# Patient Record
Sex: FEMALE | Race: WHITE | Employment: FULL TIME | URBAN - METROPOLITAN AREA
[De-identification: names, ages, dates, MRNs, and addresses within clinical notes are randomized per-mention and may not be internally consistent; named-entity substitution may affect disease eponyms.]

---

## 2017-02-06 ENCOUNTER — APPOINTMENT (OUTPATIENT)
Dept: GENERAL RADIOLOGY | Age: 57
End: 2017-02-06
Payer: COMMERCIAL

## 2017-02-06 ENCOUNTER — HOSPITAL ENCOUNTER (OUTPATIENT)
Age: 57
Setting detail: OBSERVATION
Discharge: HOME | End: 2017-02-07
Attending: EMERGENCY MEDICINE | Admitting: EMERGENCY MEDICINE
Payer: COMMERCIAL

## 2017-02-06 DIAGNOSIS — R79.1 SUPRATHERAPEUTIC INR: ICD-10-CM

## 2017-02-06 DIAGNOSIS — R06.09 OTHER FORM OF DYSPNEA: Primary | ICD-10-CM

## 2017-02-06 LAB
ABSOLUTE EOS #: 0.1 K/UL (ref 0–0.4)
ABSOLUTE LYMPH #: 1.5 K/UL (ref 1–4.8)
ABSOLUTE MONO #: 0.6 K/UL (ref 0.1–1.2)
ANION GAP SERPL CALCULATED.3IONS-SCNC: 13 MMOL/L (ref 9–17)
BASOPHILS # BLD: 0 % (ref 0–2)
BASOPHILS ABSOLUTE: 0 K/UL (ref 0–0.2)
BNP INTERPRETATION: ABNORMAL
BUN BLDV-MCNC: 10 MG/DL (ref 6–20)
BUN/CREAT BLD: ABNORMAL (ref 9–20)
CALCIUM SERPL-MCNC: 8.9 MG/DL (ref 8.6–10.4)
CHLORIDE BLD-SCNC: 98 MMOL/L (ref 98–107)
CO2: 27 MMOL/L (ref 20–31)
CREAT SERPL-MCNC: 0.57 MG/DL (ref 0.5–0.9)
DIFFERENTIAL TYPE: ABNORMAL
EOSINOPHILS RELATIVE PERCENT: 2 % (ref 1–4)
GFR AFRICAN AMERICAN: >60 ML/MIN
GFR NON-AFRICAN AMERICAN: >60 ML/MIN
GFR SERPL CREATININE-BSD FRML MDRD: ABNORMAL ML/MIN/{1.73_M2}
GFR SERPL CREATININE-BSD FRML MDRD: ABNORMAL ML/MIN/{1.73_M2}
GLUCOSE BLD-MCNC: 119 MG/DL (ref 70–99)
HCT VFR BLD CALC: 45 % (ref 36–46)
HEMOGLOBIN: 15.1 G/DL (ref 12–16)
INR BLD: 3.3
LYMPHOCYTES # BLD: 18 % (ref 24–44)
MAGNESIUM: 1.9 MG/DL (ref 1.6–2.6)
MCH RBC QN AUTO: 31.1 PG (ref 26–34)
MCHC RBC AUTO-ENTMCNC: 33.5 G/DL (ref 31–37)
MCV RBC AUTO: 93 FL (ref 80–100)
MONOCYTES # BLD: 8 % (ref 2–11)
PDW BLD-RTO: 15.1 % (ref 12.5–15.4)
PLATELET # BLD: 243 K/UL (ref 140–450)
PLATELET ESTIMATE: ABNORMAL
PMV BLD AUTO: 8.4 FL (ref 6–12)
POC TROPONIN I: 0.02 NG/ML (ref 0–0.1)
POC TROPONIN I: 0.03 NG/ML (ref 0–0.1)
POC TROPONIN INTERP: NORMAL
POC TROPONIN INTERP: NORMAL
POTASSIUM SERPL-SCNC: 4.1 MMOL/L (ref 3.7–5.3)
PRO-BNP: 1036 PG/ML
PROTHROMBIN TIME: 34.8 SEC (ref 9.4–12.6)
RBC # BLD: 4.83 M/UL (ref 4–5.2)
RBC # BLD: ABNORMAL 10*6/UL
SEG NEUTROPHILS: 72 % (ref 36–66)
SEGMENTED NEUTROPHILS ABSOLUTE COUNT: 5.8 K/UL (ref 1.8–7.7)
SODIUM BLD-SCNC: 138 MMOL/L (ref 135–144)
WBC # BLD: 8.1 K/UL (ref 3.5–11)
WBC # BLD: ABNORMAL 10*3/UL

## 2017-02-06 PROCEDURE — G0378 HOSPITAL OBSERVATION PER HR: HCPCS

## 2017-02-06 PROCEDURE — 6370000000 HC RX 637 (ALT 250 FOR IP): Performed by: EMERGENCY MEDICINE

## 2017-02-06 PROCEDURE — 84484 ASSAY OF TROPONIN QUANT: CPT

## 2017-02-06 PROCEDURE — 83735 ASSAY OF MAGNESIUM: CPT

## 2017-02-06 PROCEDURE — 71020 XR CHEST STANDARD TWO VW: CPT | Performed by: RADIOLOGY

## 2017-02-06 PROCEDURE — 83880 ASSAY OF NATRIURETIC PEPTIDE: CPT

## 2017-02-06 PROCEDURE — 85610 PROTHROMBIN TIME: CPT

## 2017-02-06 PROCEDURE — 80048 BASIC METABOLIC PNL TOTAL CA: CPT

## 2017-02-06 PROCEDURE — 2580000003 HC RX 258: Performed by: EMERGENCY MEDICINE

## 2017-02-06 PROCEDURE — 85025 COMPLETE CBC W/AUTO DIFF WBC: CPT

## 2017-02-06 PROCEDURE — 99285 EMERGENCY DEPT VISIT HI MDM: CPT

## 2017-02-06 PROCEDURE — 71020 XR CHEST STANDARD TWO VW: CPT

## 2017-02-06 PROCEDURE — 93005 ELECTROCARDIOGRAM TRACING: CPT

## 2017-02-06 RX ORDER — SODIUM CHLORIDE 0.9 % (FLUSH) 0.9 %
10 SYRINGE (ML) INJECTION PRN
Status: DISCONTINUED | OUTPATIENT
Start: 2017-02-06 | End: 2017-02-07 | Stop reason: HOSPADM

## 2017-02-06 RX ORDER — METOPROLOL TARTRATE 50 MG/1
50 TABLET, FILM COATED ORAL 2 TIMES DAILY
Status: DISCONTINUED | OUTPATIENT
Start: 2017-02-06 | End: 2017-02-07 | Stop reason: HOSPADM

## 2017-02-06 RX ORDER — NIACIN 500 MG/1
1000 TABLET, EXTENDED RELEASE ORAL NIGHTLY
Status: DISCONTINUED | OUTPATIENT
Start: 2017-02-06 | End: 2017-02-07 | Stop reason: HOSPADM

## 2017-02-06 RX ORDER — SOTALOL HYDROCHLORIDE 120 MG/1
120 TABLET ORAL 2 TIMES DAILY
Status: DISCONTINUED | OUTPATIENT
Start: 2017-02-06 | End: 2017-02-07 | Stop reason: HOSPADM

## 2017-02-06 RX ORDER — ASPIRIN 81 MG/1
81 TABLET, CHEWABLE ORAL 2 TIMES DAILY
Status: DISCONTINUED | OUTPATIENT
Start: 2017-02-06 | End: 2017-02-07 | Stop reason: HOSPADM

## 2017-02-06 RX ORDER — HYDROCHLOROTHIAZIDE 25 MG/1
12.5 TABLET ORAL DAILY
Status: DISCONTINUED | OUTPATIENT
Start: 2017-02-06 | End: 2017-02-07 | Stop reason: HOSPADM

## 2017-02-06 RX ORDER — CLOPIDOGREL BISULFATE 75 MG/1
75 TABLET ORAL DAILY
Status: DISCONTINUED | OUTPATIENT
Start: 2017-02-06 | End: 2017-02-07 | Stop reason: HOSPADM

## 2017-02-06 RX ORDER — SODIUM CHLORIDE 0.9 % (FLUSH) 0.9 %
10 SYRINGE (ML) INJECTION EVERY 12 HOURS SCHEDULED
Status: DISCONTINUED | OUTPATIENT
Start: 2017-02-06 | End: 2017-02-07 | Stop reason: HOSPADM

## 2017-02-06 RX ORDER — LOSARTAN POTASSIUM 50 MG/1
50 TABLET ORAL DAILY
Status: DISCONTINUED | OUTPATIENT
Start: 2017-02-06 | End: 2017-02-07 | Stop reason: HOSPADM

## 2017-02-06 RX ORDER — ATORVASTATIN CALCIUM 40 MG/1
40 TABLET, FILM COATED ORAL NIGHTLY
Status: DISCONTINUED | OUTPATIENT
Start: 2017-02-06 | End: 2017-02-07 | Stop reason: HOSPADM

## 2017-02-06 RX ORDER — ASPIRIN 81 MG/1
243 TABLET, CHEWABLE ORAL ONCE
Status: COMPLETED | OUTPATIENT
Start: 2017-02-06 | End: 2017-02-06

## 2017-02-06 RX ORDER — ACETAMINOPHEN 325 MG/1
650 TABLET ORAL EVERY 4 HOURS PRN
Status: DISCONTINUED | OUTPATIENT
Start: 2017-02-06 | End: 2017-02-07 | Stop reason: HOSPADM

## 2017-02-06 RX ORDER — SODIUM CHLORIDE 9 MG/ML
INJECTION, SOLUTION INTRAVENOUS CONTINUOUS
Status: DISCONTINUED | OUTPATIENT
Start: 2017-02-07 | End: 2017-02-07 | Stop reason: HOSPADM

## 2017-02-06 RX ADMIN — Medication 10 ML: at 22:22

## 2017-02-06 RX ADMIN — NIACIN 1000 MG: 500 TABLET, FILM COATED, EXTENDED RELEASE ORAL at 22:22

## 2017-02-06 RX ADMIN — ATORVASTATIN CALCIUM 40 MG: 40 TABLET, FILM COATED ORAL at 22:22

## 2017-02-06 RX ADMIN — METOPROLOL TARTRATE 50 MG: 50 TABLET, FILM COATED ORAL at 22:22

## 2017-02-06 RX ADMIN — SOTALOL HYDROCHLORIDE 120 MG: 120 TABLET ORAL at 22:22

## 2017-02-06 RX ADMIN — ASPIRIN 243 MG: 81 TABLET ORAL at 15:54

## 2017-02-06 ASSESSMENT — ENCOUNTER SYMPTOMS
WHEEZING: 0
BACK PAIN: 0
VOMITING: 0
SHORTNESS OF BREATH: 1
COUGH: 0
SORE THROAT: 0
ABDOMINAL PAIN: 0
NAUSEA: 0

## 2017-02-07 VITALS
HEIGHT: 63 IN | WEIGHT: 235 LBS | HEART RATE: 55 BPM | RESPIRATION RATE: 17 BRPM | SYSTOLIC BLOOD PRESSURE: 142 MMHG | BODY MASS INDEX: 41.64 KG/M2 | OXYGEN SATURATION: 96 % | TEMPERATURE: 97.7 F | DIASTOLIC BLOOD PRESSURE: 94 MMHG

## 2017-02-07 LAB
D-DIMER QUANTITATIVE: 0.17 MG/L FEU
EKG ATRIAL RATE: 55 BPM
EKG ATRIAL RATE: 58 BPM
EKG ATRIAL RATE: 61 BPM
EKG P AXIS: 46 DEGREES
EKG P AXIS: 48 DEGREES
EKG P AXIS: 52 DEGREES
EKG P-R INTERVAL: 152 MS
EKG P-R INTERVAL: 162 MS
EKG P-R INTERVAL: 168 MS
EKG Q-T INTERVAL: 446 MS
EKG Q-T INTERVAL: 464 MS
EKG Q-T INTERVAL: 474 MS
EKG QRS DURATION: 72 MS
EKG QRS DURATION: 72 MS
EKG QRS DURATION: 74 MS
EKG QTC CALCULATION (BAZETT): 443 MS
EKG QTC CALCULATION (BAZETT): 448 MS
EKG QTC CALCULATION (BAZETT): 465 MS
EKG R AXIS: -4 DEGREES
EKG R AXIS: -8 DEGREES
EKG R AXIS: 18 DEGREES
EKG T AXIS: -106 DEGREES
EKG T AXIS: -106 DEGREES
EKG T AXIS: -150 DEGREES
EKG VENTRICULAR RATE: 55 BPM
EKG VENTRICULAR RATE: 58 BPM
EKG VENTRICULAR RATE: 61 BPM
INR BLD: 2.1
PROTHROMBIN TIME: 22.3 SEC (ref 9.4–12.6)

## 2017-02-07 PROCEDURE — 6370000000 HC RX 637 (ALT 250 FOR IP): Performed by: EMERGENCY MEDICINE

## 2017-02-07 PROCEDURE — 36415 COLL VENOUS BLD VENIPUNCTURE: CPT

## 2017-02-07 PROCEDURE — 85610 PROTHROMBIN TIME: CPT

## 2017-02-07 PROCEDURE — 2580000003 HC RX 258: Performed by: EMERGENCY MEDICINE

## 2017-02-07 PROCEDURE — 85379 FIBRIN DEGRADATION QUANT: CPT

## 2017-02-07 PROCEDURE — 93308 TTE F-UP OR LMTD: CPT | Performed by: EMERGENCY MEDICINE

## 2017-02-07 PROCEDURE — 93005 ELECTROCARDIOGRAM TRACING: CPT

## 2017-02-07 PROCEDURE — G0378 HOSPITAL OBSERVATION PER HR: HCPCS

## 2017-02-07 RX ORDER — FUROSEMIDE 10 MG/ML
40 INJECTION INTRAMUSCULAR; INTRAVENOUS 2 TIMES DAILY
Status: DISCONTINUED | OUTPATIENT
Start: 2017-02-07 | End: 2017-02-07 | Stop reason: HOSPADM

## 2017-02-07 RX ORDER — WARFARIN SODIUM 5 MG/1
5 TABLET ORAL
Status: DISCONTINUED | OUTPATIENT
Start: 2017-02-07 | End: 2017-02-07 | Stop reason: DRUGHIGH

## 2017-02-07 RX ORDER — WARFARIN SODIUM 2.5 MG/1
2.5 TABLET ORAL
Status: DISCONTINUED | OUTPATIENT
Start: 2017-02-07 | End: 2017-02-07 | Stop reason: HOSPADM

## 2017-02-07 RX ORDER — WARFARIN SODIUM 5 MG/1
5 TABLET ORAL
Status: DISCONTINUED | OUTPATIENT
Start: 2017-02-08 | End: 2017-02-07 | Stop reason: HOSPADM

## 2017-02-07 RX ADMIN — METOPROLOL TARTRATE 50 MG: 50 TABLET, FILM COATED ORAL at 10:37

## 2017-02-07 RX ADMIN — SOTALOL HYDROCHLORIDE 120 MG: 120 TABLET ORAL at 10:37

## 2017-02-07 RX ADMIN — ASPIRIN 81 MG: 81 TABLET, CHEWABLE ORAL at 10:37

## 2017-02-07 RX ADMIN — HYDROCHLOROTHIAZIDE 12.5 MG: 25 TABLET ORAL at 10:37

## 2017-02-07 RX ADMIN — LOSARTAN POTASSIUM 50 MG: 50 TABLET, FILM COATED ORAL at 10:37

## 2017-02-07 RX ADMIN — Medication 10 ML: at 10:38

## 2017-02-07 RX ADMIN — CLOPIDOGREL 75 MG: 75 TABLET, FILM COATED ORAL at 10:37

## 2017-02-16 ENCOUNTER — HOSPITAL ENCOUNTER (OUTPATIENT)
Age: 57
Setting detail: SPECIMEN
Discharge: HOME | End: 2017-02-16
Payer: COMMERCIAL

## 2017-02-16 LAB
ANION GAP SERPL CALCULATED.3IONS-SCNC: 15 MMOL/L (ref 9–17)
BUN BLDV-MCNC: 16 MG/DL (ref 6–20)
BUN/CREAT BLD: ABNORMAL (ref 9–20)
CALCIUM SERPL-MCNC: 9.2 MG/DL (ref 8.6–10.4)
CHLORIDE BLD-SCNC: 96 MMOL/L (ref 98–107)
CO2: 30 MMOL/L (ref 20–31)
CREAT SERPL-MCNC: 1.1 MG/DL (ref 0.5–0.9)
GFR AFRICAN AMERICAN: >60 ML/MIN
GFR NON-AFRICAN AMERICAN: 52 ML/MIN
GFR SERPL CREATININE-BSD FRML MDRD: ABNORMAL ML/MIN/{1.73_M2}
GFR SERPL CREATININE-BSD FRML MDRD: ABNORMAL ML/MIN/{1.73_M2}
GLUCOSE BLD-MCNC: 118 MG/DL (ref 70–99)
INR BLD: 3.4
POTASSIUM SERPL-SCNC: 3.5 MMOL/L (ref 3.7–5.3)
PROTHROMBIN TIME: 36.3 SEC (ref 9.4–12.6)
SODIUM BLD-SCNC: 141 MMOL/L (ref 135–144)

## 2017-02-17 ENCOUNTER — OFFICE VISIT (OUTPATIENT)
Dept: FAMILY MEDICINE CLINIC | Facility: CLINIC | Age: 57
End: 2017-02-17

## 2017-02-17 VITALS
SYSTOLIC BLOOD PRESSURE: 136 MMHG | WEIGHT: 229 LBS | HEART RATE: 66 BPM | BODY MASS INDEX: 39.09 KG/M2 | OXYGEN SATURATION: 97 % | DIASTOLIC BLOOD PRESSURE: 87 MMHG | HEIGHT: 64 IN

## 2017-02-17 DIAGNOSIS — I48.91 RAPID ATRIAL FIBRILLATION (HCC): ICD-10-CM

## 2017-02-17 DIAGNOSIS — Z12.39 SCREENING FOR BREAST CANCER: ICD-10-CM

## 2017-02-17 DIAGNOSIS — E78.5 HYPERLIPIDEMIA, UNSPECIFIED HYPERLIPIDEMIA TYPE: ICD-10-CM

## 2017-02-17 DIAGNOSIS — I10 ESSENTIAL HYPERTENSION: ICD-10-CM

## 2017-02-17 DIAGNOSIS — Z00.00 WELL ADULT EXAM: Primary | ICD-10-CM

## 2017-02-17 DIAGNOSIS — Z12.11 SCREENING FOR COLON CANCER: ICD-10-CM

## 2017-02-17 DIAGNOSIS — I25.10 CORONARY ARTERY DISEASE INVOLVING NATIVE HEART WITHOUT ANGINA PECTORIS, UNSPECIFIED VESSEL OR LESION TYPE: ICD-10-CM

## 2017-02-17 PROCEDURE — 99396 PREV VISIT EST AGE 40-64: CPT | Performed by: PHYSICIAN ASSISTANT

## 2017-02-17 ASSESSMENT — ENCOUNTER SYMPTOMS
TROUBLE SWALLOWING: 0
EYE DISCHARGE: 0
WHEEZING: 0
ABDOMINAL PAIN: 0
COLOR CHANGE: 0
VOICE CHANGE: 0
RHINORRHEA: 0
NAUSEA: 0
VOMITING: 0
SHORTNESS OF BREATH: 0
CONSTIPATION: 0
SINUS PRESSURE: 0
CHEST TIGHTNESS: 0
DIARRHEA: 0
EYE PAIN: 0
SORE THROAT: 0
COUGH: 0

## 2017-02-23 ENCOUNTER — HOSPITAL ENCOUNTER (OUTPATIENT)
Age: 57
Setting detail: SPECIMEN
Discharge: HOME | End: 2017-02-23
Payer: COMMERCIAL

## 2017-02-23 DIAGNOSIS — Z00.00 WELL ADULT EXAM: ICD-10-CM

## 2017-02-23 LAB
ABSOLUTE EOS #: 0.2 K/UL (ref 0–0.4)
ABSOLUTE LYMPH #: 1.6 K/UL (ref 1–4.8)
ABSOLUTE MONO #: 0.5 K/UL (ref 0.1–1.2)
ALBUMIN SERPL-MCNC: 3.9 G/DL (ref 3.5–5.2)
ALBUMIN/GLOBULIN RATIO: 1.4 (ref 1–2.5)
ALP BLD-CCNC: 74 U/L (ref 35–104)
ALT SERPL-CCNC: 31 U/L (ref 5–33)
ANION GAP SERPL CALCULATED.3IONS-SCNC: 13 MMOL/L (ref 9–17)
AST SERPL-CCNC: 29 U/L
BASOPHILS # BLD: 1 % (ref 0–2)
BASOPHILS ABSOLUTE: 0.1 K/UL (ref 0–0.2)
BILIRUB SERPL-MCNC: 0.44 MG/DL (ref 0.3–1.2)
BUN BLDV-MCNC: 23 MG/DL (ref 6–20)
BUN/CREAT BLD: ABNORMAL (ref 9–20)
CALCIUM SERPL-MCNC: 9.4 MG/DL (ref 8.6–10.4)
CHLORIDE BLD-SCNC: 97 MMOL/L (ref 98–107)
CHOLESTEROL/HDL RATIO: 4.1
CHOLESTEROL: 142 MG/DL
CO2: 31 MMOL/L (ref 20–31)
CREAT SERPL-MCNC: 0.94 MG/DL (ref 0.5–0.9)
DIFFERENTIAL TYPE: ABNORMAL
EOSINOPHILS RELATIVE PERCENT: 2 % (ref 1–4)
GFR AFRICAN AMERICAN: >60 ML/MIN
GFR NON-AFRICAN AMERICAN: >60 ML/MIN
GFR SERPL CREATININE-BSD FRML MDRD: ABNORMAL ML/MIN/{1.73_M2}
GFR SERPL CREATININE-BSD FRML MDRD: ABNORMAL ML/MIN/{1.73_M2}
GLUCOSE BLD-MCNC: 143 MG/DL (ref 70–99)
HCT VFR BLD CALC: 49.9 % (ref 36–46)
HDLC SERPL-MCNC: 35 MG/DL
HEMOGLOBIN: 16.3 G/DL (ref 12–16)
LDL CHOLESTEROL: 55 MG/DL (ref 0–130)
LYMPHOCYTES # BLD: 20 % (ref 24–44)
MCH RBC QN AUTO: 30.9 PG (ref 26–34)
MCHC RBC AUTO-ENTMCNC: 32.6 G/DL (ref 31–37)
MCV RBC AUTO: 94.6 FL (ref 80–100)
MONOCYTES # BLD: 6 % (ref 2–11)
PDW BLD-RTO: 15.1 % (ref 12.5–15.4)
PLATELET # BLD: 278 K/UL (ref 140–450)
PLATELET ESTIMATE: ABNORMAL
PMV BLD AUTO: 9.1 FL (ref 6–12)
POTASSIUM SERPL-SCNC: 3.8 MMOL/L (ref 3.7–5.3)
RBC # BLD: 5.28 M/UL (ref 4–5.2)
RBC # BLD: ABNORMAL 10*6/UL
SEG NEUTROPHILS: 71 % (ref 36–66)
SEGMENTED NEUTROPHILS ABSOLUTE COUNT: 5.8 K/UL (ref 1.8–7.7)
SODIUM BLD-SCNC: 141 MMOL/L (ref 135–144)
TOTAL PROTEIN: 6.7 G/DL (ref 6.4–8.3)
TRIGL SERPL-MCNC: 262 MG/DL
TSH SERPL DL<=0.05 MIU/L-ACNC: 3.01 MIU/L (ref 0.3–5)
VLDLC SERPL CALC-MCNC: ABNORMAL MG/DL (ref 1–30)
WBC # BLD: 8.1 K/UL (ref 3.5–11)
WBC # BLD: ABNORMAL 10*3/UL

## 2017-02-24 LAB
INR BLD: 4
PROTHROMBIN TIME: 42.3 SEC (ref 9.4–12.6)

## 2017-02-27 ENCOUNTER — HOSPITAL ENCOUNTER (OUTPATIENT)
Dept: MAMMOGRAPHY | Age: 57
Discharge: HOME | End: 2017-02-27
Payer: COMMERCIAL

## 2017-02-27 DIAGNOSIS — Z12.39 SCREENING FOR BREAST CANCER: ICD-10-CM

## 2017-02-27 PROCEDURE — G0202 SCR MAMMO BI INCL CAD: HCPCS

## 2017-02-27 PROCEDURE — 77067 SCR MAMMO BI INCL CAD: CPT | Performed by: RADIOLOGY

## 2017-02-28 ENCOUNTER — OFFICE VISIT (OUTPATIENT)
Dept: FAMILY MEDICINE CLINIC | Facility: CLINIC | Age: 57
End: 2017-02-28

## 2017-02-28 VITALS
SYSTOLIC BLOOD PRESSURE: 136 MMHG | DIASTOLIC BLOOD PRESSURE: 88 MMHG | HEIGHT: 64 IN | HEART RATE: 74 BPM | BODY MASS INDEX: 40.12 KG/M2 | OXYGEN SATURATION: 97 % | WEIGHT: 235 LBS

## 2017-02-28 DIAGNOSIS — E78.5 HYPERLIPIDEMIA, UNSPECIFIED HYPERLIPIDEMIA TYPE: ICD-10-CM

## 2017-02-28 DIAGNOSIS — R73.9 HYPERGLYCEMIA: Primary | ICD-10-CM

## 2017-02-28 DIAGNOSIS — Z23 NEED FOR TETANUS BOOSTER: ICD-10-CM

## 2017-02-28 DIAGNOSIS — I10 ESSENTIAL HYPERTENSION: ICD-10-CM

## 2017-02-28 LAB — HBA1C MFR BLD: 4.6 %

## 2017-02-28 PROCEDURE — 90471 IMMUNIZATION ADMIN: CPT | Performed by: PHYSICIAN ASSISTANT

## 2017-02-28 PROCEDURE — 99214 OFFICE O/P EST MOD 30 MIN: CPT | Performed by: PHYSICIAN ASSISTANT

## 2017-02-28 PROCEDURE — 83036 HEMOGLOBIN GLYCOSYLATED A1C: CPT | Performed by: PHYSICIAN ASSISTANT

## 2017-02-28 PROCEDURE — 90715 TDAP VACCINE 7 YRS/> IM: CPT | Performed by: PHYSICIAN ASSISTANT

## 2017-02-28 ASSESSMENT — ENCOUNTER SYMPTOMS
CONSTIPATION: 0
DIARRHEA: 0
SHORTNESS OF BREATH: 0
NAUSEA: 0
BLURRED VISION: 0
COLOR CHANGE: 0
ABDOMINAL PAIN: 0
WHEEZING: 0
VOMITING: 0
COUGH: 0

## 2017-03-02 ENCOUNTER — NURSE ONLY (OUTPATIENT)
Dept: FAMILY MEDICINE CLINIC | Facility: CLINIC | Age: 57
End: 2017-03-02

## 2017-03-02 ENCOUNTER — HOSPITAL ENCOUNTER (OUTPATIENT)
Age: 57
Setting detail: SPECIMEN
Discharge: HOME | End: 2017-03-02
Payer: COMMERCIAL

## 2017-03-02 DIAGNOSIS — Z12.11 COLON CANCER SCREENING: Primary | ICD-10-CM

## 2017-03-02 DIAGNOSIS — Z12.11 SCREENING FOR COLON CANCER: ICD-10-CM

## 2017-03-02 LAB
CONTROL: POSITIVE
HEMOCCULT STL QL: NEGATIVE
INR BLD: 3
PROTHROMBIN TIME: 32.2 SEC (ref 9.4–12.6)

## 2017-03-02 PROCEDURE — 82274 ASSAY TEST FOR BLOOD FECAL: CPT | Performed by: PHYSICIAN ASSISTANT

## 2017-03-16 ENCOUNTER — HOSPITAL ENCOUNTER (OUTPATIENT)
Age: 57
Setting detail: SPECIMEN
Discharge: HOME | End: 2017-03-16
Payer: COMMERCIAL

## 2017-03-16 LAB
INR BLD: 2.9
PROTHROMBIN TIME: 31.2 SEC (ref 9.4–12.6)

## 2017-04-25 ENCOUNTER — HOSPITAL ENCOUNTER (OUTPATIENT)
Age: 57
Setting detail: SPECIMEN
Discharge: HOME | End: 2017-04-25
Payer: COMMERCIAL

## 2017-04-25 LAB
INR BLD: 2.5
PROTHROMBIN TIME: 26.9 SEC (ref 9.4–12.6)

## 2017-05-19 RX ORDER — LOSARTAN POTASSIUM 50 MG/1
TABLET ORAL
Qty: 30 TABLET | Refills: 6 | Status: SHIPPED | OUTPATIENT
Start: 2017-05-19 | End: 2017-12-21 | Stop reason: SDUPTHER

## 2017-05-31 ENCOUNTER — HOSPITAL ENCOUNTER (OUTPATIENT)
Age: 57
Setting detail: SPECIMEN
Discharge: HOME | End: 2017-05-31
Payer: COMMERCIAL

## 2017-05-31 LAB
INR BLD: 3.7
PROTHROMBIN TIME: 39.6 SEC (ref 9.4–12.6)

## 2017-06-26 ENCOUNTER — TELEPHONE (OUTPATIENT)
Dept: FAMILY MEDICINE CLINIC | Age: 57
End: 2017-06-26

## 2017-06-27 ENCOUNTER — OFFICE VISIT (OUTPATIENT)
Dept: FAMILY MEDICINE CLINIC | Age: 57
End: 2017-06-27
Payer: COMMERCIAL

## 2017-06-27 VITALS
OXYGEN SATURATION: 98 % | HEART RATE: 60 BPM | DIASTOLIC BLOOD PRESSURE: 86 MMHG | SYSTOLIC BLOOD PRESSURE: 122 MMHG | HEIGHT: 64 IN

## 2017-06-27 DIAGNOSIS — R30.0 DYSURIA: ICD-10-CM

## 2017-06-27 DIAGNOSIS — R10.9 FLANK PAIN, ACUTE: Primary | ICD-10-CM

## 2017-06-27 LAB
BILIRUBIN, POC: NEGATIVE
BLOOD URINE, POC: NEGATIVE
CLARITY, POC: CLEAR
COLOR, POC: YELLOW
GLUCOSE URINE, POC: NEGATIVE
KETONES, POC: NEGATIVE
LEUKOCYTE EST, POC: NEGATIVE
NITRITE, POC: NEGATIVE
PH, POC: 7
PROTEIN, POC: NEGATIVE
SPECIFIC GRAVITY, POC: 1.01
UROBILINOGEN, POC: 0.2

## 2017-06-27 PROCEDURE — 99213 OFFICE O/P EST LOW 20 MIN: CPT | Performed by: PHYSICIAN ASSISTANT

## 2017-06-27 PROCEDURE — 81003 URINALYSIS AUTO W/O SCOPE: CPT | Performed by: PHYSICIAN ASSISTANT

## 2017-06-27 RX ORDER — FUROSEMIDE 20 MG/1
TABLET ORAL
Refills: 11 | COMMUNITY
Start: 2017-05-29 | End: 2019-05-28 | Stop reason: DRUGHIGH

## 2017-06-27 ASSESSMENT — ENCOUNTER SYMPTOMS
NAUSEA: 1
DIARRHEA: 0
SHORTNESS OF BREATH: 0
CONSTIPATION: 0
VOMITING: 1
ABDOMINAL PAIN: 0
COLOR CHANGE: 0
WHEEZING: 0
COUGH: 0

## 2017-07-07 ENCOUNTER — HOSPITAL ENCOUNTER (OUTPATIENT)
Age: 57
Setting detail: SPECIMEN
Discharge: HOME | End: 2017-07-07

## 2017-07-07 DIAGNOSIS — R73.9 HYPERGLYCEMIA: ICD-10-CM

## 2017-07-07 DIAGNOSIS — E78.5 HYPERLIPIDEMIA, UNSPECIFIED HYPERLIPIDEMIA TYPE: ICD-10-CM

## 2017-07-07 LAB
ALBUMIN SERPL-MCNC: 4.3 G/DL (ref 3.5–5.2)
ALBUMIN/GLOBULIN RATIO: 1.7 (ref 1–2.5)
ALP BLD-CCNC: 66 U/L (ref 35–104)
ALT SERPL-CCNC: 20 U/L (ref 5–33)
ANION GAP SERPL CALCULATED.3IONS-SCNC: 16 MMOL/L (ref 9–17)
AST SERPL-CCNC: 21 U/L
BILIRUB SERPL-MCNC: 0.51 MG/DL (ref 0.3–1.2)
BUN BLDV-MCNC: 12 MG/DL (ref 6–20)
BUN/CREAT BLD: ABNORMAL (ref 9–20)
CALCIUM SERPL-MCNC: 9.3 MG/DL (ref 8.6–10.4)
CHLORIDE BLD-SCNC: 98 MMOL/L (ref 98–107)
CHOLESTEROL/HDL RATIO: 3.4
CHOLESTEROL: 133 MG/DL
CO2: 31 MMOL/L (ref 20–31)
CREAT SERPL-MCNC: 0.75 MG/DL (ref 0.5–0.9)
ESTIMATED AVERAGE GLUCOSE: 140 MG/DL
GFR AFRICAN AMERICAN: >60 ML/MIN
GFR NON-AFRICAN AMERICAN: >60 ML/MIN
GFR SERPL CREATININE-BSD FRML MDRD: ABNORMAL ML/MIN/{1.73_M2}
GFR SERPL CREATININE-BSD FRML MDRD: ABNORMAL ML/MIN/{1.73_M2}
GLUCOSE BLD-MCNC: 118 MG/DL (ref 70–99)
HBA1C MFR BLD: 6.5 % (ref 4–6)
HDLC SERPL-MCNC: 39 MG/DL
INR BLD: 3.2
LDL CHOLESTEROL: 48 MG/DL (ref 0–130)
POTASSIUM SERPL-SCNC: 4.4 MMOL/L (ref 3.7–5.3)
PROTHROMBIN TIME: 34.3 SEC (ref 9.4–12.6)
SODIUM BLD-SCNC: 145 MMOL/L (ref 135–144)
TOTAL PROTEIN: 6.8 G/DL (ref 6.4–8.3)
TRIGL SERPL-MCNC: 228 MG/DL
VLDLC SERPL CALC-MCNC: ABNORMAL MG/DL (ref 1–30)

## 2017-07-18 RX ORDER — HYDROCHLOROTHIAZIDE 12.5 MG/1
CAPSULE, GELATIN COATED ORAL
Qty: 30 CAPSULE | Refills: 2 | Status: SHIPPED | OUTPATIENT
Start: 2017-07-18 | End: 2017-10-31 | Stop reason: SDUPTHER

## 2017-08-16 ENCOUNTER — HOSPITAL ENCOUNTER (OUTPATIENT)
Age: 57
Setting detail: SPECIMEN
Discharge: HOME | End: 2017-08-16
Payer: COMMERCIAL

## 2017-08-16 LAB
INR BLD: 3.2
PROTHROMBIN TIME: 35 SEC (ref 9.4–12.6)

## 2017-08-24 ENCOUNTER — OFFICE VISIT (OUTPATIENT)
Dept: FAMILY MEDICINE CLINIC | Age: 57
End: 2017-08-24

## 2017-08-24 VITALS
HEIGHT: 63 IN | TEMPERATURE: 98.1 F | OXYGEN SATURATION: 96 % | HEART RATE: 96 BPM | DIASTOLIC BLOOD PRESSURE: 78 MMHG | WEIGHT: 234.8 LBS | SYSTOLIC BLOOD PRESSURE: 122 MMHG | BODY MASS INDEX: 41.6 KG/M2

## 2017-08-24 DIAGNOSIS — I10 ESSENTIAL HYPERTENSION: ICD-10-CM

## 2017-08-24 DIAGNOSIS — L98.9 FINGER LESION: ICD-10-CM

## 2017-08-24 DIAGNOSIS — E78.2 MIXED HYPERLIPIDEMIA: ICD-10-CM

## 2017-08-24 DIAGNOSIS — I48.0 PAROXYSMAL ATRIAL FIBRILLATION (HCC): ICD-10-CM

## 2017-08-24 DIAGNOSIS — I25.2 HISTORY OF ST ELEVATION MYOCARDIAL INFARCTION (STEMI): ICD-10-CM

## 2017-08-24 DIAGNOSIS — E11.9 TYPE 2 DIABETES MELLITUS WITHOUT COMPLICATION, WITHOUT LONG-TERM CURRENT USE OF INSULIN (HCC): Primary | ICD-10-CM

## 2017-08-24 DIAGNOSIS — Z72.0 TOBACCO ABUSE: ICD-10-CM

## 2017-08-24 PROCEDURE — 99214 OFFICE O/P EST MOD 30 MIN: CPT | Performed by: INTERNAL MEDICINE

## 2017-08-24 RX ORDER — CHLORAL HYDRATE 500 MG
1000 CAPSULE ORAL DAILY
COMMUNITY

## 2017-08-24 RX ORDER — GLUCOSAMINE HCL/CHONDROITIN SU 500-400 MG
CAPSULE ORAL
Qty: 50 STRIP | Refills: 6 | Status: SHIPPED | OUTPATIENT
Start: 2017-08-24 | End: 2018-08-24

## 2017-08-24 ASSESSMENT — PATIENT HEALTH QUESTIONNAIRE - PHQ9
1. LITTLE INTEREST OR PLEASURE IN DOING THINGS: 0
2. FEELING DOWN, DEPRESSED OR HOPELESS: 0
SUM OF ALL RESPONSES TO PHQ QUESTIONS 1-9: 0
SUM OF ALL RESPONSES TO PHQ9 QUESTIONS 1 & 2: 0

## 2017-09-14 ENCOUNTER — HOSPITAL ENCOUNTER (OUTPATIENT)
Age: 57
Setting detail: SPECIMEN
Discharge: HOME | End: 2017-09-14

## 2017-09-14 LAB
INR BLD: 2.3
PROTHROMBIN TIME: 25 SEC (ref 9.4–12.6)

## 2017-09-21 ENCOUNTER — OFFICE VISIT (OUTPATIENT)
Dept: FAMILY MEDICINE CLINIC | Age: 57
End: 2017-09-21

## 2017-09-21 VITALS
HEART RATE: 61 BPM | SYSTOLIC BLOOD PRESSURE: 122 MMHG | TEMPERATURE: 97.8 F | BODY MASS INDEX: 40.6 KG/M2 | DIASTOLIC BLOOD PRESSURE: 80 MMHG | OXYGEN SATURATION: 98 % | WEIGHT: 232 LBS

## 2017-09-21 DIAGNOSIS — E11.9 TYPE 2 DIABETES MELLITUS WITHOUT COMPLICATION, WITHOUT LONG-TERM CURRENT USE OF INSULIN (HCC): Primary | ICD-10-CM

## 2017-09-21 DIAGNOSIS — I25.10 CORONARY ARTERY DISEASE INVOLVING NATIVE CORONARY ARTERY OF NATIVE HEART WITHOUT ANGINA PECTORIS: ICD-10-CM

## 2017-09-21 PROCEDURE — 99214 OFFICE O/P EST MOD 30 MIN: CPT | Performed by: INTERNAL MEDICINE

## 2017-10-18 ENCOUNTER — HOSPITAL ENCOUNTER (OUTPATIENT)
Age: 57
Setting detail: SPECIMEN
Discharge: HOME | End: 2017-10-18

## 2017-10-18 LAB
INR BLD: 2.9
PROTHROMBIN TIME: 31.8 SEC (ref 9.4–12.6)

## 2017-10-31 RX ORDER — HYDROCHLOROTHIAZIDE 12.5 MG/1
CAPSULE, GELATIN COATED ORAL
Qty: 90 CAPSULE | Refills: 1 | Status: SHIPPED | OUTPATIENT
Start: 2017-10-31 | End: 2018-05-09 | Stop reason: SDUPTHER

## 2017-10-31 NOTE — TELEPHONE ENCOUNTER
LAST APPT WAS 9/21/17  NEXT APPT IS 12/21/17    Next Visit Date:  Future Appointments  Date Time Provider Cole Tucker   12/21/2017 10:15 AM Kathrin Hannah  [address] Maintenance   Topic Date Due    Diabetic foot exam  08/28/1972    Diabetic retinal exam  08/28/1972    Diabetic microalbuminuria test  08/28/1980    Cervical cancer screen  02/03/2018    Colon Cancer Screen FIT/FOBT  03/02/2018    Diabetic hemoglobin A1C test  07/07/2018    Lipid screen  07/07/2018    Breast cancer screen  02/27/2019    DTaP/Tdap/Td vaccine (2 - Td) 02/28/2027    Flu vaccine  Completed    Pneumococcal med risk  Completed    Hepatitis C screen  Completed    HIV screen  Completed       Hemoglobin A1C (%)   Date Value   07/07/2017 6.5 (H)   02/28/2017 4.6   12/20/2011 5.7             ( goal A1C is < 7)   No results found for: LABMICR  LDL Cholesterol (mg/dL)   Date Value   07/07/2017 48     LDL Calculated (mg/dL)   Date Value   08/12/2015 40       (goal LDL is <100)   AST (U/L)   Date Value   07/07/2017 21     ALT (U/L)   Date Value   07/07/2017 20     BUN (mg/dL)   Date Value   07/07/2017 12     BP Readings from Last 3 Encounters:   09/21/17 122/80   08/24/17 122/78   06/27/17 122/86          (goal 120/80)    All Future Testing planned in [facility]  Lab Frequency Next Occurrence               Patient Active Problem List:     Menometrorrhagia     Anemia     S/P endometrial ablation     Hypertension     Hyperlipidemia     Fatigue     Palpitations     Headache     Heart disease     CAD (coronary artery disease) with stents     SOB (shortness of breath)     Chest pain     PAF (paroxysmal atrial fibrillation) (HCC)     Rapid atrial fibrillation (HCC)     Paroxysmal atrial fibrillation ([address]

## 2017-11-13 ENCOUNTER — OFFICE VISIT (OUTPATIENT)
Dept: FAMILY MEDICINE CLINIC | Age: 57
End: 2017-11-13

## 2017-11-13 VITALS
WEIGHT: 228 LBS | SYSTOLIC BLOOD PRESSURE: 128 MMHG | DIASTOLIC BLOOD PRESSURE: 70 MMHG | TEMPERATURE: 97.4 F | BODY MASS INDEX: 39.9 KG/M2 | HEART RATE: 82 BPM | OXYGEN SATURATION: 99 %

## 2017-11-13 DIAGNOSIS — I48.0 PAF (PAROXYSMAL ATRIAL FIBRILLATION) (HCC): ICD-10-CM

## 2017-11-13 DIAGNOSIS — Z79.01 LONG TERM CURRENT USE OF ANTICOAGULANT THERAPY: ICD-10-CM

## 2017-11-13 DIAGNOSIS — J40 BRONCHITIS: Primary | ICD-10-CM

## 2017-11-13 LAB
INTERNATIONAL NORMALIZATION RATIO, POC: 4.3
PROTHROMBIN TIME, POC: 51.1

## 2017-11-13 PROCEDURE — 85610 PROTHROMBIN TIME: CPT | Performed by: NURSE PRACTITIONER

## 2017-11-13 PROCEDURE — 99213 OFFICE O/P EST LOW 20 MIN: CPT | Performed by: NURSE PRACTITIONER

## 2017-11-13 RX ORDER — DOXYCYCLINE HYCLATE 100 MG/1
100 CAPSULE ORAL 2 TIMES DAILY
Qty: 20 CAPSULE | Refills: 0 | Status: SHIPPED | OUTPATIENT
Start: 2017-11-13 | End: 2017-11-23

## 2017-11-13 ASSESSMENT — ENCOUNTER SYMPTOMS
BACK PAIN: 1
SHORTNESS OF BREATH: 0
COUGH: 1
WHEEZING: 0
CHEST TIGHTNESS: 0

## 2017-11-13 NOTE — PROGRESS NOTES
[facility] PHYSICIANS  [facility] PHYS POINT [address] [facility] [address]  Dept: [phone]  Dept Fax: [phone]      Maximus Madera is a 56 y.o. female who presents today for her medical conditions/complaints as noted below. Maximus Madera is c/o of Fatigue (x 2 weeks); Cough (white mucus); and Nausea        HPI:     HPI  Georgette Lizama is here today not feeling well. She has been fatigued and has a productive cough. she is sweating and chilling. she is not short of breath or wheezing. She has been lightheaded. She has called off work and this is unusual for her.this has been going on for the last 7 days. She has had ill contacts. She is a smoker.    Hemoglobin A1C (%)   Date Value   07/07/2017 6.5 (H)   02/28/2017 4.6   12/20/2011 5.7             ( goal A1C is < 7)   No results found for: LABMICR  LDL Cholesterol (mg/dL)   Date Value   07/07/2017 48   02/23/2017 55   12/05/2014 85     LDL Calculated (mg/dL)   Date Value   08/12/2015 40   09/18/2014 71       (goal LDL is <100)   AST (U/L)   Date Value   07/07/2017 21     ALT (U/L)   Date Value   07/07/2017 20     BUN (mg/dL)   Date Value   07/07/2017 12     BP Readings from Last 3 Encounters:   11/13/17 128/70   09/21/17 122/80   08/24/17 122/78          (goal 120/80)    Past Medical History:   Diagnosis Date    Abnormal Pap smear 1989    Had colposcopy    Allergy     PCN    Anemia     Atrial fibrillation ([address] 12/5/2014    Blood transfusion 12/20/11    4 units    CAD (coronary artery disease)     Eczema     Examination of participant in clinical trial 1/12/11    end date 3/25/15    Headache 12/5/2014    Heart disease 1/13/11    card stents x 2    Hyperlipidemia     Hypertension     Menopausal symptoms     PAF (paroxysmal atrial fibrillation) ([address]     s/p cardioversion Dec 2015       Past Surgical History:   Procedure Laterality Date    CARDIAC CATHETERIZATION Left 12/18/2016    CARDIAC SURGERY      heart stents placed [facility]    COLPOSCOPY      DILATION AND CURETTAGE      ENDOMETRIAL ABLATION  12    hydrothermal    LAPAROSCOPY  1989    TUBAL LIGATION         Family History   Problem Relation Age of Onset    COPD Father     Coronary Art Dis Father     Cancer Mother     Heart Failure Maternal Grandmother     Breast Cancer Neg Hx     Colon Cancer Neg Hx     Diabetes Neg Hx     Eclampsia Neg Hx     Hypertension Neg Hx     Ovarian Cancer Neg Hx      Labor Neg Hx     Spont Abortions Neg Hx     Stroke Neg Hx        Social History   Substance Use Topics    Smoking status: Current Every Day Smoker     Packs/day: 0.50     Years: 34.00     Types: Cigarettes    Smokeless tobacco: Never Used      Comment: smokes 5 cig/day    Alcohol use No      Current Outpatient Prescriptions   Medication Sig Dispense Refill    doxycycline hyclate (VIBRAMYCIN) 100 MG capsule Take 1 capsule by mouth 2 times daily for 10 days Take with food, but avoid dairy, calcium and MTV's 2 hours before and after the dose 20 capsule 0    hydrochlorothiazide (MICROZIDE) 12.5 MG capsule TAKE 1 CAPSULE BY MOUTH ONE TIME A DAY 90 capsule 1    Omega-3 Fatty Acids (FISH OIL) 1000 MG CAPS Take 3,000 mg by mouth daily      Glucose Blood (BLOOD GLUCOSE TEST STRIPS) STRP Check blood sugars daily as directed. 50 strip 6    furosemide (LASIX) 20 MG tablet TAKE 1 TABLET BY MOUTH TWO TIMES A DAY  11    losartan (COZAAR) 50 MG tablet TAKE 1 TABLET BY MOUTH ONE TIME A DAY 30 tablet 6    nitroGLYCERIN (NITROSTAT) 0.4 MG SL tablet Place 1 tablet under tongue upon chest pain, wait 5 minutes and may repeat up to 3 doses in 15 minutes. Do not crush or break.  25 tablet 3    clopidogrel (PLAVIX) 75 MG tablet Take 1 tablet by mouth daily 30 tablet 11    metoprolol tartrate (LOPRESSOR) 50 MG tablet Take 1 tablet by mouth 2 times daily 60 tablet 3    warfarin (COUMADIN) 5 MG tablet Take 5 mg by mouth      sotalol (BETAPACE) friction rub. No murmur heard. Pulmonary/Chest: Effort normal. No accessory muscle usage. No respiratory distress. She has no wheezes. She has no rhonchi. She has no rales. Productive cough noted   Musculoskeletal: Normal range of motion. She exhibits no edema. Neurological: She is alert and oriented to person, place, and time. Coordination normal.   Skin: No rash noted. No erythema. Cold and clammy   Psychiatric: She has a normal mood and affect. Her behavior is normal. Judgment and thought content normal.   Vitals reviewed. Assessment:      1. Bronchitis    2. Long term current use of anticoagulant therapy    3. PAF (paroxysmal atrial fibrillation) ([facility]        Plan:      Orders Placed This Encounter   Procedures    POCT INR     INR is 4.3 today, Dr. Naveed Henry office called to inform them of this as well as her being placed on doxycycline. They will make necessary adjustments. Symptomatic care, push fluids, ibu or acetamin as needed. RTO if syx do not improve after 10-14 days. No Follow-up on file. Patient given educational materials - see patient instructions. Discussed use, benefit, and side effects of prescribed medications. All patient questions answered. Pt voiced understanding. Reviewed health maintenance. Instructed to continue current medications, diet and exercise. Patient agreed with treatment plan. Follow up as directed.      Electronically signed by Mindi Gomez NP on 11/13/2017

## 2017-11-13 NOTE — PROGRESS NOTES
Patient is present complaining of fatigue, nausea, and a cough x 2 weeks. Medications and pharmacy reviewed with patient. Patient states no pain at this time. Visit Information    Have you changed or started any medications since your last visit including any over-the-counter medicines, vitamins, or herbal medicines? no   Have you stopped taking any of your medications? Is so, why? -  no  Are you having any side effects from any of your medications? - no    Have you seen any other physician or provider since your last visit?  no   Have you had any other diagnostic tests since your last visit?  no   Have you been seen in the emergency room and/or had an admission in a hospital since we last saw you?  no   Have you had your routine dental cleaning in the past 6 months?  no     Do you have an active MyChart account? If no, what is the barrier?   Yes    Patient Care Team:  Neeru Jay MD as PCP - General (Internal Medicine)  Wilberto Roy MD as Consulting Physician (Cardiology)    Medical History Review  Past Medical, Family, and Social History reviewed and does contribute to the patient presenting condition    Health Maintenance   Topic Date Due    Diabetic foot exam  08/28/1972    Diabetic retinal exam  08/28/1972    Diabetic microalbuminuria test  08/28/1980    Cervical cancer screen  02/03/2018    Colon Cancer Screen FIT/FOBT  03/02/2018    Diabetic hemoglobin A1C test  07/07/2018    Lipid screen  07/07/2018    Breast cancer screen  02/27/2019    DTaP/Tdap/Td vaccine (2 - Td) 02/28/2027    Flu vaccine  Completed    Pneumococcal med risk  Completed    Hepatitis C screen  Completed    HIV screen  Completed

## 2017-11-15 ENCOUNTER — HOSPITAL ENCOUNTER (OUTPATIENT)
Age: 57
Setting detail: SPECIMEN
Discharge: HOME | End: 2017-11-15

## 2017-11-15 LAB
INR BLD: 2.3
PROTHROMBIN TIME: 25.3 SEC (ref 9.4–12.6)

## 2017-11-22 ENCOUNTER — HOSPITAL ENCOUNTER (OUTPATIENT)
Age: 57
Setting detail: SPECIMEN
Discharge: HOME | End: 2017-11-22

## 2017-11-22 LAB
INR BLD: 3.4
PROTHROMBIN TIME: 37 SEC (ref 9.4–12.6)

## 2017-12-21 ENCOUNTER — OFFICE VISIT (OUTPATIENT)
Dept: FAMILY MEDICINE CLINIC | Age: 57
End: 2017-12-21

## 2017-12-21 ENCOUNTER — HOSPITAL ENCOUNTER (OUTPATIENT)
Age: 57
Setting detail: SPECIMEN
Discharge: HOME | End: 2017-12-21

## 2017-12-21 VITALS
HEIGHT: 63 IN | BODY MASS INDEX: 40.72 KG/M2 | RESPIRATION RATE: 16 BRPM | DIASTOLIC BLOOD PRESSURE: 72 MMHG | HEART RATE: 62 BPM | TEMPERATURE: 98.4 F | WEIGHT: 229.8 LBS | SYSTOLIC BLOOD PRESSURE: 118 MMHG

## 2017-12-21 DIAGNOSIS — I10 ESSENTIAL HYPERTENSION: ICD-10-CM

## 2017-12-21 DIAGNOSIS — I25.10 CORONARY ARTERY DISEASE INVOLVING NATIVE HEART WITHOUT ANGINA PECTORIS, UNSPECIFIED VESSEL OR LESION TYPE: Primary | ICD-10-CM

## 2017-12-21 DIAGNOSIS — M54.41 ACUTE RIGHT-SIDED LOW BACK PAIN WITH RIGHT-SIDED SCIATICA: ICD-10-CM

## 2017-12-21 DIAGNOSIS — E11.9 TYPE 2 DIABETES MELLITUS WITHOUT COMPLICATION, WITHOUT LONG-TERM CURRENT USE OF INSULIN (HCC): ICD-10-CM

## 2017-12-21 DIAGNOSIS — I48.0 PAROXYSMAL ATRIAL FIBRILLATION (HCC): ICD-10-CM

## 2017-12-21 DIAGNOSIS — E78.2 MIXED HYPERLIPIDEMIA: ICD-10-CM

## 2017-12-21 LAB
HBA1C MFR BLD: 6.4 %
INR BLD: 3
PROTHROMBIN TIME: 32.4 SEC (ref 9.4–12.6)

## 2017-12-21 PROCEDURE — 99214 OFFICE O/P EST MOD 30 MIN: CPT | Performed by: INTERNAL MEDICINE

## 2017-12-21 PROCEDURE — 83036 HEMOGLOBIN GLYCOSYLATED A1C: CPT | Performed by: INTERNAL MEDICINE

## 2017-12-21 RX ORDER — TIZANIDINE 4 MG/1
4 TABLET ORAL 3 TIMES DAILY
Qty: 30 TABLET | Refills: 0 | Status: SHIPPED | OUTPATIENT
Start: 2017-12-21 | End: 2018-03-21 | Stop reason: ALTCHOICE

## 2017-12-21 RX ORDER — CLOPIDOGREL BISULFATE 75 MG/1
75 TABLET ORAL DAILY
Qty: 30 TABLET | Refills: 11 | Status: SHIPPED | OUTPATIENT
Start: 2017-12-21 | End: 2019-06-04 | Stop reason: SDUPTHER

## 2017-12-21 RX ORDER — METOPROLOL TARTRATE 50 MG/1
50 TABLET, FILM COATED ORAL 2 TIMES DAILY
Qty: 60 TABLET | Refills: 3 | Status: SHIPPED | OUTPATIENT
Start: 2017-12-21 | End: 2018-05-06 | Stop reason: SDUPTHER

## 2017-12-21 RX ORDER — LOSARTAN POTASSIUM 50 MG/1
TABLET ORAL
Qty: 30 TABLET | Refills: 6 | Status: SHIPPED | OUTPATIENT
Start: 2017-12-21 | End: 2018-06-26 | Stop reason: SDUPTHER

## 2017-12-21 RX ORDER — PREDNISONE 20 MG/1
20 TABLET ORAL DAILY
Qty: 5 TABLET | Refills: 0 | Status: SHIPPED | OUTPATIENT
Start: 2017-12-21 | End: 2017-12-26

## 2017-12-21 NOTE — PROGRESS NOTES
Subjective:       Patient ID: Jen Odom is a 56 y.o. female who presents for   Chief Complaint   Patient presents with   Fredericka Goodell   , and follow up of chronic medical problems. HPI:  Nursing note reviewed and discussed with patient. Diabetes Mellitus  Patient presents for follow up of diabetes. Current symptoms include: none. Symptoms have been basically asymptomatic. Patient denies foot ulcerations, increase appetite, nausea, paresthesia of the feet, polydipsia, polyuria and visual disturbances. Evaluation to date has included: hemoglobin A1C 6.4% today. Home sugars: BGs range between 111 and 190. Current treatment: none. Last dilated eye exam: in the last couple of weeks. Additional Complaints:  Coronary Artery Disease  Patient presents for routine coronary artery disease follow-up. Current symptoms: none. Aggravating factors: none. Alleviating factors: none. Cardiac risk factors include diabetes mellitus, dyslipidemia, family history of premature cardiovascular disease, hypertension, obesity (BMI >= 30 kg/m2), sedentary lifestyle and smoking/ tobacco exposure. She had stents previously and sustained a STEMI in December 2016. She is following with Dr. Josephine Duron. Dyslipidemia  Patient presents for evaluation of lipids. Compliance with treatment thus far has been good. A repeat fasting lipid profile was done. The patient does use medications that may worsen dyslipidemias (corticosteroids, progestins, anabolic steroids, diuretics, beta-blockers, amiodarone, cyclosporine, olanzapine). The patient exercises infrequently. She also has paroxysmal atrial fibrillation, she is on coumadin for anticoagulation. Last INR was 2.3 on 9/14/17. She reports no gingival bleeding, rectal bleeding, bruising. She also reports lesion on right pinky finger has been resolving on its own. Has not seen derm yet. Still smoking about 4 cig/day, having a hard time quitting.   Right sciatica - has been ongoing for the last 3 weeks, was dumping a 40 pound bag of epifanio litter into a container when it started. Typically resolves within a week but not this time. She has tried ice with good relief, and taken motrin twice despite being on coumadin. Has not taken tylenol. Has never done PT in the past.     Patient's medications, allergies, past medical, surgical, social and family histories were reviewed and updated as appropriate. Social History   Substance Use Topics    Smoking status: Current Every Day Smoker     Packs/day: 0.50     Years: 34.00     Types: Cigarettes    Smokeless tobacco: Never Used      Comment: smokes 5 cig/day    Alcohol use No        Review of Systems  Energy level good overall, and weight is stable. No chest pain or shortness of breath. Bowels have been normal without constipation or diarrhea       Objective:          /72 (Site: Right Arm, Position: Sitting, Cuff Size: Medium Adult)   Pulse 62   Temp 98.4 °F (36.9 °C) (Oral)   Resp 16   Ht 5' 3.39\" (1.61 m)   Wt 229 lb 12.8 oz (104.2 kg)   LMP  (Within Months)   BMI 40.21 kg/m²     General: Alert and oriented, in no distress. Patient ambulating with normal gait. Obese. Chest: clear with no wheezes or rales. No retractions, or use of accessory muscles noted. Cardiovascular: PMI is not displaced, and no thrill noted. Regular rate and rhythm with no rub, murmur or gallop. There is no peripheral edema. Pedal pulses are normal.   Abdomen: Abdomen is soft and nontender. There is no organomegaly based on exam by palpation. There is no abdominal obesity present. The bowel sounds are normal.   Musculoskeletal: There are no deformities of the the extremities. Patient has all ten fingers intact. The patient has full range of motion on all 4 extremities without pain. There are no spinal deformities. There is no tenderness to palpation over the spine or paraspinal area. There is right lumbar paraspinal muscle spasm noted.  There is no pain with flexion, extension or laterorotation of the lumbar spine. There is pain with right lateroflexion in the right lumbar paraspinal muscles. Straight leg raise is negative on both sides. Skin: The skin is warm and dry. There is erythema and swelling of the cuticle of of the fifth digit on the right hand extending to dorsum of distal phalange, no tenderness or discharge appreciated. Data Review  CBC:   Lab Results   Component Value Date    WBC 8.1 02/23/2017    RBC 5.28 02/23/2017    RBC 5.11 02/21/2012     BMP:   Lab Results   Component Value Date    GLUCOSE 118 07/07/2017    GLUCOSE 104 02/07/2012    CO2 31 07/07/2017    BUN 12 07/07/2017    CREATININE 0.75 07/07/2017    CALCIUM 9.3 07/07/2017       hemoglobin A1c 6.4%  Assessment/Plan:       1. Coronary artery disease involving native heart without angina pectoris, unspecified vessel or lesion type  - f/u cardiology   - clopidogrel (PLAVIX) 75 MG tablet; Take 1 tablet by mouth daily  Dispense: 30 tablet; Refill: 11  - metoprolol tartrate (LOPRESSOR) 50 MG tablet; Take 1 tablet by mouth 2 times daily  Dispense: 60 tablet; Refill: 3    2. Paroxysmal atrial fibrillation (HCC)  Remains on anticoagulation   Reviewed NSAID avoidance with patient     3. Mixed hyperlipidemia  Continue lipitor 40mg PO QHS     4. Essential hypertension  - continue lasix 20mg PO QD and sotalol 120mg PO BID  - metoprolol tartrate (LOPRESSOR) 50 MG tablet; Take 1 tablet by mouth 2 times daily  Dispense: 60 tablet; Refill: 3  - losartan (COZAAR) 50 MG tablet; TAKE 1 TABLET BY MOUTH ONE TIME A DAY  Dispense: 30 tablet; Refill: 6    5. Acute right-sided low back pain with right-sided sciatica  - no NSAIDs  - tiZANidine (ZANAFLEX) 4 MG tablet; Take 1 tablet by mouth 3 times daily  Dispense: 30 tablet; Refill: 0  - predniSONE (DELTASONE) 20 MG tablet; Take 1 tablet by mouth daily for 5 days  Dispense: 5 tablet; Refill: 0  - exercise handout provided     6.  Type 2 diabetes mellitus without complication, without long-term current use of insulin (HCC)  - POCT glycosylated hemoglobin (Hb A1C)  - continue diet and lifestyle management   - get ophthalmology report             Electronically signed by Anatoliy Feliz MD on 12/21/2017 at 10:41 AM

## 2017-12-21 NOTE — PROGRESS NOTES
Patient is here for a check up. She has logged her blood sugar readings but she did not bring them in. She states she knows the averages. Pt is having a sciatic problem. She states it has been bothering her her for x3 weeks. She took ibuprofen 800 for the pain, it helps some. o2 would not read. No other concerns at this time. Visit Information    Have you changed or started any medications since your last visit including any over-the-counter medicines, vitamins, or herbal medicines? no   Have you stopped taking any of your medications? Is so, why? -  no  Are you having any side effects from any of your medications? - no    Have you seen any other physician or provider since your last visit?  no   Have you had any other diagnostic tests since your last visit?  no   Have you been seen in the emergency room and/or had an admission in a hospital since we last saw you?  no   Have you had your routine dental cleaning in the past 6 months?  no     Do you have an active [employer] account? If no, what is the barrier?   Yes    Patient Care Team:  Vicente Nix MD as PCP - General (Internal Medicine)  Blayne Hansen MD as Consulting Physician (Cardiology)    Medical History Review  Past Medical, Family, and Social History reviewed and does not contribute to the patient presenting condition    Health Maintenance   Topic Date Due    Diabetic foot exam  08/28/1972    Diabetic retinal exam  08/28/1972    Diabetic microalbuminuria test  08/28/1980    Cervical cancer screen  02/03/2018    Colon Cancer Screen FIT/FOBT  03/02/2018    Diabetic hemoglobin A1C test  07/07/2018    Lipid screen  07/07/2018    Breast cancer screen  02/27/2019    DTaP/Tdap/Td vaccine (2 - Td) 02/28/2027    Flu vaccine  Completed    Pneumococcal med risk  Completed    Hepatitis C screen  Completed    HIV screen  Completed

## 2018-01-23 ENCOUNTER — HOSPITAL ENCOUNTER (OUTPATIENT)
Age: 58
Setting detail: SPECIMEN
Discharge: HOME | End: 2018-01-23

## 2018-01-23 LAB
INR BLD: 3.4
PROTHROMBIN TIME: 36.9 SEC (ref 9.4–12.6)

## 2018-02-08 ENCOUNTER — HOSPITAL ENCOUNTER (OUTPATIENT)
Dept: CARDIAC CATH/INVASIVE PROCEDURES | Age: 58
Discharge: HOME | End: 2018-02-08
Payer: COMMERCIAL

## 2018-02-08 VITALS
DIASTOLIC BLOOD PRESSURE: 80 MMHG | TEMPERATURE: 97.8 F | BODY MASS INDEX: 39.87 KG/M2 | RESPIRATION RATE: 21 BRPM | HEIGHT: 63 IN | HEART RATE: 59 BPM | OXYGEN SATURATION: 95 % | SYSTOLIC BLOOD PRESSURE: 122 MMHG | WEIGHT: 225 LBS

## 2018-02-08 LAB
EKG ATRIAL RATE: 78 BPM
EKG Q-T INTERVAL: 388 MS
EKG QRS DURATION: 74 MS
EKG QTC CALCULATION (BAZETT): 487 MS
EKG R AXIS: 14 DEGREES
EKG T AXIS: -104 DEGREES
EKG VENTRICULAR RATE: 95 BPM
GFR NON-AFRICAN AMERICAN: 57 ML/MIN
GFR SERPL CREATININE-BSD FRML MDRD: >60 ML/MIN
GFR SERPL CREATININE-BSD FRML MDRD: ABNORMAL ML/MIN/{1.73_M2}
GLUCOSE BLD-MCNC: 99 MG/DL (ref 74–100)
POC CHLORIDE: 104 MMOL/L (ref 98–107)
POC CREATININE: 1.01 MG/DL (ref 0.51–1.19)
POC HEMATOCRIT: 53 % (ref 36–46)
POC HEMOGLOBIN: 18.1 G/DL (ref 12–16)
POC INR: 3.6
POC POTASSIUM: 5.3 MMOL/L (ref 3.5–4.5)
POC SODIUM: 143 MMOL/L (ref 138–146)
PROTHROMBIN TIME, POC: 40.2 SEC (ref 10.4–14.2)

## 2018-02-08 PROCEDURE — 82565 ASSAY OF CREATININE: CPT

## 2018-02-08 PROCEDURE — 82947 ASSAY GLUCOSE BLOOD QUANT: CPT

## 2018-02-08 PROCEDURE — 93005 ELECTROCARDIOGRAM TRACING: CPT

## 2018-02-08 PROCEDURE — 84295 ASSAY OF SERUM SODIUM: CPT

## 2018-02-08 PROCEDURE — 7100000010 HC PHASE II RECOVERY - FIRST 15 MIN

## 2018-02-08 PROCEDURE — 84132 ASSAY OF SERUM POTASSIUM: CPT

## 2018-02-08 PROCEDURE — 85610 PROTHROMBIN TIME: CPT

## 2018-02-08 PROCEDURE — 7100000011 HC PHASE II RECOVERY - ADDTL 15 MIN

## 2018-02-08 PROCEDURE — 6360000002 HC RX W HCPCS

## 2018-02-08 PROCEDURE — 92960 CARDIOVERSION ELECTRIC EXT: CPT | Performed by: INTERNAL MEDICINE

## 2018-02-08 PROCEDURE — 82435 ASSAY OF BLOOD CHLORIDE: CPT

## 2018-02-08 PROCEDURE — 85014 HEMATOCRIT: CPT

## 2018-02-08 RX ORDER — SODIUM CHLORIDE 9 MG/ML
INJECTION, SOLUTION INTRAVENOUS CONTINUOUS
Status: DISCONTINUED | OUTPATIENT
Start: 2018-02-08 | End: 2018-02-09 | Stop reason: HOSPADM

## 2018-02-08 RX ORDER — AMIODARONE HYDROCHLORIDE 200 MG/1
400 TABLET ORAL 2 TIMES DAILY
COMMUNITY
End: 2018-08-30 | Stop reason: HOSPADM

## 2018-02-08 RX ADMIN — SODIUM CHLORIDE: 9 INJECTION, SOLUTION INTRAVENOUS at 11:30

## 2018-02-08 NOTE — H&P
Please use Cardiology office note on 02/06/18 scanned in the chart for H&P.     Electronically signed by Nalini Crabtree MD on 2/8/2018 at 2:57 PM      [facility]  [phone]

## 2018-02-08 NOTE — PROGRESS NOTES
All discharge instructions reviewed, questions answered, paper signed and given copy. Patient discharged per ambulation with friend and belongings.

## 2018-02-08 NOTE — PROGRESS NOTES
Patient admitted, consent signed and questions answered. Patient ready for procedure. Call light to reach with side rails up 2 of 2. History and physical needing update.

## 2018-03-08 ENCOUNTER — HOSPITAL ENCOUNTER (OUTPATIENT)
Age: 58
Setting detail: SPECIMEN
Discharge: HOME | End: 2018-03-08
Payer: COMMERCIAL

## 2018-03-08 LAB
INR BLD: 5.6
PROTHROMBIN TIME: 53.4 SEC (ref 9–12)

## 2018-03-09 ENCOUNTER — HOSPITAL ENCOUNTER (OUTPATIENT)
Age: 58
Setting detail: SPECIMEN
Discharge: HOME | End: 2018-03-09
Payer: COMMERCIAL

## 2018-03-09 LAB
INR BLD: 5
PROTHROMBIN TIME: 48 SEC (ref 9–12)

## 2018-03-12 ENCOUNTER — HOSPITAL ENCOUNTER (OUTPATIENT)
Age: 58
Setting detail: SPECIMEN
Discharge: HOME | End: 2018-03-12
Payer: COMMERCIAL

## 2018-03-12 LAB
INR BLD: 1.7
PROTHROMBIN TIME: 17.2 SEC (ref 9–12)

## 2018-03-19 ENCOUNTER — HOSPITAL ENCOUNTER (OUTPATIENT)
Age: 58
Setting detail: SPECIMEN
Discharge: HOME | End: 2018-03-19
Payer: COMMERCIAL

## 2018-03-19 LAB
INR BLD: 3.1
PROTHROMBIN TIME: 30.6 SEC (ref 9–12)

## 2018-03-21 ENCOUNTER — OFFICE VISIT (OUTPATIENT)
Dept: FAMILY MEDICINE CLINIC | Age: 58
End: 2018-03-21
Payer: COMMERCIAL

## 2018-03-21 VITALS
BODY MASS INDEX: 40.64 KG/M2 | HEART RATE: 51 BPM | DIASTOLIC BLOOD PRESSURE: 64 MMHG | RESPIRATION RATE: 16 BRPM | OXYGEN SATURATION: 94 % | TEMPERATURE: 97 F | SYSTOLIC BLOOD PRESSURE: 122 MMHG | WEIGHT: 229.4 LBS

## 2018-03-21 DIAGNOSIS — I25.10 CORONARY ARTERY DISEASE INVOLVING NATIVE HEART WITHOUT ANGINA PECTORIS, UNSPECIFIED VESSEL OR LESION TYPE: ICD-10-CM

## 2018-03-21 DIAGNOSIS — E78.2 MIXED HYPERLIPIDEMIA: ICD-10-CM

## 2018-03-21 DIAGNOSIS — E11.9 TYPE 2 DIABETES MELLITUS WITHOUT COMPLICATION, WITHOUT LONG-TERM CURRENT USE OF INSULIN (HCC): Primary | ICD-10-CM

## 2018-03-21 DIAGNOSIS — I48.0 PAF (PAROXYSMAL ATRIAL FIBRILLATION) (HCC): ICD-10-CM

## 2018-03-21 DIAGNOSIS — I10 ESSENTIAL HYPERTENSION: ICD-10-CM

## 2018-03-21 DIAGNOSIS — Z72.0 TOBACCO ABUSE: ICD-10-CM

## 2018-03-21 LAB — HBA1C MFR BLD: 6.3 %

## 2018-03-21 PROCEDURE — 83036 HEMOGLOBIN GLYCOSYLATED A1C: CPT | Performed by: INTERNAL MEDICINE

## 2018-03-21 PROCEDURE — 99214 OFFICE O/P EST MOD 30 MIN: CPT | Performed by: INTERNAL MEDICINE

## 2018-04-04 ENCOUNTER — HOSPITAL ENCOUNTER (OUTPATIENT)
Age: 58
Setting detail: SPECIMEN
Discharge: HOME | End: 2018-04-04
Payer: COMMERCIAL

## 2018-04-04 LAB
INR BLD: 6.2
PROTHROMBIN TIME: 59.1 SEC (ref 9–12)

## 2018-04-06 ENCOUNTER — HOSPITAL ENCOUNTER (OUTPATIENT)
Age: 58
Setting detail: SPECIMEN
Discharge: HOME | End: 2018-04-06
Payer: COMMERCIAL

## 2018-04-06 DIAGNOSIS — E11.9 TYPE 2 DIABETES MELLITUS WITHOUT COMPLICATION, WITHOUT LONG-TERM CURRENT USE OF INSULIN (HCC): ICD-10-CM

## 2018-04-06 LAB
CREATININE URINE: 55.3 MG/DL (ref 28–217)
INR BLD: 3
MICROALBUMIN/CREAT 24H UR: <12 MG/L
MICROALBUMIN/CREAT UR-RTO: NORMAL MCG/MG CREAT
PROTHROMBIN TIME: 29.5 SEC (ref 9–12)

## 2018-04-10 ENCOUNTER — HOSPITAL ENCOUNTER (OUTPATIENT)
Age: 58
Setting detail: SPECIMEN
Discharge: HOME | End: 2018-04-10
Payer: COMMERCIAL

## 2018-04-10 LAB
INR BLD: 2.2
PROTHROMBIN TIME: 22.6 SEC (ref 9–12)

## 2018-04-26 ENCOUNTER — HOSPITAL ENCOUNTER (OUTPATIENT)
Age: 58
Setting detail: SPECIMEN
Discharge: HOME | End: 2018-04-26
Payer: COMMERCIAL

## 2018-04-26 LAB
INR BLD: 7.7
PROTHROMBIN TIME: 71.8 SEC (ref 9–12)

## 2018-04-30 ENCOUNTER — HOSPITAL ENCOUNTER (OUTPATIENT)
Age: 58
Setting detail: SPECIMEN
Discharge: HOME | End: 2018-04-30
Payer: COMMERCIAL

## 2018-04-30 LAB
INR BLD: 3.6
PROTHROMBIN TIME: 35.1 SEC (ref 9–12)

## 2018-05-06 DIAGNOSIS — I10 ESSENTIAL HYPERTENSION: ICD-10-CM

## 2018-05-06 DIAGNOSIS — I25.10 CORONARY ARTERY DISEASE INVOLVING NATIVE HEART WITHOUT ANGINA PECTORIS, UNSPECIFIED VESSEL OR LESION TYPE: ICD-10-CM

## 2018-05-07 ENCOUNTER — HOSPITAL ENCOUNTER (OUTPATIENT)
Age: 58
Setting detail: SPECIMEN
Discharge: HOME | End: 2018-05-07
Payer: COMMERCIAL

## 2018-05-07 LAB
INR BLD: 3.9
PROTHROMBIN TIME: 38 SEC (ref 9–12)

## 2018-05-07 RX ORDER — METOPROLOL TARTRATE 50 MG/1
TABLET, FILM COATED ORAL
Qty: 180 TABLET | Refills: 1 | Status: ON HOLD | OUTPATIENT
Start: 2018-05-07 | End: 2018-09-21 | Stop reason: HOSPADM

## 2018-05-09 RX ORDER — HYDROCHLOROTHIAZIDE 12.5 MG/1
CAPSULE, GELATIN COATED ORAL
Qty: 90 CAPSULE | Refills: 1 | Status: SHIPPED | OUTPATIENT
Start: 2018-05-09 | End: 2018-09-20 | Stop reason: ALTCHOICE

## 2018-05-24 ENCOUNTER — HOSPITAL ENCOUNTER (OUTPATIENT)
Age: 58
Setting detail: SPECIMEN
Discharge: HOME | End: 2018-05-24
Payer: COMMERCIAL

## 2018-05-24 LAB
INR BLD: 5.9
PROTHROMBIN TIME: 56 SEC (ref 9–12)

## 2018-05-25 ENCOUNTER — HOSPITAL ENCOUNTER (OUTPATIENT)
Age: 58
Setting detail: SPECIMEN
Discharge: HOME | End: 2018-05-25
Payer: COMMERCIAL

## 2018-05-25 LAB
INR BLD: 5.3
PROTHROMBIN TIME: 51.3 SEC (ref 9–12)

## 2018-05-29 ENCOUNTER — HOSPITAL ENCOUNTER (OUTPATIENT)
Age: 58
Setting detail: SPECIMEN
Discharge: HOME | End: 2018-05-29
Payer: COMMERCIAL

## 2018-05-29 LAB
INR BLD: 1.6
PROTHROMBIN TIME: 16.2 SEC (ref 9–12)

## 2018-06-18 ENCOUNTER — HOSPITAL ENCOUNTER (OUTPATIENT)
Age: 58
Setting detail: SPECIMEN
Discharge: HOME | End: 2018-06-18
Payer: COMMERCIAL

## 2018-06-18 LAB
INR BLD: 4
PROTHROMBIN TIME: 39.1 SEC (ref 9–12)

## 2018-06-26 DIAGNOSIS — I10 ESSENTIAL HYPERTENSION: ICD-10-CM

## 2018-06-26 RX ORDER — LOSARTAN POTASSIUM 50 MG/1
TABLET ORAL
Qty: 90 TABLET | Refills: 1 | Status: SHIPPED | OUTPATIENT
Start: 2018-06-26 | End: 2019-01-13 | Stop reason: SDUPTHER

## 2018-07-10 ENCOUNTER — HOSPITAL ENCOUNTER (OUTPATIENT)
Age: 58
Setting detail: SPECIMEN
Discharge: HOME | End: 2018-07-10
Payer: COMMERCIAL

## 2018-07-10 LAB
INR BLD: 3.8
PROTHROMBIN TIME: 37.5 SEC (ref 9–12)

## 2018-07-25 ENCOUNTER — HOSPITAL ENCOUNTER (OUTPATIENT)
Age: 58
Setting detail: SPECIMEN
Discharge: HOME | End: 2018-07-25
Payer: COMMERCIAL

## 2018-07-25 LAB
INR BLD: 3.8
PROTHROMBIN TIME: 37.3 SEC (ref 9–12)

## 2018-08-03 ENCOUNTER — HOSPITAL ENCOUNTER (OUTPATIENT)
Age: 58
Setting detail: SPECIMEN
Discharge: HOME | End: 2018-08-03
Payer: COMMERCIAL

## 2018-08-03 LAB
INR BLD: 3.2
PROTHROMBIN TIME: 31.5 SEC (ref 9–12)

## 2018-08-14 ENCOUNTER — HOSPITAL ENCOUNTER (OUTPATIENT)
Age: 58
Setting detail: SPECIMEN
Discharge: HOME | End: 2018-08-14
Payer: COMMERCIAL

## 2018-08-14 LAB
INR BLD: 3.2
PROTHROMBIN TIME: 31.9 SEC (ref 9–12)

## 2018-08-28 ENCOUNTER — HOSPITAL ENCOUNTER (OUTPATIENT)
Age: 58
Setting detail: SPECIMEN
Discharge: HOME | End: 2018-08-28

## 2018-08-28 LAB
INR BLD: 2.9
PROTHROMBIN TIME: 28.6 SEC (ref 9–12)

## 2018-08-30 ENCOUNTER — HOSPITAL ENCOUNTER (OUTPATIENT)
Dept: CARDIAC CATH/INVASIVE PROCEDURES | Age: 58
Discharge: HOME | End: 2018-08-30
Payer: COMMERCIAL

## 2018-08-30 LAB
EKG ATRIAL RATE: 44 BPM
EKG P AXIS: 56 DEGREES
EKG P-R INTERVAL: 168 MS
EKG Q-T INTERVAL: 544 MS
EKG QRS DURATION: 72 MS
EKG QTC CALCULATION (BAZETT): 465 MS
EKG R AXIS: -13 DEGREES
EKG T AXIS: 62 DEGREES
EKG VENTRICULAR RATE: 44 BPM

## 2018-08-30 PROCEDURE — 93005 ELECTROCARDIOGRAM TRACING: CPT

## 2018-08-30 RX ORDER — SOTALOL HYDROCHLORIDE 120 MG/1
120 TABLET ORAL 2 TIMES DAILY
COMMUNITY
End: 2019-05-28 | Stop reason: DRUGHIGH

## 2018-08-30 RX ORDER — SODIUM CHLORIDE 9 MG/ML
INJECTION, SOLUTION INTRAVENOUS CONTINUOUS
Status: DISCONTINUED | OUTPATIENT
Start: 2018-08-30 | End: 2018-08-31 | Stop reason: HOSPADM

## 2018-08-30 NOTE — H&P
scheduled        [X] I personally reviewed and examined the patient to confirm the above information    Electronically signed by Elizabeth Foster MD on 8/30/2018 at 8:55 AM  Cardiovascular fellow     Attending Physician Statement  I have discussed the case of Jancie Montero including pertinent history and exam findings with the resident. I have seen and examined the patient and the key elements of the encounter have been performed by me. I agree with the assessment, plan and orders as documented by the resident With changes made to the note.      Electronically signed by Steph Cole MD on 9/4/2018 at 9:21 AM.    [facility]      [phone]

## 2018-09-13 ENCOUNTER — HOSPITAL ENCOUNTER (OUTPATIENT)
Age: 58
Setting detail: SPECIMEN
Discharge: HOME | End: 2018-09-13
Payer: COMMERCIAL

## 2018-09-13 LAB
ANION GAP SERPL CALCULATED.3IONS-SCNC: 11 MMOL/L (ref 9–17)
BUN BLDV-MCNC: 14 MG/DL (ref 6–20)
BUN/CREAT BLD: ABNORMAL (ref 9–20)
CALCIUM SERPL-MCNC: 8.9 MG/DL (ref 8.6–10.4)
CHLORIDE BLD-SCNC: 102 MMOL/L (ref 98–107)
CO2: 31 MMOL/L (ref 20–31)
CREAT SERPL-MCNC: 0.69 MG/DL (ref 0.5–0.9)
GFR AFRICAN AMERICAN: >60 ML/MIN
GFR NON-AFRICAN AMERICAN: >60 ML/MIN
GFR SERPL CREATININE-BSD FRML MDRD: ABNORMAL ML/MIN/{1.73_M2}
GFR SERPL CREATININE-BSD FRML MDRD: ABNORMAL ML/MIN/{1.73_M2}
GLUCOSE BLD-MCNC: 114 MG/DL (ref 70–99)
INR BLD: 2
MAGNESIUM: 2 MG/DL (ref 1.6–2.6)
POTASSIUM SERPL-SCNC: 4.1 MMOL/L (ref 3.7–5.3)
PROTHROMBIN TIME: 20.3 SEC (ref 9–12)
SODIUM BLD-SCNC: 144 MMOL/L (ref 135–144)

## 2018-09-20 ENCOUNTER — ANESTHESIA EVENT (OUTPATIENT)
Dept: CARDIAC CATH/INVASIVE PROCEDURES | Age: 58
End: 2018-09-20

## 2018-09-20 ENCOUNTER — HOSPITAL ENCOUNTER (OUTPATIENT)
Dept: CARDIAC CATH/INVASIVE PROCEDURES | Age: 58
Discharge: HOME | End: 2018-09-21
Attending: INTERNAL MEDICINE | Admitting: INTERNAL MEDICINE
Payer: COMMERCIAL

## 2018-09-20 ENCOUNTER — ANESTHESIA (OUTPATIENT)
Dept: CARDIAC CATH/INVASIVE PROCEDURES | Age: 58
End: 2018-09-20

## 2018-09-20 VITALS — TEMPERATURE: 96 F | SYSTOLIC BLOOD PRESSURE: 67 MMHG | OXYGEN SATURATION: 100 % | DIASTOLIC BLOOD PRESSURE: 42 MMHG

## 2018-09-20 DIAGNOSIS — Z86.79 S/P ABLATION OF ATRIAL FIBRILLATION: ICD-10-CM

## 2018-09-20 DIAGNOSIS — Z98.890 S/P ABLATION OF ATRIAL FIBRILLATION: ICD-10-CM

## 2018-09-20 LAB
ACTIVATED CLOTTING TIME: 196 SEC (ref 79–149)
ACTIVATED CLOTTING TIME: 229 SEC (ref 79–149)
ACTIVATED CLOTTING TIME: 258 SEC (ref 79–149)
ACTIVATED CLOTTING TIME: 282 SEC (ref 79–149)
ACTIVATED CLOTTING TIME: 294 SEC (ref 79–149)
ACTIVATED CLOTTING TIME: 339 SEC (ref 79–149)
ACTIVATED CLOTTING TIME: 684 SEC (ref 79–149)
EKG ATRIAL RATE: 49 BPM
EKG P AXIS: 48 DEGREES
EKG P-R INTERVAL: 160 MS
EKG Q-T INTERVAL: 524 MS
EKG QRS DURATION: 72 MS
EKG QTC CALCULATION (BAZETT): 473 MS
EKG R AXIS: 2 DEGREES
EKG T AXIS: 104 DEGREES
EKG VENTRICULAR RATE: 49 BPM
GFR NON-AFRICAN AMERICAN: >60 ML/MIN
GFR SERPL CREATININE-BSD FRML MDRD: >60 ML/MIN
GFR SERPL CREATININE-BSD FRML MDRD: NORMAL ML/MIN/{1.73_M2}
GLUCOSE BLD-MCNC: 114 MG/DL (ref 74–100)
HCT VFR BLD CALC: 41.6 % (ref 36.3–47.1)
HEMOGLOBIN: 13.4 G/DL (ref 11.9–15.1)
INR BLD: 1.4
MCH RBC QN AUTO: 31.9 PG (ref 25.2–33.5)
MCHC RBC AUTO-ENTMCNC: 32.2 G/DL (ref 28.4–34.8)
MCV RBC AUTO: 99 FL (ref 82.6–102.9)
NRBC AUTOMATED: 0 PER 100 WBC
PDW BLD-RTO: 12.9 % (ref 11.8–14.4)
PLATELET # BLD: 212 K/UL (ref 138–453)
PMV BLD AUTO: 11.3 FL (ref 8.1–13.5)
POC CHLORIDE: 104 MMOL/L (ref 98–107)
POC CREATININE: 0.9 MG/DL (ref 0.51–1.19)
POC HEMATOCRIT: 48 % (ref 36–46)
POC HEMOGLOBIN: 16.4 G/DL (ref 12–16)
POC INR: 1.6
POC INR: 3.1
POC INR: 3.2
POC POTASSIUM: 3.6 MMOL/L (ref 3.5–4.5)
POC SODIUM: 144 MMOL/L (ref 138–146)
PROTHROMBIN TIME, POC: 18.7 SEC (ref 10.4–14.2)
PROTHROMBIN TIME, POC: 35.6 SEC (ref 10.4–14.2)
PROTHROMBIN TIME, POC: 36.7 SEC (ref 10.4–14.2)
PROTHROMBIN TIME: 15.1 SEC (ref 9–12)
RBC # BLD: 4.2 M/UL (ref 3.95–5.11)
WBC # BLD: 10.5 K/UL (ref 3.5–11.3)

## 2018-09-20 PROCEDURE — 6360000002 HC RX W HCPCS: Performed by: NURSE ANESTHETIST, CERTIFIED REGISTERED

## 2018-09-20 PROCEDURE — 85347 COAGULATION TIME ACTIVATED: CPT

## 2018-09-20 PROCEDURE — 84132 ASSAY OF SERUM POTASSIUM: CPT

## 2018-09-20 PROCEDURE — C1730 CATH, EP, 19 OR FEW ELECT: HCPCS

## 2018-09-20 PROCEDURE — 82435 ASSAY OF BLOOD CHLORIDE: CPT

## 2018-09-20 PROCEDURE — 6370000000 HC RX 637 (ALT 250 FOR IP): Performed by: STUDENT IN AN ORGANIZED HEALTH CARE EDUCATION/TRAINING PROGRAM

## 2018-09-20 PROCEDURE — 2580000003 HC RX 258: Performed by: INTERNAL MEDICINE

## 2018-09-20 PROCEDURE — 82565 ASSAY OF CREATININE: CPT

## 2018-09-20 PROCEDURE — 6360000002 HC RX W HCPCS: Performed by: INTERNAL MEDICINE

## 2018-09-20 PROCEDURE — 93662 INTRACARDIAC ECG (ICE): CPT

## 2018-09-20 PROCEDURE — 2580000003 HC RX 258: Performed by: NURSE ANESTHETIST, CERTIFIED REGISTERED

## 2018-09-20 PROCEDURE — C1894 INTRO/SHEATH, NON-LASER: HCPCS

## 2018-09-20 PROCEDURE — 6360000004 HC RX CONTRAST MEDICATION

## 2018-09-20 PROCEDURE — 6370000000 HC RX 637 (ALT 250 FOR IP): Performed by: INTERNAL MEDICINE

## 2018-09-20 PROCEDURE — 2720000010 HC SURG SUPPLY STERILE

## 2018-09-20 PROCEDURE — C1759 CATH, INTRA ECHOCARDIOGRAPHY: HCPCS

## 2018-09-20 PROCEDURE — 7100000010 HC PHASE II RECOVERY - FIRST 15 MIN

## 2018-09-20 PROCEDURE — 93005 ELECTROCARDIOGRAM TRACING: CPT

## 2018-09-20 PROCEDURE — 36415 COLL VENOUS BLD VENIPUNCTURE: CPT

## 2018-09-20 PROCEDURE — 3700000001 HC ADD 15 MINUTES (ANESTHESIA)

## 2018-09-20 PROCEDURE — C1732 CATH, EP, DIAG/ABL, 3D/VECT: HCPCS

## 2018-09-20 PROCEDURE — C1769 GUIDE WIRE: HCPCS

## 2018-09-20 PROCEDURE — 85014 HEMATOCRIT: CPT

## 2018-09-20 PROCEDURE — 2500000003 HC RX 250 WO HCPCS: Performed by: NURSE ANESTHETIST, CERTIFIED REGISTERED

## 2018-09-20 PROCEDURE — C1733 CATH, EP, OTHR THAN COOL-TIP: HCPCS

## 2018-09-20 PROCEDURE — 84295 ASSAY OF SERUM SODIUM: CPT

## 2018-09-20 PROCEDURE — 6360000002 HC RX W HCPCS

## 2018-09-20 PROCEDURE — 85027 COMPLETE CBC AUTOMATED: CPT

## 2018-09-20 PROCEDURE — 7100000011 HC PHASE II RECOVERY - ADDTL 15 MIN

## 2018-09-20 PROCEDURE — 2709999900 HC NON-CHARGEABLE SUPPLY

## 2018-09-20 PROCEDURE — 93656 COMPRE EP EVAL ABLTJ ATR FIB: CPT

## 2018-09-20 PROCEDURE — 82947 ASSAY GLUCOSE BLOOD QUANT: CPT

## 2018-09-20 PROCEDURE — 93613 INTRACARDIAC EPHYS 3D MAPG: CPT

## 2018-09-20 PROCEDURE — 85610 PROTHROMBIN TIME: CPT

## 2018-09-20 PROCEDURE — 3700000000 HC ANESTHESIA ATTENDED CARE

## 2018-09-20 RX ORDER — GLYCOPYRROLATE 1 MG/5 ML
SYRINGE (ML) INTRAVENOUS PRN
Status: DISCONTINUED | OUTPATIENT
Start: 2018-09-20 | End: 2018-09-20 | Stop reason: SDUPTHER

## 2018-09-20 RX ORDER — CHLORAL HYDRATE 500 MG
1000 CAPSULE ORAL DAILY
Status: DISCONTINUED | OUTPATIENT
Start: 2018-09-21 | End: 2018-09-21 | Stop reason: HOSPADM

## 2018-09-20 RX ORDER — SODIUM CHLORIDE 9 MG/ML
INJECTION, SOLUTION INTRAVENOUS CONTINUOUS
Status: DISCONTINUED | OUTPATIENT
Start: 2018-09-20 | End: 2018-09-21 | Stop reason: HOSPADM

## 2018-09-20 RX ORDER — LIDOCAINE HYDROCHLORIDE 10 MG/ML
INJECTION, SOLUTION INFILTRATION; PERINEURAL PRN
Status: DISCONTINUED | OUTPATIENT
Start: 2018-09-20 | End: 2018-09-20 | Stop reason: SDUPTHER

## 2018-09-20 RX ORDER — PROPOFOL 10 MG/ML
INJECTION, EMULSION INTRAVENOUS PRN
Status: DISCONTINUED | OUTPATIENT
Start: 2018-09-20 | End: 2018-09-20 | Stop reason: SDUPTHER

## 2018-09-20 RX ORDER — NEOSTIGMINE METHYLSULFATE 5 MG/5 ML
SYRINGE (ML) INTRAVENOUS PRN
Status: DISCONTINUED | OUTPATIENT
Start: 2018-09-20 | End: 2018-09-20 | Stop reason: SDUPTHER

## 2018-09-20 RX ORDER — MIDAZOLAM HYDROCHLORIDE 1 MG/ML
INJECTION INTRAMUSCULAR; INTRAVENOUS PRN
Status: DISCONTINUED | OUTPATIENT
Start: 2018-09-20 | End: 2018-09-20 | Stop reason: SDUPTHER

## 2018-09-20 RX ORDER — ONDANSETRON 2 MG/ML
INJECTION INTRAMUSCULAR; INTRAVENOUS PRN
Status: DISCONTINUED | OUTPATIENT
Start: 2018-09-20 | End: 2018-09-20 | Stop reason: SDUPTHER

## 2018-09-20 RX ORDER — LOSARTAN POTASSIUM 50 MG/1
50 TABLET ORAL DAILY
Status: DISCONTINUED | OUTPATIENT
Start: 2018-09-20 | End: 2018-09-21 | Stop reason: HOSPADM

## 2018-09-20 RX ORDER — SOTALOL HYDROCHLORIDE 120 MG/1
120 TABLET ORAL 2 TIMES DAILY
Status: DISCONTINUED | OUTPATIENT
Start: 2018-09-20 | End: 2018-09-21 | Stop reason: HOSPADM

## 2018-09-20 RX ORDER — ROCURONIUM BROMIDE 10 MG/ML
INJECTION, SOLUTION INTRAVENOUS PRN
Status: DISCONTINUED | OUTPATIENT
Start: 2018-09-20 | End: 2018-09-20 | Stop reason: SDUPTHER

## 2018-09-20 RX ORDER — SODIUM CHLORIDE 0.9 % (FLUSH) 0.9 %
10 SYRINGE (ML) INJECTION EVERY 12 HOURS SCHEDULED
Status: DISCONTINUED | OUTPATIENT
Start: 2018-09-20 | End: 2018-09-21 | Stop reason: HOSPADM

## 2018-09-20 RX ORDER — CLOPIDOGREL BISULFATE 75 MG/1
75 TABLET ORAL DAILY
Status: DISCONTINUED | OUTPATIENT
Start: 2018-09-20 | End: 2018-09-21 | Stop reason: HOSPADM

## 2018-09-20 RX ORDER — WARFARIN SODIUM 5 MG/1
5 TABLET ORAL DAILY
Status: DISCONTINUED | OUTPATIENT
Start: 2018-09-20 | End: 2018-09-21 | Stop reason: HOSPADM

## 2018-09-20 RX ORDER — SODIUM CHLORIDE, SODIUM LACTATE, POTASSIUM CHLORIDE, CALCIUM CHLORIDE 600; 310; 30; 20 MG/100ML; MG/100ML; MG/100ML; MG/100ML
INJECTION, SOLUTION INTRAVENOUS CONTINUOUS PRN
Status: DISCONTINUED | OUTPATIENT
Start: 2018-09-20 | End: 2018-09-20 | Stop reason: SDUPTHER

## 2018-09-20 RX ORDER — HYDROCODONE BITARTRATE AND ACETAMINOPHEN 5; 325 MG/1; MG/1
2 TABLET ORAL EVERY 4 HOURS PRN
Status: DISCONTINUED | OUTPATIENT
Start: 2018-09-20 | End: 2018-09-21 | Stop reason: HOSPADM

## 2018-09-20 RX ORDER — ATORVASTATIN CALCIUM 40 MG/1
40 TABLET, FILM COATED ORAL NIGHTLY
Status: DISCONTINUED | OUTPATIENT
Start: 2018-09-20 | End: 2018-09-21 | Stop reason: HOSPADM

## 2018-09-20 RX ORDER — NITROGLYCERIN 0.4 MG/1
0.4 TABLET SUBLINGUAL EVERY 5 MIN PRN
Status: DISCONTINUED | OUTPATIENT
Start: 2018-09-20 | End: 2018-09-21 | Stop reason: HOSPADM

## 2018-09-20 RX ORDER — FENTANYL CITRATE 50 UG/ML
25 INJECTION, SOLUTION INTRAMUSCULAR; INTRAVENOUS ONCE
Status: COMPLETED | OUTPATIENT
Start: 2018-09-20 | End: 2018-09-20

## 2018-09-20 RX ORDER — PANTOPRAZOLE SODIUM 40 MG/1
40 TABLET, DELAYED RELEASE ORAL
Status: DISCONTINUED | OUTPATIENT
Start: 2018-09-21 | End: 2018-09-21

## 2018-09-20 RX ORDER — PROTAMINE SULFATE 10 MG/ML
INJECTION, SOLUTION INTRAVENOUS PRN
Status: DISCONTINUED | OUTPATIENT
Start: 2018-09-20 | End: 2018-09-20 | Stop reason: SDUPTHER

## 2018-09-20 RX ORDER — ACETAMINOPHEN 325 MG/1
650 TABLET ORAL EVERY 4 HOURS PRN
Status: DISCONTINUED | OUTPATIENT
Start: 2018-09-20 | End: 2018-09-21 | Stop reason: HOSPADM

## 2018-09-20 RX ORDER — FENTANYL CITRATE 50 UG/ML
INJECTION, SOLUTION INTRAMUSCULAR; INTRAVENOUS PRN
Status: DISCONTINUED | OUTPATIENT
Start: 2018-09-20 | End: 2018-09-20 | Stop reason: SDUPTHER

## 2018-09-20 RX ORDER — NIACIN 500 MG/1
1000 TABLET, EXTENDED RELEASE ORAL NIGHTLY
Status: DISCONTINUED | OUTPATIENT
Start: 2018-09-20 | End: 2018-09-21 | Stop reason: HOSPADM

## 2018-09-20 RX ORDER — SODIUM CHLORIDE 0.9 % (FLUSH) 0.9 %
10 SYRINGE (ML) INJECTION PRN
Status: DISCONTINUED | OUTPATIENT
Start: 2018-09-20 | End: 2018-09-21 | Stop reason: HOSPADM

## 2018-09-20 RX ORDER — HEPARIN SODIUM 1000 [USP'U]/ML
INJECTION, SOLUTION INTRAVENOUS; SUBCUTANEOUS PRN
Status: DISCONTINUED | OUTPATIENT
Start: 2018-09-20 | End: 2018-09-20 | Stop reason: SDUPTHER

## 2018-09-20 RX ORDER — HYDROCODONE BITARTRATE AND ACETAMINOPHEN 5; 325 MG/1; MG/1
1 TABLET ORAL EVERY 4 HOURS PRN
Status: DISCONTINUED | OUTPATIENT
Start: 2018-09-20 | End: 2018-09-21 | Stop reason: HOSPADM

## 2018-09-20 RX ADMIN — PHENYLEPHRINE HYDROCHLORIDE 100 MCG: 10 INJECTION INTRAVENOUS at 09:55

## 2018-09-20 RX ADMIN — PHENYLEPHRINE HYDROCHLORIDE 100 MCG: 10 INJECTION INTRAVENOUS at 09:33

## 2018-09-20 RX ADMIN — FENTANYL CITRATE 25 MCG: 50 INJECTION, SOLUTION INTRAMUSCULAR; INTRAVENOUS at 15:25

## 2018-09-20 RX ADMIN — PHENYLEPHRINE HYDROCHLORIDE 100 MCG: 10 INJECTION INTRAVENOUS at 09:41

## 2018-09-20 RX ADMIN — Medication 2 MG: at 13:32

## 2018-09-20 RX ADMIN — Medication 0.4 MG: at 13:32

## 2018-09-20 RX ADMIN — PHENYLEPHRINE HYDROCHLORIDE 50 MCG: 10 INJECTION INTRAVENOUS at 10:17

## 2018-09-20 RX ADMIN — PHENYLEPHRINE HYDROCHLORIDE 100 MCG: 10 INJECTION INTRAVENOUS at 09:43

## 2018-09-20 RX ADMIN — HEPARIN SODIUM 2000 UNITS: 1000 INJECTION, SOLUTION INTRAVENOUS; SUBCUTANEOUS at 11:52

## 2018-09-20 RX ADMIN — PHYTONADIONE 1 MG: 10 INJECTION, EMULSION INTRAMUSCULAR; INTRAVENOUS; SUBCUTANEOUS at 11:19

## 2018-09-20 RX ADMIN — NIACIN 1000 MG: 500 TABLET, EXTENDED RELEASE ORAL at 21:33

## 2018-09-20 RX ADMIN — PROTAMINE SULFATE 25 MG: 10 INJECTION, SOLUTION INTRAVENOUS at 13:34

## 2018-09-20 RX ADMIN — FENTANYL CITRATE 100 MCG: 50 INJECTION INTRAMUSCULAR; INTRAVENOUS at 09:20

## 2018-09-20 RX ADMIN — SODIUM CHLORIDE: 0.9 INJECTION, SOLUTION INTRAVENOUS at 09:36

## 2018-09-20 RX ADMIN — Medication 10 ML: at 21:36

## 2018-09-20 RX ADMIN — PHYTONADIONE 1 MG: 10 INJECTION, EMULSION INTRAMUSCULAR; INTRAVENOUS; SUBCUTANEOUS at 09:07

## 2018-09-20 RX ADMIN — PHENYLEPHRINE HYDROCHLORIDE 100 MCG: 10 INJECTION INTRAVENOUS at 09:35

## 2018-09-20 RX ADMIN — PROTAMINE SULFATE 25 MG: 10 INJECTION, SOLUTION INTRAVENOUS at 10:47

## 2018-09-20 RX ADMIN — METOPROLOL TARTRATE 12.5 MG: 25 TABLET ORAL at 21:34

## 2018-09-20 RX ADMIN — HEPARIN SODIUM 2000 UNITS: 1000 INJECTION, SOLUTION INTRAVENOUS; SUBCUTANEOUS at 12:26

## 2018-09-20 RX ADMIN — HEPARIN SODIUM 10000 UNITS: 1000 INJECTION, SOLUTION INTRAVENOUS; SUBCUTANEOUS at 10:16

## 2018-09-20 RX ADMIN — HYDROCODONE BITARTRATE AND ACETAMINOPHEN 1 TABLET: 5; 325 TABLET ORAL at 22:13

## 2018-09-20 RX ADMIN — PROPOFOL 200 MG: 10 INJECTION, EMULSION INTRAVENOUS at 09:20

## 2018-09-20 RX ADMIN — WARFARIN SODIUM 5 MG: 5 TABLET ORAL at 18:20

## 2018-09-20 RX ADMIN — PHENYLEPHRINE HYDROCHLORIDE 100 MCG: 10 INJECTION INTRAVENOUS at 11:58

## 2018-09-20 RX ADMIN — PHENYLEPHRINE HYDROCHLORIDE 25 MCG/MIN: 10 INJECTION INTRAVENOUS at 09:39

## 2018-09-20 RX ADMIN — APIXABAN 5 MG: 5 TABLET, FILM COATED ORAL at 20:33

## 2018-09-20 RX ADMIN — HEPARIN SODIUM 2000 UNITS: 1000 INJECTION, SOLUTION INTRAVENOUS; SUBCUTANEOUS at 11:28

## 2018-09-20 RX ADMIN — LIDOCAINE HYDROCHLORIDE 50 MG: 10 INJECTION, SOLUTION INFILTRATION; PERINEURAL at 09:20

## 2018-09-20 RX ADMIN — SOTALOL HYDROCHLORIDE 120 MG: 120 TABLET ORAL at 21:35

## 2018-09-20 RX ADMIN — ONDANSETRON 4 MG: 2 INJECTION, SOLUTION INTRAMUSCULAR; INTRAVENOUS at 13:35

## 2018-09-20 RX ADMIN — Medication 0.4 MG: at 09:37

## 2018-09-20 RX ADMIN — ROCURONIUM BROMIDE 40 MG: 10 INJECTION INTRAVENOUS at 09:20

## 2018-09-20 RX ADMIN — ATORVASTATIN CALCIUM 40 MG: 40 TABLET, FILM COATED ORAL at 21:34

## 2018-09-20 RX ADMIN — PHENYLEPHRINE HYDROCHLORIDE 100 MCG: 10 INJECTION INTRAVENOUS at 10:07

## 2018-09-20 RX ADMIN — SODIUM CHLORIDE: 0.9 INJECTION, SOLUTION INTRAVENOUS at 08:35

## 2018-09-20 RX ADMIN — SODIUM CHLORIDE, POTASSIUM CHLORIDE, SODIUM LACTATE AND CALCIUM CHLORIDE: 600; 310; 30; 20 INJECTION, SOLUTION INTRAVENOUS at 09:12

## 2018-09-20 RX ADMIN — PROTAMINE SULFATE 25 MG: 10 INJECTION, SOLUTION INTRAVENOUS at 13:29

## 2018-09-20 RX ADMIN — MIDAZOLAM HYDROCHLORIDE 2 MG: 1 INJECTION, SOLUTION INTRAMUSCULAR; INTRAVENOUS at 08:55

## 2018-09-20 ASSESSMENT — PULMONARY FUNCTION TESTS
PIF_VALUE: 22
PIF_VALUE: 21
PIF_VALUE: 22
PIF_VALUE: 0
PIF_VALUE: 22
PIF_VALUE: 21
PIF_VALUE: 22
PIF_VALUE: 1
PIF_VALUE: 22
PIF_VALUE: 21
PIF_VALUE: 21
PIF_VALUE: 23
PIF_VALUE: 21
PIF_VALUE: 20
PIF_VALUE: 19
PIF_VALUE: 21
PIF_VALUE: 22
PIF_VALUE: 22
PIF_VALUE: 23
PIF_VALUE: 21
PIF_VALUE: 22
PIF_VALUE: 21
PIF_VALUE: 23
PIF_VALUE: 1
PIF_VALUE: 21
PIF_VALUE: 26
PIF_VALUE: 21
PIF_VALUE: 21
PIF_VALUE: 27
PIF_VALUE: 21
PIF_VALUE: 20
PIF_VALUE: 3
PIF_VALUE: 22
PIF_VALUE: 3
PIF_VALUE: 21
PIF_VALUE: 23
PIF_VALUE: 21
PIF_VALUE: 20
PIF_VALUE: 21
PIF_VALUE: 19
PIF_VALUE: 21
PIF_VALUE: 22
PIF_VALUE: 31
PIF_VALUE: 22
PIF_VALUE: 21
PIF_VALUE: 22
PIF_VALUE: 22
PIF_VALUE: 21
PIF_VALUE: 23
PIF_VALUE: 22
PIF_VALUE: 22
PIF_VALUE: 21
PIF_VALUE: 19
PIF_VALUE: 22
PIF_VALUE: 18
PIF_VALUE: 21
PIF_VALUE: 21
PIF_VALUE: 22
PIF_VALUE: 21
PIF_VALUE: 21
PIF_VALUE: 23
PIF_VALUE: 21
PIF_VALUE: 22
PIF_VALUE: 21
PIF_VALUE: 30
PIF_VALUE: 21
PIF_VALUE: 20
PIF_VALUE: 20
PIF_VALUE: 22
PIF_VALUE: 21
PIF_VALUE: 22
PIF_VALUE: 23
PIF_VALUE: 21
PIF_VALUE: 22
PIF_VALUE: 21
PIF_VALUE: 20
PIF_VALUE: 20
PIF_VALUE: 1
PIF_VALUE: 21
PIF_VALUE: 22
PIF_VALUE: 21
PIF_VALUE: 22
PIF_VALUE: 22
PIF_VALUE: 21
PIF_VALUE: 20
PIF_VALUE: 21
PIF_VALUE: 20
PIF_VALUE: 24
PIF_VALUE: 22
PIF_VALUE: 21
PIF_VALUE: 21
PIF_VALUE: 22
PIF_VALUE: 21
PIF_VALUE: 23
PIF_VALUE: 22
PIF_VALUE: 21
PIF_VALUE: 24
PIF_VALUE: 20
PIF_VALUE: 21
PIF_VALUE: 29
PIF_VALUE: 21
PIF_VALUE: 21
PIF_VALUE: 20
PIF_VALUE: 21
PIF_VALUE: 20
PIF_VALUE: 23
PIF_VALUE: 22
PIF_VALUE: 21
PIF_VALUE: 31
PIF_VALUE: 22
PIF_VALUE: 21
PIF_VALUE: 22
PIF_VALUE: 21
PIF_VALUE: 23
PIF_VALUE: 20
PIF_VALUE: 21
PIF_VALUE: 20
PIF_VALUE: 22
PIF_VALUE: 19
PIF_VALUE: 20
PIF_VALUE: 21
PIF_VALUE: 20
PIF_VALUE: 22
PIF_VALUE: 21
PIF_VALUE: 21
PIF_VALUE: 27
PIF_VALUE: 1
PIF_VALUE: 21
PIF_VALUE: 21
PIF_VALUE: 20
PIF_VALUE: 21
PIF_VALUE: 23
PIF_VALUE: 19
PIF_VALUE: 21
PIF_VALUE: 22
PIF_VALUE: 20
PIF_VALUE: 21
PIF_VALUE: 21
PIF_VALUE: 4
PIF_VALUE: 19
PIF_VALUE: 22
PIF_VALUE: 1
PIF_VALUE: 21
PIF_VALUE: 22
PIF_VALUE: 21
PIF_VALUE: 20
PIF_VALUE: 19
PIF_VALUE: 21
PIF_VALUE: 20
PIF_VALUE: 20
PIF_VALUE: 21
PIF_VALUE: 22
PIF_VALUE: 21
PIF_VALUE: 21
PIF_VALUE: 23
PIF_VALUE: 20
PIF_VALUE: 21
PIF_VALUE: 22
PIF_VALUE: 21
PIF_VALUE: 24
PIF_VALUE: 19
PIF_VALUE: 20
PIF_VALUE: 21
PIF_VALUE: 22
PIF_VALUE: 19
PIF_VALUE: 20
PIF_VALUE: 21
PIF_VALUE: 22
PIF_VALUE: 21
PIF_VALUE: 23
PIF_VALUE: 21
PIF_VALUE: 22
PIF_VALUE: 24
PIF_VALUE: 1
PIF_VALUE: 1
PIF_VALUE: 21
PIF_VALUE: 22
PIF_VALUE: 21
PIF_VALUE: 21
PIF_VALUE: 22
PIF_VALUE: 21
PIF_VALUE: 19
PIF_VALUE: 21
PIF_VALUE: 19
PIF_VALUE: 20
PIF_VALUE: 1
PIF_VALUE: 21
PIF_VALUE: 21
PIF_VALUE: 1
PIF_VALUE: 22
PIF_VALUE: 22
PIF_VALUE: 21
PIF_VALUE: 21
PIF_VALUE: 22
PIF_VALUE: 22
PIF_VALUE: 21
PIF_VALUE: 21
PIF_VALUE: 19
PIF_VALUE: 31
PIF_VALUE: 22
PIF_VALUE: 21
PIF_VALUE: 21
PIF_VALUE: 22
PIF_VALUE: 4
PIF_VALUE: 22
PIF_VALUE: 20
PIF_VALUE: 21
PIF_VALUE: 22
PIF_VALUE: 21
PIF_VALUE: 21
PIF_VALUE: 18
PIF_VALUE: 21
PIF_VALUE: 21
PIF_VALUE: 19
PIF_VALUE: 21
PIF_VALUE: 22
PIF_VALUE: 18
PIF_VALUE: 21

## 2018-09-20 ASSESSMENT — PAIN DESCRIPTION - PAIN TYPE
TYPE: SURGICAL PAIN
TYPE_2: SURGICAL

## 2018-09-20 ASSESSMENT — PAIN DESCRIPTION - LOCATION
LOCATION: GROIN
LOCATION_2: BACK

## 2018-09-20 ASSESSMENT — PAIN DESCRIPTION - DESCRIPTORS
DESCRIPTORS_2: ACHING
DESCRIPTORS: ACHING

## 2018-09-20 ASSESSMENT — PAIN SCALES - GENERAL
PAINLEVEL_OUTOF10: 6
PAINLEVEL_OUTOF10: 4
PAINLEVEL_OUTOF10: 6
PAINLEVEL_OUTOF10: 7

## 2018-09-20 ASSESSMENT — PAIN DESCRIPTION - INTENSITY
RATING_2: 6
RATING_2: 4

## 2018-09-20 ASSESSMENT — PAIN DESCRIPTION - ORIENTATION: ORIENTATION: RIGHT

## 2018-09-20 ASSESSMENT — ENCOUNTER SYMPTOMS: SHORTNESS OF BREATH: 1

## 2018-09-20 NOTE — PROCEDURES
Patient: Brad Flores  YOB: 1960  MRN: [id number]  Date of Procedure: 9/20/2018    Electrophysiology Laboratory                                                                                                    [facility]         Electrophysiology Study & Catheter Ablation      Electrophysiologist:   Shar Plaza MD   Fellow:   None           Procedures:   ·   Diagnostic EPS. ·   3-D mapping. ·   Catheter ablation of AF  . Intra-cardiac Echocardiography      Procedure indications:  1. AF. The arrhythmia is refractory to rate control and/or anti-arrhythmic drugs. Patient history:  66year old F with PMH of CAD S/P PCI, PAF comes in for AF ablation. Procedure summary:  After written witnessed informed consent was obtained for the procedure and sedation from the patient, the patient was transferred to the EP Lab. The procedure was performed in the post absorptive state off all anti-arrhythmic drugs. A grounding pad was placed. Self-adhesive anterior-posterior defibrillation pads were applied. Blood pressure was monitored. 1. The groins were prepped and draped in the usual sterile fashion. 1. Local anesthesia with Bupivacaine 0.25 % was given. 2. Peripheral intravenous access was obtained. The vessel was accessed using the modified Seldinger technique. 3. A multi-electrode catheter containing a set of 10 electrodes (spanning the coronary sinus) was positioned via the right FV. 4. Intracardiac echocardiography. A transducer was advanced into the right atrium via the sheath in the left femoral vein. Echocardiography was used to guide the transseptal puncture, guide positioning of the ablation tip electrode at the target sites, ensure proper contact between the ablation catheter and the endocardium, identify potential complications, and image the LA and pulmonary veins (PV).    5. Transseptal catheterization was performed under fluoroscopic and intracardiac echocardiographic guidance. Heparin bolus and continuous IV infusion were given prior transseptal puncture/ catheterization. The sheath in the right femoral vein was exchanged over a wire. A transseptal access was performed using a SL0/Agilis sheath [employer] needle assembly. Once the first transseptal access was performed, a multi-electrode catheter was advanced through the long sheath and positioned in the left superior pulmonary vein. The heparin infusion rate was adjusted to maintain an ACT between 350-400 sec throughout the procedure. 6. Electrophysiologic testing was performed. Measurements of basic intervals were obtained. Stimuli were delivered at coronary sinus sites. 7. 3-D electro-anatomic mapping. The pulmonary veins and left atrium sites were mapped, making use of Carto device. 8. RF ablation was performed. Follow-up testing was performed 30 minutes post ablation. 9. Sheaths were sutured in place. The site was compressed. Adequate hemostasis was obtained. Vascular access and catheter properties:  Entry site  Locations  Catheter    R femoral vein Mid RA Intra-cardiac Echo Accuson 8F   R femoral vein Coronary sinus SJM Decapolar CS catheter 6F   R femoral vein Trans-septal Multielectrode BS PentaRay Catheter D curve   R femoral vein Trans-sept alablation catheter ThermoCool Smart Touch BiDirectional D-F     Administered medications: The procedure was performed under GA. ·   0.9% Normal Saline. ·   OXYGEN, Nasal cannula. Contrast 10 cc   ·   Bupivicaine, subcutaneously       Arrhythmia:     ·   The patient was in normal sinus rhythm at start of the procedure. The LA was mapped using PentaRay during NSR. There were two large left PVs and two right PVs. There was minimal scar around the veins more prominent on anterior of the RPVs, no other scar is noted in the LA.   Initially Cryo ablation was planned, but could not pass the 14F dilator into the right femoral vein due to kinking Of the wire and deep location of the femoral vein. Therefore, cryo ablation was not performed and RF was used to perform the ablation using Agilis and Smart Touch catheter. Ablation of the pulmonary veins was done with a WACA and a Meliza line in each side, while monitoring esophageal temperature closely during ablation. Isolation of the left and right pulmonary veins was accomplished. There was evidence of entrance and exit block by end of the procedure. Rapid atrial pacing induced no flutter down to  ms induced non-sustained flutter  ms. This was not pursued due to its transient nature of 3 times of inductions. Following ablation and EP study was performed, NY= 142, RR= 1190, QRS=74, OO=484:  AH= 108 ms, HV=45 ms. AVNERP= 600/360 ms, AVBCL 420 ms. There was evidence of dual AV node physiology with FPERP 600/370 ms. No arrhythmia is inducible. Lesions:  Radiofrequency lesion summary:   Multiple applications of variable duration were delivered. Complications:  No complications. Discharge and follow-up:  The patient left the EP laboratory in stable condition. The patient will remain in the hospital overnight for observation and rest, with anticipated discharge on the following day. The patient has been instructed accordingly. Summary:  Successful RF AF ablation with isolation of all 4 pulmonary veins, with evidence of exit and entrance block. There was normal voltage throughout the LA. No arrhythmia was inducible by end of the procedure.   There was evidence of dual AV node physiology with no arrhythmia inducibe    Recommendations:   -- Admit to Step down/ ICU  -- Bed rest for 4 hrs  -- Anticoagulation: restart coumadin 4 hrs after sheaths are out or at scheduled time  -- Remove Figure 8 sutures in 4 hours if no hematoma or bleeding is noted  -- Protonix 40 mg every day for 4 weeks      Kerry Pope MD  Date: 9/20/2018  Time: 2:00 PM

## 2018-09-20 NOTE — H&P
[facility]  History and Physical Update          Pt Name: Lucio Norwood  MRN: [id number]  YOB: 1960  Date of evaluation: 9/20/2018      [x] I have examined the patient and reviewed the H&P/Consult completed 9/10/18, and there are no changes to the patient or plans. [] I have examined the patient and reviewed the H&P/Consult and have noted the following changes:         Electronically signed by Nj Cordero MD  on 9/20/2018 at 9:20 AM         Chief Complaint / Assistant Note   Argie Denver comes in for ablation of atrial fibrillation today. HPI   She is a 72-year-old female with paroxysmal atrial fibrillation, on sotalol and Coumadin, who comes in stating that she has been in and out of atrial fibrillation at least 2-3 times a week of variable duration. Sometimes they last 3-5 hours, sometimes even for a few days. The patient last time was scheduled for cardioversion because her AFib was not going away for a few days, but when she showed up for the appointment for the cardioversion she was found to be in normal rhythm. She is on sotalol 120 mg twice a day. Review of systems   Negative. PMH:  1. CAD, status post cardiac cath and drug-eluting stents to the OM1 and RCA in 2011. 2. Preserved LV systolic function, ejection fraction of 60%. 3. Hospital admission, December 2016, with inferior STEMI and occluded RCA. Underwent drug-eluting stent using Synergy with restoration of AAKASH III flow and preserved LV systolic function of 95% on cardiac cath. 4. Paroxysmal atrial fibrillation with prior cardioversion in 2014 and again in 2016. Has been on sotalol and anticoagulation, but she is back in atrial fibrillation today 02/06/2018.   5. Hypertension. 6. Hyperlipidemia. 7. Chronic smoking. 8. COPD. 9. ECHO 2/2017 EF 40-45%, trivial pericardial effusion   10.  Successful cardioversion with 200 joule single shock from atrial fibrillation to normal sinus rhythm

## 2018-09-20 NOTE — PROGRESS NOTES
RECOVERY PHASE/PACU initiated with PARS 8 and returns to [facility] room 8 sleepy but arousable. Oxygen on 8 liters without distress. Sinus felicia 50's. Right groin and left groin sites clean and dry with palpable pulses. Side rails up 2 of 2 with call light to reach.

## 2018-09-20 NOTE — ANESTHESIA PRE PROCEDURE
TIME A DAY  90 capsule 1    metoprolol tartrate (LOPRESSOR) 50 MG tablet TAKE 1 TABLET BY MOUTH TWO TIMES A DAY  180 tablet 1    clopidogrel (PLAVIX) 75 MG tablet Take 1 tablet by mouth daily 30 tablet 11    Omega-3 Fatty Acids (FISH OIL) 1000 MG CAPS Take 1,000 mg by mouth daily       furosemide (LASIX) 20 MG tablet TAKE 1 TABLET BY MOUTH TWO TIMES A DAY  11    nitroGLYCERIN (NITROSTAT) 0.4 MG SL tablet Place 1 tablet under tongue upon chest pain, wait 5 minutes and may repeat up to 3 doses in 15 minutes. Do not crush or break. 25 tablet 3    warfarin (COUMADIN) 5 MG tablet Take 5 mg by mouth      atorvastatin (LIPITOR) 40 MG tablet Take 40 mg by mouth nightly       niacin (SLO-NIACIN) 500 MG tablet Take 1,000 mg by mouth nightly        No current facility-administered medications for this encounter. Allergies:     Allergies   Allergen Reactions    Pcn [Penicillins] Hives       Problem List:    Patient Active Problem List   Diagnosis Code    S/P endometrial ablation Z98.890    Essential hypertension I10    Mixed hyperlipidemia E78.2    CAD (coronary artery disease) with stents I25.10    SOB (shortness of breath) R06.02    PAF (paroxysmal atrial fibrillation) ([facility]) I48.0    Rapid atrial fibrillation ([facility]) I48.91    Type 2 diabetes mellitus without complication, without long-term current use of insulin (HCC) E11.9       Past Medical History:        Diagnosis Date    Abnormal Pap smear 1989    Had colposcopy    Allergy     PCN    Anemia     Anticoagulated     ON COUMADIN    Atrial fibrillation ([facility] [address] 12/5/2014    Blood transfusion 12/20/11    4 units    CAD (coronary artery disease)     Eczema     Examination of participant in clinical trial 1/12/11    end date 3/25/15    Headache 12/5/2014    Heart disease 1/13/11    card stents x 2    Hyperlipidemia     Hypertension     Menopausal symptoms     MI, old 2011    PAF (paroxysmal atrial fibrillation) ([facility] [address]     s/p cardioversion Dec      STEMI (ST elevation myocardial infarction) ([facility] [address] 2016    Type 2 diabetes mellitus without complication, without long-term current use of insulin ([facility] [address] 2017       Past Surgical History:        Procedure Laterality Date    CARDIOVERSION  2018    DR Jorge Castañeda  SUCCESSFUL    CARDIOVERSION  2014    CARDIOVERSION      CARDIOVERSION  2018    DR Jorge Castañeda     SECTION  1986    COLPOSCOPY      CORONARY ANGIOPLASTY WITH STENT PLACEMENT  2016    PTCA AND GIRMA TO RCA    CORONARY ANGIOPLASTY WITH STENT PLACEMENT      DILATION AND CURETTAGE      ENDOMETRIAL ABLATION  12    hydrothermal    LAPAROSCOPY      TRANSESOPHAGEAL ECHOCARDIOGRAM  2014    TUBAL LIGATION         Social History:    Social History   Substance Use Topics    Smoking status: Current Every Day Smoker     Packs/day: 0.50     Years: 34.00     Types: Cigarettes    Smokeless tobacco: Never Used      Comment: smokes 5 cig/day    Alcohol use No                                Ready to quit: Not Answered  Counseling given: Not Answered      Vital Signs (Current): There were no vitals filed for this visit.                                            BP Readings from Last 3 Encounters:   18 122/64   18 122/80   17 118/72       NPO Status: Time of last liquid consumption: 2200                        Time of last solid consumption: 2100                                                      BMI:   Wt Readings from Last 3 Encounters:   18 229 lb 6.4 oz (104.1 kg)   18 225 lb (102.1 kg)   17 229 lb 12.8 oz (104.2 kg)     There is no height or weight on file to calculate BMI.    CBC:   Lab Results   Component Value Date    WBC 8.1 2017    RBC 5.28 2017    RBC 5.11 2012    HGB 16.3 2017    HCT 49.9 2017    MCV 94.6 2017    RDW 15.1 2017     2017     2012       CMP:   Lab Results   Component Value Date     09/13/2018    K 4.1 09/13/2018     09/13/2018    CO2 31 09/13/2018    BUN 14 09/13/2018    CREATININE 0.69 09/13/2018    GFRAA >60 09/13/2018    LABGLOM >60 09/13/2018    GLUCOSE 114 09/13/2018    GLUCOSE 104 02/07/2012    PROT 6.8 07/07/2017    CALCIUM 8.9 09/13/2018    BILITOT 0.51 07/07/2017    ALKPHOS 66 07/07/2017    AST 21 07/07/2017    ALT 20 07/07/2017       POC Tests: No results for input(s): POCGLU, POCNA, POCK, POCCL, POCBUN, POCHEMO, POCHCT in the last 72 hours. Coags:   Lab Results   Component Value Date    PROTIME 20.3 09/13/2018    PROTIME 40.2 02/08/2018    INR 2.0 09/13/2018    APTT 26.1 02/07/2012       HCG (If Applicable):   Lab Results   Component Value Date    PREGTESTUR NEGATIVE 02/21/2012    HCGQUANT <2 02/07/2012        ABGs: No results found for: PHART, PO2ART, TOA8JVK, SDC8ZGH, BEART, K8NEHEES     Type & Screen (If Applicable):  No results found for: LABABO, [address]    Anesthesia Evaluation  Patient summary reviewed no history of anesthetic complications:   Airway: Mallampati: II  TM distance: >3 FB   Neck ROM: full  Mouth opening: > = 3 FB Dental:    (+) lower dentures, upper dentures and partials      Pulmonary:normal exam    (+) shortness of breath: no interval change,                             Cardiovascular:    (+) hypertension: moderate, past MI: no interval change, CAD: no interval change, CABG/stent: no interval change,       ECG reviewed  Rhythm: irregular  Rate: abnormal  Echocardiogram reviewed                  Neuro/Psych:   (+) headaches: migraine headaches,             GI/Hepatic/Renal: Neg GI/Hepatic/Renal ROS            Endo/Other:    (+) DiabetesType II DM, no interval change, , .                 Abdominal:   (+) obese,         Vascular: negative vascular ROS. Anesthesia Plan      general     ASA 3       Induction: intravenous. arterial line    Anesthetic plan and risks discussed with patient.       Plan

## 2018-09-20 NOTE — ANESTHESIA POSTPROCEDURE EVALUATION
Department of Anesthesiology  Postprocedure Note    Patient: Arie Garibay  MRN: [id number]  YOB: 1960  Date of evaluation: 9/20/2018  Time:  2:51 PM     Procedure Summary     Date:  09/20/18 Room / Location:  [facility] Cath Lab    Anesthesia Start:  9792 Anesthesia Stop:  3442    Procedure:  KARINA A-FIB ABLATION W/ANES Diagnosis:      Scheduled Providers: Elise Smith MD Responsible Provider:  Elise Smith MD    Anesthesia Type:  general ASA Status:  3          Anesthesia Type: general    Jolie Phase I:      Jolie Phase II:      Last vitals: Reviewed and per EMR flowsheets.        Anesthesia Post Evaluation    Patient location during evaluation: PACU  Patient participation: complete - patient participated  Level of consciousness: awake and alert  Pain score: 0  Airway patency: patent  Nausea & Vomiting: no vomiting and no nausea  Complications: no  Cardiovascular status: hemodynamically stable  Respiratory status: acceptable  Hydration status: stable

## 2018-09-20 NOTE — PROGRESS NOTES
RECOVERY PHASE COMPLETED WITH PARS OF 9. Medicated for back and right groin pains. Oxygen remains on without distress. Side rails up 2 of 2 with call light to reach.

## 2018-09-21 VITALS
BODY MASS INDEX: 42.24 KG/M2 | OXYGEN SATURATION: 99 % | RESPIRATION RATE: 18 BRPM | SYSTOLIC BLOOD PRESSURE: 124 MMHG | HEIGHT: 63 IN | DIASTOLIC BLOOD PRESSURE: 65 MMHG | TEMPERATURE: 97.6 F | WEIGHT: 238.4 LBS | HEART RATE: 59 BPM

## 2018-09-21 LAB
EKG ATRIAL RATE: 64 BPM
EKG ATRIAL RATE: 68 BPM
EKG P AXIS: 62 DEGREES
EKG P AXIS: 65 DEGREES
EKG P-R INTERVAL: 154 MS
EKG P-R INTERVAL: 170 MS
EKG Q-T INTERVAL: 424 MS
EKG Q-T INTERVAL: 442 MS
EKG QRS DURATION: 76 MS
EKG QRS DURATION: 78 MS
EKG QTC CALCULATION (BAZETT): 450 MS
EKG QTC CALCULATION (BAZETT): 455 MS
EKG R AXIS: 11 DEGREES
EKG R AXIS: 3 DEGREES
EKG T AXIS: -151 DEGREES
EKG T AXIS: -169 DEGREES
EKG VENTRICULAR RATE: 64 BPM
EKG VENTRICULAR RATE: 68 BPM
INR BLD: 1.2
PROTHROMBIN TIME: 13 SEC (ref 9–12)

## 2018-09-21 PROCEDURE — 36415 COLL VENOUS BLD VENIPUNCTURE: CPT

## 2018-09-21 PROCEDURE — 93005 ELECTROCARDIOGRAM TRACING: CPT

## 2018-09-21 PROCEDURE — 2580000003 HC RX 258: Performed by: INTERNAL MEDICINE

## 2018-09-21 PROCEDURE — 94762 N-INVAS EAR/PLS OXIMTRY CONT: CPT

## 2018-09-21 PROCEDURE — 6370000000 HC RX 637 (ALT 250 FOR IP): Performed by: INTERNAL MEDICINE

## 2018-09-21 PROCEDURE — 85610 PROTHROMBIN TIME: CPT

## 2018-09-21 PROCEDURE — 6360000002 HC RX W HCPCS: Performed by: INTERNAL MEDICINE

## 2018-09-21 PROCEDURE — 99406 BEHAV CHNG SMOKING 3-10 MIN: CPT

## 2018-09-21 PROCEDURE — 6370000000 HC RX 637 (ALT 250 FOR IP): Performed by: STUDENT IN AN ORGANIZED HEALTH CARE EDUCATION/TRAINING PROGRAM

## 2018-09-21 RX ORDER — DIPHENHYDRAMINE HYDROCHLORIDE 50 MG/ML
25 INJECTION INTRAMUSCULAR; INTRAVENOUS ONCE
Status: COMPLETED | OUTPATIENT
Start: 2018-09-21 | End: 2018-09-21

## 2018-09-21 RX ORDER — ACETAMINOPHEN 325 MG/1
650 TABLET ORAL EVERY 4 HOURS PRN
Qty: 120 TABLET | Refills: 3 | Status: SHIPPED | OUTPATIENT
Start: 2018-09-21 | End: 2019-10-23

## 2018-09-21 RX ORDER — DIPHENHYDRAMINE HYDROCHLORIDE 50 MG/ML
INJECTION INTRAMUSCULAR; INTRAVENOUS
Status: DISCONTINUED
Start: 2018-09-21 | End: 2018-09-21 | Stop reason: HOSPADM

## 2018-09-21 RX ADMIN — Medication 10 ML: at 09:14

## 2018-09-21 RX ADMIN — SOTALOL HYDROCHLORIDE 120 MG: 120 TABLET ORAL at 09:13

## 2018-09-21 RX ADMIN — APIXABAN 5 MG: 5 TABLET, FILM COATED ORAL at 13:51

## 2018-09-21 RX ADMIN — HYDROCODONE BITARTRATE AND ACETAMINOPHEN 1 TABLET: 5; 325 TABLET ORAL at 02:35

## 2018-09-21 RX ADMIN — ESOMEPRAZOLE MAGNESIUM 20 MG: 20 CAPSULE, DELAYED RELEASE ORAL at 13:51

## 2018-09-21 RX ADMIN — DIPHENHYDRAMINE HYDROCHLORIDE 25 MG: 50 INJECTION, SOLUTION INTRAMUSCULAR; INTRAVENOUS at 11:00

## 2018-09-21 RX ADMIN — CLOPIDOGREL 75 MG: 75 TABLET, FILM COATED ORAL at 09:13

## 2018-09-21 RX ADMIN — LOSARTAN POTASSIUM 50 MG: 50 TABLET, FILM COATED ORAL at 09:13

## 2018-09-21 RX ADMIN — METOPROLOL TARTRATE 12.5 MG: 25 TABLET ORAL at 09:14

## 2018-09-21 RX ADMIN — PANTOPRAZOLE SODIUM 40 MG: 40 TABLET, DELAYED RELEASE ORAL at 09:13

## 2018-09-21 ASSESSMENT — PAIN SCALES - GENERAL: PAINLEVEL_OUTOF10: 4

## 2018-09-21 NOTE — PROGRESS NOTES
Pt c/o itching, rash and face feels hot, thinks she is having allergic reaction. Cardiology paged order for Benadryl received.

## 2018-09-21 NOTE — CARE COORDINATION
Case Management Initial Discharge Plan  Ashish Alba             Met with:patient to discuss discharge plans. Information verified: address, contacts, phone number, , insurance Yes  PCP: Jaswinder Zavala MD  Date of last visit:     Insurance Provider: [employer]    Discharge Planning    Living Arrangements:  Alone   Support Systems:  Friends/Neighbors, Family Members    Home has 1 stories  3 stairs to climb to get into front door, n/a stairs to climb to reach second floor  Location of bedroom/bathroom in home main    Patient able to perform ADL's:Independent    Current Services (outpatient & in home) none  DME equipment: none  DME provider: none    Pharmacy: [facility]   Potential Assistance Purchasing Medications:     Does patient want to participate in local refill/ meds to beds program?       Potential Assistance Needed:  N/A    Patient agreeable to home care: No  [place] of choice provided:  n/a    Prior SNF/Rehab Placement and Facility: none  Agreeable to SNF/Rehab: No  [place] of choice provided: n/a   Evaluation: n/a    Expected Discharge date:     Patient expects to be discharged to: Follow Up Appointment: Best Day/ Time:      Transportation provider: friend  Transportation arrangements needed for discharge: No    Readmission Risk              Risk of Unplanned Readmission:        8               Does patient have a readmission risk score greater than 14?: No  If yes, follow-up appointment must be made within 7 days of discharge.      Discharge Plan: home independent          Electronically signed by Perla Romero RN on 18 at 3:36 PM

## 2018-09-24 ENCOUNTER — HOSPITAL ENCOUNTER (OUTPATIENT)
Age: 58
Setting detail: SPECIMEN
Discharge: HOME | End: 2018-09-24
Payer: COMMERCIAL

## 2018-09-24 LAB
INR BLD: 1.6
PROTHROMBIN TIME: 17 SEC (ref 9–12)

## 2018-09-25 ENCOUNTER — TELEPHONE (OUTPATIENT)
Dept: FAMILY MEDICINE CLINIC | Age: 58
End: 2018-09-25

## 2018-09-25 NOTE — TELEPHONE ENCOUNTER
[facility] Transitions Initial Follow Up Call    Outreach made within 2 business days of discharge: Yes    Patient: Arie Garibay Patient : 1960   MRN: [id number]  Reason for Admission: There are no discharge diagnoses documented for the most recent discharge. Discharge Date: 18       Spoke with: [facility]    Discharge department/facility: [facility]        Follow Up  No future appointments.     Yomi Marrero

## 2018-09-26 ENCOUNTER — HOSPITAL ENCOUNTER (OUTPATIENT)
Age: 58
Setting detail: SPECIMEN
Discharge: HOME | End: 2018-09-26
Payer: COMMERCIAL

## 2018-09-26 LAB
ANION GAP SERPL CALCULATED.3IONS-SCNC: 14 MMOL/L (ref 9–17)
BUN BLDV-MCNC: 11 MG/DL (ref 6–20)
BUN/CREAT BLD: ABNORMAL (ref 9–20)
CALCIUM SERPL-MCNC: 9.4 MG/DL (ref 8.6–10.4)
CHLORIDE BLD-SCNC: 99 MMOL/L (ref 98–107)
CO2: 30 MMOL/L (ref 20–31)
CREAT SERPL-MCNC: 0.72 MG/DL (ref 0.5–0.9)
GFR AFRICAN AMERICAN: >60 ML/MIN
GFR NON-AFRICAN AMERICAN: >60 ML/MIN
GFR SERPL CREATININE-BSD FRML MDRD: ABNORMAL ML/MIN/{1.73_M2}
GFR SERPL CREATININE-BSD FRML MDRD: ABNORMAL ML/MIN/{1.73_M2}
GLUCOSE BLD-MCNC: 128 MG/DL (ref 70–99)
INR BLD: 1.6
POTASSIUM SERPL-SCNC: 4.2 MMOL/L (ref 3.7–5.3)
PROTHROMBIN TIME: 16.8 SEC (ref 9–12)
SODIUM BLD-SCNC: 143 MMOL/L (ref 135–144)

## 2018-09-28 ENCOUNTER — HOSPITAL ENCOUNTER (OUTPATIENT)
Age: 58
Setting detail: SPECIMEN
Discharge: HOME | End: 2018-09-28
Payer: COMMERCIAL

## 2018-09-28 LAB
INR BLD: 2.5
PROTHROMBIN TIME: 25.6 SEC (ref 9–12)

## 2018-10-04 ENCOUNTER — HOSPITAL ENCOUNTER (OUTPATIENT)
Age: 58
Setting detail: SPECIMEN
Discharge: HOME | End: 2018-10-04
Payer: COMMERCIAL

## 2018-10-04 LAB
ANION GAP SERPL CALCULATED.3IONS-SCNC: 11 MMOL/L (ref 9–17)
BUN BLDV-MCNC: 14 MG/DL (ref 6–20)
BUN/CREAT BLD: ABNORMAL (ref 9–20)
CALCIUM SERPL-MCNC: 9.3 MG/DL (ref 8.6–10.4)
CHLORIDE BLD-SCNC: 102 MMOL/L (ref 98–107)
CO2: 30 MMOL/L (ref 20–31)
CREAT SERPL-MCNC: 0.73 MG/DL (ref 0.5–0.9)
GFR AFRICAN AMERICAN: >60 ML/MIN
GFR NON-AFRICAN AMERICAN: >60 ML/MIN
GFR SERPL CREATININE-BSD FRML MDRD: ABNORMAL ML/MIN/{1.73_M2}
GFR SERPL CREATININE-BSD FRML MDRD: ABNORMAL ML/MIN/{1.73_M2}
GLUCOSE BLD-MCNC: 127 MG/DL (ref 70–99)
MAGNESIUM: 2 MG/DL (ref 1.6–2.6)
POTASSIUM SERPL-SCNC: 4 MMOL/L (ref 3.7–5.3)
SODIUM BLD-SCNC: 143 MMOL/L (ref 135–144)

## 2018-10-31 ENCOUNTER — HOSPITAL ENCOUNTER (OUTPATIENT)
Age: 58
Setting detail: SPECIMEN
Discharge: HOME | End: 2018-10-31
Payer: COMMERCIAL

## 2018-10-31 LAB
INR BLD: 2.2
PROTHROMBIN TIME: 22.3 SEC (ref 9–12)

## 2018-11-21 ENCOUNTER — HOSPITAL ENCOUNTER (OUTPATIENT)
Age: 58
Setting detail: SPECIMEN
Discharge: HOME | End: 2018-11-21
Payer: COMMERCIAL

## 2018-11-21 LAB
INR BLD: 2.2
PROTHROMBIN TIME: 22.4 SEC (ref 9–12)

## 2018-11-25 DIAGNOSIS — I10 ESSENTIAL HYPERTENSION: ICD-10-CM

## 2018-11-26 RX ORDER — LOSARTAN POTASSIUM 50 MG/1
TABLET ORAL
Qty: 90 TABLET | Refills: 0 | OUTPATIENT
Start: 2018-11-26

## 2018-12-17 ENCOUNTER — HOSPITAL ENCOUNTER (OUTPATIENT)
Age: 58
Setting detail: SPECIMEN
Discharge: HOME | End: 2018-12-17
Payer: COMMERCIAL

## 2018-12-17 LAB
INR BLD: 2
PROTHROMBIN TIME: 20.3 SEC (ref 9–12)

## 2019-01-04 DIAGNOSIS — I25.10 CORONARY ARTERY DISEASE INVOLVING NATIVE HEART WITHOUT ANGINA PECTORIS, UNSPECIFIED VESSEL OR LESION TYPE: ICD-10-CM

## 2019-01-04 RX ORDER — CLOPIDOGREL BISULFATE 75 MG/1
TABLET ORAL
Qty: 90 TABLET | Refills: 1 | OUTPATIENT
Start: 2019-01-04

## 2019-01-13 DIAGNOSIS — I10 ESSENTIAL HYPERTENSION: ICD-10-CM

## 2019-01-14 RX ORDER — LOSARTAN POTASSIUM 50 MG/1
TABLET ORAL
Qty: 30 TABLET | Refills: 0 | Status: SHIPPED | OUTPATIENT
Start: 2019-01-14 | End: 2019-02-17 | Stop reason: SDUPTHER

## 2019-01-17 ENCOUNTER — HOSPITAL ENCOUNTER (OUTPATIENT)
Age: 59
Setting detail: SPECIMEN
Discharge: HOME | End: 2019-01-17
Payer: COMMERCIAL

## 2019-01-17 LAB
INR BLD: 2
PROTHROMBIN TIME: 20.5 SEC (ref 9–12)

## 2019-02-08 ENCOUNTER — HOSPITAL ENCOUNTER (OUTPATIENT)
Age: 59
Setting detail: SPECIMEN
Discharge: HOME | End: 2019-02-08
Payer: COMMERCIAL

## 2019-02-08 ENCOUNTER — OFFICE VISIT (OUTPATIENT)
Dept: FAMILY MEDICINE CLINIC | Age: 59
End: 2019-02-08
Payer: COMMERCIAL

## 2019-02-08 VITALS
SYSTOLIC BLOOD PRESSURE: 124 MMHG | OXYGEN SATURATION: 96 % | TEMPERATURE: 98.2 F | DIASTOLIC BLOOD PRESSURE: 82 MMHG | HEART RATE: 63 BPM | RESPIRATION RATE: 16 BRPM | BODY MASS INDEX: 42.16 KG/M2 | WEIGHT: 238 LBS

## 2019-02-08 DIAGNOSIS — I25.10 CORONARY ARTERY DISEASE INVOLVING NATIVE HEART WITHOUT ANGINA PECTORIS, UNSPECIFIED VESSEL OR LESION TYPE: ICD-10-CM

## 2019-02-08 DIAGNOSIS — Z79.01 LONG TERM CURRENT USE OF ANTICOAGULANTS WITH INR GOAL OF 2.0-3.0: ICD-10-CM

## 2019-02-08 DIAGNOSIS — E78.2 MIXED HYPERLIPIDEMIA: ICD-10-CM

## 2019-02-08 DIAGNOSIS — I48.0 PAF (PAROXYSMAL ATRIAL FIBRILLATION) (HCC): ICD-10-CM

## 2019-02-08 DIAGNOSIS — I10 ESSENTIAL HYPERTENSION: ICD-10-CM

## 2019-02-08 DIAGNOSIS — L20.89 OTHER ATOPIC DERMATITIS: ICD-10-CM

## 2019-02-08 DIAGNOSIS — Z13.0 SCREENING FOR DEFICIENCY ANEMIA: ICD-10-CM

## 2019-02-08 DIAGNOSIS — Z98.890 S/P ENDOMETRIAL ABLATION: ICD-10-CM

## 2019-02-08 DIAGNOSIS — R59.0 AXILLARY LYMPHADENOPATHY: ICD-10-CM

## 2019-02-08 DIAGNOSIS — E11.9 TYPE 2 DIABETES MELLITUS WITHOUT COMPLICATION, WITHOUT LONG-TERM CURRENT USE OF INSULIN (HCC): Primary | ICD-10-CM

## 2019-02-08 DIAGNOSIS — N63.10 BREAST MASS, RIGHT: ICD-10-CM

## 2019-02-08 LAB
ANION GAP SERPL CALCULATED.3IONS-SCNC: 14 MMOL/L (ref 9–17)
BUN BLDV-MCNC: 13 MG/DL (ref 6–20)
BUN/CREAT BLD: ABNORMAL (ref 9–20)
CALCIUM SERPL-MCNC: 9.2 MG/DL (ref 8.6–10.4)
CHLORIDE BLD-SCNC: 102 MMOL/L (ref 98–107)
CO2: 28 MMOL/L (ref 20–31)
CREAT SERPL-MCNC: 0.74 MG/DL (ref 0.5–0.9)
GFR AFRICAN AMERICAN: >60 ML/MIN
GFR NON-AFRICAN AMERICAN: >60 ML/MIN
GFR SERPL CREATININE-BSD FRML MDRD: ABNORMAL ML/MIN/{1.73_M2}
GFR SERPL CREATININE-BSD FRML MDRD: ABNORMAL ML/MIN/{1.73_M2}
GLUCOSE BLD-MCNC: 117 MG/DL (ref 70–99)
MAGNESIUM: 1.9 MG/DL (ref 1.6–2.6)
POTASSIUM SERPL-SCNC: 3.7 MMOL/L (ref 3.7–5.3)
SODIUM BLD-SCNC: 144 MMOL/L (ref 135–144)

## 2019-02-08 PROCEDURE — 99214 OFFICE O/P EST MOD 30 MIN: CPT | Performed by: INTERNAL MEDICINE

## 2019-02-08 ASSESSMENT — PATIENT HEALTH QUESTIONNAIRE - PHQ9
SUM OF ALL RESPONSES TO PHQ9 QUESTIONS 1 & 2: 0
1. LITTLE INTEREST OR PLEASURE IN DOING THINGS: 0
SUM OF ALL RESPONSES TO PHQ QUESTIONS 1-9: 0
SUM OF ALL RESPONSES TO PHQ QUESTIONS 1-9: 0
2. FEELING DOWN, DEPRESSED OR HOPELESS: 0

## 2019-02-11 ENCOUNTER — HOSPITAL ENCOUNTER (OUTPATIENT)
Age: 59
Setting detail: SPECIMEN
Discharge: HOME | End: 2019-02-11
Payer: COMMERCIAL

## 2019-02-11 DIAGNOSIS — Z79.01 LONG TERM CURRENT USE OF ANTICOAGULANTS WITH INR GOAL OF 2.0-3.0: ICD-10-CM

## 2019-02-11 DIAGNOSIS — E78.2 MIXED HYPERLIPIDEMIA: ICD-10-CM

## 2019-02-11 DIAGNOSIS — Z13.0 SCREENING FOR DEFICIENCY ANEMIA: ICD-10-CM

## 2019-02-11 DIAGNOSIS — E11.9 TYPE 2 DIABETES MELLITUS WITHOUT COMPLICATION, WITHOUT LONG-TERM CURRENT USE OF INSULIN (HCC): ICD-10-CM

## 2019-02-11 LAB
ABSOLUTE EOS #: 0.18 K/UL (ref 0–0.44)
ABSOLUTE IMMATURE GRANULOCYTE: <0.03 K/UL (ref 0–0.3)
ABSOLUTE LYMPH #: 1.12 K/UL (ref 1.1–3.7)
ABSOLUTE MONO #: 0.53 K/UL (ref 0.1–1.2)
ALBUMIN SERPL-MCNC: 4 G/DL (ref 3.5–5.2)
ALBUMIN/GLOBULIN RATIO: 1.5 (ref 1–2.5)
ALP BLD-CCNC: 79 U/L (ref 35–104)
ALT SERPL-CCNC: 14 U/L (ref 5–33)
ANION GAP SERPL CALCULATED.3IONS-SCNC: 14 MMOL/L (ref 9–17)
AST SERPL-CCNC: 14 U/L
BASOPHILS # BLD: 1 % (ref 0–2)
BASOPHILS ABSOLUTE: 0.06 K/UL (ref 0–0.2)
BILIRUB SERPL-MCNC: 0.64 MG/DL (ref 0.3–1.2)
BUN BLDV-MCNC: 15 MG/DL (ref 6–20)
BUN/CREAT BLD: ABNORMAL (ref 9–20)
CALCIUM SERPL-MCNC: 9 MG/DL (ref 8.6–10.4)
CHLORIDE BLD-SCNC: 103 MMOL/L (ref 98–107)
CHOLESTEROL, FASTING: 111 MG/DL
CHOLESTEROL/HDL RATIO: 3.2
CO2: 27 MMOL/L (ref 20–31)
CREAT SERPL-MCNC: 0.72 MG/DL (ref 0.5–0.9)
CREATININE URINE: 95.7 MG/DL (ref 28–217)
DIFFERENTIAL TYPE: ABNORMAL
EOSINOPHILS RELATIVE PERCENT: 2 % (ref 1–4)
ESTIMATED AVERAGE GLUCOSE: 123 MG/DL
GFR AFRICAN AMERICAN: >60 ML/MIN
GFR NON-AFRICAN AMERICAN: >60 ML/MIN
GFR SERPL CREATININE-BSD FRML MDRD: ABNORMAL ML/MIN/{1.73_M2}
GFR SERPL CREATININE-BSD FRML MDRD: ABNORMAL ML/MIN/{1.73_M2}
GLUCOSE BLD-MCNC: 129 MG/DL (ref 70–99)
HBA1C MFR BLD: 5.9 % (ref 4–6)
HCT VFR BLD CALC: 43.5 % (ref 36.3–47.1)
HDLC SERPL-MCNC: 35 MG/DL
HEMOGLOBIN: 13.9 G/DL (ref 11.9–15.1)
IMMATURE GRANULOCYTES: 0 %
INR BLD: 2.8
LDL CHOLESTEROL: 48 MG/DL (ref 0–130)
LYMPHOCYTES # BLD: 14 % (ref 24–43)
MCH RBC QN AUTO: 30 PG (ref 25.2–33.5)
MCHC RBC AUTO-ENTMCNC: 32 G/DL (ref 28.4–34.8)
MCV RBC AUTO: 94 FL (ref 82.6–102.9)
MICROALBUMIN/CREAT 24H UR: <12 MG/L
MICROALBUMIN/CREAT UR-RTO: NORMAL MCG/MG CREAT
MONOCYTES # BLD: 7 % (ref 3–12)
NRBC AUTOMATED: 0 PER 100 WBC
PDW BLD-RTO: 12.4 % (ref 11.8–14.4)
PLATELET # BLD: 305 K/UL (ref 138–453)
PLATELET ESTIMATE: ABNORMAL
PMV BLD AUTO: 10.6 FL (ref 8.1–13.5)
POTASSIUM SERPL-SCNC: 4.3 MMOL/L (ref 3.7–5.3)
PROTHROMBIN TIME: 27.7 SEC (ref 9–12)
RBC # BLD: 4.63 M/UL (ref 3.95–5.11)
RBC # BLD: ABNORMAL 10*6/UL
SEG NEUTROPHILS: 76 % (ref 36–65)
SEGMENTED NEUTROPHILS ABSOLUTE COUNT: 6.03 K/UL (ref 1.5–8.1)
SODIUM BLD-SCNC: 144 MMOL/L (ref 135–144)
TOTAL PROTEIN: 6.6 G/DL (ref 6.4–8.3)
TRIGLYCERIDE, FASTING: 140 MG/DL
VLDLC SERPL CALC-MCNC: ABNORMAL MG/DL (ref 1–30)
WBC # BLD: 7.9 K/UL (ref 3.5–11.3)
WBC # BLD: ABNORMAL 10*3/UL

## 2019-02-12 DIAGNOSIS — N63.10 BREAST MASS, RIGHT: ICD-10-CM

## 2019-02-12 DIAGNOSIS — R59.0 AXILLARY LYMPHADENOPATHY: Primary | ICD-10-CM

## 2019-02-17 DIAGNOSIS — I10 ESSENTIAL HYPERTENSION: ICD-10-CM

## 2019-02-18 ENCOUNTER — HOSPITAL ENCOUNTER (OUTPATIENT)
Dept: ULTRASOUND IMAGING | Age: 59
Discharge: HOME | End: 2019-02-20
Payer: COMMERCIAL

## 2019-02-18 ENCOUNTER — HOSPITAL ENCOUNTER (OUTPATIENT)
Dept: MAMMOGRAPHY | Age: 59
Discharge: HOME | End: 2019-02-20
Payer: COMMERCIAL

## 2019-02-18 DIAGNOSIS — R92.8 ABNORMAL MAMMOGRAM: ICD-10-CM

## 2019-02-18 DIAGNOSIS — N63.10 BREAST MASS, RIGHT: ICD-10-CM

## 2019-02-18 DIAGNOSIS — R59.0 AXILLARY LYMPHADENOPATHY: ICD-10-CM

## 2019-02-18 PROCEDURE — 76642 ULTRASOUND BREAST LIMITED: CPT

## 2019-02-18 PROCEDURE — 77066 DX MAMMO INCL CAD BI: CPT

## 2019-02-19 DIAGNOSIS — I10 ESSENTIAL HYPERTENSION: ICD-10-CM

## 2019-02-19 RX ORDER — LOSARTAN POTASSIUM 50 MG/1
TABLET ORAL
Qty: 30 TABLET | Refills: 5 | Status: SHIPPED | OUTPATIENT
Start: 2019-02-19 | End: 2019-08-16 | Stop reason: SDUPTHER

## 2019-02-19 RX ORDER — LOSARTAN POTASSIUM 50 MG/1
TABLET ORAL
Qty: 30 TABLET | Refills: 0 | OUTPATIENT
Start: 2019-02-19

## 2019-02-21 ENCOUNTER — TELEPHONE (OUTPATIENT)
Dept: FAMILY MEDICINE CLINIC | Age: 59
End: 2019-02-21

## 2019-02-21 RX ORDER — PREDNISONE 20 MG/1
20 TABLET ORAL DAILY
Qty: 5 TABLET | Refills: 0 | Status: SHIPPED | OUTPATIENT
Start: 2019-02-21 | End: 2019-02-26

## 2019-03-18 ENCOUNTER — HOSPITAL ENCOUNTER (OUTPATIENT)
Age: 59
Setting detail: SPECIMEN
Discharge: HOME | End: 2019-03-18
Payer: COMMERCIAL

## 2019-03-18 LAB
INR BLD: 3.8
PROTHROMBIN TIME: 35.8 SEC (ref 9–12)

## 2019-04-23 ENCOUNTER — TELEPHONE (OUTPATIENT)
Dept: FAMILY MEDICINE CLINIC | Age: 59
End: 2019-04-23

## 2019-04-23 DIAGNOSIS — Z11.59 SCREENING EXAMINATION FOR MEASLES: Primary | ICD-10-CM

## 2019-04-23 NOTE — TELEPHONE ENCOUNTER
The pt called and wants to know if she needs her Measles vaccine? Told her needs blood work to be tested. Please, order.

## 2019-04-25 ENCOUNTER — HOSPITAL ENCOUNTER (OUTPATIENT)
Age: 59
Setting detail: SPECIMEN
Discharge: HOME | End: 2019-04-25
Payer: COMMERCIAL

## 2019-04-25 DIAGNOSIS — Z11.59 SCREENING EXAMINATION FOR MEASLES: ICD-10-CM

## 2019-04-25 LAB
INR BLD: 1.9
PROTHROMBIN TIME: 18.9 SEC (ref 9–12)

## 2019-04-26 LAB — MEASLES IMMUNE (IGG): 0.16

## 2019-05-03 ENCOUNTER — NURSE ONLY (OUTPATIENT)
Dept: FAMILY MEDICINE CLINIC | Age: 59
End: 2019-05-03

## 2019-05-03 DIAGNOSIS — Z23 NEED FOR IMMUNIZATION AGAINST MEASLES: Primary | ICD-10-CM

## 2019-05-03 PROCEDURE — 90471 IMMUNIZATION ADMIN: CPT | Performed by: INTERNAL MEDICINE

## 2019-05-03 PROCEDURE — 90707 MMR VACCINE SC: CPT | Performed by: INTERNAL MEDICINE

## 2019-05-16 ENCOUNTER — HOSPITAL ENCOUNTER (OUTPATIENT)
Age: 59
Setting detail: SPECIMEN
Discharge: HOME | End: 2019-05-16

## 2019-05-16 LAB
INR BLD: 1.5
PROTHROMBIN TIME: 15.5 SEC (ref 9–12)

## 2019-05-20 ENCOUNTER — OFFICE VISIT (OUTPATIENT)
Dept: FAMILY MEDICINE CLINIC | Age: 59
End: 2019-05-20
Payer: COMMERCIAL

## 2019-05-20 VITALS
HEART RATE: 62 BPM | WEIGHT: 242.6 LBS | BODY MASS INDEX: 42.97 KG/M2 | SYSTOLIC BLOOD PRESSURE: 138 MMHG | DIASTOLIC BLOOD PRESSURE: 84 MMHG | OXYGEN SATURATION: 97 %

## 2019-05-20 DIAGNOSIS — R23.8 VESICULAR RASH: Primary | ICD-10-CM

## 2019-05-20 PROCEDURE — 99213 OFFICE O/P EST LOW 20 MIN: CPT | Performed by: NURSE PRACTITIONER

## 2019-05-20 ASSESSMENT — ENCOUNTER SYMPTOMS
COUGH: 0
SHORTNESS OF BREATH: 0

## 2019-05-20 NOTE — PROGRESS NOTES
[address] [place]  [phone]    Dorys Anderson is a 66 y.o. female who presents today for her  medical conditions/complaints as noted below. Dorys Anderson is c/o of Joint Pain; Rash; and Fatigue    HPI:     HPI     Pt states her neck and joints hurt. She has developed a small vesicular rash on bilateral arms and chest. She does not know if she has had a fever. No cough or other associated symptoms. She does report that she had the mmr in the beginning of the month. Wt Readings from Last 3 Encounters:   05/20/19 242 lb 9.6 oz (110 kg)   02/08/19 238 lb (108 kg)   09/21/18 238 lb 6.4 oz (108.1 kg)     Nursing note reviewed and discussed with patient. Patient's medications, allergies, past medical, surgical, social and family histories were reviewed and updated asappropriate. Current Outpatient Medications   Medication Sig Dispense Refill    losartan (COZAAR) 50 MG tablet TAKE 1 TABLET BY MOUTH ONE TIME A DAY  30 tablet 5    hydrocortisone 2.5 % ointment Apply topically 2 times daily. 100 g 1    acetaminophen (TYLENOL) 325 MG tablet Take 2 tablets by mouth every 4 hours as needed for Pain 120 tablet 3    metoprolol tartrate (LOPRESSOR) 25 MG tablet Take 0.5 tablets by mouth 2 times daily 60 tablet 3    sotalol (BETAPACE) 120 MG tablet Take 120 mg by mouth 2 times daily      clopidogrel (PLAVIX) 75 MG tablet Take 1 tablet by mouth daily 30 tablet 11    Omega-3 Fatty Acids (FISH OIL) 1000 MG CAPS Take 1,000 mg by mouth daily       furosemide (LASIX) 20 MG tablet TAKE 1 TABLET BY MOUTH TWO TIMES A DAY  11    nitroGLYCERIN (NITROSTAT) 0.4 MG SL tablet Place 1 tablet under tongue upon chest pain, wait 5 minutes and may repeat up to 3 doses in 15 minutes. Do not crush or break.  25 tablet 3    warfarin (COUMADIN) 5 MG tablet Take 5 mg by mouth Sunday and Wed 5mg, 2.5 mg  On other days      atorvastatin (LIPITOR) 40 MG tablet Packs/day: 0.50     Years: 34.00     Pack years: 17.00     Types: Cigarettes    Smokeless tobacco: Never Used    Tobacco comment: smokes 5 cig/day   Substance Use Topics    Alcohol use: No     Alcohol/week: 0.0 oz      Allergies   Allergen Reactions    Pcn [Penicillins] Hives    Amiodarone     Protonix [Pantoprazole Sodium] Itching       Subjective:      Review of Systems   Constitutional: Negative for chills and fever. Respiratory: Negative for cough and shortness of breath. Cardiovascular: Negative for chest pain, palpitations and leg swelling. Musculoskeletal: Positive for myalgias. Skin: Positive for rash. Other pertinent ROS in HPI  Objective:     /84 (Site: Left Upper Arm, Position: Sitting, Cuff Size: Medium Adult)   Pulse 62   Wt 242 lb 9.6 oz (110 kg)   LMP  (Within Months)   SpO2 97%   BMI 42.97 kg/m²    Physical Exam   Constitutional: She is oriented to person, place, and time. She appears well-developed and well-nourished. She is active. HENT:   Right Ear: External ear normal.   Left Ear: External ear normal.   Nose: Nose normal.   Eyes: EOM are normal.   Neck: Normal range of motion and full passive range of motion without pain. Cardiovascular: Normal rate, regular rhythm, S1 normal, S2 normal and normal heart sounds. Pulmonary/Chest: Effort normal and breath sounds normal. No respiratory distress. Musculoskeletal: Normal range of motion. She exhibits no deformity. Neurological: She is alert and oriented to person, place, and time. Skin: Skin is warm and dry. Psychiatric: She has a normal mood and affect. Assessment/PLAN     1. Vesicular rash  Seems to be contact dermatitis. Pt will watch and notify if no improvement or worsening. RTO if symptoms worsen or fail to improve  Pt agreeable with plan      Patient given educational materials - see patientinstructions. Discussed use, benefit, and side effects of prescribed medications.   All patient questions answered. Pt voiced understanding. Reviewed health maintenance. Instructed to continue current medications, diet andexercise. 1.  Billye Fleischer received counseling on the following healthy behaviors: nutrition, exercise and medication adherence  2. Patient given educationalmaterials when available - see patient instructions when applicable  3. Discussed use, benefit, and side effects of prescribed medications. Barriersto medication compliance addressed. All patient questions answered. Pt voiced understanding. 4.  Reviewed prior labs and health maintenance  5. Continue current medications, diet and exercise.     CompletedRefills   Requested Prescriptions      No prescriptions requested or ordered in this encounter         Electronically signed by Kristy Egan CNP on 5/20/2019 at 9:19 PM

## 2019-05-28 ENCOUNTER — OFFICE VISIT (OUTPATIENT)
Dept: FAMILY MEDICINE CLINIC | Age: 59
End: 2019-05-28
Payer: COMMERCIAL

## 2019-05-28 ENCOUNTER — HOSPITAL ENCOUNTER (OUTPATIENT)
Age: 59
Setting detail: SPECIMEN
Discharge: HOME | End: 2019-05-28
Payer: COMMERCIAL

## 2019-05-28 VITALS
SYSTOLIC BLOOD PRESSURE: 124 MMHG | BODY MASS INDEX: 43.4 KG/M2 | OXYGEN SATURATION: 97 % | HEART RATE: 65 BPM | WEIGHT: 245 LBS | TEMPERATURE: 98.1 F | RESPIRATION RATE: 16 BRPM | DIASTOLIC BLOOD PRESSURE: 62 MMHG

## 2019-05-28 DIAGNOSIS — M25.542 ARTHRALGIA OF BOTH HANDS: Primary | ICD-10-CM

## 2019-05-28 DIAGNOSIS — M25.541 ARTHRALGIA OF BOTH HANDS: Primary | ICD-10-CM

## 2019-05-28 LAB
INR BLD: 2.9
PROTHROMBIN TIME: 28 SEC (ref 9–12)

## 2019-05-28 PROCEDURE — 99213 OFFICE O/P EST LOW 20 MIN: CPT | Performed by: INTERNAL MEDICINE

## 2019-05-28 RX ORDER — FUROSEMIDE 40 MG/1
TABLET ORAL
Refills: 3 | COMMUNITY
Start: 2019-05-05

## 2019-05-28 RX ORDER — METHYLPREDNISOLONE 4 MG/1
TABLET ORAL
Qty: 1 KIT | Refills: 0 | Status: SHIPPED | OUTPATIENT
Start: 2019-05-28 | End: 2019-06-05 | Stop reason: SDUPTHER

## 2019-05-28 RX ORDER — SOTALOL HYDROCHLORIDE 80 MG/1
1 TABLET ORAL 2 TIMES DAILY
Refills: 1 | COMMUNITY
Start: 2019-05-08 | End: 2019-11-14 | Stop reason: ALTCHOICE

## 2019-05-28 ASSESSMENT — ENCOUNTER SYMPTOMS
VOMITING: 0
DIARRHEA: 0
NAIL CHANGES: 0
EYE PAIN: 0
RHINORRHEA: 0
COUGH: 0
SHORTNESS OF BREATH: 0
SORE THROAT: 0

## 2019-05-28 NOTE — PROGRESS NOTES
Patient is present for joint pain that onset Thursday. She states it is in her toes, ankles, fingers and wrists. She has been using . Pt can not put rings on her fingers. She can not hold the pitcher to pour lemonade. Pt did get a measles shot 5/3. Rash on forearms, chest and legs. Pt used hydrocortisone cream, no relief. No really itch. No other concerns. Visit Information    Have you changed or started any medications since your last visit including any over-the-counter medicines, vitamins, or herbal medicines? no   Have you stopped taking any of your medications? Is so, why? -  no  Are you having any side effects from any of your medications? - no     Have you seen any other physician or provider since your last visit?  no   Have you had any other diagnostic tests since your last visit?  no   Have you been seen in the emergency room and/or had an admission in a hospital since we last saw you?  no   Have you had your routine dental cleaning in the past 6 months?  no     Do you have an active [employer] account? If no, what is the barrier?   Yes    Patient Care Team:  Martín Darden MD as PCP - General (Internal Medicine)  Joce Bartltet MD as Consulting Physician (Cardiology)    Medical History Review  Past Medical, Family, and Social History reviewed and does not contribute to the patient presenting condition    Health Maintenance   Topic Date Due    Diabetic foot exam  08/28/1972    Diabetic retinal exam  08/28/1972    Hepatitis B Vaccine (1 of 3 - Risk 3-dose series) 08/28/1981    Cervical cancer screen  02/03/2018    Colon Cancer Screen FIT/FOBT  03/02/2018    Shingles Vaccine (1 of 2) 02/08/2020 (Originally 8/28/2012)    A1C test (Diabetic or Prediabetic)  02/11/2020    Diabetic microalbuminuria test  02/11/2020    Lipid screen  02/11/2020    Potassium monitoring  02/11/2020    Creatinine monitoring  02/11/2020    Breast cancer screen  02/18/2021    DTaP/Tdap/Td vaccine (2 - Td) 02/28/2027    Flu vaccine  Completed    Pneumococcal 0-64 years Vaccine  Completed    Hepatitis C screen  Completed    HIV screen  Completed

## 2019-05-28 NOTE — PROGRESS NOTES
[facility] PHYSICIANS  [facility] PHYS POINT [address] [facility] [address] [place] [id number]  Dept: [phone]  Dept Fax: [phone]      Shira Garvey is a 66 y.o. female who presents today for hermedical conditions/complaints as noted below. Shira Garvey is c/o of Joint Pain; Rash; and Leg Swelling            HPI:     Rash   This is a new problem. The current episode started 1 to 4 weeks ago. The problem is unchanged. The affected locations include the left arm and right arm. The rash is characterized by redness and itchiness. She was exposed to nothing. Pertinent negatives include no anorexia, congestion, cough, diarrhea, eye pain, facial edema, fatigue, fever, joint pain, nail changes, rhinorrhea, shortness of breath, sore throat or vomiting. Past treatments include topical steroids. joint pain - hands, fingers, legs, feet, elbows. Unable to type, do her hair, hold things due to pain. Started about five days ago. Tried tylenol and that didn't help, so took ibuprofen because it hurt to move. Hemoglobin A1C (%)   Date Value   02/11/2019 5.9   03/21/2018 6.3   12/21/2017 6.4             ( goal A1Cis < 7)   Microalb/Crt.  Ratio (mcg/mg creat)   Date Value   02/11/2019 CANNOT BE CALCULATED     LDL Cholesterol (mg/dL)   Date Value   02/11/2019 48   07/07/2017 48   02/23/2017 55     LDL Calculated (mg/dL)   Date Value   08/12/2015 40   09/18/2014 71       (goal LDL is <100)   AST (U/L)   Date Value   02/11/2019 14     ALT (U/L)   Date Value   02/11/2019 14     BUN (mg/dL)   Date Value   02/11/2019 15     BP Readings from Last 3 Encounters:   05/28/19 124/62   05/20/19 138/84   02/08/19 124/82          (goal 120/80)    Past Medical History:   Diagnosis Date    Abnormal Pap smear 1989    Had colposcopy    Allergy     PCN    Anemia     Anticoagulated     ON COUMADIN    Atrial fibrillation ([facility] [address] 12/5/2014    Blood transfusion 12/20/11    4 units    CAD (coronary artery disease)     Eczema  Examination of participant in clinical trial 11    end date 3/25/15    Headache 2014    Heart disease 11    card stents x 2    Hyperlipidemia     Hypertension     Menopausal symptoms     MI, old     PAF (paroxysmal atrial fibrillation) [facility])     s/p cardioversion Dec 2015     STEMI (ST elevation myocardial infarction) ([facility] [address] 2016    Type 2 diabetes mellitus without complication, without long-term current use of insulin ([facility] [address] 2017      Past Surgical History:   Procedure Laterality Date    CARDIOVERSION  2018    DR Kayley Schulte  SUCCESSFUL    CARDIOVERSION  2014    CARDIOVERSION      CARDIOVERSION  2018     [facility]    COLPOSCOPY      CORONARY ANGIOPLASTY WITH STENT PLACEMENT  2016    PTCA AND GIRMA TO RCA    CORONARY ANGIOPLASTY WITH STENT PLACEMENT      DILATION AND CURETTAGE      ENDOMETRIAL ABLATION  12    hydrothermal    LAPAROSCOPY      TRANSESOPHAGEAL ECHOCARDIOGRAM  2014    TUBAL LIGATION         Family History   Problem Relation Age of Onset    COPD Father     Coronary Art Dis Father     Cancer Mother     Heart Failure Maternal Grandmother     Breast Cancer Neg Hx     Colon Cancer Neg Hx     Diabetes Neg Hx     Eclampsia Neg Hx     Hypertension Neg Hx     Ovarian Cancer Neg Hx      Labor Neg Hx     Spont Abortions Neg Hx     Stroke Neg Hx        Social History     Tobacco Use    Smoking status: Current Every Day Smoker     Packs/day: 0.50     Years: 34.00     Pack years: 17.00     Types: Cigarettes    Smokeless tobacco: Never Used    Tobacco comment: smokes 5 cig/day   Substance Use Topics    Alcohol use: No     Alcohol/week: 0.0 oz      Current Outpatient Medications   Medication Sig Dispense Refill    furosemide (LASIX) 40 MG tablet Take one tablet by mouth in the morning and one half tablet in the evening.   3    sotalol (BETAPACE) 80 MG tablet Take 1 tablet Constitutional: Negative for fatigue and fever. HENT: Negative for congestion, rhinorrhea and sore throat. Eyes: Negative for pain. Respiratory: Negative for cough and shortness of breath. Gastrointestinal: Negative for anorexia, diarrhea and vomiting. Musculoskeletal: Negative for joint pain. Skin: Positive for rash. Negative for nail changes. Objective:     /62 (Site: Left Upper Arm, Position: Sitting, Cuff Size: Large Adult)   Pulse 65   Temp 98.1 °F (36.7 °C) (Oral)   Resp 16   Wt 245 lb (111.1 kg)   LMP  (Within Months)   SpO2 97%   BMI 43.40 kg/m²   Physical Exam   Constitutional: She appears well-developed and well-nourished. Cardiovascular: Normal rate, regular rhythm and normal heart sounds. Exam reveals no gallop and no friction rub. No murmur heard. Pulmonary/Chest: Effort normal and breath sounds normal. No respiratory distress. She has no wheezes. Abdominal: Soft. Bowel sounds are normal. She exhibits no mass. There is no tenderness. There is no guarding. Musculoskeletal: Normal range of motion. Right hand: She exhibits tenderness (all fingers). Decreased strength (due to paiin) noted. Left hand: She exhibits tenderness (all fingers). Decreased strength (2/2 pain) noted. Neurological: She is alert. Skin: Skin is warm and dry. Rash (scattered maculopapular rash on extremities) noted. There is erythema. Nursing note and vitals reviewed. Assessment/Plan:     1. Arthralgia of both hands  - methylPREDNISolone (MEDROL DOSEPACK) 4 MG tablet; Take by mouth. Dispense: 1 kit; Refill: 0        No follow-ups on file. Patient given educational materials- see patient instructions. Discussed use, benefit, and side effects of prescribedmedications. All patient questions answered. Pt voiced understanding. Reviewedhealth maintenance. Instructed to continue current medications, diet and exercise. Patient agreed with treatment plan.  Follow up as directed.      Electronically signedby Garrison Mercado MD on 5/28/2019

## 2019-05-30 ENCOUNTER — TELEPHONE (OUTPATIENT)
Dept: FAMILY MEDICINE CLINIC | Age: 59
End: 2019-05-30

## 2019-05-30 NOTE — TELEPHONE ENCOUNTER
Patient called in to send a big thank you to Dr. Willy Dominguez and to let her know that she feels much better from her visit on Tuesday.

## 2019-06-04 DIAGNOSIS — I25.10 CORONARY ARTERY DISEASE INVOLVING NATIVE HEART WITHOUT ANGINA PECTORIS, UNSPECIFIED VESSEL OR LESION TYPE: ICD-10-CM

## 2019-06-04 RX ORDER — CLOPIDOGREL BISULFATE 75 MG/1
TABLET ORAL
Qty: 30 TABLET | Refills: 10 | Status: SHIPPED | OUTPATIENT
Start: 2019-06-04 | End: 2020-05-04

## 2019-06-04 NOTE — TELEPHONE ENCOUNTER
Last visit: 5/28/19  Last Med refill: 12/21/17  Does patient have enough medication for 72 hours: Yes    Next Visit Date:  Future Appointments   Date Time Provider Cole Trani   8/8/2019  8:00 AM Kathrin Mena  [address] Maintenance   Topic Date Due    Diabetic foot exam  08/28/1972    Diabetic retinal exam  08/28/1972    Hepatitis B Vaccine (1 of 3 - Risk 3-dose series) 08/28/1981    Cervical cancer screen  02/03/2018    Colon Cancer Screen FIT/FOBT  03/02/2018    Shingles Vaccine (1 of 2) 02/08/2020 (Originally 8/28/2012)    A1C test (Diabetic or Prediabetic)  02/11/2020    Diabetic microalbuminuria test  02/11/2020    Lipid screen  02/11/2020    Potassium monitoring  02/11/2020    Creatinine monitoring  02/11/2020    Breast cancer screen  02/18/2021    DTaP/Tdap/Td vaccine (2 - Td) 02/28/2027    Flu vaccine  Completed    Pneumococcal 0-64 years Vaccine  Completed    Hepatitis C screen  Completed    HIV screen  Completed       Hemoglobin A1C (%)   Date Value   02/11/2019 5.9   03/21/2018 6.3   12/21/2017 6.4             ( goal A1C is < 7)   Microalb/Crt.  Ratio (mcg/mg creat)   Date Value   02/11/2019 CANNOT BE CALCULATED     LDL Cholesterol (mg/dL)   Date Value   02/11/2019 48   07/07/2017 48     LDL Calculated (mg/dL)   Date Value   08/12/2015 40   09/18/2014 71       (goal LDL is <100)   AST (U/L)   Date Value   02/11/2019 14     ALT (U/L)   Date Value   02/11/2019 14     BUN (mg/dL)   Date Value   02/11/2019 15     BP Readings from Last 3 Encounters:   05/28/19 124/62   05/20/19 138/84   02/08/19 124/82          (goal 120/80)    All Future Testing planned in [facility]  Lab Frequency Next Occurrence               Patient Active Problem List:     S/P endometrial ablation     Essential hypertension     Mixed hyperlipidemia     CAD (coronary artery disease) with stents     SOB (shortness of breath)     PAF (paroxysmal atrial fibrillation) (HCC)     Type 2 diabetes mellitus without complication, without long-term current use of insulin (HCC)     S/P ablation of atrial fibrillation

## 2019-06-05 ENCOUNTER — HOSPITAL ENCOUNTER (OUTPATIENT)
Age: 59
Setting detail: SPECIMEN
Discharge: HOME | End: 2019-06-05
Payer: COMMERCIAL

## 2019-06-05 ENCOUNTER — OFFICE VISIT (OUTPATIENT)
Dept: FAMILY MEDICINE CLINIC | Age: 59
End: 2019-06-05
Payer: COMMERCIAL

## 2019-06-05 VITALS
BODY MASS INDEX: 42.97 KG/M2 | SYSTOLIC BLOOD PRESSURE: 120 MMHG | HEART RATE: 63 BPM | TEMPERATURE: 97.5 F | DIASTOLIC BLOOD PRESSURE: 80 MMHG | RESPIRATION RATE: 16 BRPM | OXYGEN SATURATION: 95 % | WEIGHT: 242.6 LBS

## 2019-06-05 DIAGNOSIS — M65.9 SYNOVITIS OF HAND: ICD-10-CM

## 2019-06-05 DIAGNOSIS — M65.9 SYNOVITIS OF WRIST: ICD-10-CM

## 2019-06-05 DIAGNOSIS — M65.9 SYNOVITIS OF WRIST: Primary | ICD-10-CM

## 2019-06-05 DIAGNOSIS — M25.50 GENERALIZED JOINT PAIN: ICD-10-CM

## 2019-06-05 LAB
ABSOLUTE EOS #: 0.18 K/UL (ref 0–0.44)
ABSOLUTE IMMATURE GRANULOCYTE: 0.04 K/UL (ref 0–0.3)
ABSOLUTE LYMPH #: 1.34 K/UL (ref 1.1–3.7)
ABSOLUTE MONO #: 0.45 K/UL (ref 0.1–1.2)
ALBUMIN SERPL-MCNC: 3.8 G/DL (ref 3.5–5.2)
ALBUMIN/GLOBULIN RATIO: 1.3 (ref 1–2.5)
ALP BLD-CCNC: 85 U/L (ref 35–104)
ALT SERPL-CCNC: 19 U/L (ref 5–33)
ANION GAP SERPL CALCULATED.3IONS-SCNC: 14 MMOL/L (ref 9–17)
AST SERPL-CCNC: 17 U/L
BASOPHILS # BLD: 1 % (ref 0–2)
BASOPHILS ABSOLUTE: 0.05 K/UL (ref 0–0.2)
BILIRUB SERPL-MCNC: 0.6 MG/DL (ref 0.3–1.2)
BUN BLDV-MCNC: 16 MG/DL (ref 6–20)
BUN/CREAT BLD: ABNORMAL (ref 9–20)
C-REACTIVE PROTEIN: 7.6 MG/L (ref 0–5)
CALCIUM SERPL-MCNC: 9.2 MG/DL (ref 8.6–10.4)
CHLORIDE BLD-SCNC: 102 MMOL/L (ref 98–107)
CO2: 28 MMOL/L (ref 20–31)
CREAT SERPL-MCNC: 0.61 MG/DL (ref 0.5–0.9)
DIFFERENTIAL TYPE: ABNORMAL
EOSINOPHILS RELATIVE PERCENT: 3 % (ref 1–4)
GFR AFRICAN AMERICAN: >60 ML/MIN
GFR NON-AFRICAN AMERICAN: >60 ML/MIN
GFR SERPL CREATININE-BSD FRML MDRD: ABNORMAL ML/MIN/{1.73_M2}
GFR SERPL CREATININE-BSD FRML MDRD: ABNORMAL ML/MIN/{1.73_M2}
GLUCOSE BLD-MCNC: 132 MG/DL (ref 70–99)
HCT VFR BLD CALC: 47.7 % (ref 36.3–47.1)
HEMOGLOBIN: 14.9 G/DL (ref 11.9–15.1)
IMMATURE GRANULOCYTES: 1 %
INR BLD: 2.2
LYMPHOCYTES # BLD: 21 % (ref 24–43)
MCH RBC QN AUTO: 29.7 PG (ref 25.2–33.5)
MCHC RBC AUTO-ENTMCNC: 31.2 G/DL (ref 28.4–34.8)
MCV RBC AUTO: 95.2 FL (ref 82.6–102.9)
MONOCYTES # BLD: 7 % (ref 3–12)
NRBC AUTOMATED: 0 PER 100 WBC
PDW BLD-RTO: 13.4 % (ref 11.8–14.4)
PLATELET # BLD: 301 K/UL (ref 138–453)
PLATELET ESTIMATE: ABNORMAL
PMV BLD AUTO: 10.9 FL (ref 8.1–13.5)
POTASSIUM SERPL-SCNC: 4 MMOL/L (ref 3.7–5.3)
PROTHROMBIN TIME: 22.1 SEC (ref 9–12)
RBC # BLD: 5.01 M/UL (ref 3.95–5.11)
RBC # BLD: ABNORMAL 10*6/UL
RHEUMATOID FACTOR: <10 IU/ML
SEG NEUTROPHILS: 67 % (ref 36–65)
SEGMENTED NEUTROPHILS ABSOLUTE COUNT: 4.48 K/UL (ref 1.5–8.1)
SODIUM BLD-SCNC: 144 MMOL/L (ref 135–144)
TOTAL PROTEIN: 6.7 G/DL (ref 6.4–8.3)
WBC # BLD: 6.5 K/UL (ref 3.5–11.3)
WBC # BLD: ABNORMAL 10*3/UL

## 2019-06-05 PROCEDURE — 99213 OFFICE O/P EST LOW 20 MIN: CPT | Performed by: INTERNAL MEDICINE

## 2019-06-05 RX ORDER — METHYLPREDNISOLONE 4 MG/1
TABLET ORAL
Qty: 1 KIT | Refills: 0 | Status: SHIPPED | OUTPATIENT
Start: 2019-06-05 | End: 2019-06-19 | Stop reason: SDUPTHER

## 2019-06-05 NOTE — PROGRESS NOTES
[facility] PHYSICIANS  [facility] PHYS POINT [address] [place] [id number]  Dept: [phone]  Dept Fax: [phone]      Yasmine Lopez is a 66 y.o. female who presents today for hermedical conditions/complaints as noted below. Yasmine Lopez is c/o of Joint Pain            HPI:     joint pain - hands, fingers, legs, feet, ankles, elbows. Hands and wrists are the worst. Unable to type, do her hair, hold things due to pain. Got better on the steroids, completely resolved, but started again two days ago. She states rapidly got worse last night and she couldn't sleep. She took some tylenol arthritis that helped slightly but unable to do anything with her hands. Hemoglobin A1C (%)   Date Value   02/11/2019 5.9   03/21/2018 6.3   12/21/2017 6.4             ( goal A1Cis < 7)   Microalb/Crt.  Ratio (mcg/mg creat)   Date Value   02/11/2019 CANNOT BE CALCULATED     LDL Cholesterol (mg/dL)   Date Value   02/11/2019 48   07/07/2017 48   02/23/2017 55     LDL Calculated (mg/dL)   Date Value   08/12/2015 40   09/18/2014 71       (goal LDL is <100)   AST (U/L)   Date Value   02/11/2019 14     ALT (U/L)   Date Value   02/11/2019 14     BUN (mg/dL)   Date Value   02/11/2019 15     BP Readings from Last 3 Encounters:   06/05/19 120/80   05/28/19 124/62   05/20/19 138/84          (goal 120/80)    Past Medical History:   Diagnosis Date    Abnormal Pap smear 1989    Had colposcopy    Allergy     PCN    Anemia     Anticoagulated     ON COUMADIN    Atrial fibrillation ([address] 12/5/2014    Blood transfusion 12/20/11    4 units    CAD (coronary artery disease)     Eczema     Examination of participant in clinical trial 1/12/11    end date 3/25/15    Headache 12/5/2014    Heart disease 1/13/11    card stents x 2    Hyperlipidemia     Hypertension     Menopausal symptoms     MI, old 2011    PAF (paroxysmal atrial fibrillation) ([address]     s/p cardioversion Dec 2015     STEMI (ST elevation times daily. 100 g 1    acetaminophen (TYLENOL) 325 MG tablet Take 2 tablets by mouth every 4 hours as needed for Pain 120 tablet 3    metoprolol tartrate (LOPRESSOR) 25 MG tablet Take 0.5 tablets by mouth 2 times daily 60 tablet 3    Omega-3 Fatty Acids (FISH OIL) 1000 MG CAPS Take 1,000 mg by mouth daily       nitroGLYCERIN (NITROSTAT) 0.4 MG SL tablet Place 1 tablet under tongue upon chest pain, wait 5 minutes and may repeat up to 3 doses in 15 minutes. Do not crush or break. 25 tablet 3    warfarin (COUMADIN) 5 MG tablet Take 5 mg by mouth Sunday and Wed 5mg, 2.5 mg  On other days      atorvastatin (LIPITOR) 40 MG tablet Take 40 mg by mouth nightly       niacin (SLO-NIACIN) 500 MG tablet Take 1,000 mg by mouth nightly        No current facility-administered medications for this visit.       Allergies   Allergen Reactions    Pcn [Penicillins] Hives    Amiodarone     Protonix [Pantoprazole Sodium] Itching       Health Maintenance   Topic Date Due    Diabetic foot exam  08/28/1972    Diabetic retinal exam  08/28/1972    Hepatitis B Vaccine (1 of 3 - Risk 3-dose series) 08/28/1981    Cervical cancer screen  02/03/2018    Colon Cancer Screen FIT/FOBT  03/02/2018    Shingles Vaccine (1 of 2) 02/08/2020 (Originally 8/28/2012)    A1C test (Diabetic or Prediabetic)  02/11/2020    Diabetic microalbuminuria test  02/11/2020    Lipid screen  02/11/2020    Potassium monitoring  02/11/2020    Creatinine monitoring  02/11/2020    Breast cancer screen  02/18/2021    DTaP/Tdap/Td vaccine (2 - Td) 02/28/2027    Flu vaccine  Completed    Pneumococcal 0-64 years Vaccine  Completed    Hepatitis C screen  Completed    HIV screen  Completed          Review of Systems    Objective:     /80 (Site: Left Upper Arm, Position: Sitting, Cuff Size: Medium Adult)   Pulse 63   Temp 97.5 °F (36.4 °C) (Oral)   Resp 16   Wt 242 lb 9.6 oz (110 kg)   LMP  (Within Months)   SpO2 95%   BMI 42.97 kg/m²   Physical Exam   Constitutional: She appears well-developed and well-nourished. Cardiovascular: Normal rate, regular rhythm and normal heart sounds. Exam reveals no gallop and no friction rub. No murmur heard. Pulmonary/Chest: Effort normal and breath sounds normal. No respiratory distress. She has no wheezes. Abdominal: Soft. Bowel sounds are normal. She exhibits no mass. There is no tenderness. There is no guarding. Musculoskeletal: Normal range of motion. Right hand: She exhibits tenderness (all fingers). Decreased strength (due to paiin) noted. Left hand: She exhibits tenderness (all fingers). Decreased strength (2/2 pain) noted. Neurological: She is alert. Skin: Skin is warm and dry. Rash (scattered maculopapular rash on extremities) noted. There is erythema. Nursing note and vitals reviewed. Assessment/Plan:   1. Synovitis of wrist  - methylPREDNISolone (MEDROL DOSEPACK) 4 MG tablet; Take by mouth. Dispense: 1 kit; Refill: 0  - C-Reactive Protein; Future  - Rheumatoid Factor; Future  - Cyclic Citrul Peptide Antibody, IgG; Future  - ALEXIS Screen With Reflex; Future    2. Synovitis of hand  - methylPREDNISolone (MEDROL DOSEPACK) 4 MG tablet; Take by mouth. Dispense: 1 kit; Refill: 0  - C-Reactive Protein; Future  - Rheumatoid Factor; Future  - Cyclic Citrul Peptide Antibody, IgG; Future  - ALEXIS Screen With Reflex; Future  - CBC With Auto Differential; Future  - Comprehensive Metabolic Panel; Future    3. Generalized joint pain  - methylPREDNISolone (MEDROL DOSEPACK) 4 MG tablet; Take by mouth. Dispense: 1 kit; Refill: 0      No follow-ups on file. Patient given educational materials- see patient instructions. Discussed use, benefit, and side effects of prescribedmedications. All patient questions answered. Pt voiced understanding. Reviewedhealth maintenance. Instructed to continue current medications, diet and exercise. Patient agreed with treatment plan.  Follow up as

## 2019-06-05 NOTE — PROGRESS NOTES
02/28/2027    Flu vaccine  Completed    Pneumococcal 0-64 years Vaccine  Completed    Hepatitis C screen  Completed    HIV screen  Completed

## 2019-06-06 LAB
ANTI-NUCLEAR ANTIBODY (ANA): NEGATIVE
CCP IGG ANTIBODIES: <1.5 U/ML

## 2019-06-07 DIAGNOSIS — M65.9 SYNOVITIS: Primary | ICD-10-CM

## 2019-06-07 LAB
-: NORMAL
REASON FOR REJECTION: NORMAL
ZZ NTE CLEAN UP: ORDERED TEST: NORMAL
ZZ NTE WITH NAME CLEAN UP: SPECIMEN SOURCE: NORMAL

## 2019-06-12 ENCOUNTER — TELEPHONE (OUTPATIENT)
Dept: FAMILY MEDICINE CLINIC | Age: 59
End: 2019-06-12

## 2019-06-12 DIAGNOSIS — M25.50 GENERALIZED JOINT PAIN: Primary | ICD-10-CM

## 2019-06-12 RX ORDER — ACETAMINOPHEN AND CODEINE PHOSPHATE 300; 30 MG/1; MG/1
1 TABLET ORAL EVERY 8 HOURS PRN
Qty: 30 TABLET | Refills: 0 | Status: SHIPPED | OUTPATIENT
Start: 2019-06-12 | End: 2019-06-22

## 2019-06-12 RX ORDER — ACETAMINOPHEN AND CODEINE PHOSPHATE 300; 30 MG/1; MG/1
1 TABLET ORAL EVERY 8 HOURS PRN
Qty: 30 TABLET | Refills: 0 | Status: SHIPPED | OUTPATIENT
Start: 2019-06-12 | End: 2019-06-12 | Stop reason: SDUPTHER

## 2019-06-12 NOTE — TELEPHONE ENCOUNTER
One option is tramadol or tylenol #3 for the pain alone with referral to rheumatology. Other option is prednisone or medrol low dose while awaiting rheum appointment. What is her preference?

## 2019-06-12 NOTE — TELEPHONE ENCOUNTER
Pt called requesting lab results.  She saw most of them on MyChart, just wondering if anything she should be concerned about

## 2019-06-17 ENCOUNTER — TELEPHONE (OUTPATIENT)
Dept: FAMILY MEDICINE CLINIC | Age: 59
End: 2019-06-17

## 2019-06-17 DIAGNOSIS — M25.50 GENERALIZED JOINT PAIN: Primary | ICD-10-CM

## 2019-06-17 NOTE — TELEPHONE ENCOUNTER
Please, change referral to:  [address], Fax: [phone]  M25.50 (ICD-10-CM) - Generalized joint pain    Will take her soon as we change info and fax. Needs also: Labs and Notes.

## 2019-06-19 ENCOUNTER — HOSPITAL ENCOUNTER (OUTPATIENT)
Age: 59
Setting detail: SPECIMEN
Discharge: HOME | End: 2019-06-19
Payer: COMMERCIAL

## 2019-06-19 ENCOUNTER — TELEPHONE (OUTPATIENT)
Dept: FAMILY MEDICINE CLINIC | Age: 59
End: 2019-06-19

## 2019-06-19 LAB
INR BLD: 3.1
PROTHROMBIN TIME: 30 SEC (ref 9–12)

## 2019-06-19 RX ORDER — METHYLPREDNISOLONE 4 MG/1
TABLET ORAL
Qty: 1 KIT | Refills: 0 | Status: SHIPPED | OUTPATIENT
Start: 2019-06-19 | End: 2019-06-24 | Stop reason: ALTCHOICE

## 2019-06-19 RX ORDER — PREDNISONE 2.5 MG
2.5 TABLET ORAL DAILY
Qty: 30 TABLET | Refills: 3 | Status: SHIPPED | OUTPATIENT
Start: 2019-06-19 | End: 2019-10-23

## 2019-06-19 NOTE — TELEPHONE ENCOUNTER
Would she like to do a daily low dose of 2.5mg prednisone since we can't do a lot of other antiinflammatory meds for her?

## 2019-06-24 ENCOUNTER — OFFICE VISIT (OUTPATIENT)
Dept: FAMILY MEDICINE CLINIC | Age: 59
End: 2019-06-24
Payer: COMMERCIAL

## 2019-06-24 VITALS
HEART RATE: 76 BPM | SYSTOLIC BLOOD PRESSURE: 120 MMHG | BODY MASS INDEX: 42.07 KG/M2 | OXYGEN SATURATION: 98 % | WEIGHT: 246.4 LBS | HEIGHT: 64 IN | DIASTOLIC BLOOD PRESSURE: 80 MMHG

## 2019-06-24 DIAGNOSIS — I48.0 PAF (PAROXYSMAL ATRIAL FIBRILLATION) (HCC): ICD-10-CM

## 2019-06-24 DIAGNOSIS — M25.542 ARTHRALGIA OF BOTH HANDS: ICD-10-CM

## 2019-06-24 DIAGNOSIS — I25.10 CORONARY ARTERY DISEASE INVOLVING NATIVE HEART WITHOUT ANGINA PECTORIS, UNSPECIFIED VESSEL OR LESION TYPE: Primary | ICD-10-CM

## 2019-06-24 DIAGNOSIS — M25.541 ARTHRALGIA OF BOTH HANDS: ICD-10-CM

## 2019-06-24 PROCEDURE — 99203 OFFICE O/P NEW LOW 30 MIN: CPT | Performed by: PHYSICIAN ASSISTANT

## 2019-06-24 ASSESSMENT — ENCOUNTER SYMPTOMS
SHORTNESS OF BREATH: 0
DIARRHEA: 0
COUGH: 0
VOMITING: 0
COLOR CHANGE: 0
ABDOMINAL PAIN: 0
CONSTIPATION: 0
NAUSEA: 0
WHEEZING: 0

## 2019-06-24 NOTE — PROGRESS NOTES
[address] [facility] [address]  Dept: [phone]  Dept Fax: [phone]     Love Sarah is a 66 y.o. female who presents today for her medical conditions/complaintsas noted below. Love Sarah is c/o of   Chief Complaint   Patient presents with   Janny Hash Establish Care     Patient is here to establish care. HPI:      HPI    Patient new to the office today to establish  She has been dealing with joint pain mainly in her hands. Previous PCP has been dosing her with steroids and patient states is helping. She has had redness noted to the joints as well. Worse at times more than others  She has been seeing another family doctor that is closer to home. She is limited in needing the referral through a [facility] office. Patient also needing update on her cardiology referral  She has been self paying her PCP    Hemoglobin A1C (%)   Date Value   02/11/2019 5.9   03/21/2018 6.3   12/21/2017 6.4             ( goal A1Cis < 7)   Microalb/Crt.  Ratio (mcg/mg creat)   Date Value   02/11/2019 CANNOT BE CALCULATED     LDL Cholesterol (mg/dL)   Date Value   02/11/2019 48   07/07/2017 48   02/23/2017 55     LDL Calculated (mg/dL)   Date Value   08/12/2015 40   09/18/2014 71       (goal LDL is <100)   AST (U/L)   Date Value   06/05/2019 17     ALT (U/L)   Date Value   06/05/2019 19     BUN (mg/dL)   Date Value   06/05/2019 16     BP Readings from Last 3 Encounters:   06/24/19 120/80   06/05/19 120/80   05/28/19 124/62          (goal 120/80)    Past Medical History:   Diagnosis Date    Abnormal Pap smear 1989    Had colposcopy    Allergy     PCN    Anemia     Anticoagulated     ON COUMADIN    Atrial fibrillation ([address] 12/5/2014    Blood transfusion 12/20/11    4 units    CAD (coronary artery disease)     Eczema     Examination of participant in clinical trial 1/12/11    end date 3/25/15    Headache 12/5/2014    Heart disease evening. 3    sotalol (BETAPACE) 80 MG tablet Take 1 tablet by mouth 2 times daily  1    losartan (COZAAR) 50 MG tablet TAKE 1 TABLET BY MOUTH ONE TIME A DAY  30 tablet 5    metoprolol tartrate (LOPRESSOR) 25 MG tablet Take 0.5 tablets by mouth 2 times daily 60 tablet 3    Omega-3 Fatty Acids (FISH OIL) 1000 MG CAPS Take 1,000 mg by mouth daily       nitroGLYCERIN (NITROSTAT) 0.4 MG SL tablet Place 1 tablet under tongue upon chest pain, wait 5 minutes and may repeat up to 3 doses in 15 minutes. Do not crush or break. 25 tablet 3    warfarin (COUMADIN) 5 MG tablet Take 5 mg by mouth Sunday and Wed 5mg, 2.5 mg  On other days      atorvastatin (LIPITOR) 40 MG tablet Take 40 mg by mouth nightly       hydrocortisone 2.5 % ointment Apply topically 2 times daily. 100 g 1    acetaminophen (TYLENOL) 325 MG tablet Take 2 tablets by mouth every 4 hours as needed for Pain 120 tablet 3     No current facility-administered medications for this visit.       Allergies   Allergen Reactions    Pcn [Penicillins] Hives    Amiodarone     Protonix [Pantoprazole Sodium] Itching       Health Maintenance   Topic Date Due    Diabetic foot exam  08/28/1972    Diabetic retinal exam  08/28/1972    Hepatitis B Vaccine (1 of 3 - Risk 3-dose series) 08/28/1981    Cervical cancer screen  02/03/2018    Colon Cancer Screen FIT/FOBT  03/02/2018    Shingles Vaccine (1 of 2) 02/08/2020 (Originally 8/28/2012)    A1C test (Diabetic or Prediabetic)  02/11/2020    Diabetic microalbuminuria test  02/11/2020    Lipid screen  02/11/2020    Potassium monitoring  06/05/2020    Creatinine monitoring  06/05/2020    Breast cancer screen  02/18/2021    DTaP/Tdap/Td vaccine (2 - Td) 02/28/2027    Flu vaccine  Completed    Pneumococcal 0-64 years Vaccine  Completed    Hepatitis C screen  Completed    HIV screen  Completed       Subjective:     Review of Systems   Constitutional: Negative for activity change, appetite change, fatigue, fever and unexpected weight change. /80   Pulse 76   Ht 5' 4\" (1.626 m)   Wt 246 lb 6.4 oz (111.8 kg)   LMP  (Within Months)   SpO2 98%   BMI 42.29 kg/m²    Respiratory: Negative for cough, shortness of breath and wheezing. Cardiovascular: Negative for chest pain and palpitations. Gastrointestinal: Negative for abdominal pain, constipation, diarrhea, nausea and vomiting. Skin: Negative for color change, pallor, rash and wound. Neurological: Negative for weakness. Hematological: Negative for adenopathy. Psychiatric/Behavioral: Negative for sleep disturbance. The patient is not nervous/anxious. Objective:     Physical Exam   Constitutional: She is oriented to person, place, and time. She appears well-developed and well-nourished. HENT:   Head: Normocephalic and atraumatic. Cardiovascular: Normal rate, regular rhythm and normal heart sounds. No murmur heard. Pulmonary/Chest: Effort normal and breath sounds normal. No respiratory distress. She has no wheezes. She has no rales. Musculoskeletal:   Bilateral hands with swelling distal finger joints. Minimal erythema to the joints as well. ROM wnl   Neurological: She is alert and oriented to person, place, and time. No cranial nerve deficit. Coordination normal.   Skin: Skin is warm and dry. No rash noted. No erythema. No pallor. Psychiatric: She has a normal mood and affect. Her behavior is normal. Judgment and thought content normal.   Nursing note and vitals reviewed. /80   Pulse 76   Ht 5' 4\" (1.626 m)   Wt 246 lb 6.4 oz (111.8 kg)   LMP  (Within Months)   SpO2 98%   BMI 42.29 kg/m²     Assessment:          Diagnosis Orders   1. Coronary artery disease involving native heart without angina pectoris, unspecified vessel or lesion type  AFL - Vinnie Willis MD, Cardiology, [place]   2. PAF (paroxysmal atrial fibrillation) (HCC)  Erik Jaeger MD, Cardiology, [place]   3.  Arthralgia of both hands  External Referral To Rheumatology             Plan:      Return in about 6 months (around 12/24/2019) for med review. New referrals given  Patient doing well on present medications  Cardiology managing medication and INR  She is going to decide if she wants to follow [place] in [place]. She will let me know  Declined colon CA screening at this point  Pt agreed with treatment plan    Orders Placed This Encounter   Procedures   Rdaha Carrillo MD, Cardiology, [facility]     Referral Priority:   Routine     Referral Type:   Eval and Treat     Referral Reason:   Specialty Services Required     Referred to Provider:   Edd Hoang MD     Requested Specialty:   Cardiology     Number of Visits Requested:   1    External Referral To Rheumatology     Referral Priority:   Routine     Referral Type:   Eval and Treat     Referral Reason:   Specialty Services Required     Referred to Provider:   Ondina Garrison MD     Requested Specialty:   Rheumatology     Number of Visits Requested:   1         Patient given educational materials - see patient instructions. Discussed use, benefit, and side effects of prescribed medications. All patientquestions answered. Pt voiced understanding. Reviewed health maintenance. Instructedto continue current medications, diet and exercise. Patient agreed with treatmentplan. Follow up as directed.      Electronically signed by Neeta Davalos PA-C on 6/24/2019 at 3:32 PM

## 2019-06-24 NOTE — PROGRESS NOTES
Visit Information    Have you changed or started any medications since your last visit including any over-the-counter medicines, vitamins, or herbal medicines? no   Have you stopped taking any of your medications? Is so, why? -  no  Are you having any side effects from any of your medications? - no    Have you seen any other physician or provider since your last visit?  no   Have you had any other diagnostic tests since your last visit?  no   Have you been seen in the emergency room and/or had an admission in a hospital since we last saw you?  no   Have you had your routine dental cleaning in the past 6 months?  no     Do you have an active MyChart account? If no, what is the barrier?   Yes    Patient Care Team:  [address] [place] [address] PAINES as PCP - General (Physician Assistant)  Sherwin Maya MD as PCP - [facility]  Josh Sanchez MD as Consulting Physician (Cardiology)    Medical History Review  Past Medical, Family, and Social History reviewed and does contribute to the patient presenting condition    Health Maintenance   Topic Date Due    Diabetic foot exam  08/28/1972    Diabetic retinal exam  08/28/1972    Hepatitis B Vaccine (1 of 3 - Risk 3-dose series) 08/28/1981    Cervical cancer screen  02/03/2018    Colon Cancer Screen FIT/FOBT  03/02/2018    Shingles Vaccine (1 of 2) 02/08/2020 (Originally 8/28/2012)    A1C test (Diabetic or Prediabetic)  02/11/2020    Diabetic microalbuminuria test  02/11/2020    Lipid screen  02/11/2020    Potassium monitoring  06/05/2020    Creatinine monitoring  06/05/2020    Breast cancer screen  02/18/2021    DTaP/Tdap/Td vaccine (2 - Td) 02/28/2027    Flu vaccine  Completed    Pneumococcal 0-64 years Vaccine  Completed    Hepatitis C screen  Completed    HIV screen  Completed

## 2019-07-30 ENCOUNTER — HOSPITAL ENCOUNTER (OUTPATIENT)
Age: 59
Setting detail: SPECIMEN
Discharge: HOME | End: 2019-07-30
Payer: COMMERCIAL

## 2019-07-30 LAB
HCT VFR BLD CALC: 45.8 % (ref 36.3–47.1)
HEMOGLOBIN: 14.2 G/DL (ref 11.9–15.1)
INR BLD: 3.6
MCH RBC QN AUTO: 30.4 PG (ref 25.2–33.5)
MCHC RBC AUTO-ENTMCNC: 31 G/DL (ref 28.4–34.8)
MCV RBC AUTO: 98.1 FL (ref 82.6–102.9)
NRBC AUTOMATED: 0 PER 100 WBC
PDW BLD-RTO: 13.6 % (ref 11.8–14.4)
PLATELET # BLD: 237 K/UL (ref 138–453)
PMV BLD AUTO: 10.6 FL (ref 8.1–13.5)
PROTHROMBIN TIME: 34.3 SEC (ref 9–12)
RBC # BLD: 4.67 M/UL (ref 3.95–5.11)
WBC # BLD: 5.9 K/UL (ref 3.5–11.3)

## 2019-08-15 ENCOUNTER — HOSPITAL ENCOUNTER (OUTPATIENT)
Age: 59
Setting detail: SPECIMEN
Discharge: HOME | End: 2019-08-15
Payer: COMMERCIAL

## 2019-08-15 LAB
INR BLD: 3
PROTHROMBIN TIME: 29.2 SEC (ref 9–12)

## 2019-08-16 ENCOUNTER — TELEPHONE (OUTPATIENT)
Dept: FAMILY MEDICINE CLINIC | Age: 59
End: 2019-08-16

## 2019-08-16 DIAGNOSIS — I10 ESSENTIAL HYPERTENSION: ICD-10-CM

## 2019-08-16 DIAGNOSIS — M79.7 FIBROMYALGIA: Primary | ICD-10-CM

## 2019-08-16 RX ORDER — PREGABALIN 25 MG/1
25 CAPSULE ORAL 2 TIMES DAILY
Qty: 60 CAPSULE | Refills: 1 | Status: SHIPPED | OUTPATIENT
Start: 2019-08-16 | End: 2019-10-22 | Stop reason: SDUPTHER

## 2019-08-16 RX ORDER — LOSARTAN POTASSIUM 50 MG/1
50 TABLET ORAL DAILY
Qty: 90 TABLET | Refills: 1 | Status: SHIPPED | OUTPATIENT
Start: 2019-08-16 | End: 2020-02-10

## 2019-08-16 NOTE — TELEPHONE ENCOUNTER
Spoke with patient and she stated her cardiologist does not want her on a steroid and they suggested Lyrica. Patient states she was told Lyrica helps with fibromyalgia.

## 2019-09-04 ENCOUNTER — TELEPHONE (OUTPATIENT)
Dept: FAMILY MEDICINE CLINIC | Age: 59
End: 2019-09-04

## 2019-09-04 DIAGNOSIS — C44.621: Primary | ICD-10-CM

## 2019-09-05 ENCOUNTER — HOSPITAL ENCOUNTER (OUTPATIENT)
Age: 59
Setting detail: SPECIMEN
Discharge: HOME | End: 2019-09-05
Payer: COMMERCIAL

## 2019-09-05 LAB
INR BLD: 2.7
PROTHROMBIN TIME: 26.5 SEC (ref 9–12)

## 2019-10-09 ENCOUNTER — HOSPITAL ENCOUNTER (OUTPATIENT)
Age: 59
Setting detail: SPECIMEN
Discharge: HOME | End: 2019-10-09

## 2019-10-09 LAB
INR BLD: 1.9
PROTHROMBIN TIME: 19.2 SEC (ref 9–12)

## 2019-10-22 DIAGNOSIS — M79.7 FIBROMYALGIA: ICD-10-CM

## 2019-10-22 RX ORDER — PREGABALIN 25 MG/1
25 CAPSULE ORAL 2 TIMES DAILY
Qty: 60 CAPSULE | Refills: 1 | Status: SHIPPED | OUTPATIENT
Start: 2019-10-22 | End: 2019-12-15 | Stop reason: SDUPTHER

## 2019-10-23 ENCOUNTER — OFFICE VISIT (OUTPATIENT)
Dept: FAMILY MEDICINE CLINIC | Age: 59
End: 2019-10-23
Payer: COMMERCIAL

## 2019-10-23 ENCOUNTER — HOSPITAL ENCOUNTER (OUTPATIENT)
Age: 59
Setting detail: SPECIMEN
Discharge: HOME | End: 2019-10-23

## 2019-10-23 VITALS
OXYGEN SATURATION: 95 % | HEART RATE: 70 BPM | TEMPERATURE: 97.8 F | WEIGHT: 249 LBS | BODY MASS INDEX: 42.74 KG/M2 | SYSTOLIC BLOOD PRESSURE: 136 MMHG | DIASTOLIC BLOOD PRESSURE: 82 MMHG

## 2019-10-23 DIAGNOSIS — Z12.11 SCREENING FOR COLON CANCER: ICD-10-CM

## 2019-10-23 DIAGNOSIS — E11.9 TYPE 2 DIABETES MELLITUS WITHOUT COMPLICATION, WITHOUT LONG-TERM CURRENT USE OF INSULIN (HCC): Primary | ICD-10-CM

## 2019-10-23 DIAGNOSIS — D04.9 BOWEN'S DISEASE: ICD-10-CM

## 2019-10-23 DIAGNOSIS — I10 ESSENTIAL HYPERTENSION: ICD-10-CM

## 2019-10-23 DIAGNOSIS — E78.2 MIXED HYPERLIPIDEMIA: ICD-10-CM

## 2019-10-23 DIAGNOSIS — I25.10 CORONARY ARTERY DISEASE INVOLVING NATIVE HEART WITHOUT ANGINA PECTORIS, UNSPECIFIED VESSEL OR LESION TYPE: ICD-10-CM

## 2019-10-23 DIAGNOSIS — I48.0 PAF (PAROXYSMAL ATRIAL FIBRILLATION) (HCC): ICD-10-CM

## 2019-10-23 LAB
HBA1C MFR BLD: 7.2 %
INR BLD: 2.5
PROTHROMBIN TIME: 24.6 SEC (ref 9–12)

## 2019-10-23 PROCEDURE — 99214 OFFICE O/P EST MOD 30 MIN: CPT | Performed by: INTERNAL MEDICINE

## 2019-10-23 PROCEDURE — 83036 HEMOGLOBIN GLYCOSYLATED A1C: CPT | Performed by: INTERNAL MEDICINE

## 2019-10-23 RX ORDER — AMITRIPTYLINE HYDROCHLORIDE 10 MG/1
10 TABLET, FILM COATED ORAL NIGHTLY
COMMUNITY
Start: 2019-10-15 | End: 2019-11-13 | Stop reason: SDUPTHER

## 2019-11-14 ENCOUNTER — OFFICE VISIT (OUTPATIENT)
Dept: DERMATOLOGY | Age: 59
End: 2019-11-14

## 2019-11-14 VITALS
BODY MASS INDEX: 43.66 KG/M2 | HEART RATE: 78 BPM | WEIGHT: 246.4 LBS | SYSTOLIC BLOOD PRESSURE: 134 MMHG | OXYGEN SATURATION: 95 % | HEIGHT: 63 IN | DIASTOLIC BLOOD PRESSURE: 77 MMHG

## 2019-11-14 DIAGNOSIS — D04.9 BOWEN'S DISEASE: ICD-10-CM

## 2019-11-14 DIAGNOSIS — L30.8 OTHER SPECIFIED DERMATITIS: Primary | ICD-10-CM

## 2019-11-14 PROCEDURE — 99202 OFFICE O/P NEW SF 15 MIN: CPT | Performed by: DERMATOLOGY

## 2019-11-14 RX ORDER — TRIAMCINOLONE ACETONIDE 1 MG/G
CREAM TOPICAL
Qty: 80 G | Refills: 1 | Status: SHIPPED | OUTPATIENT
Start: 2019-11-14 | End: 2020-06-23 | Stop reason: ALTCHOICE

## 2019-11-14 RX ORDER — TIZANIDINE 4 MG/1
4 TABLET ORAL NIGHTLY PRN
Qty: 30 TABLET | Refills: 3 | Status: SHIPPED | OUTPATIENT
Start: 2019-11-14 | End: 2020-03-16

## 2019-11-14 RX ORDER — NIACIN 1000 MG
TABLET, EXTENDED RELEASE ORAL DAILY
COMMUNITY

## 2019-11-14 RX ORDER — AMITRIPTYLINE HYDROCHLORIDE 10 MG/1
10 TABLET, FILM COATED ORAL NIGHTLY
Qty: 90 TABLET | Refills: 1 | Status: SHIPPED | OUTPATIENT
Start: 2019-11-14 | End: 2020-05-19

## 2019-11-25 ENCOUNTER — TELEPHONE (OUTPATIENT)
Dept: DERMATOLOGY | Age: 59
End: 2019-11-25

## 2019-11-25 DIAGNOSIS — D04.9 BOWEN'S DISEASE: Primary | ICD-10-CM

## 2019-11-25 RX ORDER — FLUOROURACIL 50 MG/ML
SOLUTION TOPICAL
Qty: 10 ML | Refills: 1 | Status: SHIPPED | OUTPATIENT
Start: 2019-11-25 | End: 2020-07-28 | Stop reason: ALTCHOICE

## 2019-11-25 RX ORDER — FLUOROURACIL 50 MG/G
CREAM TOPICAL
Qty: 40 G | Refills: 0 | Status: SHIPPED | OUTPATIENT
Start: 2019-11-25 | End: 2020-07-28 | Stop reason: ALTCHOICE

## 2019-11-26 ENCOUNTER — HOSPITAL ENCOUNTER (OUTPATIENT)
Age: 59
Setting detail: SPECIMEN
Discharge: HOME | End: 2019-11-26
Payer: COMMERCIAL

## 2019-11-26 LAB
INR BLD: 2.3
PROTHROMBIN TIME: 22.4 SEC (ref 9–12)

## 2019-12-15 DIAGNOSIS — M79.7 FIBROMYALGIA: ICD-10-CM

## 2019-12-16 ENCOUNTER — TELEPHONE (OUTPATIENT)
Dept: DERMATOLOGY | Age: 59
End: 2019-12-16

## 2019-12-16 RX ORDER — PREGABALIN 25 MG/1
CAPSULE ORAL
Qty: 60 CAPSULE | Refills: 0 | Status: SHIPPED | OUTPATIENT
Start: 2019-12-16 | End: 2020-01-20

## 2019-12-23 ENCOUNTER — HOSPITAL ENCOUNTER (OUTPATIENT)
Age: 59
Setting detail: SPECIMEN
Discharge: HOME | End: 2019-12-23
Payer: COMMERCIAL

## 2019-12-23 LAB
INR BLD: 2.8
PROTHROMBIN TIME: 27.3 SEC (ref 9–12)

## 2020-01-13 NOTE — TELEPHONE ENCOUNTER
Last visit: 10/23/19  Last Med refill: 12/16/19  Does patient have enough medication for 72 hours: No:     Next Visit Date:none  Future Appointments   Date Time Provider Cole Tucker   1/24/2020  1:00 PM Flaquita Aguilar MD  derm [address] Maintenance   Topic Date Due    Diabetic foot exam  08/28/1972    Diabetic retinal exam  08/28/1972    Hepatitis B vaccine (1 of 3 - Risk 3-dose series) 08/28/1981    Cervical cancer screen  02/03/2018    Colon Cancer Screen FIT/FOBT  03/02/2018    Flu vaccine (1) 09/01/2019    Annual Wellness Visit (AWV)  12/23/2019    Shingles Vaccine (1 of 2) 02/08/2020 (Originally 8/28/2012)    Diabetic microalbuminuria test  02/11/2020    Lipid screen  02/11/2020    Potassium monitoring  06/05/2020    Creatinine monitoring  06/05/2020    A1C test (Diabetic or Prediabetic)  10/23/2020    Breast cancer screen  02/18/2021    DTaP/Tdap/Td vaccine (2 - Td) 02/28/2027    Pneumococcal 0-64 years Vaccine  Completed    Hepatitis C screen  Completed    HIV screen  Completed       Hemoglobin A1C (%)   Date Value   10/23/2019 7.2   02/11/2019 5.9   03/21/2018 6.3             ( goal A1C is < 7)   Microalb/Crt.  Ratio (mcg/mg creat)   Date Value   02/11/2019 CANNOT BE CALCULATED     LDL Cholesterol (mg/dL)   Date Value   02/11/2019 48   07/07/2017 48     LDL Calculated (mg/dL)   Date Value   08/12/2015 40   09/18/2014 71       (goal LDL is <100)   AST (U/L)   Date Value   06/05/2019 17     ALT (U/L)   Date Value   06/05/2019 19     BUN (mg/dL)   Date Value   06/05/2019 16     BP Readings from Last 3 Encounters:   11/14/19 134/77   10/23/19 136/82   06/24/19 120/80          (goal 120/80)    All Future Testing planned in [facility]  Lab Frequency Next Occurrence   POCT FECAL IMMUNOCHEMICAL TEST (FIT) Once 01/27/2020               Patient Active Problem List:     S/P endometrial ablation     Essential hypertension     Mixed hyperlipidemia     CAD (coronary artery disease) with

## 2020-01-14 RX ORDER — PREGABALIN 25 MG/1
CAPSULE ORAL
Qty: 60 CAPSULE | Refills: 0 | OUTPATIENT
Start: 2020-01-14 | End: 2020-04-13

## 2020-01-23 ENCOUNTER — HOSPITAL ENCOUNTER (OUTPATIENT)
Age: 60
Setting detail: SPECIMEN
Discharge: HOME | End: 2020-01-23

## 2020-01-23 LAB
INR BLD: 2.4
PROTHROMBIN TIME: 23.9 SEC (ref 9–12)

## 2020-01-24 ENCOUNTER — OFFICE VISIT (OUTPATIENT)
Dept: DERMATOLOGY | Age: 60
End: 2020-01-24

## 2020-01-24 VITALS
HEIGHT: 63 IN | BODY MASS INDEX: 43.98 KG/M2 | HEART RATE: 78 BPM | WEIGHT: 248.2 LBS | DIASTOLIC BLOOD PRESSURE: 88 MMHG | OXYGEN SATURATION: 94 % | SYSTOLIC BLOOD PRESSURE: 141 MMHG

## 2020-01-24 PROCEDURE — 99213 OFFICE O/P EST LOW 20 MIN: CPT | Performed by: DERMATOLOGY

## 2020-01-24 NOTE — PROGRESS NOTES
Dermatology Patient Note  [facility] PHYSICIANS  [facility]  [address] [facility] [address]  Dept: [phone]  Dept Fax: [phone]      VISITDATE: 1/24/2020   REFERRING PROVIDER: No ref. provider found      Felix Guzman is a 64 y.o. 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 Due to patient preferences and cost, deferred mohs for topical efudex cream and solution to nail fold and periungual skin  Interval history: patient had robust reaction to efudex cream and solution. Redness, blistering, peeling, pain. In the past two weeks it has begun to heal. She noticed a new spot on her left hand has become red and inflamed over last few days    CURRENT MEDICATIONS:   Current Outpatient Medications   Medication Sig Dispense Refill    pregabalin (LYRICA) 25 MG capsule TAKE 1 CAPSULE BY MOUTH TWO TIMES A DAY  60 capsule 3    fluorouracil (EFUDEX) 5 % SOLN external solution Apply topically twice daily to base of affected nail for 6 weeks 10 mL 1    fluorouracil (EFUDEX) 5 % cream Apply twice daily to skin around affected nail for 6 weeks 40 g 0    amitriptyline (ELAVIL) 10 MG tablet Take 1 tablet by mouth nightly 90 tablet 1    tiZANidine (ZANAFLEX) 4 MG tablet Take 1 tablet by mouth nightly as needed (back pain) 30 tablet 3    Niacin ER 1000 MG TBCR Take by mouth daily      triamcinolone (KENALOG) 0.1 % cream Apply to rash twice daily (not face, armpit or groin) 80 g 1    losartan (COZAAR) 50 MG tablet Take 1 tablet by mouth daily TAKE 1 TABLET BY MOUTH ONE TIME A DAY 90 tablet 1    clopidogrel (PLAVIX) 75 MG tablet TAKE 1 TABLET BY MOUTH ONE TIME A DAY  30 tablet 10    furosemide (LASIX) 40 MG tablet Take one tablet by mouth in the morning and one half tablet in the evening. 3    metoprolol tartrate (LOPRESSOR) 25 MG tablet Take 0.5 tablets by mouth 2 times daily 60 tablet 3    Omega-3 Fatty Acids (FISH OIL) 1000 MG CAPS Take 1,000 mg by mouth daily       nitroGLYCERIN (NITROSTAT) 0.4 MG SL tablet Place 1 tablet under tongue upon chest pain, wait 5 minutes and may repeat up to 3 doses in 15 minutes. Do not crush or break. 25 tablet 3    warfarin (COUMADIN) 5 MG tablet Take 5 mg by mouth  and Wed 5mg, 2.5 mg  On other days      atorvastatin (LIPITOR) 40 MG tablet Take 40 mg by mouth nightly       hydrocortisone 2.5 % ointment Apply topically 2 times daily. (Patient not taking: Reported on 2020) 100 g 1     No current facility-administered medications for this visit. ALLERGIES:   Allergies   Allergen Reactions    Pcn [Penicillins] Hives    Amiodarone     Protonix [Pantoprazole Sodium] Itching       SOCIAL HISTORY:  Social History     Tobacco Use    Smoking status: Former Smoker     Packs/day: 0.50     Years: 34.00     Pack years: 17.00     Types: Cigarettes     Last attempt to quit: 10/23/2018     Years since quittin.2    Smokeless tobacco: Never Used    Tobacco comment: smokes 5 cig/day   Substance Use Topics    Alcohol use: No     Alcohol/week: 0.0 standard drinks       REVIEW OF SYSTEMS:  Review of Systems  Skin: Denies any new changing, growing orbleeding lesions or rashes except as described in the HPI   Constitutional: Denies fevers, chills, and malaise. PHYSICAL EXAM:   BP (!) 141/88   Pulse 78   Ht 5' 3\" (1.6 m)   Wt 248 lb 3.2 oz (112.6 kg)   LMP  (Within Months)   SpO2 94%   BMI 43.97 kg/m²     General Exam:  General Appearance: No acute distress, Well nourished     Neuro: Alert and oriented to person, place and time  Psych: Normal affect   Lymph Node: Not performed    Cutaneous Exam: Performed as documented in clinic note below.   Sun-exposed skin, which includes the head/face, neck, both arms, digits and/or nails was examined. Pertinent Physical Exam Findings:  Physical Exam  Right distal 5th digit with perinungual erythema, exfoliative skin. Nail has white discoloration and is slightly onycholytic  Left dorsal hand with erythematous keratotic papule    Photo surveillance performed: No    Medical Necessity of Exam Performed:   Distribution of patient concerns    Additional Diagnostic Testing performed during exam: Not performed ,  Not performed    ASSESSMENT:   Diagnosis Orders   1. Bowen's disease         Plan of Action is as Follows:  Assessment   1. Bowen's disease s/p 6 weeks of efudex cream (periungual skin) and solution at prox and distal nail fold (in an attempt to reach subungual portion)  - still quite inflamed today, cannot say if resolved as of yet  - f/u in 3 months  - discussed with patient that nail may shed before a normal new nail grows I    Suspect ISK vs. AK of left hand, call if worsening    RTC 3 months            Patient Instructions   - Stop all topicals  - Follow up in 3 months, sooner if spot on left hand does not improve      Follow-up: No follow-ups on file. This note was created with the assistance of a speech-recognition program.  Although the intention is to generate a document that actually reflects the content of the visit, no guarantees can be provided that every mistake has been identified and corrected byediting.     Electronically signed by Lisa Lemons MD on 1/24/20 at 1:17 PM

## 2020-02-10 RX ORDER — LOSARTAN POTASSIUM 50 MG/1
TABLET ORAL
Qty: 90 TABLET | Refills: 1 | Status: SHIPPED | OUTPATIENT
Start: 2020-02-10 | End: 2022-02-24

## 2020-02-10 NOTE — TELEPHONE ENCOUNTER
Last visit: 10/23/19  Last Med refill: 8/16/19  Does patient have enough medication for 72 hours: Yes    Next Visit Date:  Future Appointments   Date Time Provider Cole Caitlin   4/29/2020 10:15 AM MD germania Lenz derm [address] Maintenance   Topic Date Due    Diabetic foot exam  08/28/1972    Diabetic retinal exam  08/28/1972    Hepatitis B vaccine (1 of 3 - Risk 3-dose series) 08/28/1981    Shingles Vaccine (1 of 2) 08/28/2012    Cervical cancer screen  02/03/2018    Colon Cancer Screen FIT/FOBT  03/02/2018    Diabetic microalbuminuria test  02/11/2020    Lipid screen  02/11/2020    Potassium monitoring  06/05/2020    Creatinine monitoring  06/05/2020    A1C test (Diabetic or Prediabetic)  10/23/2020    Breast cancer screen  02/18/2021    DTaP/Tdap/Td vaccine (2 - Td) 02/28/2027    Flu vaccine  Completed    Pneumococcal 0-64 years Vaccine  Completed    Hepatitis C screen  Completed    HIV screen  Completed    Hepatitis A vaccine  Aged Out    Hib vaccine  Aged Out    Meningococcal (ACWY) vaccine  Aged Out       Hemoglobin A1C (%)   Date Value   10/23/2019 7.2   02/11/2019 5.9   03/21/2018 6.3             ( goal A1C is < 7)   Microalb/Crt.  Ratio (mcg/mg creat)   Date Value   02/11/2019 CANNOT BE CALCULATED     LDL Cholesterol (mg/dL)   Date Value   02/11/2019 48   07/07/2017 48     LDL Calculated (mg/dL)   Date Value   08/12/2015 40   09/18/2014 71       (goal LDL is <100)   AST (U/L)   Date Value   06/05/2019 17     ALT (U/L)   Date Value   06/05/2019 19     BUN (mg/dL)   Date Value   06/05/2019 16     BP Readings from Last 3 Encounters:   01/24/20 (!) 141/88   11/14/19 134/77   10/23/19 136/82          (goal 120/80)    All Future Testing planned in [facility]  Lab Frequency Next Occurrence   POCT FECAL IMMUNOCHEMICAL TEST (FIT) Once 01/27/2020               Patient Active Problem List:     S/P endometrial ablation     Essential hypertension     Mixed hyperlipidemia     CAD (coronary artery disease) with stents     SOB (shortness of breath)     PAF (paroxysmal atrial fibrillation) ([facility])     Type 2 diabetes mellitus without complication, without long-term current use of insulin ([facility])     S/P ablation of atrial fibrillation     Bowen's disease

## 2020-02-20 ENCOUNTER — HOSPITAL ENCOUNTER (OUTPATIENT)
Age: 60
Setting detail: SPECIMEN
Discharge: HOME | End: 2020-02-20

## 2020-02-20 LAB
INR BLD: 2.7
PROTHROMBIN TIME: 26.4 SEC (ref 9–12)

## 2020-03-16 RX ORDER — TIZANIDINE 4 MG/1
4 TABLET ORAL NIGHTLY PRN
Qty: 90 TABLET | Refills: 1 | Status: SHIPPED | OUTPATIENT
Start: 2020-03-16 | End: 2020-08-31 | Stop reason: SDUPTHER

## 2020-03-16 NOTE — TELEPHONE ENCOUNTER
Last visit: 10/23/19  Last Med refill: 11/14/19  Does patient have enough medication for 72 hours: No:     Next Visit Date:  Future Appointments   Date Time Provider Cole Tucker   4/29/2020 10:15 AM Demetrius Gipson MD  derm [address] Maintenance   Topic Date Due    Diabetic foot exam  08/28/1972    Diabetic retinal exam  08/28/1972    Hepatitis B vaccine (1 of 3 - Risk 3-dose series) 08/28/1981    Shingles Vaccine (1 of 2) 08/28/2012    Cervical cancer screen  02/03/2018    Colon Cancer Screen FIT/FOBT  03/02/2018    Diabetic microalbuminuria test  02/11/2020    Lipid screen  02/11/2020    Potassium monitoring  06/05/2020    Creatinine monitoring  06/05/2020    A1C test (Diabetic or Prediabetic)  10/23/2020    Breast cancer screen  02/18/2021    DTaP/Tdap/Td vaccine (2 - Td) 02/28/2027    Flu vaccine  Completed    Pneumococcal 0-64 years Vaccine  Completed    Hepatitis C screen  Completed    HIV screen  Completed    Hepatitis A vaccine  Aged Out    Hib vaccine  Aged Out    Meningococcal (ACWY) vaccine  Aged Out       Hemoglobin A1C (%)   Date Value   10/23/2019 7.2   02/11/2019 5.9   03/21/2018 6.3             ( goal A1C is < 7)   Microalb/Crt.  Ratio (mcg/mg creat)   Date Value   02/11/2019 CANNOT BE CALCULATED     LDL Cholesterol (mg/dL)   Date Value   02/11/2019 48   07/07/2017 48     LDL Calculated (mg/dL)   Date Value   08/12/2015 40   09/18/2014 71       (goal LDL is <100)   AST (U/L)   Date Value   06/05/2019 17     ALT (U/L)   Date Value   06/05/2019 19     BUN (mg/dL)   Date Value   06/05/2019 16     BP Readings from Last 3 Encounters:   01/24/20 (!) 141/88   11/14/19 134/77   10/23/19 136/82          (goal 120/80)    All Future Testing planned in [facility]  Lab Frequency Next Occurrence   POCT FECAL IMMUNOCHEMICAL TEST (FIT) Once 04/09/2020               Patient Active Problem List:     S/P endometrial ablation     Essential hypertension     Mixed hyperlipidemia     CAD

## 2020-04-17 ENCOUNTER — TELEMEDICINE (OUTPATIENT)
Dept: DERMATOLOGY | Age: 60
End: 2020-04-17

## 2020-04-17 PROCEDURE — 99213 OFFICE O/P EST LOW 20 MIN: CPT | Performed by: DERMATOLOGY

## 2020-04-17 NOTE — PROGRESS NOTES
TELEHEALTH EVALUATION -- Audio/Visual (During GYUOL-21 public health emergency)    At initiation of telehealth evaluation, patient identification was confirmed by visualization of patient's photo ID. Dermatology Patient Note  [address]  Paola Laws  [address] [place] [address]  Dept: [phone]  Dept Fax: [phone]      VISIT DATE: 4/17/2020   REFERRING PROVIDER: No ref. provider found      Dayne Liz is a 64 y.o. female  who presents today in the office for:    No chief complaint on file. HISTORY OF PRESENT ILLNESS:  Patient presents for f/u eczematous dermatitis of arms  Interval history: still very itchy. Triamcinolone didn't help so she got OTC psoriasis cream with 3% sal acid which helps more, but it's still there and still itchy. R arm > L arm. Started with bumps that she scratches  Current treatment and adherence: as above  Prior treatments tried: see initial HPI    Patient is also following for Bowen's disease of finger. Since stop efudex treatment the irritation has subsided, onycholytic nail has shed and she does not feel there is any residual lesion    She has a lesion of left dorsal hand as well, but wants to wait to evaluate until in-person visit.  Not growing or changing rapidly      CURRENT MEDICATIONS:   Current Outpatient Medications   Medication Sig Dispense Refill    tiZANidine (ZANAFLEX) 4 MG tablet TAKE 1 TABLET BY MOUTH NIGHTLY AS NEEDED (BACK PAIN) 90 tablet 1    losartan (COZAAR) 50 MG tablet TAKE 1 TABLET BY MOUTH ONE TIME A DAY  90 tablet 1    pregabalin (LYRICA) 25 MG capsule TAKE 1 CAPSULE BY MOUTH TWO TIMES A DAY  60 capsule 3    fluorouracil (EFUDEX) 5 % SOLN external solution Apply topically twice daily to base of affected nail for 6 weeks 10 mL 1    fluorouracil (EFUDEX) 5 % cream Apply twice daily to skin around affected nail for 6 weeks 40 g 0    amitriptyline (ELAVIL) 10 MG tablet Take 1 tablet by mouth nightly 90

## 2020-05-04 RX ORDER — PREGABALIN 25 MG/1
CAPSULE ORAL
Qty: 60 CAPSULE | Refills: 0 | Status: SHIPPED | OUTPATIENT
Start: 2020-05-04 | End: 2020-07-28 | Stop reason: ALTCHOICE

## 2020-05-04 RX ORDER — CLOPIDOGREL BISULFATE 75 MG/1
TABLET ORAL
Qty: 30 TABLET | Refills: 0 | Status: SHIPPED | OUTPATIENT
Start: 2020-05-04 | End: 2020-06-01

## 2020-05-04 NOTE — TELEPHONE ENCOUNTER
hyperlipidemia     CAD (coronary artery disease) with stents     SOB (shortness of breath)     PAF (paroxysmal atrial fibrillation) ([facility])     Type 2 diabetes mellitus without complication, without long-term current use of insulin ([facility])     S/P ablation of atrial fibrillation     Bowen's disease

## 2020-05-19 NOTE — TELEPHONE ENCOUNTER
(shortness of breath)     PAF (paroxysmal atrial fibrillation) ([facility])     Type 2 diabetes mellitus without complication, without long-term current use of insulin ([facility])     S/P ablation of atrial fibrillation     Bowen's disease

## 2020-05-20 RX ORDER — AMITRIPTYLINE HYDROCHLORIDE 10 MG/1
TABLET, FILM COATED ORAL
Qty: 30 TABLET | Refills: 0 | Status: SHIPPED | OUTPATIENT
Start: 2020-05-20 | End: 2020-07-28 | Stop reason: ALTCHOICE

## 2020-05-21 ENCOUNTER — HOSPITAL ENCOUNTER (OUTPATIENT)
Age: 60
Discharge: HOME | End: 2020-05-21

## 2020-05-21 LAB
INR BLD: 2.5
PROTHROMBIN TIME: 25.4 SEC (ref 9–12)

## 2020-05-21 PROCEDURE — 85610 PROTHROMBIN TIME: CPT

## 2020-05-21 PROCEDURE — 36415 COLL VENOUS BLD VENIPUNCTURE: CPT

## 2020-05-28 RX ORDER — LOSARTAN POTASSIUM 50 MG/1
TABLET ORAL
Qty: 90 TABLET | Refills: 0 | OUTPATIENT
Start: 2020-05-28

## 2020-06-01 RX ORDER — CLOPIDOGREL BISULFATE 75 MG/1
TABLET ORAL
Qty: 15 TABLET | Refills: 0 | Status: SHIPPED | OUTPATIENT
Start: 2020-06-01

## 2020-06-01 NOTE — TELEPHONE ENCOUNTER
Last visit: 10/23/19  Last Med refill: 5/4/20  Does patient have enough medication for 72 hours: Yes:     PATIENT NEEDS APPT    Next Visit Date:  Future Appointments   Date Time Provider Cole Tucker   6/23/2020  3:00 PM Nathaly Arechiga MD  derm [address] Maintenance   Topic Date Due    Diabetic foot exam  08/28/1972    Diabetic retinal exam  08/28/1972    Hepatitis B vaccine (1 of 3 - Risk 3-dose series) 08/28/1981    Shingles Vaccine (1 of 2) 08/28/2012    Cervical cancer screen  02/03/2018    Colon Cancer Screen FIT/FOBT  03/02/2018    Diabetic microalbuminuria test  02/11/2020    Lipid screen  02/11/2020    Potassium monitoring  06/05/2020    Creatinine monitoring  06/05/2020    A1C test (Diabetic or Prediabetic)  10/23/2020    Breast cancer screen  02/18/2021    DTaP/Tdap/Td vaccine (2 - Td) 02/28/2027    Flu vaccine  Completed    Pneumococcal 0-64 years Vaccine  Completed    Hepatitis C screen  Completed    HIV screen  Completed    Hepatitis A vaccine  Aged Out    Hib vaccine  Aged Out    Meningococcal (ACWY) vaccine  Aged Out       Hemoglobin A1C (%)   Date Value   10/23/2019 7.2   02/11/2019 5.9   03/21/2018 6.3             ( goal A1C is < 7)   Microalb/Crt.  Ratio (mcg/mg creat)   Date Value   02/11/2019 CANNOT BE CALCULATED     LDL Cholesterol (mg/dL)   Date Value   02/11/2019 48   07/07/2017 48     LDL Calculated (mg/dL)   Date Value   08/12/2015 40   09/18/2014 71       (goal LDL is <100)   AST (U/L)   Date Value   06/05/2019 17     ALT (U/L)   Date Value   06/05/2019 19     BUN (mg/dL)   Date Value   06/05/2019 16     BP Readings from Last 3 Encounters:   01/24/20 (!) 141/88   11/14/19 134/77   10/23/19 136/82          (goal 120/80)    All Future Testing planned in [facility]  Lab Frequency Next Occurrence               Patient Active Problem List:     S/P endometrial ablation     Essential hypertension     Mixed hyperlipidemia     CAD (coronary artery disease) with stents     SOB (shortness of breath)     PAF (paroxysmal atrial fibrillation) ([facility])     Type 2 diabetes mellitus without complication, without long-term current use of insulin ([facility])     S/P ablation of atrial fibrillation     Bowen's disease

## 2020-06-17 RX ORDER — PREGABALIN 25 MG/1
CAPSULE ORAL
Qty: 60 CAPSULE | Refills: 0 | OUTPATIENT
Start: 2020-06-17

## 2020-06-17 RX ORDER — AMITRIPTYLINE HYDROCHLORIDE 10 MG/1
TABLET, FILM COATED ORAL
Qty: 30 TABLET | Refills: 0 | OUTPATIENT
Start: 2020-06-17

## 2020-06-18 RX ORDER — PREGABALIN 25 MG/1
CAPSULE ORAL
Qty: 60 CAPSULE | Refills: 0 | OUTPATIENT
Start: 2020-06-18 | End: 2020-09-16

## 2020-06-18 RX ORDER — AMITRIPTYLINE HYDROCHLORIDE 10 MG/1
TABLET, FILM COATED ORAL
Qty: 30 TABLET | Refills: 0 | OUTPATIENT
Start: 2020-06-18

## 2020-06-23 ENCOUNTER — OFFICE VISIT (OUTPATIENT)
Dept: DERMATOLOGY | Age: 60
End: 2020-06-23

## 2020-06-23 ENCOUNTER — TELEPHONE (OUTPATIENT)
Dept: FAMILY MEDICINE CLINIC | Age: 60
End: 2020-06-23

## 2020-06-23 VITALS
DIASTOLIC BLOOD PRESSURE: 101 MMHG | BODY MASS INDEX: 42.29 KG/M2 | TEMPERATURE: 96.6 F | HEART RATE: 89 BPM | WEIGHT: 247.7 LBS | HEIGHT: 64 IN | SYSTOLIC BLOOD PRESSURE: 137 MMHG | OXYGEN SATURATION: 93 %

## 2020-06-23 PROCEDURE — 11104 PUNCH BX SKIN SINGLE LESION: CPT | Performed by: DERMATOLOGY

## 2020-06-23 PROCEDURE — 99213 OFFICE O/P EST LOW 20 MIN: CPT | Performed by: DERMATOLOGY

## 2020-06-23 RX ORDER — LIDOCAINE HYDROCHLORIDE AND EPINEPHRINE 10; 10 MG/ML; UG/ML
0.5 INJECTION, SOLUTION INFILTRATION; PERINEURAL ONCE
Status: COMPLETED | OUTPATIENT
Start: 2020-06-23 | End: 2020-06-23

## 2020-06-23 RX ORDER — CLOBETASOL PROPIONATE 0.5 MG/G
CREAM TOPICAL
Qty: 60 G | Refills: 2 | Status: SHIPPED | OUTPATIENT
Start: 2020-06-23 | End: 2021-02-24

## 2020-06-23 RX ADMIN — LIDOCAINE HYDROCHLORIDE AND EPINEPHRINE 0.5 ML: 10; 10 INJECTION, SOLUTION INFILTRATION; PERINEURAL at 16:12

## 2020-06-23 NOTE — PROGRESS NOTES
Surgical Pathology; Future  - WA PUNCH BIOPSY SKIN SINGLE LESION  - lidocaine-EPINEPHrine 1 percent-1:[id number] injection 0.5 mL  - clobetasol (TEMOVATE) 0.05 % cream; Apply topically 2 times daily to rash on arm (not face, groin, or body folds)  Dispense: 60 g; Refill: 2    3. History of squamous cell carcinoma in situ (SCCIS)  - NER  - patient understands that topical treatment is less effective than surgical, especially in this location. She understands it can recur and to call if there is any new growth there. Will continue to monitor. RTC pending path            Patient Instructions   - Clobetasol cream twice daily for arms  - Remove sutures in 10-14 days        BIOPSY WOUND CARE    A biopsy is where a small piece of skin tissue is removed and examined by a pathologist.  When a biopsy is done, there is a small wound site that requires proper care to prevent infection and scarring. Some biopsies require sutures and their removal.    How to Care for Biopsy Wound    A.  Leave band-aid or dressing on for 24 hours. B. Wash two times a day with soap and water. C.  Let the wound air dry, then apply Vaseline ointment and cover with a Band-Aid       unless otherwise instructed by your provider. D. If there is slight discomfort, you may give acetaminophen or ibuprofen. When To Call the Doctor    Call the Dermatology Clinic or your doctor if any of the following occur:    A. Redness and swelling  B. Tenderness and warm to touch  C.  Drainage from wound  D. Fever    Biopsy Results    Biopsy results are usually available in 1-2 weeks. We provide biopsy results in letters for begin results or we will call for any concerning results. If you have not heard from our staff please call the office within 2 weeks. Please call our office with any concerns at [phone]. Follow-up: No follow-ups on file.       This note was created with the assistance of a speech-recognition program.  Although the intention

## 2020-06-23 NOTE — PATIENT INSTRUCTIONS
- Clobetasol cream twice daily for arms  - Remove sutures in 10-14 days        BIOPSY WOUND CARE    A biopsy is where a small piece of skin tissue is removed and examined by a pathologist.  When a biopsy is done, there is a small wound site that requires proper care to prevent infection and scarring. Some biopsies require sutures and their removal.    How to Care for Biopsy Wound    A.  Leave band-aid or dressing on for 24 hours. B. Wash two times a day with soap and water. C.  Let the wound air dry, then apply Vaseline ointment and cover with a Band-Aid       unless otherwise instructed by your provider. D. If there is slight discomfort, you may give acetaminophen or ibuprofen. When To Call the Doctor    Call the Dermatology Clinic or your doctor if any of the following occur:    A. Redness and swelling  B. Tenderness and warm to touch  C.  Drainage from wound  D. Fever    Biopsy Results    Biopsy results are usually available in 1-2 weeks. We provide biopsy results in letters for begin results or we will call for any concerning results. If you have not heard from our staff please call the office within 2 weeks. Please call our office with any concerns at [phone].

## 2020-06-26 ENCOUNTER — TELEPHONE (OUTPATIENT)
Dept: DERMATOLOGY | Age: 60
End: 2020-06-26

## 2020-06-26 NOTE — TELEPHONE ENCOUNTER
Please inform the patient that the biopsy showed an eczema pattern under the microscope. This could be happening on its own or it could be from something contacting her skin that is either an irritant or allergen. Often times in these scenarios I will offer patch testing to look for an allergen - though cost may be high without insurance. The other option is to continue the clobetasol I prescribed last time and go on a \"skin diet\" - nothing on skin except water, the prescription clobetasol, and vanicream products (for soap, lotion, etc) until follow-up in one month. Let me know her thoughts.

## 2020-06-26 NOTE — TELEPHONE ENCOUNTER
Pt was given the results and will try the skin diet and vanicream products.    Future Appointments   Date Time Provider Cole Caitlin   7/28/2020  9:15 AM Anamaria Hatfield MD  derm [facility]

## 2020-07-02 ENCOUNTER — HOSPITAL ENCOUNTER (OUTPATIENT)
Age: 60
Discharge: HOME | End: 2020-07-02

## 2020-07-02 LAB
ANION GAP SERPL CALCULATED.3IONS-SCNC: 13 MMOL/L (ref 9–17)
BUN BLDV-MCNC: 11 MG/DL (ref 6–20)
BUN/CREAT BLD: ABNORMAL (ref 9–20)
CALCIUM SERPL-MCNC: 8.9 MG/DL (ref 8.6–10.4)
CHLORIDE BLD-SCNC: 100 MMOL/L (ref 98–107)
CO2: 28 MMOL/L (ref 20–31)
CREAT SERPL-MCNC: 0.82 MG/DL (ref 0.5–0.9)
GFR AFRICAN AMERICAN: >60 ML/MIN
GFR NON-AFRICAN AMERICAN: >60 ML/MIN
GFR SERPL CREATININE-BSD FRML MDRD: ABNORMAL ML/MIN/{1.73_M2}
GFR SERPL CREATININE-BSD FRML MDRD: ABNORMAL ML/MIN/{1.73_M2}
GLUCOSE BLD-MCNC: 242 MG/DL (ref 70–99)
MAGNESIUM: 1.8 MG/DL (ref 1.6–2.6)
POTASSIUM SERPL-SCNC: 4.3 MMOL/L (ref 3.7–5.3)
SODIUM BLD-SCNC: 141 MMOL/L (ref 135–144)

## 2020-07-02 PROCEDURE — 83735 ASSAY OF MAGNESIUM: CPT

## 2020-07-02 PROCEDURE — 36415 COLL VENOUS BLD VENIPUNCTURE: CPT

## 2020-07-02 PROCEDURE — 80048 BASIC METABOLIC PNL TOTAL CA: CPT

## 2020-07-24 ENCOUNTER — HOSPITAL ENCOUNTER (OUTPATIENT)
Age: 60
Discharge: HOME | End: 2020-07-24

## 2020-07-24 LAB
INR BLD: 2.6
PROTHROMBIN TIME: 25.9 SEC (ref 9–12)

## 2020-07-24 PROCEDURE — 85610 PROTHROMBIN TIME: CPT

## 2020-07-24 PROCEDURE — 36415 COLL VENOUS BLD VENIPUNCTURE: CPT

## 2020-07-28 ENCOUNTER — OFFICE VISIT (OUTPATIENT)
Dept: DERMATOLOGY | Age: 60
End: 2020-07-28
Payer: COMMERCIAL

## 2020-07-28 VITALS
SYSTOLIC BLOOD PRESSURE: 165 MMHG | BODY MASS INDEX: 43.12 KG/M2 | WEIGHT: 243.4 LBS | HEIGHT: 63 IN | DIASTOLIC BLOOD PRESSURE: 76 MMHG | TEMPERATURE: 97.1 F | HEART RATE: 74 BPM | OXYGEN SATURATION: 94 %

## 2020-07-28 PROCEDURE — 99213 OFFICE O/P EST LOW 20 MIN: CPT | Performed by: DERMATOLOGY

## 2020-07-28 RX ORDER — TRIAMCINOLONE ACETONIDE 1 MG/G
CREAM TOPICAL
Qty: 80 G | Refills: 2 | Status: SHIPPED | OUTPATIENT
Start: 2020-07-28 | End: 2021-02-24 | Stop reason: SDUPTHER

## 2020-07-28 NOTE — PATIENT INSTRUCTIONS
-Apply Triamcinolone cream, use it twice a day for one week, once a day for a week, every other day for a week, then as needed to the rash. -Follow up in two months        Granuloma annulare (gran-u-LOW-muh an-u-LAR-e) is a skin condition that causes raised reddish or skin-colored bumps (lesions) in a ring pattern. The bumps are usually on the hands and feet. Minor skin injuries and some drugs might trigger the condition.  Different types affect adults and children

## 2020-07-28 NOTE — PROGRESS NOTES
Dermatology Patient Note  [address]  [address]  [address]  Dept: [phone]  Dept Fax: [phone]      VISITDATE: 7/28/2020   REFERRING PROVIDER: No ref. provider found      Jaun Field is a 64 y.o. female  who presents today in the office for:    Other (One month follow up : Eczema. Pt wants to know if you want her to continue the clobelasol, it is very helpful.)      HISTORY OF PRESENT ILLNESS:  Patient with history of nail bowen's disease presents for f/u eczematous dermatitis  Interval history: rash on arm has cleared with daily clobetasol. Patient is pleased. Wants to know if she should continue using it. Has some new spots on legs  Current treatment and adherence: clobetasol cream daily  Prior treatments tried: see initial HPI      CURRENT MEDICATIONS:   Current Outpatient Medications   Medication Sig Dispense Refill    triamcinolone (KENALOG) 0.1 % cream Apply to rash twice daily x1 wk, once daily x 1 week, then every other day 80 g 2    clobetasol (TEMOVATE) 0.05 % cream Apply topically 2 times daily to rash on arm (not face, groin, or body folds) 60 g 2    clopidogrel (PLAVIX) 75 MG tablet TAKE 1 TABLET BY MOUTH ONE TIME A DAY  15 tablet 0    tiZANidine (ZANAFLEX) 4 MG tablet TAKE 1 TABLET BY MOUTH NIGHTLY AS NEEDED (BACK PAIN) 90 tablet 1    losartan (COZAAR) 50 MG tablet TAKE 1 TABLET BY MOUTH ONE TIME A DAY  90 tablet 1    Niacin ER 1000 MG TBCR Take by mouth daily      furosemide (LASIX) 40 MG tablet Take one tablet by mouth in the morning and one half tablet in the evening. 3    metoprolol tartrate (LOPRESSOR) 25 MG tablet Take 0.5 tablets by mouth 2 times daily 60 tablet 3    Omega-3 Fatty Acids (FISH OIL) 1000 MG CAPS Take 1,000 mg by mouth daily       nitroGLYCERIN (NITROSTAT) 0.4 MG SL tablet Place 1 tablet under tongue upon chest pain, wait 5 minutes and may repeat up to 3 doses in 15 minutes. Do not crush or break. 25 tablet 3    warfarin (COUMADIN) 5 MG tablet Take 5 mg by mouth  and Wed 5mg, 2.5 mg  On other days      atorvastatin (LIPITOR) 40 MG tablet Take 40 mg by mouth nightly        No current facility-administered medications for this visit. ALLERGIES:   Allergies   Allergen Reactions    Pcn [Penicillins] Hives    Amiodarone     Protonix [Pantoprazole Sodium] Itching       SOCIAL HISTORY:  Social History     Tobacco Use    Smoking status: Former Smoker     Packs/day: 0.50     Years: 34.00     Pack years: 17.00     Types: Cigarettes     Last attempt to quit: 10/23/2018     Years since quittin.7    Smokeless tobacco: Never Used    Tobacco comment: smokes 5 cig/day   Substance Use Topics    Alcohol use: No     Alcohol/week: 0.0 standard drinks       REVIEW OF SYSTEMS:  Review of Systems  Skin: Denies any new changing, growing orbleeding lesions or rashes except as described in the HPI   Constitutional: Denies fevers, chills, and malaise. PHYSICAL EXAM:   BP (!) 165/76 (Site: Left Upper Arm, Position: Sitting, Cuff Size: Large Adult)   Pulse 74   Temp 97.1 °F (36.2 °C)   Ht 5' 3\" (1.6 m)   Wt 243 lb 6.4 oz (110.4 kg)   LMP 2014 (Approximate)   SpO2 94%   BMI 43.12 kg/m²     General Exam:  General Appearance: No acute distress, Well nourished     Neuro: Alert and oriented to person, place and time  Psych: Normal affect   Lymph Node: Not performed    Cutaneous Exam: Performed as documented in clinic note below. Head/face,neck, both arms, digits and/or nails, and limited lower extremities (that which is visible with pants/shorts and shoes/socks on) was examined.     Pertinent Physical Exam Findings:  Physical Exam  Arm with few healing excoriations, no active rash  Left dorsal hand and thighs with pink annular smooth plaques    Photo surveillance performed: No    Medical Necessity of Exam Performed:   Distribution of patient concerns    Additional Diagnostic Testing performed during exam: Not performed ,  Not performed    ASSESSMENT:   Diagnosis Orders   1. Other eczema  triamcinolone (KENALOG) 0.1 % cream   2. Granuloma annulare         Plan of Action is as Follows:  Assessment   1. Eczematous dermatitis - ACD vs. atopic  - discussed appropriate use of topical steroids  - stop clobetasol, start triam and taper  - call if flaring  - triamcinolone (KENALOG) 0.1 % cream; Apply to rash twice daily x1 wk, once daily x 1 week, then every other day  Dispense: 80 g; Refill: 2    2. Granuloma annulare  - discussed diagnosis, etiology, natural course, and treatment options  - patient declines treatment at this time    RTC 2 months            Patient Instructions   -Apply Triamcinolone cream, use it twice a day for one week, once a day for a week, every other day for a week, then as needed to the rash. -Follow up in two months        Granuloma annulare (gran-u-LOW-muh an-u-LAR-e) is a skin condition that causes raised reddish or skin-colored bumps (lesions) in a ring pattern. The bumps are usually on the hands and feet. Minor skin injuries and some drugs might trigger the condition. Different types affect adults and children        Follow-up: No follow-ups on file. This note was created with the assistance of a speech-recognition program.  Although the intention is to generate a document that actually reflects the content of the visit, no guarantees can be provided that every mistake has been identified and corrected byediting.     Electronically signed by Comfort Ospina MD on 7/28/20 at 9:17 AM EDT

## 2020-08-31 ENCOUNTER — TELEPHONE (OUTPATIENT)
Dept: FAMILY MEDICINE CLINIC | Age: 60
End: 2020-08-31

## 2020-08-31 ENCOUNTER — PATIENT MESSAGE (OUTPATIENT)
Dept: FAMILY MEDICINE CLINIC | Age: 60
End: 2020-08-31

## 2020-08-31 ENCOUNTER — OFFICE VISIT (OUTPATIENT)
Dept: FAMILY MEDICINE CLINIC | Age: 60
End: 2020-08-31
Payer: COMMERCIAL

## 2020-08-31 VITALS
HEIGHT: 64 IN | WEIGHT: 245 LBS | DIASTOLIC BLOOD PRESSURE: 88 MMHG | HEART RATE: 72 BPM | SYSTOLIC BLOOD PRESSURE: 132 MMHG | OXYGEN SATURATION: 96 % | TEMPERATURE: 97.8 F | BODY MASS INDEX: 41.83 KG/M2

## 2020-08-31 LAB — HBA1C MFR BLD: 9.8 %

## 2020-08-31 PROCEDURE — 83036 HEMOGLOBIN GLYCOSYLATED A1C: CPT | Performed by: PHYSICIAN ASSISTANT

## 2020-08-31 PROCEDURE — 99213 OFFICE O/P EST LOW 20 MIN: CPT | Performed by: PHYSICIAN ASSISTANT

## 2020-08-31 RX ORDER — TIZANIDINE 4 MG/1
4 TABLET ORAL NIGHTLY PRN
Qty: 90 TABLET | Refills: 1 | Status: SHIPPED | OUTPATIENT
Start: 2020-08-31 | End: 2021-03-03

## 2020-08-31 ASSESSMENT — ENCOUNTER SYMPTOMS
ABDOMINAL PAIN: 0
COUGH: 0
CONSTIPATION: 0
WHEEZING: 0
NAUSEA: 0
SHORTNESS OF BREATH: 0
COLOR CHANGE: 0
VOMITING: 0
DIARRHEA: 0

## 2020-08-31 ASSESSMENT — PATIENT HEALTH QUESTIONNAIRE - PHQ9
SUM OF ALL RESPONSES TO PHQ QUESTIONS 1-9: 0
1. LITTLE INTEREST OR PLEASURE IN DOING THINGS: 0
SUM OF ALL RESPONSES TO PHQ QUESTIONS 1-9: 0
2. FEELING DOWN, DEPRESSED OR HOPELESS: 0
SUM OF ALL RESPONSES TO PHQ9 QUESTIONS 1 & 2: 0

## 2020-08-31 NOTE — PROGRESS NOTES
 MI, old     PAF (paroxysmal atrial fibrillation) [facility])     s/p cardioversion Dec 2015     STEMI (ST elevation myocardial infarction) ([facility] [address] 2016    Type 2 diabetes mellitus without complication, without long-term current use of insulin ([facility] [address] 2017      Past Surgical History:   Procedure Laterality Date    CARDIOVERSION  2018    DR Cristy Read  SUCCESSFUL    CARDIOVERSION  2014    CARDIOVERSION      C[facility]  2018    [facility]    COLPOSCOPY      CORONARY ANGIOPLASTY WITH STENT PLACEMENT  2016    PTCA AND GIRMA TO RCA    CORONARY ANGIOPLASTY WITH STENT PLACEMENT      DILATION AND CURETTAGE      ENDOMETRIAL ABLATION  12    hydrothermal    LAPAROSCOPY      TRANSESOPHAGEAL ECHOCARDIOGRAM  2014    TUBAL LIGATION         Family History   Problem Relation Age of Onset    COPD Father     Coronary Art Dis Father     Cancer Mother     Heart Failure Maternal Grandmother     Breast Cancer Neg Hx     Colon Cancer Neg Hx     Diabetes Neg Hx     Eclampsia Neg Hx     Hypertension Neg Hx     Ovarian Cancer Neg Hx      Labor Neg Hx     Spont Abortions Neg Hx     Stroke Neg Hx        Social History     Tobacco Use    Smoking status: Former Smoker     Packs/day: 0.50     Years: 34.00     Pack years: 17.00     Types: Cigarettes     Last attempt to quit: 10/23/2018     Years since quittin.8    Smokeless tobacco: Never Used    Tobacco comment: smokes 5 cig/day   Substance Use Topics    Alcohol use: No     Alcohol/week: 0.0 standard drinks      Current Outpatient Medications   Medication Sig Dispense Refill    metFORMIN (GLUCOPHAGE) 500 MG tablet Take 500 mg by mouth daily (with breakfast)      tiZANidine (ZANAFLEX) 4 MG tablet Take 1 tablet by mouth nightly as needed (back pain) 90 tablet 1    clopidogrel (PLAVIX) 75 MG tablet TAKE 1 TABLET BY MOUTH ONE TIME A DAY  15 tablet 0    losartan (COZAAR) 50 MG tablet TAKE 1 TABLET BY MOUTH ONE TIME A DAY  90 tablet 1    Niacin ER 1000 MG TBCR Take by mouth daily      furosemide (LASIX) 40 MG tablet Take one tablet by mouth in the morning and one half tablet in the evening. 3    metoprolol tartrate (LOPRESSOR) 25 MG tablet Take 0.5 tablets by mouth 2 times daily (Patient taking differently: Take 25 mg by mouth 2 times daily ) 60 tablet 3    Omega-3 Fatty Acids (FISH OIL) 1000 MG CAPS Take 1,000 mg by mouth daily       warfarin (COUMADIN) 5 MG tablet Take 5 mg by mouth Sunday and Wed 5mg, 2.5 mg  On other days      atorvastatin (LIPITOR) 40 MG tablet Take 40 mg by mouth nightly       triamcinolone (KENALOG) 0.1 % cream Apply to rash twice daily x1 wk, once daily x 1 week, then every other day (Patient not taking: Reported on 8/31/2020) 80 g 2    clobetasol (TEMOVATE) 0.05 % cream Apply topically 2 times daily to rash on arm (not face, groin, or body folds) (Patient not taking: Reported on 8/31/2020) 60 g 2    nitroGLYCERIN (NITROSTAT) 0.4 MG SL tablet Place 1 tablet under tongue upon chest pain, wait 5 minutes and may repeat up to 3 doses in 15 minutes. Do not crush or break. (Patient not taking: Reported on 8/31/2020) 25 tablet 3     No current facility-administered medications for this visit.       Allergies   Allergen Reactions    Pcn [Penicillins] Hives    Amiodarone     Protonix [Pantoprazole Sodium] Itching       Health Maintenance   Topic Date Due    Diabetic foot exam  08/28/1972    Diabetic retinal exam  08/28/1972    Hepatitis B vaccine (1 of 3 - Risk 3-dose series) 08/28/1981    Shingles Vaccine (1 of 2) 08/28/2012    Cervical cancer screen  02/03/2018    Colon Cancer Screen FIT/FOBT  03/02/2018    A1C test (Diabetic or Prediabetic)  01/23/2020    Diabetic microalbuminuria test  02/11/2020    Lipid screen  02/11/2020    Flu vaccine (1) 09/01/2020    Breast cancer screen  02/18/2021    Potassium monitoring  07/02/2021    Creatinine monitoring  07/02/2021    DTaP/Tdap/Td vaccine (2 - Td) 02/28/2027    Pneumococcal 0-64 years Vaccine  Completed    Hepatitis C screen  Completed    HIV screen  Completed    Hepatitis A vaccine  Aged Out    Hib vaccine  Aged Out    Meningococcal (ACWY) vaccine  Aged Out       Subjective:     Review of Systems   Constitutional: Negative for activity change, appetite change, fatigue, fever and unexpected weight change. /88 (Site: Right Upper Arm, Position: Sitting, Cuff Size: Medium Adult)   Pulse 72   Temp 97.8 °F (36.6 °C) (Tympanic)   Ht 5' 3.5\" (1.613 m)   Wt 245 lb (111.1 kg)   LMP 07/03/2014 (Approximate)   SpO2 96%   BMI 42.72 kg/m²    Respiratory: Negative for cough, shortness of breath and wheezing. Cardiovascular: Negative for chest pain and palpitations. Gastrointestinal: Negative for abdominal pain, constipation, diarrhea, nausea and vomiting. Endocrine: Negative for polydipsia and polyuria. Genitourinary: Negative for dysuria. Skin: Negative for color change, pallor, rash and wound. Neurological: Negative for weakness. Hematological: Negative for adenopathy. Psychiatric/Behavioral: The patient is not nervous/anxious. Objective:     Physical Exam  Vitals signs and nursing note reviewed. Constitutional:       Appearance: Normal appearance. She is well-developed. She is not ill-appearing. HENT:      Head: Normocephalic and atraumatic. Cardiovascular:      Rate and Rhythm: Normal rate and regular rhythm. Heart sounds: No murmur. Pulmonary:      Effort: Pulmonary effort is normal. No respiratory distress. Breath sounds: Normal breath sounds. No wheezing, rhonchi or rales. Musculoskeletal:      Right lower leg: No edema. Left lower leg: No edema. Skin:     General: Skin is warm and dry. Coloration: Skin is not pale. Findings: No erythema or rash.    Neurological:      Mental Status: She is alert and oriented to person, place, and time. Psychiatric:         Mood and Affect: Mood normal.         Behavior: Behavior normal.         Thought Content: Thought content normal.         Judgment: Judgment normal.       /88 (Site: Right Upper Arm, Position: Sitting, Cuff Size: Medium Adult)   Pulse 72   Temp 97.8 °F (36.6 °C) (Tympanic)   Ht 5' 3.5\" (1.613 m)   Wt 245 lb (111.1 kg)   LMP 07/03/2014 (Approximate)   SpO2 96%   BMI 42.72 kg/m²     Assessment:       Diagnosis Orders   1. Type 2 diabetes mellitus without complication, without long-term current use of insulin ([facility])  POCT glycosylated hemoglobin (Hb A1C)             Plan:      Return in about 3 months (around 11/30/2020) for med review, diabetes. Patient left today before we reviewed A1c results and came up with a plan. Will increase her metfromin and have her get additional labs done. Recheck in office in 3 mo  Stress low sugar/carb diet and regular exercise  Await follow up    Results for orders placed or performed in visit on 08/31/20   POCT glycosylated hemoglobin (Hb A1C)   Result Value Ref Range    Hemoglobin A1C 9.8 %         Orders Placed This Encounter   Procedures    POCT glycosylated hemoglobin (Hb A1C)     Orders Placed This Encounter   Medications    tiZANidine (ZANAFLEX) 4 MG tablet     Sig: Take 1 tablet by mouth nightly as needed (back pain)     Dispense:  90 tablet     Refill:  1       Patient given educational materials - see patient instructions. Discussed use, benefit, and side effects of prescribed medications. All patientquestions answered. Pt voiced understanding. Reviewed health maintenance. Instructedto continue current medications, diet and exercise. Patient agreed with treatmentplan. Follow up as directed.      Electronically signed by Pema Sheppard PA-C on 8/31/2020 at 12:50 PM

## 2020-08-31 NOTE — TELEPHONE ENCOUNTER
From: Ismael James  To: Alia Banks PA-C  Sent: 8/31/2020 3:04 PM EDT  Subject: Test Results Question    With my A1C being 9.8 I am assuming I will need the Metformin filled, is taking it in the evening ok or should  I start taking it in the morning Thank You

## 2020-08-31 NOTE — PROGRESS NOTES
Visit Information    Have you changed or started any medications since your last visit including any over-the-counter medicines, vitamins, or herbal medicines? no   Are you having any side effects from any of your medications? -  no  Have you stopped taking any of your medications? Is so, why? -  no    Have you seen any other physician or provider since your last visit? Yes, Dr. Zeenat Butts  Have you had any other diagnostic tests since your last visit? No  Have you been seen in the emergency room and/or had an admission to a hospital since we last saw you? No  Have you had your routine dental cleaning in the past 6 months? no    Have you activated your [employer] account? If not, what are your barriers?  Yes     Patient Care Team:  Rabia Andrade PA-C as PCP - General (Physician Assistant)  Stacey Díaz MD as PCP - [address] [place] [address] [facility]  Bishop Gabriella MD as Consulting Physician (Cardiology)    Medical History Review  Past Medical, Family, and Social History reviewed and does contribute to the patient presenting condition    Health Maintenance   Topic Date Due    Diabetic foot exam  08/28/1972    Diabetic retinal exam  08/28/1972    Hepatitis B vaccine (1 of 3 - Risk 3-dose series) 08/28/1981    Shingles Vaccine (1 of 2) 08/28/2012    Cervical cancer screen  02/03/2018    Colon Cancer Screen FIT/FOBT  03/02/2018    Diabetic microalbuminuria test  02/11/2020    Lipid screen  02/11/2020    Flu vaccine (1) 09/01/2020    A1C test (Diabetic or Prediabetic)  10/23/2020    Breast cancer screen  02/18/2021    Potassium monitoring  07/02/2021    Creatinine monitoring  07/02/2021    DTaP/Tdap/Td vaccine (2 - Td) 02/28/2027    Pneumococcal 0-64 years Vaccine  Completed    Hepatitis C screen  Completed    HIV screen  Completed    Hepatitis A vaccine  Aged Out    Hib vaccine  Aged Out    Meningococcal (ACWY) vaccine  Aged Out

## 2020-08-31 NOTE — TELEPHONE ENCOUNTER
Call patient her A1c was over 9 showing poor control. I sent over metformin to pharmacy for 1000mg twice a day. Take at mealtimes. Stress diabetic diet and regular exercise. Aim for 150min per week exercise, walking or whatever she enjoys. Lets recheck her with labs in 3 mo . I placed order in epic for these. Test blood sugar daily and keep log. Bring to next appointment.  Let em know if she has any questions

## 2020-09-08 ENCOUNTER — PATIENT MESSAGE (OUTPATIENT)
Dept: FAMILY MEDICINE CLINIC | Age: 60
End: 2020-09-08

## 2020-09-24 ENCOUNTER — HOSPITAL ENCOUNTER (OUTPATIENT)
Age: 60
Setting detail: SPECIMEN
Discharge: HOME | End: 2020-09-24

## 2020-09-24 LAB
INR BLD: 2.2
PROTHROMBIN TIME: 22.9 SEC (ref 9–12)

## 2020-10-30 ENCOUNTER — HOSPITAL ENCOUNTER (OUTPATIENT)
Age: 60
Setting detail: SPECIMEN
Discharge: HOME | End: 2020-10-30

## 2020-10-30 LAB
INR BLD: 2.2
PROTHROMBIN TIME: 22.6 SEC (ref 9–12)

## 2020-11-20 ENCOUNTER — HOSPITAL ENCOUNTER (OUTPATIENT)
Age: 60
Setting detail: SPECIMEN
Discharge: HOME | End: 2020-11-20

## 2020-11-20 LAB
ABSOLUTE EOS #: 0.18 K/UL (ref 0–0.44)
ABSOLUTE IMMATURE GRANULOCYTE: 0.04 K/UL (ref 0–0.3)
ABSOLUTE LYMPH #: 0.98 K/UL (ref 1.1–3.7)
ABSOLUTE MONO #: 0.55 K/UL (ref 0.1–1.2)
ALBUMIN SERPL-MCNC: 4.3 G/DL (ref 3.5–5.2)
ALBUMIN/GLOBULIN RATIO: 1.6 (ref 1–2.5)
ALP BLD-CCNC: 69 U/L (ref 35–104)
ALT SERPL-CCNC: 18 U/L (ref 5–33)
ANION GAP SERPL CALCULATED.3IONS-SCNC: 13 MMOL/L (ref 9–17)
AST SERPL-CCNC: 18 U/L
BASOPHILS # BLD: 1 % (ref 0–2)
BASOPHILS ABSOLUTE: 0.05 K/UL (ref 0–0.2)
BILIRUB SERPL-MCNC: 0.75 MG/DL (ref 0.3–1.2)
BUN BLDV-MCNC: 14 MG/DL (ref 6–20)
BUN/CREAT BLD: ABNORMAL (ref 9–20)
CALCIUM SERPL-MCNC: 9.1 MG/DL (ref 8.6–10.4)
CHLORIDE BLD-SCNC: 98 MMOL/L (ref 98–107)
CHOLESTEROL/HDL RATIO: 4.1
CHOLESTEROL: 150 MG/DL
CO2: 29 MMOL/L (ref 20–31)
CREAT SERPL-MCNC: 0.85 MG/DL (ref 0.5–0.9)
CREATININE URINE: 36.9 MG/DL (ref 28–217)
DIFFERENTIAL TYPE: ABNORMAL
EOSINOPHILS RELATIVE PERCENT: 2 % (ref 1–4)
ESTIMATED AVERAGE GLUCOSE: 174 MG/DL
GFR AFRICAN AMERICAN: >60 ML/MIN
GFR NON-AFRICAN AMERICAN: >60 ML/MIN
GFR SERPL CREATININE-BSD FRML MDRD: ABNORMAL ML/MIN/{1.73_M2}
GFR SERPL CREATININE-BSD FRML MDRD: ABNORMAL ML/MIN/{1.73_M2}
GLUCOSE BLD-MCNC: 201 MG/DL (ref 70–99)
HBA1C MFR BLD: 7.7 % (ref 4–6)
HCT VFR BLD CALC: 43.4 % (ref 36.3–47.1)
HDLC SERPL-MCNC: 37 MG/DL
HEMOGLOBIN: 13.5 G/DL (ref 11.9–15.1)
IMMATURE GRANULOCYTES: 1 %
INR BLD: 2.1
LDL CHOLESTEROL: 73 MG/DL (ref 0–130)
LYMPHOCYTES # BLD: 12 % (ref 24–43)
MCH RBC QN AUTO: 30.5 PG (ref 25.2–33.5)
MCHC RBC AUTO-ENTMCNC: 31.1 G/DL (ref 28.4–34.8)
MCV RBC AUTO: 98 FL (ref 82.6–102.9)
MICROALBUMIN/CREAT 24H UR: <12 MG/L
MICROALBUMIN/CREAT UR-RTO: NORMAL MCG/MG CREAT
MONOCYTES # BLD: 7 % (ref 3–12)
NRBC AUTOMATED: 0 PER 100 WBC
PDW BLD-RTO: 12.8 % (ref 11.8–14.4)
PLATELET # BLD: 301 K/UL (ref 138–453)
PLATELET ESTIMATE: ABNORMAL
PMV BLD AUTO: 11.4 FL (ref 8.1–13.5)
POTASSIUM SERPL-SCNC: 4.2 MMOL/L (ref 3.7–5.3)
PROTHROMBIN TIME: 21.7 SEC (ref 9–12)
RBC # BLD: 4.43 M/UL (ref 3.95–5.11)
RBC # BLD: ABNORMAL 10*6/UL
SEG NEUTROPHILS: 77 % (ref 36–65)
SEGMENTED NEUTROPHILS ABSOLUTE COUNT: 6.2 K/UL (ref 1.5–8.1)
SODIUM BLD-SCNC: 140 MMOL/L (ref 135–144)
TOTAL PROTEIN: 7 G/DL (ref 6.4–8.3)
TRIGL SERPL-MCNC: 200 MG/DL
VLDLC SERPL CALC-MCNC: ABNORMAL MG/DL (ref 1–30)
WBC # BLD: 8 K/UL (ref 3.5–11.3)
WBC # BLD: ABNORMAL 10*3/UL

## 2020-12-02 ENCOUNTER — OFFICE VISIT (OUTPATIENT)
Dept: FAMILY MEDICINE CLINIC | Age: 60
End: 2020-12-02
Payer: MEDICAID

## 2020-12-02 VITALS
BODY MASS INDEX: 41.99 KG/M2 | OXYGEN SATURATION: 98 % | HEIGHT: 63 IN | TEMPERATURE: 97 F | HEART RATE: 80 BPM | SYSTOLIC BLOOD PRESSURE: 136 MMHG | DIASTOLIC BLOOD PRESSURE: 86 MMHG | WEIGHT: 237 LBS

## 2020-12-02 PROCEDURE — 3051F HG A1C>EQUAL 7.0%<8.0%: CPT | Performed by: PHYSICIAN ASSISTANT

## 2020-12-02 PROCEDURE — 99213 OFFICE O/P EST LOW 20 MIN: CPT | Performed by: PHYSICIAN ASSISTANT

## 2020-12-02 RX ORDER — GLIPIZIDE 10 MG/1
10 TABLET ORAL 2 TIMES DAILY
Qty: 60 TABLET | Refills: 3 | Status: SHIPPED | OUTPATIENT
Start: 2020-12-02 | End: 2021-07-07 | Stop reason: ALTCHOICE

## 2020-12-02 ASSESSMENT — ENCOUNTER SYMPTOMS
WHEEZING: 0
SHORTNESS OF BREATH: 0
COLOR CHANGE: 0
CONSTIPATION: 0
COUGH: 0
VOMITING: 0
DIARRHEA: 0
ABDOMINAL PAIN: 0
NAUSEA: 0

## 2020-12-02 NOTE — PROGRESS NOTES
Visit Information    Have you changed or started any medications since your last visit including any over-the-counter medicines, vitamins, or herbal medicines? no   Are you having any side effects from any of your medications? -  no  Have you stopped taking any of your medications? Is so, why? -  no    Have you seen any other physician or provider since your last visit? No  Have you had any other diagnostic tests since your last visit? No  Have you been seen in the emergency room and/or had an admission to a hospital since we last saw you? No  Have you had your routine dental cleaning in the past 6 months? no    Have you activated your [employer] account? If not, what are your barriers?  Yes     Patient Care Team:  Lorenza Duran PA-C as PCP - General (Physician Assistant)  Lorenza Duran PA-C as PCP - [facility]  Haylee Albarran MD as Consulting Physician (Cardiology)    Medical History Review  Past Medical, Family, and Social History reviewed and does contribute to the patient presenting condition    Health Maintenance   Topic Date Due    Diabetic foot exam  08/28/1972    Diabetic retinal exam  08/28/1972    Hepatitis B vaccine (1 of 3 - Risk 3-dose series) 08/28/1981    Shingles Vaccine (1 of 2) 08/28/2012    Cervical cancer screen  02/03/2018    Colon Cancer Screen FIT/FOBT  03/02/2018    Breast cancer screen  02/18/2021    A1C test (Diabetic or Prediabetic)  11/20/2021    Diabetic microalbuminuria test  11/20/2021    Lipid screen  11/20/2021    Potassium monitoring  11/20/2021    Creatinine monitoring  11/20/2021    DTaP/Tdap/Td vaccine (2 - Td) 02/28/2027    Flu vaccine  Completed    Hepatitis C screen  Completed    HIV screen  Completed    Hepatitis A vaccine  Aged Out    Hib vaccine  Aged Out    Meningococcal (ACWY) vaccine  Aged Out    Pneumococcal 0-64 years Vaccine  Aged Out

## 2020-12-02 NOTE — PROGRESS NOTES
[facility]  [address] [place] [address]  Dept: [phone]  Dept Fax: [phone]    Luis Hinojosa is a 64 y.o. female who presents today for her medical conditions/complaintsas noted below. Luis Hinojosa is c/o of   Chief Complaint   Patient presents with    Discuss Labs         HPI:     Diabetes   She presents for her follow-up diabetic visit. She has type 2 diabetes mellitus. Her disease course has been stable. There are no hypoglycemic associated symptoms. Pertinent negatives for hypoglycemia include no nervousness/anxiousness or pallor. There are no diabetic associated symptoms. Pertinent negatives for diabetes include no chest pain, no fatigue, no polydipsia, no polyuria and no weakness. There are no hypoglycemic complications. Symptoms are stable. She is following a generally healthy diet. She rarely participates in exercise. Her breakfast blood glucose is taken between 7-8 am. Her breakfast blood glucose range is generally 140-180 mg/dl. (150-200) An ACE inhibitor/angiotensin II receptor blocker is being taken. patient here for recheck on DM. She states doing well on present medication. BS still running high in the AM. She does not check later in the day. Eats low sugar/low carb diet. Minimal exercise    Hemoglobin A1C (%)   Date Value   11/20/2020 7.7 (H)   08/31/2020 9.8   10/23/2019 7.2             ( goal A1Cis < 7)   Microalb/Crt.  Ratio (mcg/mg creat)   Date Value   11/20/2020 CANNOT BE CALCULATED     LDL Cholesterol (mg/dL)   Date Value   11/20/2020 73   02/11/2019 48   07/07/2017 48     LDL Calculated (mg/dL)   Date Value   08/12/2015 40   09/18/2014 71       (goal LDL is <100)   AST (U/L)   Date Value   11/20/2020 18     ALT (U/L)   Date Value   11/20/2020 18     BUN (mg/dL)   Date Value   11/20/2020 14     BP Readings from Last 3 Encounters:   12/02/20 136/86   08/31/20 132/88   07/28/20 (!) 165/76          (goal 120/80)    Past Medical History:   Diagnosis Date    Abnormal Pap smear     Had colposcopy    Allergy     PCN    Anemia     Anticoagulated     ON COUMADIN    Atrial fibrillation ([address] 2014    Blood transfusion 11    4 units    CAD (coronary artery disease)     Eczema     Examination of participant in clinical trial 11    end date 3/25/15    Headache 2014    Heart disease 11    card stents x 2    Hyperlipidemia     Hypertension     Menopausal symptoms     MI, old     PAF (paroxysmal atrial fibrillation) ([address]     s/p cardioversion Dec 2015     STEMI (ST elevation myocardial infarction) ([address] 2016    Type 2 diabetes mellitus without complication, without long-term current use of insulin ([address] 2017      Past Surgical History:   Procedure Laterality Date    CARDIOVERSION  2018    DR Maciel Meza  SUCCESSFUL    CARDIOVERSION  2014    CARDIOVERSION      C[facility]  2018    [facility]    COLPOSCOPY      CORONARY ANGIOPLASTY WITH STENT PLACEMENT  2016    PTCA AND GIRMA TO RCA    CORONARY ANGIOPLASTY WITH STENT PLACEMENT      DILATION AND CURETTAGE      ENDOMETRIAL ABLATION  12    hydrothermal    LAPAROSCOPY      TRANSESOPHAGEAL ECHOCARDIOGRAM  2014    TUBAL LIGATION         Family History   Problem Relation Age of Onset    COPD Father     Coronary Art Dis Father     Cancer Mother     Heart Failure Maternal Grandmother     Breast Cancer Neg Hx     Colon Cancer Neg Hx     Diabetes Neg Hx     Eclampsia Neg Hx     Hypertension Neg Hx     Ovarian Cancer Neg Hx      Labor Neg Hx     Spont Abortions Neg Hx     Stroke Neg Hx        Social History     Tobacco Use    Smoking status: Former Smoker     Packs/day: 0.50     Years: 34.00     Pack years: 17.00     Types: Cigarettes     Last attempt to quit: 10/23/2018     Years since quittin.1    Smokeless tobacco: Never Used    Tobacco comment: smokes 5 cig/day   Substance Use Topics    Alcohol use: No     Alcohol/week: 0.0 standard drinks      Current Outpatient Medications   Medication Sig Dispense Refill    glipiZIDE (GLUCOTROL) 10 MG tablet Take 1 tablet by mouth 2 times daily 60 tablet 3    tiZANidine (ZANAFLEX) 4 MG tablet Take 1 tablet by mouth nightly as needed (back pain) 90 tablet 1    metFORMIN (GLUCOPHAGE) 1000 MG tablet Take 1 tablet by mouth 2 times daily (with meals) 180 tablet 0    triamcinolone (KENALOG) 0.1 % cream Apply to rash twice daily x1 wk, once daily x 1 week, then every other day 80 g 2    clopidogrel (PLAVIX) 75 MG tablet TAKE 1 TABLET BY MOUTH ONE TIME A DAY  15 tablet 0    losartan (COZAAR) 50 MG tablet TAKE 1 TABLET BY MOUTH ONE TIME A DAY  90 tablet 1    Niacin ER 1000 MG TBCR Take by mouth daily      furosemide (LASIX) 40 MG tablet Take one tablet by mouth in the morning and one half tablet in the evening. 3    metoprolol tartrate (LOPRESSOR) 25 MG tablet Take 0.5 tablets by mouth 2 times daily (Patient taking differently: Take 25 mg by mouth 2 times daily ) 60 tablet 3    Omega-3 Fatty Acids (FISH OIL) 1000 MG CAPS Take 1,000 mg by mouth daily       nitroGLYCERIN (NITROSTAT) 0.4 MG SL tablet Place 1 tablet under tongue upon chest pain, wait 5 minutes and may repeat up to 3 doses in 15 minutes. Do not crush or break. 25 tablet 3    warfarin (COUMADIN) 5 MG tablet Take 5 mg by mouth Sunday and Wed 5mg, 2.5 mg  On other days      atorvastatin (LIPITOR) 40 MG tablet Take 40 mg by mouth nightly       clobetasol (TEMOVATE) 0.05 % cream Apply topically 2 times daily to rash on arm (not face, groin, or body folds) (Patient not taking: Reported on 12/2/2020) 60 g 2     No current facility-administered medications for this visit.       Allergies   Allergen Reactions    Pcn [Penicillins] Hives    Amiodarone     Protonix [Pantoprazole Sodium] Itching       Health Maintenance   Topic Date Due    Diabetic foot exam  08/28/1972    Diabetic retinal exam  08/28/1972    Hepatitis B vaccine (1 of 3 - Risk 3-dose series) 08/28/1981    Shingles Vaccine (1 of 2) 08/28/2012    Cervical cancer screen  02/03/2018    Colon Cancer Screen FIT/FOBT  03/02/2018    Breast cancer screen  02/18/2021    A1C test (Diabetic or Prediabetic)  11/20/2021    Diabetic microalbuminuria test  11/20/2021    Lipid screen  11/20/2021    Potassium monitoring  11/20/2021    Creatinine monitoring  11/20/2021    DTaP/Tdap/Td vaccine (2 - Td) 02/28/2027    Flu vaccine  Completed    Hepatitis C screen  Completed    HIV screen  Completed    Hepatitis A vaccine  Aged Out    Hib vaccine  Aged Out    Meningococcal (ACWY) vaccine  Aged Out    Pneumococcal 0-64 years Vaccine  Aged Out       Subjective:     Review of Systems   Constitutional: Negative for activity change, appetite change, fatigue, fever and unexpected weight change. /86 (Site: Left Upper Arm, Position: Sitting, Cuff Size: Medium Adult)   Pulse 80   Temp 97 °F (36.1 °C) (Temporal)   Ht 5' 3\" (1.6 m)   Wt 237 lb (107.5 kg)   LMP 07/03/2014 (Approximate)   SpO2 98%   BMI 41.98 kg/m²    Respiratory: Negative for cough, shortness of breath and wheezing. Cardiovascular: Negative for chest pain and palpitations. Gastrointestinal: Negative for abdominal pain, constipation, diarrhea, nausea and vomiting. Endocrine: Negative for polydipsia and polyuria. Skin: Negative for color change, pallor, rash and wound. Neurological: Negative for weakness. Hematological: Negative for adenopathy. Psychiatric/Behavioral: Negative for sleep disturbance. The patient is not nervous/anxious. Objective:     Physical Exam  Vitals signs and nursing note reviewed. Constitutional:       Appearance: Normal appearance. She is well-developed. She is not ill-appearing. HENT:      Head: Normocephalic and atraumatic.    Cardiovascular:      Rate and Rhythm: Normal rate and regular rhythm. Heart sounds: No murmur. Pulmonary:      Effort: Pulmonary effort is normal. No respiratory distress. Breath sounds: Normal breath sounds. No wheezing, rhonchi or rales. Skin:     General: Skin is warm and dry. Coloration: Skin is not pale. Findings: No erythema or rash. Neurological:      Mental Status: She is alert and oriented to person, place, and time. Psychiatric:         Mood and Affect: Mood normal.         Behavior: Behavior normal.         Thought Content: Thought content normal.         Judgment: Judgment normal.       /86 (Site: Left Upper Arm, Position: Sitting, Cuff Size: Medium Adult)   Pulse 80   Temp 97 °F (36.1 °C) (Temporal)   Ht 5' 3\" (1.6 m)   Wt 237 lb (107.5 kg)   LMP 07/03/2014 (Approximate)   SpO2 98%   BMI 41.98 kg/m²     Assessment:       Diagnosis Orders   1. Type 2 diabetes mellitus without complication, without long-term current use of insulin (HCC)  glipiZIDE (GLUCOTROL) 10 MG tablet    Comprehensive Metabolic Panel    Hemoglobin A1C   2. Colon cancer screening declined               Plan:      Return in about 3 months (around 3/2/2021) for diabetes. Patient and I reviewed recent labs. A1c at 7.7 down from 9.8. encouraged cont with healthy diet, recommended more regular exercise. Also recommended addition of second DM agent. Patient is self pay so cost is a factor. We reviewed glipizide, use and SE. She is willing to start this agent. Will have her start BID.  Check BS regularly, take with meals  Recheck with labs in 3 mo sooner prn  Patient agreed with treatment plan      Orders Placed This Encounter   Procedures    Comprehensive Metabolic Panel     Standing Status:   Future     Standing Expiration Date:   12/2/2021    Hemoglobin A1C     Standing Status:   Future     Standing Expiration Date:   12/2/2021     Orders Placed This Encounter   Medications    glipiZIDE (GLUCOTROL) 10 MG tablet     Sig: Take 1 tablet by mouth 2 times daily     Dispense:  60 tablet     Refill:  3      Lab Results   Component Value Date    WBC 8.0 11/20/2020    HGB 13.5 11/20/2020    HCT 43.4 11/20/2020    MCV 98.0 11/20/2020     11/20/2020     Lab Results   Component Value Date     11/20/2020    K 4.2 11/20/2020    CL 98 11/20/2020    CO2 29 11/20/2020    BUN 14 11/20/2020    CREATININE 0.85 11/20/2020    GLUCOSE 201 (H) 11/20/2020    CALCIUM 9.1 11/20/2020    PROT 7.0 11/20/2020    LABALBU 4.3 11/20/2020    BILITOT 0.75 11/20/2020    ALKPHOS 69 11/20/2020    AST 18 11/20/2020    ALT 18 11/20/2020    LABGLOM >60 11/20/2020    GFRAA >60 11/20/2020       Lab Results   Component Value Date    CHOL 150 11/20/2020    CHOL 133 07/07/2017    CHOL 142 02/23/2017     Lab Results   Component Value Date    TRIG 200 (H) 11/20/2020    TRIG 228 (H) 07/07/2017    TRIG 262 (H) 02/23/2017     Lab Results   Component Value Date    HDL 37 (L) 11/20/2020    HDL 35 (L) 02/11/2019    HDL 39 (L) 07/07/2017     Lab Results   Component Value Date    LDLCHOLESTEROL 73 11/20/2020    LDLCHOLESTEROL 48 02/11/2019    LDLCHOLESTEROL 48 07/07/2017    LDLCALC 40 08/12/2015    LDLCALC 71 09/18/2014     Lab Results   Component Value Date    VLDL NOT REPORTED (H) 11/20/2020    VLDL NOT REPORTED 02/11/2019    VLDL NOT REPORTED 07/07/2017     Lab Results   Component Value Date    CHOLHDLRATIO 4.1 11/20/2020    CHOLHDLRATIO 3.2 02/11/2019    CHOLHDLRATIO 3.4 07/07/2017     Lab Results   Component Value Date    LABA1C 7.7 (H) 11/20/2020     Lab Results   Component Value Date     11/20/2020        Patient given educational materials - see patient instructions. Discussed use, benefit, and side effects of prescribed medications. All patientquestions answered. Pt voiced understanding. Reviewed health maintenance. Instructedto continue current medications, diet and exercise. Patient agreed with treatmentplan. Follow up as directed.      Electronically signed by Sherri Arguelles PA-C on 12/2/2020 at 3:09 PM

## 2020-12-17 ENCOUNTER — HOSPITAL ENCOUNTER (OUTPATIENT)
Age: 60
Setting detail: SPECIMEN
Discharge: HOME | End: 2020-12-17

## 2020-12-17 LAB
INR BLD: 2
PROTHROMBIN TIME: 20.5 SEC (ref 9–12)

## 2020-12-30 ENCOUNTER — APPOINTMENT (OUTPATIENT)
Dept: GENERAL RADIOLOGY | Age: 60
DRG: 201 | End: 2020-12-30
Payer: MEDICAID

## 2020-12-30 ENCOUNTER — HOSPITAL ENCOUNTER (INPATIENT)
Age: 60
LOS: 1 days | Discharge: LEFT BEFORE DISCHARGE | DRG: 201 | End: 2020-12-31
Attending: EMERGENCY MEDICINE | Admitting: INTERNAL MEDICINE
Payer: MEDICAID

## 2020-12-30 DIAGNOSIS — I48.91 ATRIAL FIBRILLATION WITH RVR (HCC): Primary | ICD-10-CM

## 2020-12-30 LAB
ABSOLUTE EOS #: 0.07 K/UL (ref 0–0.44)
ABSOLUTE IMMATURE GRANULOCYTE: 0.03 K/UL (ref 0–0.3)
ABSOLUTE LYMPH #: 1.02 K/UL (ref 1.1–3.7)
ABSOLUTE MONO #: 0.55 K/UL (ref 0.1–1.2)
ANION GAP SERPL CALCULATED.3IONS-SCNC: 15 MMOL/L (ref 9–17)
BASOPHILS # BLD: 0 % (ref 0–2)
BASOPHILS ABSOLUTE: <0.03 K/UL (ref 0–0.2)
BUN BLDV-MCNC: 18 MG/DL (ref 6–20)
BUN/CREAT BLD: ABNORMAL (ref 9–20)
CALCIUM SERPL-MCNC: 9.1 MG/DL (ref 8.6–10.4)
CHLORIDE BLD-SCNC: 104 MMOL/L (ref 98–107)
CO2: 24 MMOL/L (ref 20–31)
CREAT SERPL-MCNC: 0.98 MG/DL (ref 0.5–0.9)
DIFFERENTIAL TYPE: ABNORMAL
EOSINOPHILS RELATIVE PERCENT: 1 % (ref 1–4)
GFR AFRICAN AMERICAN: >60 ML/MIN
GFR NON-AFRICAN AMERICAN: 58 ML/MIN
GFR SERPL CREATININE-BSD FRML MDRD: ABNORMAL ML/MIN/{1.73_M2}
GFR SERPL CREATININE-BSD FRML MDRD: ABNORMAL ML/MIN/{1.73_M2}
GLUCOSE BLD-MCNC: 129 MG/DL (ref 65–105)
GLUCOSE BLD-MCNC: 66 MG/DL (ref 70–99)
HCT VFR BLD CALC: 44.6 % (ref 36.3–47.1)
HEMOGLOBIN: 14 G/DL (ref 11.9–15.1)
IMMATURE GRANULOCYTES: 1 %
INR BLD: 2.7
LYMPHOCYTES # BLD: 17 % (ref 24–43)
MCH RBC QN AUTO: 30.8 PG (ref 25.2–33.5)
MCHC RBC AUTO-ENTMCNC: 31.4 G/DL (ref 28.4–34.8)
MCV RBC AUTO: 98 FL (ref 82.6–102.9)
MONOCYTES # BLD: 9 % (ref 3–12)
NRBC AUTOMATED: 0 PER 100 WBC
PARTIAL THROMBOPLASTIN TIME: 33.7 SEC (ref 20.5–30.5)
PDW BLD-RTO: 13.3 % (ref 11.8–14.4)
PLATELET # BLD: 283 K/UL (ref 138–453)
PLATELET ESTIMATE: ABNORMAL
PMV BLD AUTO: 10.8 FL (ref 8.1–13.5)
POTASSIUM SERPL-SCNC: 4 MMOL/L (ref 3.7–5.3)
PROTHROMBIN TIME: 27.6 SEC (ref 9–12)
RBC # BLD: 4.55 M/UL (ref 3.95–5.11)
RBC # BLD: ABNORMAL 10*6/UL
SEG NEUTROPHILS: 72 % (ref 36–65)
SEGMENTED NEUTROPHILS ABSOLUTE COUNT: 4.37 K/UL (ref 1.5–8.1)
SODIUM BLD-SCNC: 143 MMOL/L (ref 135–144)
TROPONIN INTERP: ABNORMAL
TROPONIN T: ABNORMAL NG/ML
TROPONIN, HIGH SENSITIVITY: 16 NG/L (ref 0–14)
TROPONIN, HIGH SENSITIVITY: 22 NG/L (ref 0–14)
TROPONIN, HIGH SENSITIVITY: 26 NG/L (ref 0–14)
WBC # BLD: 6.1 K/UL (ref 3.5–11.3)
WBC # BLD: ABNORMAL 10*3/UL

## 2020-12-30 PROCEDURE — 2500000003 HC RX 250 WO HCPCS: Performed by: STUDENT IN AN ORGANIZED HEALTH CARE EDUCATION/TRAINING PROGRAM

## 2020-12-30 PROCEDURE — 84484 ASSAY OF TROPONIN QUANT: CPT

## 2020-12-30 PROCEDURE — 96374 THER/PROPH/DIAG INJ IV PUSH: CPT

## 2020-12-30 PROCEDURE — 80048 BASIC METABOLIC PNL TOTAL CA: CPT

## 2020-12-30 PROCEDURE — 2580000003 HC RX 258: Performed by: STUDENT IN AN ORGANIZED HEALTH CARE EDUCATION/TRAINING PROGRAM

## 2020-12-30 PROCEDURE — 6370000000 HC RX 637 (ALT 250 FOR IP): Performed by: STUDENT IN AN ORGANIZED HEALTH CARE EDUCATION/TRAINING PROGRAM

## 2020-12-30 PROCEDURE — 71046 X-RAY EXAM CHEST 2 VIEWS: CPT

## 2020-12-30 PROCEDURE — 85610 PROTHROMBIN TIME: CPT

## 2020-12-30 PROCEDURE — 2580000003 HC RX 258

## 2020-12-30 PROCEDURE — 1200000000 HC SEMI PRIVATE

## 2020-12-30 PROCEDURE — 93005 ELECTROCARDIOGRAM TRACING: CPT

## 2020-12-30 PROCEDURE — 2500000003 HC RX 250 WO HCPCS

## 2020-12-30 PROCEDURE — 85025 COMPLETE CBC W/AUTO DIFF WBC: CPT

## 2020-12-30 PROCEDURE — 82947 ASSAY GLUCOSE BLOOD QUANT: CPT

## 2020-12-30 PROCEDURE — 99285 EMERGENCY DEPT VISIT HI MDM: CPT

## 2020-12-30 PROCEDURE — 85730 THROMBOPLASTIN TIME PARTIAL: CPT

## 2020-12-30 RX ORDER — DEXTROSE MONOHYDRATE 50 MG/ML
100 INJECTION, SOLUTION INTRAVENOUS PRN
Status: DISCONTINUED | OUTPATIENT
Start: 2020-12-30 | End: 2020-12-31 | Stop reason: HOSPADM

## 2020-12-30 RX ORDER — WARFARIN SODIUM 2.5 MG/1
2.5 TABLET ORAL
Status: DISCONTINUED | OUTPATIENT
Start: 2020-12-31 | End: 2020-12-31 | Stop reason: HOSPADM

## 2020-12-30 RX ORDER — DEXTROSE MONOHYDRATE 25 G/50ML
INJECTION, SOLUTION INTRAVENOUS
Status: COMPLETED
Start: 2020-12-30 | End: 2020-12-30

## 2020-12-30 RX ORDER — DEXTROSE MONOHYDRATE 25 G/50ML
12.5 INJECTION, SOLUTION INTRAVENOUS PRN
Status: DISCONTINUED | OUTPATIENT
Start: 2020-12-30 | End: 2020-12-31 | Stop reason: HOSPADM

## 2020-12-30 RX ORDER — DEXTROSE MONOHYDRATE 50 MG/ML
INJECTION, SOLUTION INTRAVENOUS
Status: DISPENSED
Start: 2020-12-30 | End: 2020-12-31

## 2020-12-30 RX ORDER — CLOPIDOGREL BISULFATE 75 MG/1
75 TABLET ORAL DAILY
Status: DISCONTINUED | OUTPATIENT
Start: 2020-12-30 | End: 2020-12-31 | Stop reason: HOSPADM

## 2020-12-30 RX ORDER — AMLODIPINE BESYLATE 10 MG/1
10 TABLET ORAL DAILY
Status: DISCONTINUED | OUTPATIENT
Start: 2020-12-31 | End: 2020-12-31 | Stop reason: HOSPADM

## 2020-12-30 RX ORDER — DILTIAZEM HYDROCHLORIDE 5 MG/ML
10 INJECTION INTRAVENOUS ONCE
Status: COMPLETED | OUTPATIENT
Start: 2020-12-30 | End: 2020-12-30

## 2020-12-30 RX ORDER — SODIUM CHLORIDE 0.9 % (FLUSH) 0.9 %
10 SYRINGE (ML) INJECTION PRN
Status: DISCONTINUED | OUTPATIENT
Start: 2020-12-30 | End: 2020-12-31 | Stop reason: HOSPADM

## 2020-12-30 RX ORDER — ACETAMINOPHEN 325 MG/1
650 TABLET ORAL EVERY 6 HOURS PRN
Status: DISCONTINUED | OUTPATIENT
Start: 2020-12-30 | End: 2020-12-31 | Stop reason: HOSPADM

## 2020-12-30 RX ORDER — PANTOPRAZOLE SODIUM 40 MG/1
40 TABLET, DELAYED RELEASE ORAL
Status: DISCONTINUED | OUTPATIENT
Start: 2020-12-31 | End: 2020-12-31 | Stop reason: HOSPADM

## 2020-12-30 RX ORDER — POLYETHYLENE GLYCOL 3350 17 G/17G
17 POWDER, FOR SOLUTION ORAL DAILY PRN
Status: DISCONTINUED | OUTPATIENT
Start: 2020-12-30 | End: 2020-12-31 | Stop reason: HOSPADM

## 2020-12-30 RX ORDER — PROMETHAZINE HYDROCHLORIDE 12.5 MG/1
12.5 TABLET ORAL EVERY 6 HOURS PRN
Status: DISCONTINUED | OUTPATIENT
Start: 2020-12-30 | End: 2020-12-31 | Stop reason: HOSPADM

## 2020-12-30 RX ORDER — SODIUM CHLORIDE 0.9 % (FLUSH) 0.9 %
10 SYRINGE (ML) INJECTION EVERY 12 HOURS SCHEDULED
Status: DISCONTINUED | OUTPATIENT
Start: 2020-12-30 | End: 2020-12-31 | Stop reason: HOSPADM

## 2020-12-30 RX ORDER — WARFARIN SODIUM 5 MG/1
5 TABLET ORAL
Status: DISCONTINUED | OUTPATIENT
Start: 2021-01-03 | End: 2020-12-31 | Stop reason: HOSPADM

## 2020-12-30 RX ORDER — ACETAMINOPHEN 650 MG/1
650 SUPPOSITORY RECTAL EVERY 6 HOURS PRN
Status: DISCONTINUED | OUTPATIENT
Start: 2020-12-30 | End: 2020-12-31 | Stop reason: HOSPADM

## 2020-12-30 RX ORDER — DEXTROSE MONOHYDRATE 25 G/50ML
12.5 INJECTION, SOLUTION INTRAVENOUS ONCE
Status: COMPLETED | OUTPATIENT
Start: 2020-12-30 | End: 2020-12-30

## 2020-12-30 RX ORDER — LABETALOL HYDROCHLORIDE 5 MG/ML
10 INJECTION, SOLUTION INTRAVENOUS EVERY 4 HOURS PRN
Status: DISCONTINUED | OUTPATIENT
Start: 2020-12-30 | End: 2020-12-31 | Stop reason: HOSPADM

## 2020-12-30 RX ORDER — FUROSEMIDE 10 MG/ML
40 INJECTION INTRAMUSCULAR; INTRAVENOUS DAILY
Status: DISCONTINUED | OUTPATIENT
Start: 2020-12-30 | End: 2020-12-31 | Stop reason: HOSPADM

## 2020-12-30 RX ORDER — DILTIAZEM HYDROCHLORIDE 5 MG/ML
INJECTION INTRAVENOUS
Status: DISCONTINUED
Start: 2020-12-30 | End: 2020-12-30

## 2020-12-30 RX ORDER — ATORVASTATIN CALCIUM 80 MG/1
40 TABLET, FILM COATED ORAL NIGHTLY
Status: DISCONTINUED | OUTPATIENT
Start: 2020-12-30 | End: 2020-12-31 | Stop reason: HOSPADM

## 2020-12-30 RX ORDER — NICOTINE POLACRILEX 4 MG
15 LOZENGE BUCCAL PRN
Status: DISCONTINUED | OUTPATIENT
Start: 2020-12-30 | End: 2020-12-31 | Stop reason: HOSPADM

## 2020-12-30 RX ORDER — ONDANSETRON 2 MG/ML
4 INJECTION INTRAMUSCULAR; INTRAVENOUS EVERY 6 HOURS PRN
Status: DISCONTINUED | OUTPATIENT
Start: 2020-12-30 | End: 2020-12-31 | Stop reason: HOSPADM

## 2020-12-30 RX ADMIN — DEXTROSE MONOHYDRATE 12.5 G: 25 INJECTION, SOLUTION INTRAVENOUS at 13:10

## 2020-12-30 RX ADMIN — ACETAMINOPHEN 650 MG: 325 TABLET ORAL at 21:36

## 2020-12-30 RX ADMIN — DILTIAZEM HYDROCHLORIDE 5 MG/HR: 5 INJECTION, SOLUTION INTRAVENOUS at 21:36

## 2020-12-30 RX ADMIN — DILTIAZEM HYDROCHLORIDE 10 MG: 5 INJECTION INTRAVENOUS at 19:29

## 2020-12-30 RX ADMIN — DILTIAZEM HYDROCHLORIDE 10 MG: 5 INJECTION INTRAVENOUS at 13:34

## 2020-12-30 RX ADMIN — DILTIAZEM HYDROCHLORIDE 5 MG/HR: 5 INJECTION, SOLUTION INTRAVENOUS at 19:28

## 2020-12-30 RX ADMIN — METOPROLOL TARTRATE 12.5 MG: 25 TABLET ORAL at 22:07

## 2020-12-30 RX ADMIN — ATORVASTATIN CALCIUM 40 MG: 80 TABLET, FILM COATED ORAL at 21:36

## 2020-12-30 RX ADMIN — CLOPIDOGREL BISULFATE 75 MG: 75 TABLET ORAL at 21:04

## 2020-12-30 ASSESSMENT — ENCOUNTER SYMPTOMS
NAUSEA: 0
ALLERGIC/IMMUNOLOGIC NEGATIVE: 1
VOMITING: 0
ABDOMINAL PAIN: 0
SORE THROAT: 0
CONSTIPATION: 0
RESPIRATORY NEGATIVE: 1
EYES NEGATIVE: 1
TROUBLE SWALLOWING: 0
DIARRHEA: 0
NAUSEA: 1
SHORTNESS OF BREATH: 0

## 2020-12-30 ASSESSMENT — PAIN SCALES - GENERAL: PAINLEVEL_OUTOF10: 4

## 2020-12-30 NOTE — ED PROVIDER NOTES
[facility] ED  eMERGENCY dEPARTMENT eNCOUnter   Attending Attestation     Pt Name: Jakub Bal  MRN: [id number]  [place] 1960  Date of evaluation: 12/30/20       Jakub Bal is a 64 y.o. female who presents with Atrial Fibrillation (pt with afib, had ablation two years ago. pt has been having fatigue and feels like she is back in afib x one week. pt on coumadin for afib. denies chest pain. pt alert and oriented, arrives to er via private auto with spouse present. pt called cardiologist who told her to come to er. ) and Fatigue      History: Patient presents with atrial fibrillation. Patient has in the past.  Patient says that she has been taking her sotalol but only keeps her heart rate under control for about 2 hours. Patient told to come here by cardiologist.     Exam: Heart rate irregularly irregular and rapid in the low 100s, lungs CTABL, abdomen soft and non tender. Pt well appearing. Plan for discussion with cardiology, consider rate control versus cardioversion. Will discuss with cardiology and take the recommendations. EKG shows atrial fibrillation. I performed a history and physical examination of the patient and discussed management with the resident. I reviewed the residents note and agree with the documented findings and plan of care. Any areas of disagreement are noted on the chart. I was personally present for the key portions of any procedures. I have documented in the chart those procedures where I was not present during the key portions. I have personally reviewed all images and agree with the resident's interpretation. I have reviewed the emergency nurses triage note. I agree with the chief complaint, past medical history, past surgical history, allergies, medications, social and family history as documented unless otherwise noted below.  Documentation of the HPI, Physical Exam and Medical Decision Making performed by medical students or scribes is based on my personal performance of the HPI, PE and MDM. For Phys Assistant/ Nurse Practitioner cases/documentation I have had a face to face evaluation of this patient and have completed at least one if not all key elements of the E/M (history, physical exam, and MDM). Additional findings are as noted. For APC cases I have personally evaluated and examined the patient in conjunction with the APC and agree with the treatment plan and disposition of the patient as recorded by the APC.     Seth Luevano MD  Attending Emergency  Physician       Ray Raza MD  12/30/20 [id number]

## 2020-12-30 NOTE — ED NOTES
Pt given boxed lunch. Remains on monitor. Awaiting bed assignment for admission.       Dayton Singh RN  12/30/20 [id number]

## 2020-12-30 NOTE — PROGRESS NOTES
INTERNAL MEDICINE SENIOR NOTE     Patient seen and examined. Agree with H & P.     Briefly,   62-year-old female with a history of PAF for many years, feeling fatigued, tired, intermittent palpitations for the past 1 week. She follows with Dr. Dorota Mena cardiologist, who advised her to take sotalol to see for improvement. However for the past 2 days patient been feeling worse and came to the ER. Denies any active shortness of breath, chest pain. No diarrhea, urinary symptoms. On arrival heart rate in 130s with A. fib, /113. Given 1 dose of Cardizem, heart rate went into 90s for 2 hours but then back again to 100s. Blood pressure 160/110 mmHg. Troponin XVI. Blood glucose 66 even after eating. She has taken her Metformin and glipizide at home this morning. INR 2.7, follows with Coumadin clinic and takes warfarin every day, for her mitral valve bioprosthetic repair and for A. Fib. Last echo in 2017 with EF 40-45%. As per the patient she had 3 cardioversions in the past last one 2 years ago, also had cardiac ablation in 2018 for refractory A. fib with rate control/antiarrhythmics. Has history of CAD s/p PCI x 2 times, 2011 and 2016 . On Examination:  Vitals:    12/30/20 1717 12/30/20 1722 12/30/20 1727 12/30/20 1756   BP: (!) 160/113      Pulse: 112 108 105 106   Resp:   20    Temp:       TempSrc:       SpO2:  96% 96% 95%   Weight:           No results for input(s): POCGLU in the last 72 hours.   Hematology:  Recent Labs     12/30/20  1227   WBC 6.1   HGB 14.0   HCT 44.6      INR 2.7     Chemistry:  Recent Labs     12/30/20  1227      K 4.0      CO2 24   GLUCOSE 66*   BUN 18   CREATININE 0.98*   ANIONGAP 15   LABGLOM 58*   GFRAA >60   CALCIUM 9.1 No results for input(s): PROT, LABALBU, LABA1C, L2ZMPTU, B3GQQCZ, FT4, TSH, AST, ALT, LDH, GGT, ALKPHOS, BILITOT, BILIDIR, AMMONIA, AMYLASE, LIPASE, LACTATE, CHOL, HDL, LDLCHOLESTEROL, CHOLHDLRATIO, TRIG, VLDL, PHENYTOIN, PHENYF in the last 72 hours. Physical Examination:  CONST: AAO X 3, In no apparent distress  HEENT: Conjunctiva normal, EOM intact, mucous membrane moist, no thryomegaly   RESP: CTA bilaterally decreased, no wheeze, no rales, rhonchi cleared with cough. CVS: RRR, S1, S2 normal, No murmur, no JVP elevation, no pedal edema. ABD: Soft, Non tender, Non distended, BS +  NEURO: AAO X 3, Motor strength 5/5 in all 4 extremities, sensations intact, DTR's normal   EXT: No pedal edema, no calf tenderness, distal pulses intact     EKG: Afib with RVR    CXR: Unremarkable. Last echo in 2017 with EF 40-45%. As per the patient she had 3 cardioversions in the past last one 2 years ago, also had cardiac ablation in 2018 for refractory A. fib with rate control/antiarrhythmics. Has history of CAD s/p PCI x 2 times, 2011 and 2016 . Assessment:  · A. fib with RVR: Currently no known provoking factors. Failed outpatient sotalol therapy requiring admission. Currently rate in 110s. Given 1 dose of IV Cardizem. If heart rate goes >120s, will try another bolus/drip. Continue close monitoring. Pharmacy consulted for warfarin dosing. Currently INR 2.7. Cardiology consulted in the ER, follows with Dr. Rito Velazquez as outpatient. Last echo results as above. Will repeat echo to evaluate for wall motion abnormalities and EF. N.p.o. after midnight for possible cardioversion tomorrow. · Essential hypertension: Mild elevation in creatinine. Will hold lisinopril home dose. Amlodipine 10 mg from tomorrow. Labetalol as needed for >160 mmHg. · CAD s/p PCI x 2 times, 2011 and 2016. Continue Lipitor, Lopressor, Lasix. · Chronic systolic heart failure: EF 45%. Repeat echo. Currently stable. · T2DM with A1c 7.7%. Currently hypoglycemic. Encourage oral intake. Hypoglycemia protocol, POCT glucose checks. Medium dose sliding scale as needed. DVT prophylaxis: Already on warfarin. PT/OT: Consulted   to follow.       Danielle Aldrich MD, PGY-2  Internal Medicine Residency Program  [address]  12/30/2020 6:21 PM

## 2020-12-30 NOTE — ED PROVIDER NOTES
[address] HANDOFF       Handoff taken on the following patient from prior Attending Physician:  Pt Name: Veronica Saravia  PCP:  Seble Brown PA-C    Attestation  I was available and discussed any additional care issues that arose and coordinated the management plans with the resident(s) caring for the patient during my duty period. Any areas of disagreement with resident's documentation of care or procedures are noted on the chart. I was personally present for the key portions of any/all procedures during my duty period. I have documented in the chart those procedures where I was not present during the key portions. CHIEF COMPLAINT       Chief Complaint   Patient presents with    Atrial Fibrillation     pt with afib, had ablation two years ago. pt has been having fatigue and feels like she is back in afib x one week. pt on coumadin for afib. denies chest pain. pt alert and oriented, arrives to er via private auto with spouse present. pt called cardiologist who told her to come to er.  Fatigue         CURRENT MEDICATIONS     Previous Medications  Previous Medications    ATORVASTATIN (LIPITOR) 40 MG TABLET    Take 40 mg by mouth nightly     CLOBETASOL (TEMOVATE) 0.05 % CREAM    Apply topically 2 times daily to rash on arm (not face, groin, or body folds)    CLOPIDOGREL (PLAVIX) 75 MG TABLET    TAKE 1 TABLET BY MOUTH ONE TIME A DAY     FUROSEMIDE (LASIX) 40 MG TABLET    Take one tablet by mouth in the morning and one half tablet in the evening.     GLIPIZIDE (GLUCOTROL) 10 MG TABLET    Take 1 tablet by mouth 2 times daily    LOSARTAN (COZAAR) 50 MG TABLET    TAKE 1 TABLET BY MOUTH ONE TIME A DAY     METFORMIN (GLUCOPHAGE) 1000 MG TABLET    TAKE 1 TABLET BY MOUTH TWO TIMES A DAY WITH MEALS    METOPROLOL TARTRATE (LOPRESSOR) 25 MG TABLET    Take 0.5 tablets by mouth 2 times daily    NIACIN ER 1000 MG TBCR    Take by mouth daily    NITROGLYCERIN (NITROSTAT) 0.4 MG SL TABLET Place 1 tablet under tongue upon chest pain, wait 5 minutes and may repeat up to 3 doses in 15 minutes. Do not crush or break. OMEGA-3 FATTY ACIDS (FISH OIL) 1000 MG CAPS    Take 1,000 mg by mouth daily     TIZANIDINE (ZANAFLEX) 4 MG TABLET    Take 1 tablet by mouth nightly as needed (back pain)    TRIAMCINOLONE (KENALOG) 0.1 % CREAM    Apply to rash twice daily x1 wk, once daily x 1 week, then every other day    WARFARIN (COUMADIN) 5 MG TABLET    Take 5 mg by mouth Sunday and Wed 5mg, 2.5 mg  On other days       Encounter Medications  Orders Placed This Encounter   Medications    dextrose 50 % solution     JAMES PEDROZA: cabinet override    dextrose 50 % IV solution    dilTIAZem injection 10 mg       ALLERGIES     is allergic to pcn [penicillins]; amiodarone; and protonix [pantoprazole sodium]. RECENT VITALS:   Temp: 98.6 °F (37 °C),  Pulse: 98, Resp: 20, BP: (!) 145/88    RADIOLOGY:   XR CHEST (2 VW)   Final Result   No acute cardiopulmonary process.              LABS:  Labs Reviewed   CBC WITH AUTO DIFFERENTIAL - Abnormal; Notable for the following components:       Result Value    Seg Neutrophils 72 (*)     Lymphocytes 17 (*)     Immature Granulocytes 1 (*)     Absolute Lymph # 1.02 (*)     All other components within normal limits   BASIC METABOLIC PANEL W/ REFLEX TO MG FOR LOW K - Abnormal; Notable for the following components:    Glucose 66 (*)     CREATININE 0.98 (*)     GFR Non- 58 (*)     All other components within normal limits   TROPONIN - Abnormal; Notable for the following components:    Troponin, High Sensitivity 22 (*)     All other components within normal limits   TROPONIN - Abnormal; Notable for the following components:    Troponin, High Sensitivity 16 (*)     All other components within normal limits   PROTIME-INR - Abnormal; Notable for the following components:    Protime 27.6 (*)     All other components within normal limits   APTT - Abnormal; Notable for the following components:    PTT 33.7 (*)     All other components within normal limits           PLAN/ TASKS OUTSTANDING       Admitted, awaiting bed. (Please note that portions of this note were completed with a voice recognition program.  Efforts were made to edit the dictations but occasionally words are mis-transcribed. )    Golden MD, F.A.C.E.P.   Attending Emergency Physician        Connie Berg MD  12/30/20 [id number]

## 2020-12-30 NOTE — ED PROVIDER NOTES
[facility] ED  Emergency Department Encounter  EmergencyMedicine Resident     Pt Name:Lula Cortes  MRN: [id number]  Kaykaygfflorentino 1960  Date of evaluation: 20  PCP:  Jakub Cross PA-C    CHIEF COMPLAINT       Chief Complaint   Patient presents with    Atrial Fibrillation     pt with afib, had ablation two years ago. pt has been having fatigue and feels like she is back in afib x one week. pt on coumadin for afib. denies chest pain. pt alert and oriented, arrives to er via private auto with spouse present. pt called cardiologist who told her to come to er.  Fatigue       HISTORY OF PRESENT ILLNESS  (Location/Symptom, Timing/Onset, Context/Setting, Quality, Duration, Modifying Factors, Severity.)      Marcela Cruz is a 64 y.o. female who presents with 1 week of A. fib, symptomatic with palpitations and fatigue. Patient with a history of paroxysmal A. fib, treated with ablation 2 years ago has not been in A. fib since. After going into A. fib last week was started on sotalol by cardiologist.  Jeniffer Lindsay to come in today because she has not spontaneously converted yet. No other associated symptoms, patient notes she feels she typically does well in A. fib, no chest pain or shortness of breath, just generalized fatigue. Anticoagulated on warfarin. PAST MEDICAL / SURGICAL / SOCIAL / FAMILY HISTORY      has a past medical history of Abnormal Pap smear, Allergy, Anemia, Anticoagulated, Atrial fibrillation ([address] Blood transfusion, CAD (coronary artery disease), Eczema, Examination of participant in clinical trial, Headache, Heart disease, Hyperlipidemia, Hypertension, Menopausal symptoms, MI, old, PAF (paroxysmal atrial fibrillation) ([address] STEMI (ST elevation myocardial infarction) ([address] and Type 2 diabetes mellitus without complication, without long-term current use of insulin ([address] has a past surgical history that includes  section ();  Dilation & curettage (); Tubal ligation (); Colposcopy (); laparoscopy (); Endometrial ablation (12); Coronary angioplasty with stent (2016); Cardioversion (2018); transesophageal echocardiogram (2014); Cardioversion (2014); Coronary angioplasty with stent (); Cardioversion (); and Cardioversion (2018).     Social History     Socioeconomic History    Marital status:      Spouse name: Not on file    Number of children: Not on file    Years of education: Not on file    Highest education level: Not on file   Occupational History    Not on file   Social Needs    Financial resource strain: Not on file    Food insecurity     Worry: Not on file     Inability: Not on file    Transportation needs     Medical: Not on file     Non-medical: Not on file   Tobacco Use    Smoking status: Former Smoker     Packs/day: 0.50     Years: 34.00     Pack years: 17.00     Types: Cigarettes     Quit date: 10/23/2018     Years since quittin.1    Smokeless tobacco: Never Used    Tobacco comment: smokes 5 cig/day   Substance and Sexual Activity    Alcohol use: No     Alcohol/week: 0.0 standard drinks    Drug use: No    Sexual activity: Never   Lifestyle    Physical activity     Days per week: Not on file     Minutes per session: Not on file    Stress: Not on file   Relationships    Social connections     Talks on phone: Not on file     Gets together: Not on file     Attends Orthodoxy service: Not on file     Active member of club or organization: Not on file     Attends meetings of clubs or organizations: Not on file     Relationship status: Not on file    Intimate partner violence     Fear of current or ex partner: Not on file     Emotionally abused: Not on file     Physically abused: Not on file     Forced sexual activity: Not on file   Other Topics Concern    Not on file   Social History Narrative    Not on file       Family History   Problem Relation Age of Onset    COPD Father     Coronary Art Dis Father     Cancer Mother     Heart Failure Maternal Grandmother     Breast Cancer Neg Hx     Colon Cancer Neg Hx     Diabetes Neg Hx     Eclampsia Neg Hx     Hypertension Neg Hx     Ovarian Cancer Neg Hx      Labor Neg Hx     Spont Abortions Neg Hx     Stroke Neg Hx        Allergies:  Pcn [penicillins], Amiodarone, and Protonix [pantoprazole sodium]    Home Medications:  Prior to Admission medications    Medication Sig Start Date End Date Taking? Authorizing Provider   metFORMIN (GLUCOPHAGE) 1000 MG tablet TAKE 1 TABLET BY MOUTH TWO TIMES A DAY WITH MEALS 20ine A KAYCEE Manriquez-MEENA   glipiZIDE (GLUCOTROL) 10 MG tablet Take 1 tablet by mouth 2 times daily 20ine A ForKAYCEE herrera-MEENA   tiZANidine (ZANAFLEX) 4 MG tablet Take 1 tablet by mouth nightly as needed (back pain) 20ine A ForJESSE herrera   triamcinolone (KENALOG) 0.1 % cream Apply to rash twice daily x1 wk, once daily x 1 week, then every other day 20   Lima Morrell MD   clobetasol (TEMOVATE) 0.05 % cream Apply topically 2 times daily to rash on arm (not face, groin, or body folds)  Patient not taking: Reported on 2020   Emerald Li MD   clopidogrel (PLAVIX) 75 MG tablet TAKE 1 TABLET BY MOUTH ONE TIME A DAY  20   Kathrin Christian MD   losartan (COZAAR) 50 MG tablet TAKE 1 TABLET BY MOUTH ONE TIME A DAY  2/10/20   Kathrin Christian MD   Niacin ER 1000 MG TBCR Take by mouth daily    Historical Provider, MD   furosemide (LASIX) 40 MG tablet Take one tablet by mouth in the morning and one half tablet in the evening.  19   Historical Provider, MD   metoprolol tartrate (LOPRESSOR) 25 MG tablet Take 0.5 tablets by mouth 2 times daily  Patient taking differently: Take 25 mg by mouth 2 times daily  18   Angel Luis Felix APRN - CNP   Omega-3 Fatty Acids (FISH OIL) 1000 MG CAPS Take 1,000 mg by mouth daily     Historical Provider, MD   nitroGLYCERIN (NITROSTAT) 0.4 Effort: Pulmonary effort is normal. No respiratory distress. Abdominal:      Palpations: Abdomen is soft. Tenderness: There is no abdominal tenderness. Musculoskeletal: Normal range of motion. General: No deformity. Skin:     General: Skin is warm and dry. Capillary Refill: Capillary refill takes less than 2 seconds. Neurological:      General: No focal deficit present. Mental Status: She is alert and oriented to person, place, and time. Cranial Nerves: No cranial nerve deficit.    Psychiatric:         Mood and Affect: Mood normal.         Behavior: Behavior normal.         DIFFERENTIAL  DIAGNOSIS     PLAN (LABS / IMAGING / EKG):  Orders Placed This Encounter   Procedures    XR CHEST (2 VW)    CBC Auto Differential    Basic Metabolic Panel w/ Reflex to MG    Troponin    Protime-INR    APTT    Basic Metabolic Panel w/ Reflex to MG    CBC auto differential    Troponin    Diet NPO, After Midnight    DIET CARDIAC;    Telemetry Monitoring    HYPOGLYCEMIA TREATMENT: blood glucose less than 50 mg/dL and patient  ALERT and TOLERATING PO    HYPOGLYCEMIA TREATMENT: blood glucose less than 70 mg/dL and patient ALERT and TOLERATING PO    HYPOGLYCEMIA TREATMENT: blood glucose less than 70 mg/dL and patient NOT ALERT or NPO    Vital signs per unit routine    Notify physician    Up as tolerated    Up with assistance    Elevate Head of Bed     Monitor for signs/symptoms of urinary retention    Full Code    Inpatient consult to Cardiology    Inpatient consult to Internal Medicine   [address] to Dose: Warfarin    Inpatient consult to Case Management    OT eval and treat    PT evaluation and treat    Initiate Oxygen Therapy Protocol    POCT glucose    POCT Glucose    EKG 12 Lead    EKG 12 Lead    ECHO Complete 2D W Doppler W Color    Place CT Compatible peripheral IV    PATIENT STATUS (FROM ED OR OR/PROCEDURAL) Inpatient       MEDICATIONS ORDERED:  Orders Placed This Encounter   Medications    dextrose 50 % solution     JAMES PEDROZA: cabinet override    dextrose 50 % IV solution    dilTIAZem injection 10 mg    atorvastatin (LIPITOR) tablet 40 mg    clopidogrel (PLAVIX) tablet 75 mg    furosemide (LASIX) injection 40 mg    insulin lispro (HUMALOG) injection vial 0-6 Units    insulin lispro (HUMALOG) injection vial 0-3 Units    glucose (GLUTOSE) 40 % oral gel 15 g    dextrose 50 % IV solution    glucagon (rDNA) injection 1 mg    dextrose 5 % solution    metoprolol tartrate (LOPRESSOR) tablet 12.5 mg    sodium chloride flush 0.9 % injection 10 mL    sodium chloride flush 0.9 % injection 10 mL    OR Linked Order Group     promethazine (PHENERGAN) tablet 12.5 mg     ondansetron (ZOFRAN) injection 4 mg    polyethylene glycol (GLYCOLAX) packet 17 g    OR Linked Order Group     acetaminophen (TYLENOL) tablet 650 mg     acetaminophen (TYLENOL) suppository 650 mg    amLODIPine (NORVASC) tablet 10 mg    labetalol (NORMODYNE;TRANDATE) injection 10 mg    dextrose 5 % solution     JAMES PEDROZA: cabinet override    DISCONTD: dilTIAZem 25 MG/5ML injection     JAMES PEDROZA: cabinet override    dextrose 5 % solution     JAMES PEDROZA: cabinet override    dextrose 5 % solution     JAMES PEDROZA: cabinet override    FOLLOWED BY Linked Order Group     dilTIAZem injection 10 mg     dilTIAZem 125 mg in dextrose 5 % 125 mL infusion       DDX: A. fib with RVR versus ACS versus other    DIAGNOSTIC RESULTS / EMERGENCY DEPARTMENT COURSE / MDM     LABS:  Results for orders placed or performed during the hospital encounter of 12/30/20   CBC Auto Differential   Result Value Ref Range    WBC 6.1 3.5 - 11.3 k/uL    RBC 4.55 3.95 - 5.11 m/uL    Hemoglobin 14.0 11.9 - 15.1 g/dL    Hematocrit 44.6 36.3 - 47.1 %    MCV 98.0 82.6 - 102.9 fL    MCH 30.8 25.2 - 33.5 pg    MCHC 31.4 28.4 - 34.8 g/dL    RDW 13.3 11.8 - 14.4 %    Platelets 421 517 - 067 k/uL    MPV 10.8 8.1 - 13.5 fL    NRBC Automated 0.0 0.0 per 100 WBC    Differential Type NOT REPORTED     Seg Neutrophils 72 (H) 36 - 65 %    Lymphocytes 17 (L) 24 - 43 %    Monocytes 9 3 - 12 %    Eosinophils % 1 1 - 4 %    Basophils 0 0 - 2 %    Immature Granulocytes 1 (H) 0 %    Segs Absolute 4.37 1.50 - 8.10 k/uL    Absolute Lymph # 1.02 (L) 1.10 - 3.70 k/uL    Absolute Mono # 0.55 0.10 - 1.20 k/uL    Absolute Eos # 0.07 0.00 - 0.44 k/uL    Basophils Absolute <0.03 0.00 - 0.20 k/uL    Absolute Immature Granulocyte 0.03 0.00 - 0.30 k/uL    WBC Morphology NOT REPORTED     RBC Morphology NOT REPORTED     Platelet Estimate NOT REPORTED    Basic Metabolic Panel w/ Reflex to MG   Result Value Ref Range    Glucose 66 (L) 70 - 99 mg/dL    BUN 18 6 - 20 mg/dL    CREATININE 0.98 (H) 0.50 - 0.90 mg/dL    Bun/Cre Ratio NOT REPORTED 9 - 20    Calcium 9.1 8.6 - 10.4 mg/dL    Sodium 143 135 - 144 mmol/L    Potassium 4.0 3.7 - 5.3 mmol/L    Chloride 104 98 - 107 mmol/L    CO2 24 20 - 31 mmol/L    Anion Gap 15 9 - 17 mmol/L    GFR Non-African American 58 (L) >60 mL/min    GFR African American >60 >60 mL/min    GFR Comment          GFR Staging NOT REPORTED    Troponin   Result Value Ref Range    Troponin, High Sensitivity 22 (H) 0 - 14 ng/L    Troponin T NOT REPORTED <0.03 ng/mL    Troponin Interp NOT REPORTED    Troponin   Result Value Ref Range    Troponin, High Sensitivity 16 (H) 0 - 14 ng/L    Troponin T NOT REPORTED <0.03 ng/mL    Troponin Interp NOT REPORTED    Protime-INR   Result Value Ref Range    Protime 27.6 (H) 9.0 - 12.0 sec    INR 2.7    APTT   Result Value Ref Range    PTT 33.7 (H) 20.5 - 30.5 sec         RADIOLOGY:  Xr Chest (2 Vw)    Result Date: 12/30/2020  EXAMINATION: TWO XRAY VIEWS OF THE CHEST 12/30/2020 2:45 pm COMPARISON: Chest radiograph performed 02/06/2017. HISTORY: ORDERING SYSTEM PROVIDED HISTORY: afib RVR TECHNOLOGIST PROVIDED HISTORY: afib RVR FINDINGS: There is no acute consolidation or effusion.   There is no

## 2020-12-30 NOTE — ED NOTES
Pt agreeable to being placed back on monitor. Writer spoke with admitting service who is going to come see patient right away. Pt notified that physicians are on there way. Pt remains in afib rvr. Pt still states that she wants to go home if she is not going to get a bed upstairs. Pt informed that she may be boarded in er for hours.       Jeff Ruano RN  12/30/20 [id number]

## 2020-12-30 NOTE — Clinical Note
Patient Class: Inpatient [101]   REQUIRED: Diagnosis: Atrial fibrillation with RVR ([facility] [[id number]]   Estimated Length of Stay: Estimated stay of more than 2 midnights   Admitting Provider: Christiana Delgadillo [[id number]]   Telemetry Bed Required?: Yes

## 2020-12-30 NOTE — ED NOTES
Pt took self off monitor. States that she wanted to be cardioverted and that if she was going to just be left down to sit in the ER, she would have went home. Pt states that she is uncomfortable, and upset that no physicians from admitting service have seen her. Pt requesting to be discharged and to follow up with cardiology. Will notify admitting service.       Hailee Carrington RN  12/30/20 8147

## 2020-12-31 ENCOUNTER — APPOINTMENT (OUTPATIENT)
Dept: CARDIAC CATH/INVASIVE PROCEDURES | Age: 60
DRG: 201 | End: 2020-12-31
Payer: MEDICAID

## 2020-12-31 VITALS
WEIGHT: 237 LBS | RESPIRATION RATE: 24 BRPM | DIASTOLIC BLOOD PRESSURE: 82 MMHG | BODY MASS INDEX: 41.98 KG/M2 | TEMPERATURE: 98.2 F | SYSTOLIC BLOOD PRESSURE: 130 MMHG | OXYGEN SATURATION: 100 % | HEART RATE: 79 BPM

## 2020-12-31 LAB
ABSOLUTE EOS #: 0.13 K/UL (ref 0–0.44)
ABSOLUTE IMMATURE GRANULOCYTE: <0.03 K/UL (ref 0–0.3)
ABSOLUTE LYMPH #: 0.9 K/UL (ref 1.1–3.7)
ABSOLUTE MONO #: 0.52 K/UL (ref 0.1–1.2)
ANION GAP SERPL CALCULATED.3IONS-SCNC: 12 MMOL/L (ref 9–17)
BASOPHILS # BLD: 0 % (ref 0–2)
BASOPHILS ABSOLUTE: <0.03 K/UL (ref 0–0.2)
BUN BLDV-MCNC: 16 MG/DL (ref 6–20)
BUN/CREAT BLD: ABNORMAL (ref 9–20)
CALCIUM SERPL-MCNC: 8.8 MG/DL (ref 8.6–10.4)
CHLORIDE BLD-SCNC: 104 MMOL/L (ref 98–107)
CO2: 26 MMOL/L (ref 20–31)
CREAT SERPL-MCNC: 0.69 MG/DL (ref 0.5–0.9)
DIFFERENTIAL TYPE: ABNORMAL
EOSINOPHILS RELATIVE PERCENT: 2 % (ref 1–4)
GFR AFRICAN AMERICAN: >60 ML/MIN
GFR NON-AFRICAN AMERICAN: >60 ML/MIN
GFR SERPL CREATININE-BSD FRML MDRD: ABNORMAL ML/MIN/{1.73_M2}
GFR SERPL CREATININE-BSD FRML MDRD: ABNORMAL ML/MIN/{1.73_M2}
GLUCOSE BLD-MCNC: 139 MG/DL (ref 65–105)
GLUCOSE BLD-MCNC: 148 MG/DL (ref 70–99)
GLUCOSE BLD-MCNC: 175 MG/DL (ref 65–105)
HCT VFR BLD CALC: 40.4 % (ref 36.3–47.1)
HEMOGLOBIN: 13.1 G/DL (ref 11.9–15.1)
IMMATURE GRANULOCYTES: 0 %
INR BLD: 2.3
LYMPHOCYTES # BLD: 11 % (ref 24–43)
MAGNESIUM: 1.5 MG/DL (ref 1.6–2.6)
MCH RBC QN AUTO: 31.1 PG (ref 25.2–33.5)
MCHC RBC AUTO-ENTMCNC: 32.4 G/DL (ref 28.4–34.8)
MCV RBC AUTO: 96 FL (ref 82.6–102.9)
MONOCYTES # BLD: 6 % (ref 3–12)
NRBC AUTOMATED: 0 PER 100 WBC
PDW BLD-RTO: 13.3 % (ref 11.8–14.4)
PLATELET # BLD: 243 K/UL (ref 138–453)
PLATELET ESTIMATE: ABNORMAL
PMV BLD AUTO: 11.1 FL (ref 8.1–13.5)
POTASSIUM SERPL-SCNC: 3.4 MMOL/L (ref 3.7–5.3)
PROTHROMBIN TIME: 23.7 SEC (ref 9–12)
RBC # BLD: 4.21 M/UL (ref 3.95–5.11)
RBC # BLD: ABNORMAL 10*6/UL
SEG NEUTROPHILS: 81 % (ref 36–65)
SEGMENTED NEUTROPHILS ABSOLUTE COUNT: 6.84 K/UL (ref 1.5–8.1)
SODIUM BLD-SCNC: 142 MMOL/L (ref 135–144)
TROPONIN INTERP: ABNORMAL
TROPONIN INTERP: ABNORMAL
TROPONIN T: ABNORMAL NG/ML
TROPONIN T: ABNORMAL NG/ML
TROPONIN, HIGH SENSITIVITY: 18 NG/L (ref 0–14)
TROPONIN, HIGH SENSITIVITY: 22 NG/L (ref 0–14)
WBC # BLD: 8.4 K/UL (ref 3.5–11.3)
WBC # BLD: ABNORMAL 10*3/UL

## 2020-12-31 PROCEDURE — 6370000000 HC RX 637 (ALT 250 FOR IP): Performed by: INTERNAL MEDICINE

## 2020-12-31 PROCEDURE — 85610 PROTHROMBIN TIME: CPT

## 2020-12-31 PROCEDURE — 93005 ELECTROCARDIOGRAM TRACING: CPT | Performed by: STUDENT IN AN ORGANIZED HEALTH CARE EDUCATION/TRAINING PROGRAM

## 2020-12-31 PROCEDURE — 83735 ASSAY OF MAGNESIUM: CPT

## 2020-12-31 PROCEDURE — 6360000002 HC RX W HCPCS: Performed by: STUDENT IN AN ORGANIZED HEALTH CARE EDUCATION/TRAINING PROGRAM

## 2020-12-31 PROCEDURE — 2580000003 HC RX 258: Performed by: STUDENT IN AN ORGANIZED HEALTH CARE EDUCATION/TRAINING PROGRAM

## 2020-12-31 PROCEDURE — 6370000000 HC RX 637 (ALT 250 FOR IP): Performed by: STUDENT IN AN ORGANIZED HEALTH CARE EDUCATION/TRAINING PROGRAM

## 2020-12-31 PROCEDURE — 84484 ASSAY OF TROPONIN QUANT: CPT

## 2020-12-31 PROCEDURE — 80048 BASIC METABOLIC PNL TOTAL CA: CPT

## 2020-12-31 PROCEDURE — 5A2204Z RESTORATION OF CARDIAC RHYTHM, SINGLE: ICD-10-PCS | Performed by: INTERNAL MEDICINE

## 2020-12-31 PROCEDURE — 93005 ELECTROCARDIOGRAM TRACING: CPT

## 2020-12-31 PROCEDURE — 99223 1ST HOSP IP/OBS HIGH 75: CPT | Performed by: INTERNAL MEDICINE

## 2020-12-31 PROCEDURE — 6360000002 HC RX W HCPCS

## 2020-12-31 PROCEDURE — 85025 COMPLETE CBC W/AUTO DIFF WBC: CPT

## 2020-12-31 PROCEDURE — 82947 ASSAY GLUCOSE BLOOD QUANT: CPT

## 2020-12-31 PROCEDURE — 92960 CARDIOVERSION ELECTRIC EXT: CPT | Performed by: INTERNAL MEDICINE

## 2020-12-31 RX ORDER — POTASSIUM CHLORIDE 20 MEQ/1
40 TABLET, EXTENDED RELEASE ORAL ONCE
Status: COMPLETED | OUTPATIENT
Start: 2020-12-31 | End: 2020-12-31

## 2020-12-31 RX ORDER — TIZANIDINE 4 MG/1
4 TABLET ORAL NIGHTLY PRN
Status: DISCONTINUED | OUTPATIENT
Start: 2020-12-31 | End: 2020-12-31 | Stop reason: HOSPADM

## 2020-12-31 RX ORDER — SOTALOL HYDROCHLORIDE 120 MG/1
120 TABLET ORAL 2 TIMES DAILY
Status: DISCONTINUED | OUTPATIENT
Start: 2020-12-31 | End: 2020-12-31 | Stop reason: HOSPADM

## 2020-12-31 RX ORDER — TIZANIDINE 4 MG/1
4 TABLET ORAL NIGHTLY PRN
Status: DISCONTINUED | OUTPATIENT
Start: 2020-12-31 | End: 2020-12-31

## 2020-12-31 RX ORDER — LOSARTAN POTASSIUM 50 MG/1
50 TABLET ORAL DAILY
Status: DISCONTINUED | OUTPATIENT
Start: 2020-12-31 | End: 2020-12-31 | Stop reason: HOSPADM

## 2020-12-31 RX ORDER — MAGNESIUM SULFATE IN WATER 40 MG/ML
2 INJECTION, SOLUTION INTRAVENOUS ONCE
Status: COMPLETED | OUTPATIENT
Start: 2020-12-31 | End: 2020-12-31

## 2020-12-31 RX ADMIN — METOPROLOL TARTRATE 12.5 MG: 25 TABLET ORAL at 09:25

## 2020-12-31 RX ADMIN — INSULIN LISPRO 1 UNITS: 100 INJECTION, SOLUTION INTRAVENOUS; SUBCUTANEOUS at 16:45

## 2020-12-31 RX ADMIN — POTASSIUM CHLORIDE 40 MEQ: 1500 TABLET, EXTENDED RELEASE ORAL at 09:22

## 2020-12-31 RX ADMIN — CLOPIDOGREL BISULFATE 75 MG: 75 TABLET ORAL at 09:12

## 2020-12-31 RX ADMIN — SOTALOL HYDROCHLORIDE 120 MG: 120 TABLET ORAL at 15:19

## 2020-12-31 RX ADMIN — Medication 10 ML: at 09:13

## 2020-12-31 RX ADMIN — MAGNESIUM SULFATE 2 G: 2 INJECTION INTRAVENOUS at 10:38

## 2020-12-31 RX ADMIN — TIZANIDINE 4 MG: 4 TABLET ORAL at 06:06

## 2020-12-31 RX ADMIN — FUROSEMIDE 40 MG: 10 INJECTION, SOLUTION INTRAMUSCULAR; INTRAVENOUS at 09:12

## 2020-12-31 NOTE — ED NOTES
Rec'd report from [facility]. Pt resting on cot, Cardizem infusing at 5mg, heart rate around 100 at this time. NAD noted Call light within reach.      Mayte Gordon RN  12/30/20 0601

## 2020-12-31 NOTE — PROGRESS NOTES
[facility]  Internal Medicine Teaching Residency Program  Inpatient Daily Progress Note  ______________________________________________________________________________    Patient: Feliciano Rivers  YOB: 1960   XGQ:[id number]    Acct: [de-identified]     Room: 37/  Admit date: 12/30/2020  Today's date: 12/31/20  Number of days in the hospital: 1    SUBJECTIVE   Admitting Diagnosis: Atrial fibrillation with RVR ([address]  CC: Fatigue, diaphoresis, nausea, palpitations     Pt examined at bedside. Chart & results reviewed. Vitals stable this AM.  Patient continues to be in A. fib, rate controlled on Cardizem drip, HR in 80s early this a.m. Patient taken to cardiac Cath Lab for cardioversion later today. ROS:  Constitutional:  negative for chills, fevers, sweats  Respiratory:  negative for cough, dyspnea on exertion, hemoptysis, shortness of breath, wheezing  Cardiovascular: Positive for diaphoresis, nausea, palpitations gastrointestinal:  negative for abdominal pain, constipation, diarrhea, nausea, vomiting  Neurological:  negative for dizziness, headache  BRIEF HISTORY     The patient is a pleasant 64 y.o. female with PMH significant for hypertension, CAD s/p stent placement, paroxysmal atrial fibrillation s/p cardioversion, type 2 diabetes mellitus, STEMI, hyperlipidemia presents with a chief complaint of nausea, fatigue, diaphoresis, palpitations for the past 1 week due to paroxysmal atrial fibrillation with RVR.      According to the patient, she she follows up with Dr. Malu Wheeler for atrial fibrillation, underwent ablation in 2018 and has been in normal sinus rhythm up till June 2020 when she had another episode of paroxysmal A. Fib, treated with sotalol. She is currently on Coumadin for anticoagulation.   1 in a week ago, patient started having symptomatic atrial fibrillation, called up cardiology clinic and was told to restart sotalol however symptoms did not improve. This morning, patient again contacted cardiology outpatient who asked her to go to the ED.     Denied any recent fevers, chills, shortness of breath, chest pain, abdominal pain, changes in bowel habits, dysuria, hematuria, hematemesis, melena, pedal edema.     In the ED, patient was hypertensive /113, tachycardic in atrial fibrillation with RVR on EKG, HR: 130s, saturating in 90s on room air. EKG showed atrial fibrillation with premature atrial complexes. CXR showed no acute cardiopulmonary process. Troponins were trended 22>> 16>> 26. INR 2.7. WBC count 6.1, BMP largely unremarkable except for elevated creatinine 0.98.     Patient was given 1 dose of Cardizem 10 mg, HR decreased to 90s for 2 hours, elevated again up to 120s. Last echo in 2017 with EF 40-45%. As per the patient she had 3 cardioversions in the past 2 years ago, also had cardiac ablation in 2018 for refractory A. fib with rate control/antiarrhythmics. Has history of CAD s/p PCI x 2 times,  and . Follows up with Coumadin clinic and takes warfarin every day for mitral valve bioprosthetic repair and for A. Fib. [id number] risk score of 4.       OBJECTIVE     Vital Signs:  BP 92/68   Pulse 68   Temp 98.1 °F (36.7 °C)   Resp 21   Wt 237 lb (107.5 kg)   LMP 2014 (Approximate)   SpO2 95%   BMI 41.98 kg/m²     Temp (24hrs), Av.4 °F (36.9 °C), Min:98.1 °F (36.7 °C), Max:98.6 °F (37 °C)    No intake/output data recorded. Physical Exam:  Constitutional: This is a Greater than 40 - Morbid Obesity / Extreme Obesity / Grade III 64y.o. year old female who is alert, oriented, cooperative and in no apparent distress. Head:normocephalic and atraumatic. EENT:  PERRLA. No conjunctival injections. Septum was midline, mucosa was without erythema, exudates or cobblestoning. No thrush was noted. Neck: Supple without thyromegaly. No elevated JVP. Trachea was midline.   Respiratory: Chest was symmetrical without dullness to percussion. Breath sounds bilaterally were clear to auscultation. There were no wheezes, rhonchi or rales. There is no intercostal retraction or use of accessory muscles. No egophony noted. Cardiovascular: Regular without murmur, clicks, gallops or rubs. Abdomen: Slightly rounded and soft without organomegaly. No rebound, rigidity or guarding was appreciated. Lymphatic: No lymphadenopathy. Musculoskeletal: Normal curvature of the spine. No gross muscle weakness. Extremities:  No lower extremity edema, ulcerations, tenderness, varicosities or erythema. Muscle size, tone and strength are normal.  No involuntary movements are noted. Skin:  Warm and dry. Good color, turgor and pigmentation. No lesions or scars.   No cyanosis or clubbing  Neurological/Psychiatric: The patient's general behavior, level of consciousness, thought content and emotional status is normal.        Medications:  Scheduled Medications:    losartan  50 mg Oral Daily    magnesium sulfate  2 g Intravenous Once    sotalol  120 mg Oral BID    atorvastatin  40 mg Oral Nightly    clopidogrel  75 mg Oral Daily    furosemide  40 mg Intravenous Daily    insulin lispro  0-6 Units Subcutaneous TID WC    insulin lispro  0-3 Units Subcutaneous Nightly    metoprolol tartrate  12.5 mg Oral BID    sodium chloride flush  10 mL Intravenous 2 times per day    amLODIPine  10 mg Oral Daily    warfarin (COUMADIN) daily dosing (placeholder)   Other RX Placeholder    pantoprazole  40 mg Oral QAM AC    [START ON 1/3/2021] warfarin  5 mg Oral Once per day on Sun Wed    warfarin  2.5 mg Oral Once per day on Mon Tue Thu Fri Sat     Continuous Infusions:    dextrose      dilTIAZem (CARDIZEM) 125 mg in dextrose 5% 125 mL infusion 4 mg/hr (12/31/20 0127)     PRN Medications    tiZANidine, 4 mg, Nightly PRN      glucose, 15 g, PRN      dextrose, 12.5 g, PRN      glucagon (rDNA), 1 mg, PRN      dextrose, 100 mL/hr, case, including pertinent history and exam findings with the resident and the team.  I have seen and examined the patient and the key elements of the encounter have been performed by me. I agree with the assessment, plan and orders as documented by the resident.       Luis Muse MD   Attending Physician, Internal Medicine Residency Program  12/31/2020, 11:42 AM

## 2020-12-31 NOTE — PROGRESS NOTES
Perfect serve received from the RN. Patient is keen on leaving AMA. Informed the RN that I am in the middle of patient care for another patient and will be at the bedside as soon as finished    Writer went bedside. Patient mentioned that \"I have gone through it 4 times in the past and I know I will be fine\". Patient stated that \"it is very noisy in here and I cannot get enough rest\". Patient stated that she wanted to leave and wanted to rest at her own house. Writer discussed with the patient that she has at risk of sudden cardiovascular collapse due to her unstable rhythm. This was repeatedly stressed upon and was discussed in detail with the patient. Patient voiced understanding and stated that she still wanted to go home(leave AMA) as she wanted to rest at her own house. She stated that she will continue with her medications as prescribed. She further reported that she has fire department at 2-minute distance from her house and transportation to the hospital takes 11 minutes. She reported that in case if something happens she along with her boyfriend who lives with her will either call the fire department or come to the hospital straightaway. She further stated that in case if needed she will call her cardiologist's office in the morning tomorrow for further advice. Dr. Narinder Caraballo, the medicine resident on-call also came by and discussed with the patient. Despite the repeated counseling, Patient is very insistent and keen on leaving AMA. Despite repeated counseling, patient did not agree to stay in the hospital overnight. Patient finally signed the [place] papers in the writer's presence and in the presence of RN [place] and called her cab to leave. Yareli Villafana M.D.   Internal Medicine Resident , PGY-3  [facility]

## 2020-12-31 NOTE — PROGRESS NOTES
Dr. Ozzy Agudelo notified of 8 beat run of vtach at [address]  Per Dr. Ozzy Agudelo stop Cardizem gtt and give sotalol an hour after.

## 2020-12-31 NOTE — ED NOTES
Pt resting on cot, NAD noted, pt alert and oriented, states she is having a hard time sleeping. Pt denies further needs at this time. Trop drawn labeled and sent to lab. Cardizem continues at 5 mg, pt on cardiac monitor. Will cont to monitor.       Caty Pope RN  12/31/20 0102       Caty Pope RN  12/31/20 9052

## 2020-12-31 NOTE — PROGRESS NOTES
Pharmacy Note  Warfarin Consult    Jacob Vo is a 64 y.o. female for whom pharmacy has been consulted to manage warfarin therapy. Consulting Physician: Masood Vargas  Reason for Admission: Afib with RVR    Warfarin dose prior to admission: 5 mg on sun/wed and 2.5 mg all other days (per Syed Lucero)  Warfarin indication: Afib  Target INR range: 2-3     Past Medical History:   Diagnosis Date    Abnormal Pap smear 1989    Had colposcopy    Allergy     PCN    Anemia     Anticoagulated     ON COUMADIN    Atrial fibrillation ([facility] [address] 12/5/2014    Blood transfusion 12/20/11    4 units    CAD (coronary artery disease)     Eczema     Examination of participant in clinical trial 1/12/11    end date 3/25/15    Headache 12/5/2014    Heart disease 1/13/11    card stents x 2    Hyperlipidemia     Hypertension     Menopausal symptoms     MI, old 2011    PAF (paroxysmal atrial fibrillation) ([facility]     s/p cardioversion Dec 2015     STEMI (ST elevation myocardial infarction) ([facility] 12/2016    Type 2 diabetes mellitus without complication, without long-term current use of insulin ([facility] 12/21/2017                Recent Labs     12/30/20  1227   INR 2.7     Recent Labs     12/30/20  1227   HGB 14.0   HCT 44.6          Current warfarin drug-drug interactions: plavix/tylenol      Date             INR        Dose   12/30/2020     2.7               Daily PT/INR while inpatient. Thank you for the consult. Will continue to follow. Feliberto Urbina. D.    12/30/2020  11:56 PM

## 2020-12-31 NOTE — ED NOTES
Bed: 44  Expected date:   Expected time:   Means of arrival:   Comments:  [id number] RN  12/30/20 2031

## 2020-12-31 NOTE — CONSULTS
[facility]   Consult Note         Today's Date: 12/31/2020  Patient Name: Wayne Scherer  Date of admission: 12/30/2020 12:04 PM  Patient's age: 64 y.o., 1960  Admission Dx: Atrial fibrillation with RVR ([address] [I48.91]    Reason for Consult:  Cardiac evaluation    Requesting Physician: Luis Burris MD    REASON FOR CONSULT: Paroxysmal atrial fibrillation with RVR    History Obtained From:  Patient, chart, staff, records    HISTORY OF PRESENT ILLNESS:      The patient is a 64 y.o. female with background history of CAD s/p PCI drug-eluting stent to the LCx 2011, GIRMA RCA 2016, type 2 diabetes mellitus, hyperlipidemia, paroxysmal atrial fibrillation s/p cardioversion 02/08/2018, s/p radiofrequency ablation 09/20/2018. Patient was in normal sinus rhythm until June 2020 when she had another episode of paroxysmal atrial fibrillation. Patient was on metoprolol but sotalol was added at that time and she was also on Coumadin. 1 week ago patient started having symptomatic atrial fibrillation, call the cardiology clinic and was told to restart sotalol however symptoms did not improve. Yesterday morning patient again contacted cardiology outpatient who asked her to come to the ED. Patient denies any fever, chills, shortness of breath, chest pain, abdominal pain, changes in bowel habits, dysuria, hematuria, hematemesis, melena, pedal edema. In the ED patient was hypertensive /113, tachycardic in atrial fibrillation with RVR on EKG, heart rate in 130s, saturating in 90s on room air. EKG showed atrial fibrillation with premature atrial complexes. Chest x-ray showed no acute cardiopulmonary process. Troponins were trended 20, 16, 26. INR 2.7. WBC count 6.1. BMP largely unremarkable except for elevated creatinine 0.98. It was given 1 dose of Cardizem 10 mg, heart rate decreased to 90s for 2 hours, elevated again at 120s.   Currently patient is on diltiazem infusion with heart rate in DAY  2/10/20   Kathrin Montero MD   Niacin ER 1000 MG TBCR Take by mouth daily    Historical Provider, MD   furosemide (LASIX) 40 MG tablet Take one tablet by mouth in the morning and one half tablet in the evening. 5/5/19   Historical Provider, MD   metoprolol tartrate (LOPRESSOR) 25 MG tablet Take 0.5 tablets by mouth 2 times daily  Patient taking differently: Take 25 mg by mouth 2 times daily  9/21/18   KYLE Alvarez CNP   Omega-3 Fatty Acids (FISH OIL) 1000 MG CAPS Take 1,000 mg by mouth daily     Historical Provider, MD   nitroGLYCERIN (NITROSTAT) 0.4 MG SL tablet Place 1 tablet under tongue upon chest pain, wait 5 minutes and may repeat up to 3 doses in 15 minutes. Do not crush or break. 12/20/16   KYLE Evans CNP   warfarin (COUMADIN) 5 MG tablet Take 5 mg by mouth Sunday and Wed 5mg, 2.5 mg  On other days    Historical Provider, MD   atorvastatin (LIPITOR) 40 MG tablet Take 40 mg by mouth nightly     Beto lAlen MD     losartan, 50 mg, Oral, Daily    atorvastatin, 40 mg, Oral, Nightly    clopidogrel, 75 mg, Oral, Daily    furosemide, 40 mg, Intravenous, Daily    insulin lispro, 0-6 Units, Subcutaneous, TID WC    insulin lispro, 0-3 Units, Subcutaneous, Nightly    metoprolol tartrate, 12.5 mg, Oral, BID    sodium chloride flush, 10 mL, Intravenous, 2 times per day    amLODIPine, 10 mg, Oral, Daily    warfarin (COUMADIN) daily dosing (placeholder), , Other, RX Placeholder    pantoprazole, 40 mg, Oral, QAM AC    [START ON 1/3/2021] warfarin, 5 mg, Oral, Once per day on Sun Wed    warfarin, 2.5 mg, Oral, Once per day on Mon Tue Thu Fri Sat      Allergies:  Pcn [penicillins], Amiodarone, and Protonix [pantoprazole sodium]    Social History:   reports that she quit smoking about 2 years ago. Her smoking use included cigarettes. She has a 17.00 pack-year smoking history. She has never used smokeless tobacco. She reports that she does not drink alcohol or use drugs. Family History: family history includes COPD in her father; Cancer in her mother; Coronary Art Dis in her father; Heart Failure in her maternal grandmother. No h/o sudden cardiac death. REVIEW OF SYSTEMS:    Review of Systems   Constitutional: Positive for activity change, diaphoresis and fatigue. Negative for appetite change, chills, fever and unexpected weight change. HENT: Negative. Eyes: Negative. Respiratory: Negative. Cardiovascular: Positive for palpitations. Negative for chest pain and leg swelling. Gastrointestinal: Positive for nausea. Endocrine: Negative. Genitourinary: Negative. Musculoskeletal: Negative. Skin: Negative. Allergic/Immunologic: Negative. Neurological: Negative. Hematological: Negative. Psychiatric/Behavioral: Negative. PHYSICAL EXAM:      /64   Pulse 88   Temp 98.6 °F (37 °C) (Oral)   Resp 20   Wt 237 lb (107.5 kg)   LMP 07/03/2014 (Approximate)   SpO2 96%   BMI 41.98 kg/m²      Constitutional: This is a Greater than 40 - Morbid Obesity / Extreme Obesity / Grade III 64y.o. year old female who is alert, oriented, cooperative and in no apparent distress. Head:normocephalic and atraumatic. EENT:  PERRLA. No conjunctival injections. Septum was midline, mucosa was without erythema, exudates or cobblestoning. No thrush was noted. Neck: Supple without thyromegaly. No elevated JVP. Trachea was midline. Respiratory: Chest was symmetrical without dullness to percussion. Breath sounds bilaterally were clear to auscultation. There were no wheezes, rhonchi or rales. There is no intercostal retraction or use of accessory muscles. No egophony noted. Cardiovascular: Regular without murmur, clicks, gallops or rubs. Abdomen: Slightly rounded and soft without organomegaly. No rebound, rigidity or guarding was appreciated. Lymphatic: No lymphadenopathy. Musculoskeletal: Normal curvature of the spine. No gross muscle weakness. Other Current Problems  Patient Active Problem List   Diagnosis    S/P endometrial ablation    Essential hypertension    Mixed hyperlipidemia    CAD (coronary artery disease) with stents    SOB (shortness of breath)    PAF [facility]paroxysmal atrial fibrillation) ([facility])    Type 2 diabetes mellitus without complication, without long-term current use of insulin ([address]    S/P ablation of atrial fibrillation    Bowen's disease    Atrial fibrillation with RVR ([address]           IMPRESSION & Recommendations:    IMPRESSION:  P.AF with RVR [id number] VASC 4 Rate controlled with HR in 80s  Currently patient is in A. Fib on cardizem drip  CAD s/p GIRMA to LCx 2011 and RCA 2016. type 2 diabetes mellitus  Hypertension  Hyperlipidemia    Recommendations:  Continue Cardizem drip  Electrical Cardioversion as 4 INR in Therapeutic range  Keep patient NPO  Resume home medications  Will need stress test as an out patient  Further recommendations to follow      Discussed with patient, family, and Nurse. Electronically signed by Pau Costello MD on 12/31/2020 at Leyda Gordon MD  PGY-1, [id number] [facility]. Attending Physician Statement  I have discussed the care of Raji Yan, including pertinent history and exam findings,  with the cardiology fellow/resident. I have seen and examined the patient and the key elements of all parts of the encounter have been performed by me. I agree with the assessment, plan and orders as documented by the resident with additional recommendations as below:       Known PAF, underlying CAD post PCI to LCX/RCA in past, presented with atrial fib with RVR, currently rate controlled on cardizem infusion, on long term anticoagulation with coumadin, INR has been therapeutic over last 6 weeks. Will proceed with cardioversion to restore NSR. Start sotalol 120 mg bid post cardioversion (allergic to amiodarone).  Also recommend pharmacological stress test as OP due to ischemic changes on ECG. Risks, benefits and alternatives of cardioversion discussed with patient in detail, she verbalizes understanding and willing to proceed.        Ron Michaels MD, [facility]

## 2020-12-31 NOTE — ED NOTES
Pt ambulated to restroom with steady gait. Cardizem lowered to 2.5 for consistent HR under 100.      Mathieu Abarca RN  12/31/20 0126

## 2020-12-31 NOTE — PROGRESS NOTES
Pharmacy Note  Warfarin Consult follow-up    Recent Labs     12/31/20  0633   INR 2.3     Recent Labs     12/30/20  1227 12/31/20  0633   HGB 14.0 13.1   HCT 44.6 40.4    243     Current warfarin drug-drug interactions: Plavix, Tylenol    Date INR Dose   12/30/2020 2.7 None - but possibly took prior to ED   12/31/2020 2.3 2.5mg     Notes:  Give warfarin 2.5mg today on 12/31/2020. Daily PT/INR while inpatient.      Ab Álvarez RPh, CACP  Clinical Pharmacist Medication Management  12/31/2020  11:14 AM

## 2020-12-31 NOTE — PROCEDURES
The patient referred for CV. Benefits, risks and alternatives explained and consent signed. Patient sedated with Versed and Fentanyl. DC synchronized CV using 200 J into NSR. Patient wake up without complications.

## 2020-12-31 NOTE — H&P
[facility]     Department of Internal Medicine - Staff Internal Medicine Teaching Service          ADMISSION NOTE/HISTORY AND PHYSICAL EXAMINATION   Date: 12/30/2020  Patient Name: Marco Dyer  Date of admission: 12/30/2020 12:04 PM  YOB: 1960  PCP: Yanique Lopez PA-C  History Obtained From:  patient, electronic medical record    CHIEF COMPLAINT     Chief complaint: Fatigue, diaphoresis, nausea, palpitations    HISTORY OF PRESENTING ILLNESS     The patient is a pleasant 64 y.o. female with PMH significant for hypertension, CAD s/p stent placement, paroxysmal atrial fibrillation s/p cardioversion, type 2 diabetes mellitus, STEMI, hyperlipidemia presents with a chief complaint of nausea, fatigue, diaphoresis, palpitations for the past 1 week due to paroxysmal atrial fibrillation with RVR. According to the patient, she she follows up with Dr. Letty Basurto for atrial fibrillation, underwent ablation in 2018 and has been in normal sinus rhythm up till June 2020 when she had another episode of paroxysmal A. Fib, treated with sotalol. She is currently on Coumadin for anticoagulation. 1 in a week ago, patient started having symptomatic atrial fibrillation, called up cardiology clinic and was told to restart sotalol however symptoms did not improve. This morning, patient again contacted cardiology outpatient who asked her to go to the ED. Denied any recent fevers, chills, shortness of breath, chest pain, abdominal pain, changes in bowel habits, dysuria, hematuria, hematemesis, melena, pedal edema. In the ED, patient was hypertensive /113, tachycardic in atrial fibrillation with RVR on EKG, HR: 130s, saturating in 90s on room air. EKG showed atrial fibrillation with premature atrial complexes. CXR showed no acute cardiopulmonary process. Troponins were trended 22>> 16>> 26. INR 2.7.   WBC count 6.1, BMP largely unremarkable except for elevated creatinine 0.98.    Patient was given 1 dose of Cardizem 10 mg, HR decreased to 90s for 2 hours, elevated again up to 120s. Last echo in 2017 with EF 40-45%. As per the patient she had 3 cardioversions in the past 2 years ago, also had cardiac ablation in 2018 for refractory A. fib with rate control/antiarrhythmics. Has history of CAD s/p PCI x 2 times, 2011 and 2016. Follows up with Coumadin clinic and takes warfarin every day for mitral valve bioprosthetic repair and for A. Fib. [id number] risk score of 4. Review of Systems:  Review of Systems   Constitutional: Positive for activity change, diaphoresis and fatigue. Negative for appetite change, chills, fever and unexpected weight change. HENT: Negative. Eyes: Negative. Respiratory: Negative. Cardiovascular: Positive for palpitations. Negative for chest pain and leg swelling. Gastrointestinal: Positive for nausea. Endocrine: Negative. Genitourinary: Negative. Musculoskeletal: Negative. Skin: Negative. Allergic/Immunologic: Negative. Neurological: Negative. Hematological: Negative. Psychiatric/Behavioral: Negative.       PAST MEDICAL HISTORY     Past Medical History:   Diagnosis Date    Abnormal Pap smear 1989    Had colposcopy    Allergy     PCN    Anemia     Anticoagulated     ON COUMADIN    Atrial fibrillation ([address] 12/5/2014    Blood transfusion 12/20/11    4 units    CAD (coronary artery disease)     Eczema     Examination of participant in clinical trial 1/12/11    end date 3/25/15    Headache 12/5/2014    Heart disease 1/13/11    card stents x 2    Hyperlipidemia     Hypertension     Menopausal symptoms     MI, old 2011    PAF (paroxysmal atrial fibrillation) ([address]     s/p cardioversion Dec 2015     STEMI (ST elevation myocardial infarction) ([address] 12/2016    Type 2 diabetes mellitus without complication, without long-term current use of insulin ([address] 12/21/2017       PAST SURGICAL HISTORY     Past Surgical History:   Procedure Laterality Date    CARDIOVERSION  2018    DR Lay Dunaway  SUCCESSFUL    CARDIOVERSION  2014    CARDIOVERSION  2015    CARDIOVERSION  2018    DR Lay Dunaway     SECTION      COLPOSCOPY      CORONARY ANGIOPLASTY WITH STENT PLACEMENT  2016    PTCA AND GIRMA TO RCA    CORONARY ANGIOPLASTY WITH STENT PLACEMENT      DILATION AND CURETTAGE      ENDOMETRIAL ABLATION  12    hydrothermal    LAPAROSCOPY      TRANSESOPHAGEAL ECHOCARDIOGRAM  2014    TUBAL LIGATION         ALLERGIES     Pcn [penicillins], Amiodarone, and Protonix [pantoprazole sodium]    MEDICATIONS PRIOR TO ADMISSION     Prior to Admission medications    Medication Sig Start Date End Date Taking? Authorizing Provider   metFORMIN (GLUCOPHAGE) 1000 MG tablet TAKE 1 TABLET BY MOUTH TWO TIMES A DAY WITH MEALS 20ine A KAYCEE Manriquez-MEENA   glipiZIDE (GLUCOTROL) 10 MG tablet Take 1 tablet by mouth 2 times daily 20ine A JESSE Manriquez   tiZANidine (ZANAFLEX) 4 MG tablet Take 1 tablet by mouth nightly as needed (back pain) 20ine A JESSE Manriquez   triamcinolone (KENALOG) 0.1 % cream Apply to rash twice daily x1 wk, once daily x 1 week, then every other day 20   Alia Morrell MD   clobetasol (TEMOVATE) 0.05 % cream Apply topically 2 times daily to rash on arm (not face, groin, or body folds)  Patient not taking: Reported on 2020   Boyd Dukes MD   clopidogrel (PLAVIX) 75 MG tablet TAKE 1 TABLET BY MOUTH ONE TIME A DAY  20   Kathrin Cai MD   losartan (COZAAR) 50 MG tablet TAKE 1 TABLET BY MOUTH ONE TIME A DAY  2/10/20   Kathrin Cai MD   Niacin ER 1000 MG TBCR Take by mouth daily    Historical Provider, MD   furosemide (LASIX) 40 MG tablet Take one tablet by mouth in the morning and one half tablet in the evening.  19   Historical Provider, MD   metoprolol tartrate (LOPRESSOR) 25 MG tablet Take 0.5 tablets by mouth 2 times daily  Patient taking differently: Take 25 mg by mouth 2 times daily  18   Alissa Nicolas APRN - CNP   Omega-3 Fatty Acids (FISH OIL) 1000 MG CAPS Take 1,000 mg by mouth daily     Historical Provider, MD   nitroGLYCERIN (NITROSTAT) 0.4 MG SL tablet Place 1 tablet under tongue upon chest pain, wait 5 minutes and may repeat up to 3 doses in 15 minutes. Do not crush or break. 16   KYLE Wallis CNP   warfarin (COUMADIN) 5 MG tablet Take 5 mg by mouth  and Wed 5mg, 2.5 mg  On other days    Historical Provider, MD   atorvastatin (LIPITOR) 40 MG tablet Take 40 mg by mouth nightly     Aman Huddleston MD       SOCIAL HISTORY     Tobacco: Denied  Alcohol: Denied  Illicits: Denied  Occupation:     FAMILY HISTORY     Family History   Problem Relation Age of Onset    COPD Father     Coronary Art Dis Father     Cancer Mother     Heart Failure Maternal Grandmother     Breast Cancer Neg Hx     Colon Cancer Neg Hx     Diabetes Neg Hx     Eclampsia Neg Hx     Hypertension Neg Hx     Ovarian Cancer Neg Hx      Labor Neg Hx     Spont Abortions Neg Hx     Stroke Neg Hx        PHYSICAL EXAM     Vitals: BP (!) 147/111   Pulse 108   Temp 98.6 °F (37 °C) (Oral)   Resp 18   Wt 237 lb (107.5 kg)   LMP 2014 (Approximate)   SpO2 97%   BMI 41.98 kg/m²   Tmax: Temp (24hrs), Av.6 °F (37 °C), Min:98.6 °F (37 °C), Max:98.6 °F (37 °C)    Last Body weight:   Wt Readings from Last 3 Encounters:   20 237 lb (107.5 kg)   20 237 lb (107.5 kg)   20 245 lb (111.1 kg)     Body Mass Index : Body mass index is 41.98 kg/m². PHYSICAL EXAMINATION:  Constitutional: This is a Greater than 36 - Morbid Obesity / Extreme Obesity / Grade III 64y.o. year old female who is alert, oriented, cooperative and in no apparent distress. Head:normocephalic and atraumatic. EENT:  PERRLA. No conjunctival injections.  Septum was midline, mucosa was without erythema, exudates or cobblestoning. No thrush was noted. Neck: Supple without thyromegaly. No elevated JVP. Trachea was midline. Respiratory: Chest was symmetrical without dullness to percussion. Breath sounds bilaterally were clear to auscultation. There were no wheezes, rhonchi or rales. There is no intercostal retraction or use of accessory muscles. No egophony noted. Cardiovascular: Regular without murmur, clicks, gallops or rubs. Abdomen: Slightly rounded and soft without organomegaly. No rebound, rigidity or guarding was appreciated. Lymphatic: No lymphadenopathy. Musculoskeletal: Normal curvature of the spine. No gross muscle weakness. Extremities:  No lower extremity edema, ulcerations, tenderness, varicosities or erythema. Muscle size, tone and strength are normal.  No involuntary movements are noted. Skin:  Warm and dry. Good color, turgor and pigmentation. No lesions or scars.   No cyanosis or clubbing  Neurological/Psychiatric: The patient's general behavior, level of consciousness, thought content and emotional status is normal.          INVESTIGATIONS     Laboratory Testing:     Recent Results (from the past 24 hour(s))   CBC Auto Differential    Collection Time: 12/30/20 12:27 PM   Result Value Ref Range    WBC 6.1 3.5 - 11.3 k/uL    RBC 4.55 3.95 - 5.11 m/uL    Hemoglobin 14.0 11.9 - 15.1 g/dL    Hematocrit 44.6 36.3 - 47.1 %    MCV 98.0 82.6 - 102.9 fL    MCH 30.8 25.2 - 33.5 pg    MCHC 31.4 28.4 - 34.8 g/dL    RDW 13.3 11.8 - 14.4 %    Platelets 476 248 - 852 k/uL    MPV 10.8 8.1 - 13.5 fL    NRBC Automated 0.0 0.0 per 100 WBC    Differential Type NOT REPORTED     Seg Neutrophils 72 (H) 36 - 65 %    Lymphocytes 17 (L) 24 - 43 %    Monocytes 9 3 - 12 %    Eosinophils % 1 1 - 4 %    Basophils 0 0 - 2 %    Immature Granulocytes 1 (H) 0 %    Segs Absolute 4.37 1.50 - 8.10 k/uL    Absolute Lymph # 1.02 (L) 1.10 - 3.70 k/uL    Absolute Mono # 0.55 0.10 - 1.20 k/uL    Absolute Eos # 0.07 0.00 - 0.44 k/uL Basophils Absolute <0.03 0.00 - 0.20 k/uL    Absolute Immature Granulocyte 0.03 0.00 - 0.30 k/uL    WBC Morphology NOT REPORTED     RBC Morphology NOT REPORTED     Platelet Estimate NOT REPORTED    Basic Metabolic Panel w/ Reflex to MG    Collection Time: 12/30/20 12:27 PM   Result Value Ref Range    Glucose 66 (L) 70 - 99 mg/dL    BUN 18 6 - 20 mg/dL    CREATININE 0.98 (H) 0.50 - 0.90 mg/dL    Bun/Cre Ratio NOT REPORTED 9 - 20    Calcium 9.1 8.6 - 10.4 mg/dL    Sodium 143 135 - 144 mmol/L    Potassium 4.0 3.7 - 5.3 mmol/L    Chloride 104 98 - 107 mmol/L    CO2 24 20 - 31 mmol/L    Anion Gap 15 9 - 17 mmol/L    GFR Non-African American 58 (L) >60 mL/min    GFR African American >60 >60 mL/min    GFR Comment          GFR Staging NOT REPORTED    Troponin    Collection Time: 12/30/20 12:27 PM   Result Value Ref Range    Troponin, High Sensitivity 22 (H) 0 - 14 ng/L    Troponin T NOT REPORTED <0.03 ng/mL    Troponin Interp NOT REPORTED    Protime-INR    Collection Time: 12/30/20 12:27 PM   Result Value Ref Range    Protime 27.6 (H) 9.0 - 12.0 sec    INR 2.7    APTT    Collection Time: 12/30/20 12:27 PM   Result Value Ref Range    PTT 33.7 (H) 20.5 - 30.5 sec   Troponin    Collection Time: 12/30/20  1:41 PM   Result Value Ref Range    Troponin, High Sensitivity 16 (H) 0 - 14 ng/L    Troponin T NOT REPORTED <0.03 ng/mL    Troponin Interp NOT REPORTED    Troponin    Collection Time: 12/30/20  7:26 PM   Result Value Ref Range    Troponin, High Sensitivity 26 (H) 0 - 14 ng/L    Troponin T NOT REPORTED <0.03 ng/mL    Troponin Interp NOT REPORTED    POC Glucose Fingerstick    Collection Time: 12/30/20  8:44 PM   Result Value Ref Range    POC Glucose 129 (H) 65 - 105 mg/dL       Imaging:   Xr Chest (2 Vw)    Result Date: 12/30/2020  No acute cardiopulmonary process.        ASSESSMENT & PLAN   IMPRESSION  This is a 64 y.o. female who presented with nausea, fatigue, diaphoresis, palpitations and found to have paroxysmal A. fib with RVR. Principal Problem:    Atrial fibrillation with RVR (HCC)  Active Problems:    S/P endometrial ablation    Essential hypertension    Mixed hyperlipidemia  Resolved Problems:    * No resolved hospital problems. *    Paroxysmal atrial fibrillation with RVR:  -[id number] score of 4.  -Patient started on Cardizem bolus 10 mg followed by Cardizem infusion at 10 mL/h. Cardiology following.  -Warfarin pharmacy to dose. INR 2.7.  -Echocardiogram ordered. Follow-up. Patient n.p.o. after midnight. Essential hypertension:  -Norvasc 10 mg daily, patient currently on Cardizem infusion, Lopressor 12.5 mg twice daily, IV Lasix 40 mg daily. -IV labetalol 10 mg every 4 hours as needed. Mixed hyperlipidemia:  -Lipitor 40 mg nightly    Diabetes mellitus type 2, non-insulin-dependent:  -Patient takes Metformin 1000 mg twice daily, glipizide twice daily at home.  -Last HbA1c 7.7 in November 2020, patient started on low-dose insulin sliding scale, hypoglycemia protocol. DVT ppx: Warfarin  GI ppx: Protonix    PT/OT/SW: Ordered  Discharge Planning: Ongoing    Katherine Bernstein MD  Internal Medicine Resident, PGY-1  [address], [place]  12/30/2020, 10:13 PM      Attending Physician Statement  I have discussed the case, including pertinent history and exam findings with the resident and the team.  I have seen and examined the patient and the key elements of the encounter have been performed by me. I agree with the assessment, plan and orders as documented by the resident. In Brief:  Patient has history of A. fib with ablation done in the past.  Presented with fatigue and palpitation and found to have A. fib with rapid ventricular response. Patient started on IV Cardizem. Cardiology to evaluate patient for ablation. Will continue anticoagulation at this time.     Luis Marie MD   Attending Physician, Internal Medicine Residency Program  12/31/2020, 11:41 AM

## 2020-12-31 NOTE — PROGRESS NOTES
Patient arrives back to ER room 44 @ 1038  Denies any pain or needs at this time. Patient remains alert, NAD, RR unlabored and even. Call light within reach.

## 2020-12-31 NOTE — PROGRESS NOTES
Perfect serve received from the RN. Patient is keen on leaving AMA. Informed the RN that I am in the middle of patient care for another patient and will be at the bedside as soon as finished. Writer went bedside. Patient mentioned that \"i has gone through it 4 times in the past and I know I will be fine\". Patient stated that \"it is very noisy in here and I cannot get enough rest\". Patient stated that she wanted to leave and wanted to rest at her own house. Writer discussed with the patient that she has at risk of sudden cardiovascular collapse due to her unstable rhythm. This was repeatedly stressed upon and was discussed in detail with the patient. Patient voiced understanding and stated that she still wanted to go home(leave AMA) as she wanted to rest at her own house. She stated that she will continue with her medications as prescribed. She further reported that she has fire department at 2-minute distance from her house and transportation to the hospital takes 11 minutes. She reported that in case if something happens she along with her boyfriend who lives with her will either call the fire department or come to the hospital straightaway. She further stated that in case if needed she will call her cardiologist's office in the morning tomorrow for further advice. Dr. Didi Corral, the medicine resident on-call also came by and discussed with the patient. Despite the repeated counseling, Patient is very insistent and keen on leaving AMA. Despite repeated counseling, patient did not agree to stay in the hospital overnight. Patient finally signed the [place] papers in the writer's presence and in the presence of RN [place] and called her cab to leave. Ella Ingram M.D.   Internal Medicine Resident , PGY-3  [facility]  12/31/20 6:13 PM

## 2020-12-31 NOTE — PROGRESS NOTES
Occupational Therapy    Occupational Therapy Not Seen Note    DATE: 2020  Name: Eri Simon  : 1960  MRN: [id number]    Patient not available for Occupational Therapy due to:    Surgery/Procedure: Patient taken to cardiac Cath Lab this a.m. for cardioversion.     Next Scheduled Treatment: Attempt on     Electronically signed by Danny Warren OT on 2020 at 12:01 PM

## 2020-12-31 NOTE — PROGRESS NOTES
Patient in bed, bed locked and in lowest position. Call light within reach. Denies pain at this time.

## 2020-12-31 NOTE — PROGRESS NOTES
INTERNAL MEDICINE SENIOR NOTE     Patient seen and examined. Agree with H & P.     Briefly,   62-year-old female with a history of PAF for many years, feeling fatigued, tired, intermittent palpitations for the past 1 week. She follows with Dr. Marcia Browne cardiologist, who advised her to take sotalol to see for improvement. However for the past 2 days patient been feeling worse and came to the ER. Denies any active shortness of breath, chest pain. No diarrhea, urinary symptoms. On arrival heart rate in 130s with A. fib, /113. Given 1 dose of Cardizem, heart rate went into 90s for 2 hours but then back again to 100s. Blood pressure 160/110 mmHg. Troponin XVI. Blood glucose 66 even after eating. She has taken her Metformin and glipizide at home this morning. INR 2.7, follows with Coumadin clinic and takes warfarin every day, for her mitral valve bioprosthetic repair and for A. Fib. Last echo in 2017 with EF 40-45%. As per the patient she had 3 cardioversions in the past last one 2 years ago, also had cardiac ablation in 2018 for refractory A. fib with rate control/antiarrhythmics. Has history of CAD s/p PCI x 2 times, 2011 and 2016 . On Examination:  Vitals:    12/30/20 1722 12/30/20 1727 12/30/20 1756 12/30/20 1856   BP:    (!) 147/111   Pulse: 108 105 106 117   Resp:  20  17   Temp:       TempSrc:       SpO2: 96% 96% 95% 97%   Weight:           No results for input(s): POCGLU in the last 72 hours.   Hematology:  Recent Labs     12/30/20  1227   WBC 6.1   HGB 14.0   HCT 44.6      INR 2.7     Chemistry:  Recent Labs     12/30/20  1227      K 4.0      CO2 24   GLUCOSE 66*   BUN 18   CREATININE 0.98*   ANIONGAP 15   LABGLOM 58*   GFRAA >60   CALCIUM 9.1     No results for input(s): PROT, LABALBU, LABA1C, Q8NZHFS, Y4SLVVW, FT4, TSH, AST, ALT, LDH, GGT, ALKPHOS, BILITOT, BILIDIR, AMMONIA, AMYLASE, LIPASE, LACTATE, CHOL, HDL, LDLCHOLESTEROL, CHOLHDLRATIO, TRIG, VLDL, PHENYTOIN, PHENYF in the last 72 hours. Physical Examination:  CONST: AAO X 3, In no apparent distress  HEENT: Conjunctiva normal, EOM intact, mucous membrane moist, no thryomegaly   RESP: CTA bilaterally decreased, no wheeze, no rales, rhonchi cleared with cough. CVS: RRR, S1, S2 normal, No murmur, no JVP elevation, no pedal edema. ABD: Soft, Non tender, Non distended, BS +  NEURO: AAO X 3, Motor strength 5/5 in all 4 extremities, sensations intact, DTR's normal   EXT: No pedal edema, no calf tenderness, distal pulses intact     EKG: Afib with RVR, QTc 480. CXR: Unremarkable. Last echo in 2017 with EF 40-45%. As per the patient she had 3 cardioversions in the past last one 2 years ago, also had cardiac ablation in 2018 for refractory A. fib with rate control/antiarrhythmics. Has history of CAD s/p PCI x 2 times, 2011 and 2016 . Assessment:  · Parox A. fib with RVR: Currently no known provoking factors. Failed outpatient sotalol therapy requiring admission. Currently rate in 120s. Discussed with Cardiology. Started on IV Cardizem drip and continue Lopressor 25 BID. Pharmacy consulted for warfarin dosing. Currently INR 2.7. If INR subtherapeutic, might need to bridge with heparin drip. Follows with Dr. Venecia Mata as outpatient. Last echo in 2017 with EF 40-45%. Will repeat echo to evaluate for wall motion abnormalities and EF. N.p.o. after midnight for possible cardioversion tomorrow. Hold Sotalol. · Essential hypertension: Mild elevation in creatinine. Will hold lisinopril home dose. Amlodipine 10 mg from tomorrow. Labetalol as needed for >160 mmHg. · CAD s/p PCI x 2 times, 2011 and 2016. Continue Lipitor, Lopressor, Lasix. · Chronic systolic heart failure: EF 45%. Repeat echo. Currently stable. · T2DM with A1c 7.7%. Currently hypoglycemic. Encourage oral intake. Hypoglycemia protocol, POCT glucose checks. Medium dose sliding scale as needed.        DVT prophylaxis: Already on warfarin. PT/OT: Consulted   to follow.       Yolanda Rodriguez MD, PGY-2  Internal Medicine Residency Program  [address]  12/30/2020 7:24 PM

## 2020-12-31 NOTE — PROGRESS NOTES
Dr. Aparna Castellanos notified at [phone] via perfect serve, that patient is becoming adamant about getting discharged today. Phone call made at  to Dr. Aureliano Katz that patient is eager to leave. Dr. Aureliano Katz at bedside [phone]  Dr. Aparna Castellanos at bedside [phone]     Patient made the decision to sign AMA paperwork at [phone]. AMA paperwork reviewed with patient by Dr. Aparna Castellanos and Dr. Aureliano Katz at bedside. Risks explained in detail about leaving against medical advice. Patient alert and NAD at this time. Patient made aware that if they experience a medical emergency, call 911 or return to the ER.

## 2020-12-31 NOTE — ED NOTES
Pt updated on plan of care. Pt states that she is feeling better now that she is on cardizem gtt and heart rate is lower at this time. Heart rate 97. Pt still remains short of breath. Pt requesting to change out of hospital gown and into [facility]. [id number] Mil[address] Gardner for patient due to comfort. Pt to be moved to zone four to hospital bed. Pt agreeable to stay at this time. Will continue to monitor.       Vick Brady RN  12/30/20 1950

## 2021-01-03 LAB
EKG ATRIAL RATE: 115 BPM
EKG ATRIAL RATE: 63 BPM
EKG P AXIS: 74 DEGREES
EKG P-R INTERVAL: 168 MS
EKG Q-T INTERVAL: 346 MS
EKG Q-T INTERVAL: 518 MS
EKG QRS DURATION: 74 MS
EKG QRS DURATION: 82 MS
EKG QTC CALCULATION (BAZETT): 480 MS
EKG QTC CALCULATION (BAZETT): 530 MS
EKG R AXIS: -167 DEGREES
EKG R AXIS: -3 DEGREES
EKG T AXIS: -152 DEGREES
EKG T AXIS: -22 DEGREES
EKG VENTRICULAR RATE: 116 BPM
EKG VENTRICULAR RATE: 63 BPM

## 2021-01-03 PROCEDURE — 93010 ELECTROCARDIOGRAM REPORT: CPT | Performed by: INTERNAL MEDICINE

## 2021-01-29 ENCOUNTER — HOSPITAL ENCOUNTER (OUTPATIENT)
Age: 61
Setting detail: SPECIMEN
Discharge: HOME | End: 2021-01-29
Payer: MEDICAID

## 2021-01-29 LAB
INR BLD: 2.1
PROTHROMBIN TIME: 21.7 SEC (ref 9–12)

## 2021-02-24 ENCOUNTER — OFFICE VISIT (OUTPATIENT)
Dept: DERMATOLOGY | Age: 61
End: 2021-02-24
Payer: MEDICAID

## 2021-02-24 VITALS
WEIGHT: 231.6 LBS | DIASTOLIC BLOOD PRESSURE: 65 MMHG | HEIGHT: 63 IN | BODY MASS INDEX: 41.04 KG/M2 | HEART RATE: 71 BPM | OXYGEN SATURATION: 94 % | TEMPERATURE: 97.3 F | SYSTOLIC BLOOD PRESSURE: 113 MMHG

## 2021-02-24 DIAGNOSIS — L92.0 GRANULOMA ANNULARE: ICD-10-CM

## 2021-02-24 DIAGNOSIS — L30.8 OTHER ECZEMA: Primary | ICD-10-CM

## 2021-02-24 PROCEDURE — 99214 OFFICE O/P EST MOD 30 MIN: CPT | Performed by: DERMATOLOGY

## 2021-02-24 RX ORDER — TRIAMCINOLONE ACETONIDE 1 MG/G
CREAM TOPICAL
Qty: 80 G | Refills: 2 | Status: SHIPPED | OUTPATIENT
Start: 2021-02-24 | End: 2021-07-07

## 2021-02-24 RX ORDER — CLOBETASOL PROPIONATE 0.5 MG/G
OINTMENT TOPICAL
Qty: 60 G | Refills: 2 | Status: SHIPPED | OUTPATIENT
Start: 2021-02-24 | End: 2021-07-07

## 2021-02-24 RX ORDER — SOTALOL HYDROCHLORIDE 80 MG/1
120 TABLET ORAL 2 TIMES DAILY
COMMUNITY
Start: 2020-12-23

## 2021-02-24 NOTE — PATIENT INSTRUCTIONS
- Apply Sarna as moisturizer to the skin (can try Cerave if you cannot find sarna) Mix 6 drops of peppermint extract and apply, add a few more drops until cooling effect is reached. - Restart clobetasol to active areas of rash then taper back down and triamcinolone is less strong (no longer than 2 weeks on clobetasol at a time)  - Follow up in the office for patch testing    Patch Testing for Skin Allergies   A patch test is a skin test used to find the cause of a possible allergic reaction on the skin. This reaction is called allergic contact dermatitis. Contact dermatitis is a reaction to something that came into contact with the skin. This kind of allergic reaction usually causes inflammation (redness, itching). Patch testing is done by the dermatology clinic. During patch testing, different substances are placed on the skin and taped in place. The patches are typically placed on the back, left on for 2 days and then removed. The area of skin that was tested will be evaluated by the dermatologist 2 to 4 days after the patches are removed. You will need to return to the dermatology clinic 2 to 3 times during the week that the patch testing is done. Generally the test week is a total of 7 days. It is important to follow the guidelines below to get accurate results. Patch testing evaluates a slow, delayed skin reaction so it is important that the patches stay in place. The test site must also be protected throughout the entire test week. Preparation for the Test   Getting the skin ready for your patch test is important. Following these instructions will help you to get more accurate, reliable results. - Avoid sun exposure for 1 to 2 weeks before patch testing.   - Do not use topical medicines (creams and ointments) on the back and any other area where patches may be placed for at least 1 week before patch testing.  You can continue to use these medicines on areas of the body where patches will not be not apply creams, ointments, or moisturizers where the patches were. Do not scratch the skin where the patches were. You should avoid hot areas and activities that cause excessive sweating. Removing patches: Normally you should not remove patches yourself. Usually the nurses in the dermatology clinic remove them. Occasionally the doctor will ask a family to remove the patches at home. If you have been given this instruction you will remove the tape and clear bandage carefully. You will re-lawrence the borders of the patch testing sheets with the marker you were given before you take them off the skin. Sometimes a patch may fall off or pull away so that it is not actually touching the skin. If this happens it is important to let your childs doctor know at your next appointment. The Third Visit:   The third visit is usually 7 days after the patches were put on. You will come to the clinic to have the skin checked. It is important to check the skin again to look for skin reactions. The nurses and doctor will look at the skin and tell you if there are any substances that you should avoid. After your visit you can restart topical creams, ointments, and moisturizers. You may shower and bathe normally. If you have any questions, be sure to ask your doctor or nurse.

## 2021-02-24 NOTE — PROGRESS NOTES
Dermatology Patient Note  [facility]  [address]  [address]  Dept: [phone]  Dept Fax: [phone]      VISITDATE: 2/24/2021   REFERRING PROVIDER: No ref. provider found      Sofía Byers is a 64 y.o. female  who presents today in the office for:    Follow-up (Follow up on eczema and GA. Has a new eczema spot under the arm/breast area. Not really using anything on GA so no change. Using TAC on eczema but gold bond works better she feels like)      PERTINENT HISTORY NOT LISTED ABOVE:  Patient with history of bowens disease of the nail fold presents for f/u eczema and granuloma annulare  - GA is only rarely tender. Mostly she is not bothered by it  - eczematous rash of right arm is worse  - uses triamcinolone but doesn't relieve itch, so she uses gold bond for eczema instead.  It makes it feel better but doesn't resolve it      CURRENT MEDICATIONS:   Current Outpatient Medications   Medication Sig Dispense Refill    sotalol (BETAPACE) 80 MG tablet TAKE 1 TABLET BY MOUTH TWO TIMES A DAY      camphor-menthol (SARNA) 0.5-0.5 % lotion Apply topically up to 4x daily as needed for itch 1 Bottle 4    clobetasol (TEMOVATE) 0.05 % ointment Apply to rash twice daily x 2 weeks for flares (not face, armpit or groin) 60 g 2    triamcinolone (KENALOG) 0.1 % cream Apply to rash twice twice daily (not face, armpit, or groin) 80 g 2    metFORMIN (GLUCOPHAGE) 1000 MG tablet TAKE 1 TABLET BY MOUTH TWO TIMES A DAY WITH MEALS 180 tablet 0    glipiZIDE (GLUCOTROL) 10 MG tablet Take 1 tablet by mouth 2 times daily 60 tablet 3    tiZANidine (ZANAFLEX) 4 MG tablet Take 1 tablet by mouth nightly as needed (back pain) 90 tablet 1    clopidogrel (PLAVIX) 75 MG tablet TAKE 1 TABLET BY MOUTH ONE TIME A DAY  15 tablet 0    losartan (COZAAR) 50 MG tablet TAKE 1 TABLET BY MOUTH ONE TIME A DAY  90 tablet 1    Niacin ER 1000 MG TBCR Take by mouth daily      furosemide lower extremities (that which is visible with pants/shorts and shoes/socks on) was examined. Diagnoses/exam findings/medical history pertinent to this visit are listed below:    Assessment and Plan:  Assessment   1. Eczematous dermatitis of right upper arm with nummular plaque more posteriorly  - chronic illness with progression and/or exacerbation  - suspect allergic contactant is contributing  - schedule for patch testing  - use sarna instead of goldbond  - increase potency to clobetasol for 2 weeks, then back to triam  - camphor-menthol (SARNA) 0.5-0.5 % lotion; Apply topically up to 4x daily as needed for itch  Dispense: 1 Bottle; Refill: 4  - clobetasol (TEMOVATE) 0.05 % ointment; Apply to rash twice daily x 2 weeks for flares (not face, armpit or groin)  Dispense: 60 g; Refill: 2  - triamcinolone (KENALOG) 0.1 % cream; Apply to rash twice twice daily (not face, armpit, or groin)  Dispense: 80 g; Refill: 2    2. Granuloma annulare  - patient not bothered by it. Can try clobetasol or ILK in the future          RTC for patch testing    Patient Instructions   - Apply Sarna as moisturizer to the skin (can try Cerave if you cannot find sarna) Mix 6 drops of peppermint extract and apply, add a few more drops until cooling effect is reached. - Restart clobetasol to active areas of rash then taper back down and triamcinolone is less strong (no longer than 2 weeks on clobetasol at a time)  - Follow up in the office for patch testing    Patch Testing for Skin Allergies   A patch test is a skin test used to find the cause of a possible allergic reaction on the skin. This reaction is called allergic contact dermatitis. Contact dermatitis is a reaction to something that came into contact with the skin. This kind of allergic reaction usually causes inflammation (redness, itching). Patch testing is done by the dermatology clinic. During patch testing, different substances are placed on the skin and taped in place.  The patches are typically placed on the back, left on for 2 days and then removed. The area of skin that was tested will be evaluated by the dermatologist 2 to 4 days after the patches are removed. You will need to return to the dermatology clinic 2 to 3 times during the week that the patch testing is done. Generally the test week is a total of 7 days. It is important to follow the guidelines below to get accurate results. Patch testing evaluates a slow, delayed skin reaction so it is important that the patches stay in place. The test site must also be protected throughout the entire test week. Preparation for the Test   Getting the skin ready for your patch test is important. Following these instructions will help you to get more accurate, reliable results. - Avoid sun exposure for 1 to 2 weeks before patch testing.   - Do not use topical medicines (creams and ointments) on the back and any other area where patches may be placed for at least 1 week before patch testing. You can continue to use these medicines on areas of the body where patches will not be placed. - You may use moisturizers on the skin until the day before patch testing.   - You may continue to take any prescribed antihistamines as usual before and during the testing. During the Test Week   - The patches and test area marked with ink must be kept dry throughout the entire test.     During the Test Week  - Do not apply anything to the test area. This includes all soaps, creams, ointments and moisturizers. - Do not scratch, rub, loosen or remove the patches. - Avoid physical activities that will loosen the tape. - Do not expose the test area to sunlight. - Avoid hot areas and activities that cause excessive sweating. Continue to take your prescribed antihistamines. The patches must be kept dry. You should not take a shower or bath. Wash areas of the body where there are no patches with a washcloth or bath sponge.  Be careful not to get the patches wet. The First Visit:   The patches are usually placed on your back and left in place for 2 days.   - You should wear loose, comfortable clothing.   - You should wear old, dark-colored clothes to avoid stains from the marker. Small sheets, or patches, holding different substances are placed on the skin. The nurses will use a marker to outline the edges of the sheets. Then the sheets will be secured with tape or another sticky bandage (dressing). The Second Visit:   The second visit is usually 2 days after the patches were put on. Typically you will come to the clinic to have the patches removed. The nurses will carefully remove the tape and patches. They will re-lawrence the skin where the patches were. The nurses will check the area to see if the skin shows any signs of a reaction. In many cases a reaction may not have shown up yet. It is still important to come back to the clinic to have the area checked one more time. Continue to keep the skin dry where the patches were. Do not take a shower or bathe where the patches were. Do not apply creams, ointments, or moisturizers where the patches were. Do not scratch the skin where the patches were. You should avoid hot areas and activities that cause excessive sweating. Removing patches: Normally you should not remove patches yourself. Usually the nurses in the dermatology clinic remove them. Occasionally the doctor will ask a family to remove the patches at home. If you have been given this instruction you will remove the tape and clear bandage carefully. You will re-lawrence the borders of the patch testing sheets with the marker you were given before you take them off the skin. Sometimes a patch may fall off or pull away so that it is not actually touching the skin. If this happens it is important to let your childs doctor know at your next appointment.      The Third Visit:   The third visit is usually 7 days after the patches were put on. You will come to the clinic to have the skin checked. It is important to check the skin again to look for skin reactions. The nurses and doctor will look at the skin and tell you if there are any substances that you should avoid. After your visit you can restart topical creams, ointments, and moisturizers. You may shower and bathe normally. If you have any questions, be sure to ask your doctor or nurse. This note was created with the assistance of a speech-recognition program.  Although the intention is to generate a document that actually reflects the content of the visit, no guarantees can be provided that every mistake has been identified and corrected byediting.     Electronically signed by Larissa Pat MD on 2/24/21 at 11:33 AM EST

## 2021-03-03 ENCOUNTER — OFFICE VISIT (OUTPATIENT)
Dept: FAMILY MEDICINE CLINIC | Age: 61
End: 2021-03-03
Payer: MEDICAID

## 2021-03-03 VITALS
OXYGEN SATURATION: 95 % | WEIGHT: 230 LBS | HEIGHT: 63 IN | SYSTOLIC BLOOD PRESSURE: 132 MMHG | DIASTOLIC BLOOD PRESSURE: 86 MMHG | HEART RATE: 76 BPM | BODY MASS INDEX: 40.75 KG/M2

## 2021-03-03 DIAGNOSIS — Z12.31 ENCOUNTER FOR SCREENING MAMMOGRAM FOR BREAST CANCER: ICD-10-CM

## 2021-03-03 DIAGNOSIS — E11.9 TYPE 2 DIABETES MELLITUS WITHOUT COMPLICATION, WITHOUT LONG-TERM CURRENT USE OF INSULIN (HCC): Primary | ICD-10-CM

## 2021-03-03 DIAGNOSIS — Z12.11 COLON CANCER SCREENING: ICD-10-CM

## 2021-03-03 PROBLEM — G89.29 CHRONIC PAIN OF MULTIPLE JOINTS: Status: ACTIVE | Noted: 2019-07-16

## 2021-03-03 PROBLEM — M25.50 CHRONIC PAIN OF MULTIPLE JOINTS: Status: ACTIVE | Noted: 2019-07-16

## 2021-03-03 PROBLEM — M19.042 PRIMARY OSTEOARTHRITIS OF BOTH HANDS: Status: ACTIVE | Noted: 2019-07-31

## 2021-03-03 PROBLEM — G89.4 CHRONIC PAIN DISORDER: Status: ACTIVE | Noted: 2019-07-31

## 2021-03-03 PROBLEM — M19.041 PRIMARY OSTEOARTHRITIS OF BOTH HANDS: Status: ACTIVE | Noted: 2019-07-31

## 2021-03-03 LAB — HBA1C MFR BLD: 6.2 %

## 2021-03-03 PROCEDURE — 83036 HEMOGLOBIN GLYCOSYLATED A1C: CPT | Performed by: PHYSICIAN ASSISTANT

## 2021-03-03 PROCEDURE — 99213 OFFICE O/P EST LOW 20 MIN: CPT | Performed by: PHYSICIAN ASSISTANT

## 2021-03-03 RX ORDER — TIZANIDINE 4 MG/1
TABLET ORAL
Qty: 90 TABLET | Refills: 0 | Status: SHIPPED | OUTPATIENT
Start: 2021-03-03 | End: 2021-06-04

## 2021-03-03 ASSESSMENT — PATIENT HEALTH QUESTIONNAIRE - PHQ9
2. FEELING DOWN, DEPRESSED OR HOPELESS: 0
SUM OF ALL RESPONSES TO PHQ9 QUESTIONS 1 & 2: 0

## 2021-03-03 ASSESSMENT — ENCOUNTER SYMPTOMS
ABDOMINAL PAIN: 0
NAUSEA: 0
COLOR CHANGE: 0
DIARRHEA: 0
SHORTNESS OF BREATH: 0
CONSTIPATION: 0
COUGH: 0
VOMITING: 0
WHEEZING: 0

## 2021-03-03 NOTE — PROGRESS NOTES
Visit Information    Have you changed or started any medications since your last visit including any over-the-counter medicines, vitamins, or herbal medicines? no   Are you having any side effects from any of your medications? -  no  Have you stopped taking any of your medications? Is so, why? -  no    Have you seen any other physician or provider since your last visit? No  Have you had any other diagnostic tests since your last visit? No  Have you been seen in the emergency room and/or had an admission to a hospital since we last saw you? No  Have you had your routine dental cleaning in the past 6 months? no    Have you activated your [employer] account? If not, what are your barriers?  No:      Patient Care Team:  Joanne Aguilar PA-C as PCP - General (Physician Assistant)  Joanne Aguilar PA-C as PCP - [facility]  Ramiro Winkler MD as Consulting Physician (Cardiology)    Medical History Review  Past Medical, Family, and Social History reviewed and does contribute to the patient presenting condition    Health Maintenance   Topic Date Due    Diabetic foot exam  Never done    Hepatitis B vaccine (1 of 3 - Risk 3-dose series) Never done    Shingles Vaccine (1 of 2) Never done    Colon Cancer Screen FIT/FOBT  03/02/2018    Breast cancer screen  02/18/2021    Cervical cancer screen  03/03/2022 (Originally 2/3/2018)    Diabetic retinal exam  03/13/2022 (Originally 8/28/1970)    A1C test (Diabetic or Prediabetic)  11/20/2021    Diabetic microalbuminuria test  11/20/2021    Lipid screen  11/20/2021    Potassium monitoring  12/31/2021    Creatinine monitoring  12/31/2021    DTaP/Tdap/Td vaccine (2 - Td) 02/28/2027    Flu vaccine  Completed    Pneumococcal 0-64 years Vaccine  Completed    Hepatitis C screen  Completed    HIV screen  Completed    Hepatitis A vaccine  Aged Out    Hib vaccine  Aged Out    Meningococcal (ACWY) vaccine  Aged Out

## 2021-03-03 NOTE — PROGRESS NOTES
[address] [facility]  [address] [facility] [address] [address]  Dept: [phone]  Dept Fax: [phone]    Veronica Saravia is a 64 y.o. female who presents today for her medical conditions/complaintsas noted below. Veronica Saravia is c/o of   Chief Complaint   Patient presents with    Diabetes         HPI:     Diabetes  She presents for her follow-up diabetic visit. She has type 2 diabetes mellitus. Her disease course has been stable. There are no hypoglycemic associated symptoms. Pertinent negatives for hypoglycemia include no nervousness/anxiousness or pallor. There are no diabetic associated symptoms. Pertinent negatives for diabetes include no chest pain, no fatigue and no weakness. There are no hypoglycemic complications. Symptoms are stable. Current diabetic treatment includes oral agent (dual therapy) (not able to tolerate the metformin well due to loose stools). She is compliant with treatment most of the time. She is following a generally healthy diet. She participates in exercise intermittently. Her breakfast blood glucose is taken between 7-8 am. Her breakfast blood glucose range is generally 140-180 mg/dl. An ACE inhibitor/angiotensin II receptor blocker is being taken. patient updates struggling to take the metformin due to loose stools. She will skip doses off and on when bothersome. She can take it most every day at least once before bothers her. She has also been forgetting to take her glipizide off and on. Does feel she gets most of her doses in. She states BS ranging to between 140-180  She has been seeing her cardiologist regularly. Just had stress test Monday. She did have a fib and new cardioversion in December 2020. She has insurance this month but may loose it next month    Hemoglobin A1C (%)   Date Value   03/03/2021 6.2   11/20/2020 7.7 (H)   08/31/2020 9.8             ( goal A1Cis < 7)   Microalb/Crt.  Ratio (mcg/mg creat) Date Value   2020 CANNOT BE CALCULATED     LDL Cholesterol (mg/dL)   Date Value   2020 73   2019 48   2017 48     LDL Calculated (mg/dL)   Date Value   2015 40   2014 71       (goal LDL is <100)   AST (U/L)   Date Value   2020 18     ALT (U/L)   Date Value   2020 18     BUN (mg/dL)   Date Value   2020 16     BP Readings from Last 3 Encounters:   21 132/86   21 113/65   20 130/82          (goal 120/80)    Past Medical History:   Diagnosis Date    Abnormal Pap smear     Had colposcopy    Allergy     PCN    Anemia     Anticoagulated     ON COUMADIN    Atrial fibrillation ([address] 2014    Blood transfusion 11    4 units    CAD (coronary artery disease)     Eczema     Examination of participant in clinical trial 11    end date 3/25/15    Headache 2014    Heart disease 11    card stents x 2    Hyperlipidemia     Hypertension     Menopausal symptoms     MI, old     PAF (paroxysmal atrial fibrillation) ([address]     s/p cardioversion Dec 2015     Primary osteoarthritis of both hands 2019    STEMI (ST elevation myocardial infarction) ([address] 2016    Type 2 diabetes mellitus without complication, without long-term current use of insulin ([address] 2017      Past Surgical History:   Procedure Laterality Date    CARDIOVERSION  2018    DR Solis Oreilly  SUCCESSFUL    CARDIOVERSION  2014    CARDIOVERSION      CARDIOVERSION  2018    DR Solis Oreilly     SECTION      COLPOSCOPY      CORONARY ANGIOPLASTY WITH STENT PLACEMENT  2016    PTCA AND GIRMA TO RCA    CORONARY ANGIOPLASTY WITH STENT PLACEMENT      DILATION AND CURETTAGE      ENDOMETRIAL ABLATION  12    hydrothermal    LAPAROSCOPY      TRANSESOPHAGEAL ECHOCARDIOGRAM  2014    TUBAL LIGATION         Family History   Problem Relation Age of Onset    COPD Father     Coronary Art Dis Father  Cancer Mother     Heart Failure Maternal Grandmother     Breast Cancer Neg Hx     Colon Cancer Neg Hx     Diabetes Neg Hx     Eclampsia Neg Hx     Hypertension Neg Hx     Ovarian Cancer Neg Hx      Labor Neg Hx     Spont Abortions Neg Hx     Stroke Neg Hx        Social History     Tobacco Use    Smoking status: Former Smoker     Packs/day: 0.50     Years: 34.00     Pack years: 17.00     Types: Cigarettes     Quit date: 10/23/2018     Years since quittin.3    Smokeless tobacco: Never Used    Tobacco comment: smokes 5 cig/day   Substance Use Topics    Alcohol use: No     Alcohol/week: 0.0 standard drinks      Current Outpatient Medications   Medication Sig Dispense Refill    sotalol (BETAPACE) 80 MG tablet TAKE 1 TABLET BY MOUTH TWO TIMES A DAY      camphor-menthol (SARNA) 0.5-0.5 % lotion Apply topically up to 4x daily as needed for itch 1 Bottle 4    clobetasol (TEMOVATE) 0.05 % ointment Apply to rash twice daily x 2 weeks for flares (not face, armpit or groin) 60 g 2    triamcinolone (KENALOG) 0.1 % cream Apply to rash twice twice daily (not face, armpit, or groin) 80 g 2    metFORMIN (GLUCOPHAGE) 1000 MG tablet TAKE 1 TABLET BY MOUTH TWO TIMES A DAY WITH MEALS 180 tablet 0    glipiZIDE (GLUCOTROL) 10 MG tablet Take 1 tablet by mouth 2 times daily 60 tablet 3    tiZANidine (ZANAFLEX) 4 MG tablet Take 1 tablet by mouth nightly as needed (back pain) 90 tablet 1    clopidogrel (PLAVIX) 75 MG tablet TAKE 1 TABLET BY MOUTH ONE TIME A DAY  15 tablet 0    losartan (COZAAR) 50 MG tablet TAKE 1 TABLET BY MOUTH ONE TIME A DAY  90 tablet 1    Niacin ER 1000 MG TBCR Take by mouth daily      furosemide (LASIX) 40 MG tablet Take one tablet by mouth in the morning and one half tablet in the evening.   3    metoprolol tartrate (LOPRESSOR) 25 MG tablet Take 0.5 tablets by mouth 2 times daily (Patient taking differently: Take 25 mg by mouth 2 times daily ) 60 tablet 3    Omega-3 Fatty Acids (FISH OIL) 1000 MG CAPS Take 1,000 mg by mouth daily       nitroGLYCERIN (NITROSTAT) 0.4 MG SL tablet Place 1 tablet under tongue upon chest pain, wait 5 minutes and may repeat up to 3 doses in 15 minutes. Do not crush or break. 25 tablet 3    warfarin (COUMADIN) 5 MG tablet Take 5 mg by mouth Sunday and Wed 5mg, 2.5 mg  On other days      atorvastatin (LIPITOR) 40 MG tablet Take 40 mg by mouth nightly        No current facility-administered medications for this visit. Allergies   Allergen Reactions    Pcn [Penicillins] Hives    Amiodarone     Protonix [Pantoprazole Sodium] Itching       Health Maintenance   Topic Date Due    Diabetic foot exam  Never done    Hepatitis B vaccine (1 of 3 - Risk 3-dose series) Never done    Shingles Vaccine (1 of 2) Never done    Colon Cancer Screen FIT/FOBT  03/02/2018    Breast cancer screen  02/18/2021    Cervical cancer screen  03/03/2022 (Originally 2/3/2018)    Diabetic retinal exam  03/13/2022 (Originally 8/28/1970)    Diabetic microalbuminuria test  11/20/2021    Lipid screen  11/20/2021    Potassium monitoring  12/31/2021    Creatinine monitoring  12/31/2021    A1C test (Diabetic or Prediabetic)  03/03/2022    DTaP/Tdap/Td vaccine (2 - Td) 02/28/2027    Flu vaccine  Completed    Pneumococcal 0-64 years Vaccine  Completed    Hepatitis C screen  Completed    HIV screen  Completed    Hepatitis A vaccine  Aged Out    Hib vaccine  Aged Out    Meningococcal (ACWY) vaccine  Aged Out       Subjective:     Review of Systems   Constitutional: Negative for activity change, appetite change, fatigue, fever and unexpected weight change. /86   Pulse 76   Ht 5' 3\" (1.6 m)   Wt 230 lb (104.3 kg)   LMP 07/03/2014 (Approximate)   SpO2 95%   BMI 40.74 kg/m²    Respiratory: Negative for cough, shortness of breath and wheezing. Cardiovascular: Negative for chest pain, palpitations and leg swelling.    Gastrointestinal: Negative for abdominal overall and ok continuing present doses  We could consider decrease metformin dose if stools continue to bother her. Repeat labs in 3 -4 mo and follow up after  Encouraged healthy LF/low carb diet  Regular exercise  Update cologuard and patient will call to schedule mamm  Patient agreed with treatment plan    Orders Placed This Encounter   Procedures    Cologuard (For External Results Only)     This test is performed by an external laboratory and is used for result attachment only. It is required that this order requisition be faxed to: [employer] @ [phone]. See [email or web address] for further information. Standing Status:   Future     Standing Expiration Date:   3/3/2022    ANASTASIA DIGITAL SCREEN W OR WO CAD BILATERAL     Standing Status:   Future     Standing Expiration Date:   3/3/2022     Order Specific Question:   Reason for exam:     Answer:   screening    Comprehensive Metabolic Panel     Standing Status:   Future     Standing Expiration Date:   3/3/2022    Hemoglobin A1C     Standing Status:   Future     Standing Expiration Date:   3/3/2022    POCT glycosylated hemoglobin (Hb A1C)     Results for orders placed or performed in visit on 03/03/21   POCT glycosylated hemoglobin (Hb A1C)   Result Value Ref Range    Hemoglobin A1C 6.2 %         Patient given educational materials - see patient instructions. Discussed use, benefit, and side effects of prescribed medications. All patientquestions answered. Pt voiced understanding. Reviewed health maintenance. Instructedto continue current medications, diet and exercise. Patient agreed with treatmentplan. Follow up as directed.      Electronically signed by Philip Stauffer PA-C on 3/3/2021 at 11:43 AM

## 2021-03-05 ENCOUNTER — HOSPITAL ENCOUNTER (OUTPATIENT)
Dept: MAMMOGRAPHY | Age: 61
Discharge: HOME | End: 2021-03-07
Payer: MEDICAID

## 2021-03-05 DIAGNOSIS — Z12.31 ENCOUNTER FOR SCREENING MAMMOGRAM FOR BREAST CANCER: ICD-10-CM

## 2021-03-05 PROCEDURE — 77063 BREAST TOMOSYNTHESIS BI: CPT

## 2021-03-08 ENCOUNTER — HOSPITAL ENCOUNTER (OUTPATIENT)
Age: 61
Setting detail: SPECIMEN
Discharge: HOME | End: 2021-03-08
Payer: COMMERCIAL

## 2021-03-08 LAB
INR BLD: 2
PROTHROMBIN TIME: 20.2 SEC (ref 9.1–12.3)

## 2021-03-09 ENCOUNTER — TELEPHONE (OUTPATIENT)
Dept: DERMATOLOGY | Age: 61
End: 2021-03-09

## 2021-03-09 NOTE — TELEPHONE ENCOUNTER
Pt called in and spoke with our scheduling department. Pt requested to schedule a patch test. Writer returned call to schedule and patient declined. States she has to much on her plate and will call when she is ready.

## 2021-03-17 NOTE — PROGRESS NOTES
Message left with  patient and restrictions/one visitor policy reviewed, asked to bring medications with patient and drop off location.   Informed of medications to take in am.

## 2021-03-18 ENCOUNTER — HOSPITAL ENCOUNTER (OUTPATIENT)
Dept: CARDIAC CATH/INVASIVE PROCEDURES | Age: 61
Discharge: HOME | End: 2021-03-18
Payer: MEDICAID

## 2021-03-18 VITALS
WEIGHT: 231 LBS | SYSTOLIC BLOOD PRESSURE: 127 MMHG | RESPIRATION RATE: 20 BRPM | OXYGEN SATURATION: 97 % | BODY MASS INDEX: 40.93 KG/M2 | HEIGHT: 63 IN | DIASTOLIC BLOOD PRESSURE: 65 MMHG | TEMPERATURE: 98.4 F | HEART RATE: 70 BPM

## 2021-03-18 LAB
ACTIVATED CLOTTING TIME: 278 SEC (ref 79–149)
GFR NON-AFRICAN AMERICAN: 59 ML/MIN
GFR SERPL CREATININE-BSD FRML MDRD: >60 ML/MIN
GFR SERPL CREATININE-BSD FRML MDRD: ABNORMAL ML/MIN/{1.73_M2}
GLUCOSE BLD-MCNC: 135 MG/DL (ref 65–105)
GLUCOSE BLD-MCNC: 161 MG/DL (ref 74–100)
PLATELET # BLD: 269 K/UL (ref 138–453)
POC CHLORIDE: NORMAL MMOL/L (ref 98–107)
POC CREATININE: 0.97 MG/DL (ref 0.51–1.19)
POC HEMATOCRIT: 45 % (ref 36–46)
POC HEMOGLOBIN: 15.3 G/DL (ref 12–16)
POC INR: 1.1
POC POTASSIUM: 4 MMOL/L (ref 3.5–4.5)
POC SODIUM: 143 MMOL/L (ref 138–146)
PROTHROMBIN TIME, POC: 12.6 SEC (ref 10.4–14.2)

## 2021-03-18 PROCEDURE — 82565 ASSAY OF CREATININE: CPT

## 2021-03-18 PROCEDURE — 6370000000 HC RX 637 (ALT 250 FOR IP)

## 2021-03-18 PROCEDURE — 82947 ASSAY GLUCOSE BLOOD QUANT: CPT

## 2021-03-18 PROCEDURE — 85347 COAGULATION TIME ACTIVATED: CPT

## 2021-03-18 PROCEDURE — 93458 L HRT ARTERY/VENTRICLE ANGIO: CPT | Performed by: INTERNAL MEDICINE

## 2021-03-18 PROCEDURE — 92928 PRQ TCAT PLMT NTRAC ST 1 LES: CPT | Performed by: INTERNAL MEDICINE

## 2021-03-18 PROCEDURE — C1874 STENT, COATED/COV W/DEL SYS: HCPCS

## 2021-03-18 PROCEDURE — 82435 ASSAY OF BLOOD CHLORIDE: CPT

## 2021-03-18 PROCEDURE — 6360000002 HC RX W HCPCS

## 2021-03-18 PROCEDURE — 2500000003 HC RX 250 WO HCPCS

## 2021-03-18 PROCEDURE — 84132 ASSAY OF SERUM POTASSIUM: CPT

## 2021-03-18 PROCEDURE — C1894 INTRO/SHEATH, NON-LASER: HCPCS

## 2021-03-18 PROCEDURE — 2709999900 HC NON-CHARGEABLE SUPPLY

## 2021-03-18 PROCEDURE — 7100000000 HC PACU RECOVERY - FIRST 15 MIN

## 2021-03-18 PROCEDURE — C1887 CATHETER, GUIDING: HCPCS

## 2021-03-18 PROCEDURE — 85049 AUTOMATED PLATELET COUNT: CPT

## 2021-03-18 PROCEDURE — 36415 COLL VENOUS BLD VENIPUNCTURE: CPT

## 2021-03-18 PROCEDURE — 85610 PROTHROMBIN TIME: CPT

## 2021-03-18 PROCEDURE — 6360000004 HC RX CONTRAST MEDICATION

## 2021-03-18 PROCEDURE — 85014 HEMATOCRIT: CPT

## 2021-03-18 PROCEDURE — 84295 ASSAY OF SERUM SODIUM: CPT

## 2021-03-18 PROCEDURE — C1725 CATH, TRANSLUMIN NON-LASER: HCPCS

## 2021-03-18 PROCEDURE — C1769 GUIDE WIRE: HCPCS

## 2021-03-18 PROCEDURE — 7100000001 HC PACU RECOVERY - ADDTL 15 MIN

## 2021-03-18 RX ORDER — SODIUM CHLORIDE 9 MG/ML
INJECTION, SOLUTION INTRAVENOUS CONTINUOUS
Status: DISCONTINUED | OUTPATIENT
Start: 2021-03-18 | End: 2021-03-19 | Stop reason: HOSPADM

## 2021-03-18 RX ORDER — SODIUM CHLORIDE 0.9 % (FLUSH) 0.9 %
10 SYRINGE (ML) INJECTION EVERY 12 HOURS SCHEDULED
Status: DISCONTINUED | OUTPATIENT
Start: 2021-03-18 | End: 2021-03-19 | Stop reason: HOSPADM

## 2021-03-18 RX ORDER — SODIUM CHLORIDE 0.9 % (FLUSH) 0.9 %
10 SYRINGE (ML) INJECTION PRN
Status: DISCONTINUED | OUTPATIENT
Start: 2021-03-18 | End: 2021-03-19 | Stop reason: HOSPADM

## 2021-03-18 RX ORDER — ACETAMINOPHEN 325 MG/1
650 TABLET ORAL EVERY 4 HOURS PRN
Status: DISCONTINUED | OUTPATIENT
Start: 2021-03-18 | End: 2021-03-19 | Stop reason: HOSPADM

## 2021-03-18 RX ORDER — ASPIRIN 81 MG/1
81 TABLET ORAL DAILY
Qty: 30 TABLET | Refills: 0 | Status: SHIPPED | OUTPATIENT
Start: 2021-03-18 | End: 2021-07-07

## 2021-03-18 RX ADMIN — SODIUM CHLORIDE: 9 INJECTION, SOLUTION INTRAVENOUS at 08:19

## 2021-03-18 NOTE — H&P
[facility]   H&P               Today's Date: 3/18/2021  Patient Name: Veronica Saravia  Date of admission: 3/18/2021  7:29 AM  Patient's age: 64 y.o., 1960  Admission Dx: Abnormal stress ECG [R94.39]    Requesting Physician: No admitting provider for patient encounter. CHIEF COMPLAINT:  Cardiac cath     History Obtained From:  patient, electronic medical record    HISTORY OF PRESENT ILLNESS:    Veronica Saravia 64 y.o. female presented for cardiac cath. She has known h/o CAD with h/o stents to OM and RCA. She also has P afib for which she underwent cardioversion. She was seen in clinic 2021 when she was complaining of intermittent chest pain and EKG changes consistent with ischemia. She underwent stress test which was abnormal.  She is here for cardiac cath today. Past Medical History:   has a past medical history of Abnormal Pap smear, Allergy, Anemia, Anticoagulated, Anticoagulated on Coumadin, Atrial fibrillation ([address] Blood transfusion, CAD (coronary artery disease), COPD (chronic obstructive pulmonary disease) ([address] Eczema, Examination of participant in clinical trial, Headache, Heart disease, Hyperlipidemia, Hypertension, Menopausal symptoms, MI, old, PAF (paroxysmal atrial fibrillation) ([address] Primary osteoarthritis of both hands, Smoker, STEMI (ST elevation myocardial infarction) ([address] and Type 2 diabetes mellitus without complication, without long-term current use of insulin ([address] Past Surgical History:   has a past surgical history that includes  section (); Dilation & curettage (); Tubal ligation (); Colposcopy (); laparoscopy (); Endometrial ablation (2012); Coronary angioplasty with stent (2016); Cardioversion (2018); transesophageal echocardiogram (2014); Cardioversion (2014); Coronary angioplasty with stent (); Cardioversion ();  Cardioversion (2018); ablation of dysrhythmic focus (2018); and Cardiac catheterization (03/18/2021). Home Medications:    Prior to Admission medications    Medication Sig Start Date End Date Taking? Authorizing Provider   metFORMIN (GLUCOPHAGE) 1000 MG tablet TAKE 1 TABLET BY MOUTH TWO TIMES A DAY WITH MEALS. 3/14/21  Yes Ellen Manriquez PA-C   tiZANidine (ZANAFLEX) 4 MG tablet TAKE 1 TABLET BY MOUTH NIGHTLY AS NEEDED (FOR BACK PAIN) 3/3/21  Yes Ellen Manriquez PA-C   sotalol (BETAPACE) 80 MG tablet TAKE 1 TABLET BY MOUTH TWO TIMES A DAY 12/23/20  Yes Historical Provider, MD   camphor-menthol (SARNA) 0.5-0.5 % lotion Apply topically up to 4x daily as needed for itch 2/24/21  Yes Juliet King MD   clobetasol (TEMOVATE) 0.05 % ointment Apply to rash twice daily x 2 weeks for flares (not face, armpit or groin) 2/24/21  Yes Juliet King MD   glipiZIDE (GLUCOTROL) 10 MG tablet Take 1 tablet by mouth 2 times daily 12/2/20  Yes Ellen Manriquez PA-C   clopidogrel (PLAVIX) 75 MG tablet TAKE 1 TABLET BY MOUTH ONE TIME A DAY  6/1/20  Yes Kathrin Cheatham MD   losartan (COZAAR) 50 MG tablet TAKE 1 TABLET BY MOUTH ONE TIME A DAY  2/10/20  Yes Kathrin Cheatham MD   Niacin ER 1000 MG TBCR Take by mouth daily   Yes Historical Provider, MD   furosemide (LASIX) 40 MG tablet Take one tablet by mouth in the morning and one half tablet in the evening.  5/5/19  Yes Historical Provider, MD   metoprolol tartrate (LOPRESSOR) 25 MG tablet Take 0.5 tablets by mouth 2 times daily  Patient taking differently: Take 25 mg by mouth 2 times daily  9/21/18  Yes KYLE Willams - CNP   Omega-3 Fatty Acids (FISH OIL) 1000 MG CAPS Take 1,000 mg by mouth daily    Yes Historical Provider, MD   atorvastatin (LIPITOR) 40 MG tablet Take 40 mg by mouth nightly    Yes Madai Navarro MD   triamcinolone (KENALOG) 0.1 % cream Apply to rash twice twice daily (not face, armpit, or groin) 2/24/21   Juliet King MD   nitroGLYCERIN (NITROSTAT) 0.4 MG SL tablet Place 1 tablet under tongue upon chest pain, wait 5 minutes and may repeat up to 3 doses in 15 minutes. Do not crush or break. 12/20/16   Ashley LozanoKYLE - CNP   warfarin (COUMADIN) 5 MG tablet Take 5 mg by mouth Sunday and Wed 5mg, 2.5 mg  On other days    Historical Provider, MD      Current Facility-Administered Medications: 0.9 % sodium chloride infusion, , Intravenous, Continuous    Allergies:  Pcn [penicillins], Amiodarone, and Protonix [pantoprazole sodium]    Social History:   reports that she quit smoking about 2 years ago. Her smoking use included cigarettes. She has a 17.00 pack-year smoking history. She has never used smokeless tobacco. She reports that she does not drink alcohol or use drugs. Family History: family history includes COPD in her father; Cancer in her mother; Coronary Art Dis in her father; Heart Failure in her maternal grandmother. No h/o sudden cardiac death. No for premature CAD    REVIEW OF SYSTEMS:    · Constitutional: there has been no unanticipated weight loss. There's been No change in energy level, No change in activity level. · Eyes: No visual changes or diplopia. No scleral icterus. · ENT: No Headaches  · Cardiovascular: CAD  · Respiratory: No previous pulmonary problems, No cough  · Gastrointestinal: No abdominal pain. No change in bowel or bladder habits. · Genitourinary: No dysuria, trouble voiding, or hematuria. · Musculoskeletal:  No gait disturbance, No weakness or joint complaints. · Integumentary: No rash or pruritis. · Neurological: No headache, diplopia, change in muscle strength, numbness or tingling. No change in gait, balance, coordination, mood, affect, memory, mentation, behavior. · Psychiatric: No anxiety, or depression. · Endocrine: No temperature intolerance. No excessive thirst, fluid intake, or urination. No tremor. · Hematologic/Lymphatic: No abnormal bruising or bleeding, blood clots or swollen lymph nodes.   · Allergic/Immunologic: No nasal congestion or anteroapical and anterolateral ischemia, EF 41%  6. HTN  Hyperlipdemia  7. COPD  8. Chronic smoking     Patient Active Problem List   Diagnosis    S/P endometrial ablation    Essential hypertension    Mixed hyperlipidemia    CAD (coronary artery disease) with stents    SOB (shortness of breath)    [facility]) [facility]    Type 2 diabetes mellitus without complication, without long-term current use of insulin ([facility])    S/P ablation of atrial fibrillation    Bowen's disease    Atrial fibrillation with RVR ([facility])    Chronic pain disorder    Chronic pain of multiple joints    Primary osteoarthritis of both hands       RECOMMENDATIONS:  1. Proceed with cardiac cath +/- PCI    Risk, benefits and alternatives of cardiac catheterization +/- PCI  were discussed in detail. Risk of bleeding, requiring blood transfusion, vascular complication requiring surgery, renal insufficieny with need of dialysis, CVA, MI, death and anesthesia complications including intubation were discussed. Patient agrees to proceed and verbalizes understanding. Pema Schwab MD  Cardiology Fellow      Attending Physician Statement  I have discussed the case of Estee Marie including pertinent history and exam findings with the resident. I have seen and examined the patient and the key elements of the encounter have been performed by me. I agree with the assessment, plan and orders as documented by the resident With changes made to the note.      Electronically signed by Gladys Cavanaugh MD on 3/19/2021 at 3:17 PM.    [facility]      [phone]

## 2021-03-18 NOTE — PROGRESS NOTES
Air removed from TR band in  2 mL increments until all air removed. No bleeding or hematoma noted. Pressure dressing and armboard applied, radial pulse palpable.

## 2021-03-18 NOTE — OP NOTE
[facility]        Date:   3/18/2021  Patient name:  Bradford Garner  Date of admission:  3/18/2021  7:29 AM  MRN:   [id number]  YOB: 1960    CARDIAC CATHETERIZATION      Operators:    Isai Serrano MD    Procedure performed:       [x] Left Heart Catheterization. [] Graft Angiography. [x] Left Ventriculography. [] Right Heart Catheterization. [x] Coronary Angiography. [] Aortic Valve Studies. [x] PCI: OM     [] Other:         Pre Procedure Conscious Sedation Data:    ASA Class:    [] I [x] II [] III [] IV    Mallampati Class:  [] I [] II [x] III [] IV        Indication:  [] STEMI      [x] + Stress test  [] ACS      [] + EKG Changes  [] Non Q MI       [x] Significant Risk Factors  [x] Recurrent Angina             [] Diabetes Mellitus    [] New LBBB      [] Uncontrolled HTN. [] CHF / Low EF changes     [] Abnormal CTA / Ca Score  [] Other:       Procedure:  Access:  [] Femoral  [x] Radial  artery       [x] Right  [] Left    After informed consent was obtained with explanation of the risks and benefits, patient was brought to the cath lab. The right wrist was prepped and draped in sterile fashion. 1% lidocaine was used for local block. The  right radial artery was cannulated with 6  Fr sheath with brisk arterial blood return. A premixed injection of Verapamil, Heparin and Nitrogycerin was injected through the side port. The side port was frequently flushed and aspirated with normal saline. Findings:    LMCA: Normal 0% stenosis.     LAD: Mild irregularities 20-30%.     LCx: Single stenosis. Distal in-stent re-stenosis    Lesion on 1st Ob Zena% stenosis reduced to 0% with PTCA/GIRMA Xience Dionne 3.5 x 23    RCA: Mild irregularities 20-30%. with patent mid stent      Coronary Tree      Dominance: Mixed    The LV gram was performed in the SHARMA 30 position. LVEF: 40%.      Estimated blood loss: 5 ml    Conclusions:  Successful PCI / Drug Eluting Stent of the mid 1st Beta Blockers      [] Nitrate  [] Ca Channel Blockers      [] Ranolazine  [] Statin       [] Plavix/Others antiplatelets      Jone Torres MD  Fellow, [address]            Physician Statement  I have discussed the case of [facility] including pertinent history and exam findings with the resident. I have seen and examined the patient and the key elements of the encounter have been performed by me. I agree with the assessment, plan and orders as documented by the resident With changes made to the note. Procedure performed by me.     Electronically signed by Lara Giles MD on 3/19/2021 at 3:17 PM.    [facility]      [phone]

## 2021-03-18 NOTE — PROGRESS NOTES
Jennifer Nichole RN states she was called to patient room with c/o feeling dizzy. IV fluid was given and blood glucose was checked (135). HOB was lowered.

## 2021-03-18 NOTE — PROGRESS NOTES
[facility] Assessment obtained. Post cath pathway initiated. Rt radial site with Vascband intact. No hematoma noted. Restrictions reviewed with patient and aunt. Patient without complaints.

## 2021-03-18 NOTE — PROGRESS NOTES
All discharge instructions reviewed, questions answered, paper signed and given copy. Patient discharged per ambulatory (pt request) with boyfriend and belongings.

## 2021-03-18 NOTE — PROGRESS NOTES
Writer in to see patient. HOB had been elevated and pt ate some snacks. States vision looked \"wavy\" when she was watching TV and now she feels better. IV at [facility].

## 2021-03-18 NOTE — PROGRESS NOTES
Patient admitted, consent signed, all questions answered. Pt ready for procedure. Bed in low position, call light to reach with side rails up 2 of 2. Bilat groin areas clipped. Aunt at bedside with patient.

## 2021-03-19 DIAGNOSIS — Z12.11 COLON CANCER SCREENING: ICD-10-CM

## 2021-03-25 ENCOUNTER — HOSPITAL ENCOUNTER (OUTPATIENT)
Age: 61
Setting detail: SPECIMEN
Discharge: HOME | End: 2021-03-25
Payer: COMMERCIAL

## 2021-03-25 LAB
INR BLD: 1.4
PROTHROMBIN TIME: 14.6 SEC (ref 9.1–12.3)

## 2021-04-02 ENCOUNTER — HOSPITAL ENCOUNTER (OUTPATIENT)
Age: 61
Setting detail: SPECIMEN
Discharge: HOME | End: 2021-04-02
Payer: COMMERCIAL

## 2021-04-02 LAB
INR BLD: 2.2
PROTHROMBIN TIME: 22.4 SEC (ref 9.1–12.3)

## 2021-04-16 ENCOUNTER — HOSPITAL ENCOUNTER (EMERGENCY)
Age: 61
Discharge: HOME | End: 2021-04-16
Attending: EMERGENCY MEDICINE
Payer: COMMERCIAL

## 2021-04-16 ENCOUNTER — APPOINTMENT (OUTPATIENT)
Dept: GENERAL RADIOLOGY | Age: 61
End: 2021-04-16
Payer: COMMERCIAL

## 2021-04-16 VITALS
BODY MASS INDEX: 40.74 KG/M2 | DIASTOLIC BLOOD PRESSURE: 97 MMHG | RESPIRATION RATE: 16 BRPM | HEART RATE: 72 BPM | TEMPERATURE: 97.2 F | SYSTOLIC BLOOD PRESSURE: 127 MMHG | WEIGHT: 230 LBS | OXYGEN SATURATION: 95 %

## 2021-04-16 DIAGNOSIS — I48.91 ATRIAL FIBRILLATION WITH RAPID VENTRICULAR RESPONSE (HCC): Primary | ICD-10-CM

## 2021-04-16 LAB
ABSOLUTE EOS #: 0.23 K/UL (ref 0–0.44)
ABSOLUTE IMMATURE GRANULOCYTE: 0.04 K/UL (ref 0–0.3)
ABSOLUTE LYMPH #: 1.21 K/UL (ref 1.1–3.7)
ABSOLUTE MONO #: 0.55 K/UL (ref 0.1–1.2)
ANION GAP SERPL CALCULATED.3IONS-SCNC: 14 MMOL/L (ref 9–17)
BASOPHILS # BLD: 1 % (ref 0–2)
BASOPHILS ABSOLUTE: 0.07 K/UL (ref 0–0.2)
BUN BLDV-MCNC: 16 MG/DL (ref 6–20)
BUN/CREAT BLD: ABNORMAL (ref 9–20)
CALCIUM SERPL-MCNC: 9 MG/DL (ref 8.6–10.4)
CHLORIDE BLD-SCNC: 101 MMOL/L (ref 98–107)
CO2: 27 MMOL/L (ref 20–31)
CREAT SERPL-MCNC: 0.84 MG/DL (ref 0.5–0.9)
DIFFERENTIAL TYPE: ABNORMAL
EOSINOPHILS RELATIVE PERCENT: 2 % (ref 1–4)
GFR AFRICAN AMERICAN: >60 ML/MIN
GFR NON-AFRICAN AMERICAN: >60 ML/MIN
GFR SERPL CREATININE-BSD FRML MDRD: ABNORMAL ML/MIN/{1.73_M2}
GFR SERPL CREATININE-BSD FRML MDRD: ABNORMAL ML/MIN/{1.73_M2}
GLUCOSE BLD-MCNC: 199 MG/DL (ref 70–99)
HCT VFR BLD CALC: 45.1 % (ref 36.3–47.1)
HEMOGLOBIN: 14.3 G/DL (ref 11.9–15.1)
IMMATURE GRANULOCYTES: 0 %
INR BLD: 2.4
LYMPHOCYTES # BLD: 12 % (ref 24–43)
MCH RBC QN AUTO: 30.1 PG (ref 25.2–33.5)
MCHC RBC AUTO-ENTMCNC: 31.7 G/DL (ref 28.4–34.8)
MCV RBC AUTO: 94.9 FL (ref 82.6–102.9)
MONOCYTES # BLD: 6 % (ref 3–12)
NRBC AUTOMATED: 0 PER 100 WBC
PDW BLD-RTO: 12.8 % (ref 11.8–14.4)
PLATELET # BLD: 303 K/UL (ref 138–453)
PLATELET ESTIMATE: ABNORMAL
PMV BLD AUTO: 10.4 FL (ref 8.1–13.5)
POTASSIUM SERPL-SCNC: 4.6 MMOL/L (ref 3.7–5.3)
PROTHROMBIN TIME: 23.9 SEC (ref 9.1–12.3)
RBC # BLD: 4.75 M/UL (ref 3.95–5.11)
RBC # BLD: ABNORMAL 10*6/UL
SEG NEUTROPHILS: 79 % (ref 36–65)
SEGMENTED NEUTROPHILS ABSOLUTE COUNT: 7.68 K/UL (ref 1.5–8.1)
SODIUM BLD-SCNC: 142 MMOL/L (ref 135–144)
TROPONIN INTERP: ABNORMAL
TROPONIN INTERP: ABNORMAL
TROPONIN T: ABNORMAL NG/ML
TROPONIN T: ABNORMAL NG/ML
TROPONIN, HIGH SENSITIVITY: 17 NG/L (ref 0–14)
TROPONIN, HIGH SENSITIVITY: 19 NG/L (ref 0–14)
WBC # BLD: 9.8 K/UL (ref 3.5–11.3)
WBC # BLD: ABNORMAL 10*3/UL

## 2021-04-16 PROCEDURE — 85610 PROTHROMBIN TIME: CPT

## 2021-04-16 PROCEDURE — 85025 COMPLETE CBC W/AUTO DIFF WBC: CPT

## 2021-04-16 PROCEDURE — 96361 HYDRATE IV INFUSION ADD-ON: CPT

## 2021-04-16 PROCEDURE — 80048 BASIC METABOLIC PNL TOTAL CA: CPT

## 2021-04-16 PROCEDURE — 99284 EMERGENCY DEPT VISIT MOD MDM: CPT

## 2021-04-16 PROCEDURE — 96375 TX/PRO/DX INJ NEW DRUG ADDON: CPT

## 2021-04-16 PROCEDURE — 84484 ASSAY OF TROPONIN QUANT: CPT

## 2021-04-16 PROCEDURE — 96374 THER/PROPH/DIAG INJ IV PUSH: CPT

## 2021-04-16 PROCEDURE — 93005 ELECTROCARDIOGRAM TRACING: CPT | Performed by: STUDENT IN AN ORGANIZED HEALTH CARE EDUCATION/TRAINING PROGRAM

## 2021-04-16 PROCEDURE — 71045 X-RAY EXAM CHEST 1 VIEW: CPT

## 2021-04-16 PROCEDURE — 2500000003 HC RX 250 WO HCPCS: Performed by: STUDENT IN AN ORGANIZED HEALTH CARE EDUCATION/TRAINING PROGRAM

## 2021-04-16 PROCEDURE — 2580000003 HC RX 258: Performed by: STUDENT IN AN ORGANIZED HEALTH CARE EDUCATION/TRAINING PROGRAM

## 2021-04-16 RX ORDER — DILTIAZEM HYDROCHLORIDE 5 MG/ML
20 INJECTION INTRAVENOUS ONCE
Status: COMPLETED | OUTPATIENT
Start: 2021-04-16 | End: 2021-04-16

## 2021-04-16 RX ORDER — 0.9 % SODIUM CHLORIDE 0.9 %
500 INTRAVENOUS SOLUTION INTRAVENOUS ONCE
Status: COMPLETED | OUTPATIENT
Start: 2021-04-16 | End: 2021-04-16

## 2021-04-16 RX ORDER — METOPROLOL TARTRATE 50 MG/1
25 TABLET, FILM COATED ORAL 3 TIMES DAILY
Status: DISCONTINUED | OUTPATIENT
Start: 2021-04-16 | End: 2021-04-16 | Stop reason: HOSPADM

## 2021-04-16 RX ORDER — METOPROLOL TARTRATE 5 MG/5ML
5 INJECTION INTRAVENOUS ONCE
Status: COMPLETED | OUTPATIENT
Start: 2021-04-16 | End: 2021-04-16

## 2021-04-16 RX ADMIN — METOPROLOL TARTRATE 5 MG: 1 INJECTION, SOLUTION INTRAVENOUS at 08:50

## 2021-04-16 RX ADMIN — SODIUM CHLORIDE 500 ML: 9 INJECTION, SOLUTION INTRAVENOUS at 08:50

## 2021-04-16 RX ADMIN — DILTIAZEM HYDROCHLORIDE 20 MG: 5 INJECTION INTRAVENOUS at 09:34

## 2021-04-16 ASSESSMENT — ENCOUNTER SYMPTOMS
WHEEZING: 0
BACK PAIN: 0
NAUSEA: 0
COUGH: 0
VOMITING: 0
ABDOMINAL PAIN: 0
SHORTNESS OF BREATH: 1

## 2021-04-16 NOTE — ED PROVIDER NOTES
[facility]  ED  Emergency Department Encounter  Emergency Medicine Resident     Pt Name: Ida Bergman  MRN: [id number]  Kaykaygfflorentino 1960  Date of evaluation: 21  PCP:  William Syed Dr       Chief Complaint   Patient presents with    Palpitations       HISTORY Liam  (Location/Symptom, Timing/Onset, Context/Setting, Quality, Duration, Modifying Factors,Severity.)      Ida Bergman is a 63 yo female who presents with palpitations. Patient states that she has an extensive history of atrial fibrillation anticoagulated on Coumadin and for the past 2 days she has been having palpitations which worsened this morning. She denies any chest pain but states she has some mild shortness of breath with it. She notes that she has had multiple cardiac stents and had a cardiac stent placed 1 month ago here at this facility. Denies any fevers, chills, sweats, cough, syncope, lightheadedness, abdominal pain, nausea vomiting. PAST MEDICAL / SURGICAL / SOCIAL / FAMILY HISTORY      has a past medical history of Abnormal Pap smear, Allergy, Anemia, Anticoagulated, Anticoagulated on Coumadin, Atrial fibrillation ([address] Blood transfusion, CAD (coronary artery disease), COPD (chronic obstructive pulmonary disease) ([address] Eczema, Examination of participant in clinical trial, Headache, Heart disease, Hyperlipidemia, Hypertension, Menopausal symptoms, MI, old, PAF (paroxysmal atrial fibrillation) ([address] Primary osteoarthritis of both hands, Smoker, STEMI (ST elevation myocardial infarction) ([address] and Type 2 diabetes mellitus without complication, without long-term current use of insulin ([address] has a past surgical history that includes  section (); Dilation & curettage (); Tubal ligation (); Colposcopy (); laparoscopy (); Endometrial ablation (2012); Coronary angioplasty with stent (2016);  Cardioversion (2018); Breast Cancer Neg Hx     Colon Cancer Neg Hx     Diabetes Neg Hx     Eclampsia Neg Hx     Hypertension Neg Hx     Ovarian Cancer Neg Hx      Labor Neg Hx     Spont Abortions Neg Hx     Stroke Neg Hx         Allergies:  Pcn [penicillins], Amiodarone, and Protonix [pantoprazole sodium]    Home Medications:  Prior to Admission medications    Medication Sig Start Date End Date Taking? Authorizing Provider   aspirin EC 81 MG EC tablet Take 1 tablet by mouth daily 3/18/21  Yes Eduard Mercado MD   metFORMIN (GLUCOPHAGE) 1000 MG tablet TAKE 1 TABLET BY MOUTH TWO TIMES A DAY WITH MEALS. 3/14/21  Yes Ellen Manriquez PA-C   tiZANidine (ZANAFLEX) 4 MG tablet TAKE 1 TABLET BY MOUTH NIGHTLY AS NEEDED (FOR BACK PAIN) 3/3/21  Yes Ellen Manriquez PA-C   sotalol (BETAPACE) 80 MG tablet TAKE 1 TABLET BY MOUTH TWO TIMES A DAY 20  Yes Historical Provider, MD   camphor-menthol (SARNA) 0.5-0.5 % lotion Apply topically up to 4x daily as needed for itch 21  Yes Edwardo Silva MD   clobetasol (TEMOVATE) 0.05 % ointment Apply to rash twice daily x 2 weeks for flares (not face, armpit or groin) 21  Yes Edwardo Silva MD   triamcinolone (KENALOG) 0.1 % cream Apply to rash twice twice daily (not face, armpit, or groin) 21  Yes Edwardo Silva MD   glipiZIDE (GLUCOTROL) 10 MG tablet Take 1 tablet by mouth 2 times daily 20  Yes Ellen Manriquez PA-C   clopidogrel (PLAVIX) 75 MG tablet TAKE 1 TABLET BY MOUTH ONE TIME A DAY  20  Yes Kathrin Santo MD   losartan (COZAAR) 50 MG tablet TAKE 1 TABLET BY MOUTH ONE TIME A DAY  2/10/20  Yes Kathrin Santo MD   Niacin ER 1000 MG TBCR Take by mouth daily   Yes Historical Provider, MD   furosemide (LASIX) 40 MG tablet Take one tablet by mouth in the morning and one half tablet in the evening.  19  Yes Historical Provider, MD   metoprolol tartrate (LOPRESSOR) 25 MG tablet Take 0.5 tablets by mouth 2 times daily  Patient taking differently: Take 25 mg by mouth 2 times daily  9/21/18  Yes Barb Coad, APRN - CNP   Omega-3 Fatty Acids (FISH OIL) 1000 MG CAPS Take 1,000 mg by mouth daily    Yes Historical Provider, MD   nitroGLYCERIN (NITROSTAT) 0.4 MG SL tablet Place 1 tablet under tongue upon chest pain, wait 5 minutes and may repeat up to 3 doses in 15 minutes. Do not crush or break. 12/20/16  Yes KYLE Ortega - CNP   warfarin (COUMADIN) 5 MG tablet Take 5 mg by mouth Sunday and Wed 5mg, 2.5 mg  On other days   Yes Historical Provider, MD   atorvastatin (LIPITOR) 40 MG tablet Take 40 mg by mouth nightly    Yes Luis Dee MD       REVIEW OFSYSTEMS    (2-9 systems for level 4, 10 or more for level 5)      Review of Systems   Constitutional: Negative for chills and fever. HENT: Negative. Eyes: Negative for visual disturbance. Respiratory: Positive for shortness of breath. Negative for cough and wheezing. Cardiovascular: Positive for palpitations. Negative for chest pain and leg swelling. Gastrointestinal: Negative for abdominal pain, nausea and vomiting. Musculoskeletal: Negative for back pain and neck pain. Skin: Negative for rash. Neurological: Negative for dizziness, syncope, weakness, light-headedness, numbness and headaches. Hematological:        Anticoagulated on Coumadin. PHYSICAL EXAM   (up to 7 for level 4, 8 or more forlevel 5)      INITIAL VITALS:   ED Triage Vitals [04/16/21 0827]   BP Temp Temp Source Pulse Resp SpO2 Height Weight   (!) 159/107 97.2 °F (36.2 °C) Oral 125 16 90 % -- 230 lb (104.3 kg)       Physical Exam  Vitals signs and nursing note reviewed. Constitutional:       General: She is not in acute distress. Appearance: Normal appearance. She is not ill-appearing, toxic-appearing or diaphoretic. Cardiovascular:      Rate and Rhythm: Tachycardia present. Rhythm irregular. Heart sounds: No murmur.    Pulmonary:      Effort: Pulmonary effort is normal.      Breath sounds: Normal breath sounds. Abdominal:      General: There is no distension. Palpations: Abdomen is soft. Tenderness: There is no abdominal tenderness. There is no guarding. Musculoskeletal:         General: No tenderness. Right lower leg: No edema. Left lower leg: No edema. Skin:     General: Skin is warm and dry. Neurological:      Mental Status: She is alert and oriented to person, place, and time. DIFFERENTIAL  DIAGNOSIS     PLAN (LABS / IMAGING / EKG):  Orders Placed This Encounter   Procedures    XR CHEST PORTABLE    CBC WITH AUTO DIFFERENTIAL    BASIC METABOLIC PANEL    Troponin    Troponin    Protime-INR    Inpatient consult to Cardiology    EKG 12 Lead       MEDICATIONS ORDERED:  Orders Placed This Encounter   Medications    metoprolol (LOPRESSOR) injection 5 mg    0.9 % sodium chloride bolus    dilTIAZem injection 20 mg    DISCONTD: metoprolol tartrate (LOPRESSOR) tablet 25 mg       DDX: A. fib with RVR, ACS, dehydration,       Initial MDM/Plan/ED course: 64 y.o. female who presents with palpitations. On exam patient has a tachycardic irregularly irregular heart rate but otherwise vitals normal patient is in no acute distress and only complaining of mild very mild shortness of breath. On physical exam heart rate is as mentioned above however lung sounds are clear, abdomen soft nontender, no leg swelling calf tenderness. Patient has history of longstanding atrial fibrillation anticoagulated on Coumadin and is familiar with these episodes. Patient has been compliant with all her medications. Cardiac work-up with troponins x2 was obtained and patient was first treated with 5 mg of IV Lopressor without any change in heart rate. Patient was then treated with a 20 mg bolus of Cardizem which brought her heart rate down to the 70s at which he remained for over an hour. Cardiac work-up unremarkable.   I did's consult cardiology who agrees that the patient may be discharged [facility]    Schedule an appointment as soon as possible for a visit   Follow up      DISCHARGE MEDICATIONS:  Discharge Medication List as of 4/16/2021 11:02 AM          Elsy Valencia DO  Emergency Medicine Resident    (Please note that portions of this note were completed with a voice recognition program.Efforts were made to edit the dictations but occasionally words are mis-transcribed.)        Gunnar Oh DO  Resident  04/16/21 1726

## 2021-04-16 NOTE — ED NOTES
Pt to the ER with c/o heart palpitations, has h/o A. Fib with RVR and is currently in A.fib upon arrival. Denies chest pain, cough, dizziness. Pt has h/o ablation and cardiac cath with no stent placement. Placed on full cardiac monitor, no acute distress noted, rr even and NL.  Dr. Garcia at the bedside to evaluate the pt     Janie Aguirre RN  04/16/21 8262

## 2021-04-16 NOTE — ED PROVIDER NOTES
[facility]  Emergency Department  Faculty Attestation     I performed a history and physical examination of the patient and discussed management with the resident. I reviewed the residents note and agree with the documented findings and plan of care. Any areas of disagreement are noted on the chart. I was personally present for the key portions of any procedures. I have documented in the chart those procedures where I was not present during the key portions. I have reviewed the emergency nurses triage note. I agree with the chief complaint, past medical history, past surgical history, allergies, medications, social and family history as documented unless otherwise noted below. For Physician Assistant/ Nurse Practitioner cases/documentation I have personally evaluated this patient and have completed at least one if not all key elements of the E/M (history, physical exam, and MDM). Additional findings are as noted. Primary Care Physician:  Pavan Wharton PA-C    Screenings:  [unfilled]    CHIEF COMPLAINT       Chief Complaint   Patient presents with    Palpitations       RECENT VITALS:   Temp: 97.2 °F (36.2 °C),  Pulse: 125, Resp: 16, BP: (!) 159/107    LABS:  Labs Reviewed   CBC WITH AUTO DIFFERENTIAL - Abnormal; Notable for the following components:       Result Value    Seg Neutrophils 79 (*)     Lymphocytes 12 (*)     All other components within normal limits   BASIC METABOLIC PANEL   TROPONIN   TROPONIN   PROTIME-INR       Radiology  XR CHEST PORTABLE    (Results Pending)         EKG:   EKG Interpretation    Interpreted by me    Rhythm: Atrial fibrillation  Rate: Tachycardia  Axis: normal  Ectopy: none  Conduction: normal  ST Segments: Depression laterally  T Waves:  Inversion laterally  Q Waves: none  Poor R wave progression anteriorly    Clinical Impression: Rapid atrial fibrillation, nonspecific ST and T wave changes consistent with ischemia    Attending Physician Additional  Notes    Patient has a history of paroxysmal atrial fibrillation and has had palpitations since Wednesday. She normally takes metoprolol though she is on 12.5 mg instead of 25 mg twice daily after her recent cardiac stent last month, also takes sotalol twice daily but takes an extra sotalol each day when she has A. fib. There is some shortness of breath on exertion. No fevers chills sweats cough sputum hemoptysis leg pain calf swelling or mobility. She has chronic issues with insomnia but no different than normal.  No alcohol use. No chest pain or strokelike symptoms. On exam she has tachycardia, mild tachypnea, saturations 94% on room air. No JVD. Lungs are clear. No edema cords Homans or calf tenderness. Impression is rapid atrial fibrillation. Plan is IV rate control with her beta-blocker, laboratory studies, consider magnesium, consultation with her cardiologist.            Sofy Salas.  Agnes Reyes MD, [facility]  Attending Emergency  Physician                Reji Harris MD  04/16/21 6935

## 2021-04-16 NOTE — CONSULTS
not help. Her highest heart rate was in 1 20- 30.  130 noted in the hospital.  Patient was given 1 dose of Cardizem which brought back her rate down to 80. Patient has been compliant to medications, had a recent heart cath 1 month ago, on aspirin, Plavix, Coumadin. .  Aspirin on Monday and continue with Plavix and Coumadin. INR is 2.4. Initial troponin was 19, 17    Past Medical History:   has a past medical history of Abnormal Pap smear, Allergy, Anemia, Anticoagulated, Anticoagulated on Coumadin, Atrial fibrillation ([address] Blood transfusion, CAD (coronary artery disease), COPD (chronic obstructive pulmonary disease) ([address] Eczema, Examination of participant in clinical trial, Headache, Heart disease, Hyperlipidemia, Hypertension, Menopausal symptoms, MI, old, PAF (paroxysmal atrial fibrillation) ([address] Primary osteoarthritis of both hands, Smoker, STEMI (ST elevation myocardial infarction) ([address] and Type 2 diabetes mellitus without complication, without long-term current use of insulin ([address] Past Surgical History:   has a past surgical history that includes  section (); Dilation & curettage (); Tubal ligation (); Colposcopy (); laparoscopy (); Endometrial ablation (2012); Coronary angioplasty with stent (2016); Cardioversion (2018); transesophageal echocardiogram (2014); Cardioversion (2014); Coronary angioplasty with stent (); Cardioversion (); Cardioversion (2018); ablation of dysrhythmic focus (2018); and Cardiac catheterization (2021). Home Medications:    Prior to Admission medications    Medication Sig Start Date End Date Taking? Authorizing Provider   aspirin EC 81 MG EC tablet Take 1 tablet by mouth daily 3/18/21  Yes Elizabeth Booth MD   metFORMIN (GLUCOPHAGE) 1000 MG tablet TAKE 1 TABLET BY MOUTH TWO TIMES A DAY WITH MEALS.   3/14/21  Yes Ellen Manriquez PA-C   tiZANidine (ZANAFLEX) 4 MG tablet TAKE 1 TABLET BY MOUTH NIGHTLY AS NEEDED (FOR BACK PAIN) 3/3/21  Yes Ellen Manriquez PA-C   sotalol (BETAPACE) 80 MG tablet TAKE 1 TABLET BY MOUTH TWO TIMES A DAY 12/23/20  Yes Historical Provider, MD   camphor-menthol (SARNA) 0.5-0.5 % lotion Apply topically up to 4x daily as needed for itch 2/24/21  Yes Isabel Mueller MD   clobetasol (TEMOVATE) 0.05 % ointment Apply to rash twice daily x 2 weeks for flares (not face, armpit or groin) 2/24/21  Yes Isabel Mueller MD   triamcinolone (KENALOG) 0.1 % cream Apply to rash twice twice daily (not face, armpit, or groin) 2/24/21  Yes Isabel Mueller MD   glipiZIDE (GLUCOTROL) 10 MG tablet Take 1 tablet by mouth 2 times daily 12/2/20  Yes Ellen Manriquez PA-C   clopidogrel (PLAVIX) 75 MG tablet TAKE 1 TABLET BY MOUTH ONE TIME A DAY  6/1/20  Yes Kathrin Preciado MD   losartan (COZAAR) 50 MG tablet TAKE 1 TABLET BY MOUTH ONE TIME A DAY  2/10/20  Yes Kathrin Preciado MD   Niacin ER 1000 MG TBCR Take by mouth daily   Yes Historical Provider, MD   furosemide (LASIX) 40 MG tablet Take one tablet by mouth in the morning and one half tablet in the evening. 5/5/19  Yes Historical Provider, MD   metoprolol tartrate (LOPRESSOR) 25 MG tablet Take 0.5 tablets by mouth 2 times daily  Patient taking differently: Take 25 mg by mouth 2 times daily  9/21/18  Yes [facility], APRN - CNP   Omega-3 Fatty Acids (FISH OIL) 1000 MG CAPS Take 1,000 mg by mouth daily    Yes Historical Provider, MD   nitroGLYCERIN (NITROSTAT) 0.4 MG SL tablet Place 1 tablet under tongue upon chest pain, wait 5 minutes and may repeat up to 3 doses in 15 minutes. Do not crush or break. 12/20/16  Yes KYLE Capone CNP   warfarin (COUMADIN) 5 MG tablet Take 5 mg by mouth Sunday and Wed 5mg, 2.5 mg  On other days   Yes Historical Provider, MD   atorvastatin (LIPITOR) 40 MG tablet Take 40 mg by mouth nightly    Yes Kayley Rodriguez MD      No current facility-administered medications for this encounter. Allergies:  Pcn [penicillins], Amiodarone, and Protonix [pantoprazole sodium]    Social History:   reports that she quit smoking about 2 years ago. Her smoking use included cigarettes. She has a 17.00 pack-year smoking history. She has never used smokeless tobacco. She reports that she does not drink alcohol or use drugs. Family History: family history includes COPD in her father; Cancer in her mother; Coronary Art Dis in her father; Heart Failure in her maternal grandmother. No h/o sudden cardiac death. No for premature CAD    REVIEW OF SYSTEMS:    · Constitutional: there has been no unanticipated weight loss. There's been No change in energy level, No change in activity level. · Eyes: No visual changes or diplopia. No scleral icterus. · ENT: No Headaches  · Cardiovascular: + cardiac history  · Respiratory: No previous pulmonary problems, No cough  · Gastrointestinal: No abdominal pain. No change in bowel or bladder habits. · Genitourinary: No dysuria, trouble voiding, or hematuria. · Musculoskeletal:  No gait disturbance, No weakness or joint complaints. · Integumentary: No rash or pruritis. · Neurological: No headache, diplopia, change in muscle strength, numbness or tingling. No change in gait, balance, coordination, mood, affect, memory, mentation, behavior. · Psychiatric: No anxiety, or depression. · Endocrine: No temperature intolerance. No excessive thirst, fluid intake, or urination. No tremor. · Hematologic/Lymphatic: No abnormal bruising or bleeding, blood clots or swollen lymph nodes. · Allergic/Immunologic: No nasal congestion or hives. PHYSICAL EXAM:      /79   Pulse 72   Temp 97.2 °F (36.2 °C) (Oral)   Resp 16   Wt 230 lb (104.3 kg)   LMP 07/03/2014 (Approximate)   SpO2 98%   BMI 40.74 kg/m²    Constitutional and General Appearance: alert, cooperative, no distress and appears stated age  [de-identified]: PERRL, no cervical lymphadenopathy. No masses palpable.  Normal oral mucosa  Respiratory:  · Normal excursion and expansion without use of accessory muscles  · Resp Auscultation: Good respiratory effort. No for increased work of breathing. On auscultation: clear to auscultation bilaterally  Cardiovascular:  · The apical impulse is not displaced  Irregular rate, in afib  · Jugular venous pulsation Normal  · The carotid upstroke is normal in amplitude and contour without delay or bruit  · Peripheral pulses are symmetrical and full   Abdomen:   · No masses or tenderness  · Bowel sounds present  Extremities:  ·  No Cyanosis or Clubbing  ·  Lower extremity edema: No  ·  Skin: Warm and dry  Neurological:  · Alert and oriented. · Moves all extremities well  · No abnormalities of mood, affect, memory, mentation, or behavior are noted    DATA:    Diagnostics:    Cath  Conclusions:  Successful PCI / Drug Eluting Stent of the mid 1st OM  Patent mid RCA stent. Minimal LAD disease. Mildly impaired ventricular function.     Recommendations:  1. Medical Therapy. 2. Risk Factors Modification. 3. Post Cath Protocol  4. Aspirin, plavix and Warfarin for one month, afterwards stop aspirin and continue plavix and warfarin    Labs:     CBC:   Recent Labs     04/16/21  0846   WBC 9.8   HGB 14.3   HCT 45.1        BMP:   Recent Labs     04/16/21  0846      K 4.6   CO2 27   BUN 16   CREATININE 0.84   LABGLOM >60   GLUCOSE 199*     BNP: No results for input(s): BNP in the last 72 hours. PT/INR:   Recent Labs     04/16/21  0846   PROTIME 23.9*   INR 2.4     APTT:No results for input(s): APTT in the last 72 hours. CARDIAC ENZYMES:No results for input(s): CKTOTAL, CKMB, CKMBINDEX, TROPONINI in the last 72 hours. FASTING LIPID PANEL:  Lab Results   Component Value Date    HDL 37 11/20/2020    LDLCALC 40 08/12/2015    TRIG 200 11/20/2020     LIVER PROFILE:No results for input(s): AST, ALT, LABALBU in the last 72 hours. IMPRESSION:    1.  CAD- s/p,Successful PCI / Drug Eluting Stent of the mid 1st OM  2. Previous drug-eluting stents to the OM1 and RCA in 2011. 3. Preserved LV systolic function, ejection fraction of 60%. 4. P Afib with cardioversion in past and RFA 9/2018- last CV in 12/2020  5. Chest pain   6. Abnormal stress test - moderate size anteroapical and anterolateral ischemia, EF 41%  7. HTN  Hyperlipdemia  8. COPD  9. Chronic smoking     RECOMMENDATIONS:  Restart home  Medications  Increase lopressor to 25 mg tid  Okay to follow up out patient in the office on Monday. Discussed with patient and Nurse.     Electronically signed by Gerber Fay MD on 4/16/2021 at 10:35 [address] [place] [address] [facility]      [phone]

## 2021-04-17 LAB
EKG ATRIAL RATE: 130 BPM
EKG Q-T INTERVAL: 318 MS
EKG QRS DURATION: 78 MS
EKG QTC CALCULATION (BAZETT): 467 MS
EKG R AXIS: -3 DEGREES
EKG T AXIS: -168 DEGREES
EKG VENTRICULAR RATE: 130 BPM

## 2021-04-17 PROCEDURE — 93010 ELECTROCARDIOGRAM REPORT: CPT | Performed by: INTERNAL MEDICINE

## 2021-04-19 ENCOUNTER — HOSPITAL ENCOUNTER (OUTPATIENT)
Age: 61
Setting detail: SPECIMEN
Discharge: HOME | End: 2021-04-19
Payer: COMMERCIAL

## 2021-04-19 LAB
INR BLD: 2.2
PROTHROMBIN TIME: 21.7 SEC (ref 9.1–12.3)

## 2021-05-20 ENCOUNTER — HOSPITAL ENCOUNTER (OUTPATIENT)
Age: 61
Setting detail: SPECIMEN
Discharge: HOME | End: 2021-05-20
Payer: COMMERCIAL

## 2021-05-20 LAB
INR BLD: 1.9
PROTHROMBIN TIME: 18.9 SEC (ref 9.1–12.3)

## 2021-06-03 ENCOUNTER — HOSPITAL ENCOUNTER (OUTPATIENT)
Age: 61
Setting detail: SPECIMEN
Discharge: HOME | End: 2021-06-03
Payer: COMMERCIAL

## 2021-06-03 LAB
INR BLD: 2.9
PROTHROMBIN TIME: 28.3 SEC (ref 9.1–12.3)

## 2021-06-04 RX ORDER — TIZANIDINE 4 MG/1
TABLET ORAL
Qty: 90 TABLET | Refills: 0 | Status: SHIPPED | OUTPATIENT
Start: 2021-06-04 | End: 2021-08-31

## 2021-06-24 ENCOUNTER — HOSPITAL ENCOUNTER (OUTPATIENT)
Age: 61
Setting detail: SPECIMEN
Discharge: HOME | End: 2021-06-24
Payer: COMMERCIAL

## 2021-06-24 LAB
INR BLD: 2.1
PROTHROMBIN TIME: 21.5 SEC (ref 9.1–12.3)

## 2021-07-07 ENCOUNTER — OFFICE VISIT (OUTPATIENT)
Dept: FAMILY MEDICINE CLINIC | Age: 61
End: 2021-07-07
Payer: COMMERCIAL

## 2021-07-07 VITALS
TEMPERATURE: 97 F | WEIGHT: 220 LBS | BODY MASS INDEX: 38.98 KG/M2 | HEIGHT: 63 IN | SYSTOLIC BLOOD PRESSURE: 128 MMHG | OXYGEN SATURATION: 97 % | DIASTOLIC BLOOD PRESSURE: 76 MMHG | HEART RATE: 73 BPM

## 2021-07-07 DIAGNOSIS — E11.9 TYPE 2 DIABETES MELLITUS WITHOUT COMPLICATION, WITHOUT LONG-TERM CURRENT USE OF INSULIN (HCC): Primary | ICD-10-CM

## 2021-07-07 LAB — HBA1C MFR BLD: 5.9 %

## 2021-07-07 PROCEDURE — 99213 OFFICE O/P EST LOW 20 MIN: CPT | Performed by: PHYSICIAN ASSISTANT

## 2021-07-07 PROCEDURE — 83036 HEMOGLOBIN GLYCOSYLATED A1C: CPT | Performed by: PHYSICIAN ASSISTANT

## 2021-07-07 ASSESSMENT — ENCOUNTER SYMPTOMS
DIARRHEA: 0
COLOR CHANGE: 0
ABDOMINAL PAIN: 0
NAUSEA: 0
COUGH: 0
SHORTNESS OF BREATH: 0
VOMITING: 0
CONSTIPATION: 0
WHEEZING: 0

## 2021-07-07 NOTE — PROGRESS NOTES
[address] [facility]  [address]  [facility] [address]  Dept: [phone]  Dept Fax: [phone]    Torres Zhu is a 64 y.o. female who presents today for her medical conditions/complaintsas noted below. Torres Zhu is c/o of   Chief Complaint   Patient presents with    Diabetes         HPI:     Diabetes  She presents for her follow-up diabetic visit. She has type 2 diabetes mellitus. Her disease course has been stable. There are no hypoglycemic associated symptoms. Pertinent negatives for hypoglycemia include no nervousness/anxiousness or pallor. There are no diabetic associated symptoms. Pertinent negatives for diabetes include no chest pain, no fatigue and no weakness. There are no hypoglycemic complications. Symptoms are stable. There are no diabetic complications. Risk factors for coronary artery disease include diabetes mellitus, obesity and post-menopausal. Current diabetic treatment includes oral agent (dual therapy). She is compliant with treatment most of the time. She is following a generally healthy diet. She participates in exercise intermittently. An ACE inhibitor/angiotensin II receptor blocker is being taken. patient state eating healthy and feels less stressed. She states living with her boyfriend now and has been much better with good support. Hemoglobin A1C (%)   Date Value   07/07/2021 5.9   03/03/2021 6.2   11/20/2020 7.7 (H)             ( goal A1Cis < 7)   Microalb/Crt.  Ratio (mcg/mg creat)   Date Value   11/20/2020 CANNOT BE CALCULATED     LDL Cholesterol (mg/dL)   Date Value   11/20/2020 73   02/11/2019 48   07/07/2017 48     LDL Calculated (mg/dL)   Date Value   08/12/2015 40   09/18/2014 71       (goal LDL is <100)   AST (U/L)   Date Value   11/20/2020 18     ALT (U/L)   Date Value   11/20/2020 18     BUN (mg/dL)   Date Value   04/16/2021 16     BP Readings from Last 3 Encounters:   07/07/21 128/76   04/16/21 (!) 127/97   21 127/65          (goal 120/80)    Past Medical History:   Diagnosis Date    Abnormal Pap smear     Had colposcopy    Allergy     PCN    Anemia     Anticoagulated     ON COUMADIN    Anticoagulated on Coumadin     Atrial fibrillation ([address] 2014    Blood transfusion 2011    4 units    CAD (coronary artery disease)     COPD (chronic obstructive pulmonary disease) (HCC)     Eczema     Examination of participant in clinical trial 2011    end date 3/25/15    Headache 2014    Heart disease 2011    card stents x 2    Hyperlipidemia     Hypertension     Menopausal symptoms     MI, old     PAF (paroxysmal atrial fibrillation) ([address]     s/p cardioversion Dec 2015     Primary osteoarthritis of both hands 2019    Smoker     STEMI (ST elevation myocardial infarction) ([address] 2016    INFERIOR / OCCLUDED RCA    Type 2 diabetes mellitus without complication, without long-term current use of insulin ([address] 2017      Past Surgical History:   Procedure Laterality Date    ABLATION OF DYSRHYTHMIC FOCUS  2018    A FIB    CARDIAC CATHETERIZATION  2021    CARDIOVERSION  2018    DR Rafia Paredes  SUCCESSFUL    CARDIOVERSION  2014    CARDIOVERSION  2015    CARDIOVERSION  2018    DR Rafia Paredes     SECTION      COLPOSCOPY      CORONARY ANGIOPLASTY WITH STENT PLACEMENT  2016    PTCA AND GIRMA TO RCA    CORONARY ANGIOPLASTY WITH STENT PLACEMENT      DILATION AND CURETTAGE      ENDOMETRIAL ABLATION  2012    hydrothermal    LAPAROSCOPY      TRANSESOPHAGEAL ECHOCARDIOGRAM  2014    TUBAL LIGATION         Family History   Problem Relation Age of Onset    COPD Father     Coronary Art Dis Father     Cancer Mother     Heart Failure Maternal Grandmother     Breast Cancer Neg Hx     Colon Cancer Neg Hx     Diabetes Neg Hx     Eclampsia Neg Hx     Hypertension Neg Hx     Ovarian Cancer Neg Hx      Labor Neg Hx     Spont Abortions Neg Hx     Stroke Neg Hx        Social History     Tobacco Use    Smoking status: Former Smoker     Packs/day: 0.50     Years: 34.00     Pack years: 17.00     Types: Cigarettes     Quit date: 10/23/2018     Years since quittin.7    Smokeless tobacco: Never Used    Tobacco comment: smokes 5 cig/day   Substance Use Topics    Alcohol use: No     Alcohol/week: 0.0 standard drinks      Current Outpatient Medications   Medication Sig Dispense Refill    metFORMIN (GLUCOPHAGE) 1000 MG tablet TAKE 1 TABLET BY MOUTH TWO TIMES A DAY WITH MEALS 180 tablet 0    tiZANidine (ZANAFLEX) 4 MG tablet TAKE 1 TABLET BY MOUTH EVERY DAY AT NIGHT AS NEEDED FOR back PAIN 90 tablet 0    sotalol (BETAPACE) 80 MG tablet TAKE 1 TABLET BY MOUTH TWO TIMES A DAY      clopidogrel (PLAVIX) 75 MG tablet TAKE 1 TABLET BY MOUTH ONE TIME A DAY  15 tablet 0    losartan (COZAAR) 50 MG tablet TAKE 1 TABLET BY MOUTH ONE TIME A DAY  90 tablet 1    Niacin ER 1000 MG TBCR Take by mouth daily      furosemide (LASIX) 40 MG tablet Take one tablet by mouth in the morning and one half tablet in the evening. 3    metoprolol tartrate (LOPRESSOR) 25 MG tablet Take 0.5 tablets by mouth 2 times daily (Patient taking differently: Take 25 mg by mouth 2 times daily ) 60 tablet 3    Omega-3 Fatty Acids (FISH OIL) 1000 MG CAPS Take 1,000 mg by mouth daily       nitroGLYCERIN (NITROSTAT) 0.4 MG SL tablet Place 1 tablet under tongue upon chest pain, wait 5 minutes and may repeat up to 3 doses in 15 minutes. Do not crush or break. 25 tablet 3    warfarin (COUMADIN) 5 MG tablet Take 5 mg by mouth  and Wed 5mg, 2.5 mg  On other days      atorvastatin (LIPITOR) 40 MG tablet Take 40 mg by mouth nightly        No current facility-administered medications for this visit.      Allergies   Allergen Reactions    Pcn [Penicillins] Hives    Amiodarone     Protonix [Pantoprazole Sodium] Itching       Health Maintenance   Topic Date Due    Hepatitis B vaccine (1 of 3 - Risk 3-dose series) Never done    Shingles Vaccine (1 of 2) Never done    Cervical cancer screen  03/03/2022 (Originally 2/3/2018)    Diabetic retinal exam  03/13/2022 (Originally 8/28/1970)    Flu vaccine (1) 09/01/2021    Diabetic microalbuminuria test  11/20/2021    Lipid screen  11/20/2021    Potassium monitoring  04/16/2022    Creatinine monitoring  04/16/2022    Diabetic foot exam  07/07/2022    A1C test (Diabetic or Prediabetic)  07/07/2022    Breast cancer screen  03/05/2023    Colon cancer screen fecal DNA test (Cologuard)  03/15/2024    DTaP/Tdap/Td vaccine (2 - Td or Tdap) 02/28/2027    Pneumococcal 0-64 years Vaccine (2 of 2 - PPSV23) 08/28/2027    COVID-19 Vaccine  Completed    Hepatitis C screen  Completed    HIV screen  Completed    Hepatitis A vaccine  Aged Out    Hib vaccine  Aged Out    Meningococcal (ACWY) vaccine  Aged Out       Subjective:     Review of Systems   Constitutional: Negative for activity change, appetite change, fatigue, fever and unexpected weight change. /76 (Site: Left Upper Arm, Position: Sitting, Cuff Size: Medium Adult)   Pulse 73   Temp 97 °F (36.1 °C) (Tympanic)   Ht 5' 3\" (1.6 m)   Wt 220 lb (99.8 kg)   LMP 07/03/2014 (Approximate)   SpO2 97%   BMI 38.97 kg/m²    Respiratory: Negative for cough, shortness of breath and wheezing. Cardiovascular: Negative for chest pain, palpitations and leg swelling. Gastrointestinal: Negative for abdominal pain, constipation, diarrhea, nausea and vomiting. Genitourinary: Negative for dysuria. Skin: Negative for color change, pallor, rash and wound. Neurological: Negative for weakness. Psychiatric/Behavioral: The patient is not nervous/anxious. Objective:     Physical Exam  Vitals and nursing note reviewed. Constitutional:       General: She is not in acute distress.      Appearance: Normal review. Reviewed A1c, improved to 5.9  Patient was only using glipizide once a day at most. Will DC completely  Cont with healthy diet and regular exercise  Recommended repeat labs in 3 mo to follow A1c   Recheck in office if stable in 6 mo sooner prn  Patient agreed with treatment plan    Orders Placed This Encounter   Procedures    Comprehensive Metabolic Panel     Standing Status:   Future     Standing Expiration Date:   7/7/2022    Hemoglobin A1C     Standing Status:   Future     Standing Expiration Date:   7/7/2022    Lipid Panel     Standing Status:   Future     Standing Expiration Date:   7/7/2022     Order Specific Question:   Is Patient Fasting?/# of Hours     Answer:   yes    CBC Auto Differential     Standing Status:   Future     Standing Expiration Date:   7/7/2022    Microalbumin, Ur     Standing Status:   Future     Standing Expiration Date:   7/7/2022    POCT glycosylated hemoglobin (Hb A1C)    HM DIABETES FOOT EXAM     Results for orders placed or performed in visit on 07/07/21   POCT glycosylated hemoglobin (Hb A1C)   Result Value Ref Range    Hemoglobin A1C 5.9 %         Patient given educational materials - see patient instructions. Discussed use, benefit, and side effects of prescribed medications. All patientquestions answered. Pt voiced understanding. Reviewed health maintenance. Instructedto continue current medications, diet and exercise. Patient agreed with treatmentplan. Follow up as directed.      Electronically signed by Tristan Flores PA-C on 7/7/2021 at 10:30 AM

## 2021-07-15 ENCOUNTER — PATIENT MESSAGE (OUTPATIENT)
Dept: FAMILY MEDICINE CLINIC | Age: 61
End: 2021-07-15

## 2021-07-15 RX ORDER — GLIPIZIDE 5 MG/1
5 TABLET ORAL DAILY
Qty: 30 TABLET | Refills: 3 | Status: SHIPPED | OUTPATIENT
Start: 2021-07-15 | End: 2021-11-21 | Stop reason: SDUPTHER

## 2021-07-28 ENCOUNTER — HOSPITAL ENCOUNTER (OUTPATIENT)
Age: 61
Setting detail: SPECIMEN
Discharge: HOME | End: 2021-07-28
Payer: COMMERCIAL

## 2021-07-28 LAB
INR BLD: 2.3
PROTHROMBIN TIME: 23.1 SEC (ref 9.1–12.3)

## 2021-08-05 ENCOUNTER — HOSPITAL ENCOUNTER (OUTPATIENT)
Dept: CARDIAC CATH/INVASIVE PROCEDURES | Age: 61
Discharge: HOME | End: 2021-08-05
Payer: COMMERCIAL

## 2021-08-05 VITALS
HEART RATE: 81 BPM | HEIGHT: 63 IN | RESPIRATION RATE: 20 BRPM | BODY MASS INDEX: 38.98 KG/M2 | OXYGEN SATURATION: 98 % | DIASTOLIC BLOOD PRESSURE: 77 MMHG | SYSTOLIC BLOOD PRESSURE: 125 MMHG | WEIGHT: 220 LBS | TEMPERATURE: 99.3 F

## 2021-08-05 LAB
ANION GAP: 12 MMOL/L (ref 7–16)
EKG ATRIAL RATE: 277 BPM
EKG Q-T INTERVAL: 334 MS
EKG QRS DURATION: 82 MS
EKG QTC CALCULATION (BAZETT): 479 MS
EKG R AXIS: -9 DEGREES
EKG T AXIS: -155 DEGREES
EKG VENTRICULAR RATE: 124 BPM
GFR NON-AFRICAN AMERICAN: 53 ML/MIN
GFR SERPL CREATININE-BSD FRML MDRD: >60 ML/MIN
GFR SERPL CREATININE-BSD FRML MDRD: ABNORMAL ML/MIN/{1.73_M2}
GLUCOSE BLD-MCNC: 113 MG/DL (ref 74–100)
INR BLD: 6.7
POC BUN: 19 MG/DL (ref 8–26)
POC CHLORIDE: 103 MMOL/L (ref 98–107)
POC CREATININE: 1.06 MG/DL (ref 0.51–1.19)
POC HEMATOCRIT: 42 % (ref 36–46)
POC HEMOGLOBIN: 14.3 G/DL (ref 12–16)
POC IONIZED CALCIUM: 1.12 MMOL/L (ref 1.15–1.33)
POC POTASSIUM: 3.7 MMOL/L (ref 3.5–4.5)
POC SODIUM: 143 MMOL/L (ref 138–146)
POC TCO2: 29 MMOL/L (ref 22–30)
PROTHROMBIN TIME: 61.5 SEC (ref 9.1–12.3)

## 2021-08-05 PROCEDURE — 7100000001 HC PACU RECOVERY - ADDTL 15 MIN

## 2021-08-05 PROCEDURE — 93005 ELECTROCARDIOGRAM TRACING: CPT | Performed by: INTERNAL MEDICINE

## 2021-08-05 PROCEDURE — 85610 PROTHROMBIN TIME: CPT

## 2021-08-05 PROCEDURE — 84520 ASSAY OF UREA NITROGEN: CPT

## 2021-08-05 PROCEDURE — 82947 ASSAY GLUCOSE BLOOD QUANT: CPT

## 2021-08-05 PROCEDURE — 80051 ELECTROLYTE PANEL: CPT

## 2021-08-05 PROCEDURE — 93010 ELECTROCARDIOGRAM REPORT: CPT | Performed by: INTERNAL MEDICINE

## 2021-08-05 PROCEDURE — 7100000000 HC PACU RECOVERY - FIRST 15 MIN

## 2021-08-05 PROCEDURE — 82330 ASSAY OF CALCIUM: CPT

## 2021-08-05 PROCEDURE — 6360000002 HC RX W HCPCS

## 2021-08-05 PROCEDURE — 92960 CARDIOVERSION ELECTRIC EXT: CPT | Performed by: INTERNAL MEDICINE

## 2021-08-05 PROCEDURE — 82565 ASSAY OF CREATININE: CPT

## 2021-08-05 PROCEDURE — 85014 HEMATOCRIT: CPT

## 2021-08-05 RX ORDER — SODIUM CHLORIDE 9 MG/ML
INJECTION, SOLUTION INTRAVENOUS CONTINUOUS
Status: CANCELLED | OUTPATIENT
Start: 2021-08-05

## 2021-08-05 NOTE — PROCEDURES
[facility]  Cardioversion Procedure Note         Today's Date: 8/5/2021  Indication: Atrial fibrillation/Atrial flutter    Patient seen and examined. History and Physical reviewed. Labs reviewed. After informed consent was obtained with explanation of the risks and benefits, the patient was prepared using standard tecqniques. All Conscious Sedation was administered via the Cardiologist.     CARDIOVERSION:    After an adequate level of sedation was achieved  200J in biphasic synchronized delivery was administered. conversion to normal sinus rhythm. The patient awoke without complications. A post procedure 12 L ECG was ordered and reviewed. Impression:  Successful Consious Sedation - safely  Successful Cardioversion            Complications: There were no complications encountered. The patient will continue with the discharge meds and has been instructed to follow-up with your primary cardiologist for continued long term care and cardiovascular management. There were no complications encountered. Electronically signed on 08/05/21 at 2:40 PM by:    Cade Coleman MD, MD   Fellow, [address] [place] [address]      I have reviewed the case / procedure with resident / fellow  I have examined the patient personally  Patient agree with treatment plan as discussed before, final arrangement based on my evaluation and exam.    Risk and benefit of procedure planned were explained in details. Procedure was performed by me personally, with all aspect of the procedure being done using standard protocol. Note was modified based on my own assessment and treatment.     Sherwin Herrera MD  [facility]

## 2021-08-05 NOTE — H&P
[facility]  Pre- Procedure History and Physical/Update          Patient Name:  Luís Melvin  MRN:    [id number]  YOB: 1960  Date of evaluation:  2021       Please refer to the consult note / H&P completed by Dr. Dorinda Cha on 2021 in the medical record and note that:       [x] I have examined the patient and reviewed the H&P/Consult and there are no changes to be made to the assessment or plan.     [] I have examined the patient and reviewed the H&P/Consult and have noted the following changes:        Past Medical History:   Diagnosis Date    Abnormal Pap smear     Had colposcopy    Allergy     PCN    Anemia     Anticoagulated     ON COUMADIN    Anticoagulated on Coumadin     Atrial fibrillation ([address] 2014    Blood transfusion 2011    4 units    CAD (coronary artery disease)     COPD (chronic obstructive pulmonary disease) ([address]     Eczema     Examination of participant in clinical trial 2011    end date 3/25/15    Headache 2014    Heart disease 2011    card stents x 2    Hyperlipidemia     Hypertension     Menopausal symptoms     MI, old     PAF (paroxysmal atrial fibrillation) ([address]     s/p cardioversion Dec 2015     Primary osteoarthritis of both hands 2019    Smoker     STEMI (ST elevation myocardial infarction) ([address] 2016    INFERIOR / OCCLUDED RCA    Type 2 diabetes mellitus without complication, without long-term current use of insulin ([address] 2017       Past Surgical History:   Procedure Laterality Date    ABLATION OF DYSRHYTHMIC FOCUS  2018    A FIB    CARDIAC CATHETERIZATION  2021    CARDIOVERSION  2018    DR Charlotte James  SUCCESSFUL    CARDIOVERSION  2014    CARDIOVERSION  2015    CARDIOVERSION  2018    DR Charlotte James     SECTION  1986    COLPOSCOPY      CORONARY ANGIOPLASTY WITH STENT PLACEMENT  2016    PTCA AND GIRMA TO RCA    CORONARY ANGIOPLASTY WITH STENT PLACEMENT  2011    DILATION AND CURETTAGE  1989    ENDOMETRIAL ABLATION  02/21/2012    hydrothermal    LAPAROSCOPY  1989    TRANSESOPHAGEAL ECHOCARDIOGRAM  12/2014    TUBAL LIGATION  1989        reports that she quit smoking about 2 years ago. Her smoking use included cigarettes. She has a 17.00 pack-year smoking history. She has never used smokeless tobacco. She reports that she does not drink alcohol and does not use drugs. Prior to Admission medications    Medication Sig Start Date End Date Taking? Authorizing Provider   glipiZIDE (GLUCOTROL) 5 MG tablet Take 1 tablet by mouth daily 7/15/21   Ellen STEPHEN Manriquez PA-C   metFORMIN (GLUCOPHAGE) 1000 MG tablet TAKE 1 TABLET BY MOUTH TWO TIMES A DAY WITH MEALS 6/28/21 Janine A JESSE Manriquez   tiZANidine (ZANAFLEX) 4 MG tablet TAKE 1 TABLET BY MOUTH EVERY DAY AT NIGHT AS NEEDED FOR back PAIN 6/4/21 Janine A JESSE Manriquez   sotalol (BETAPACE) 80 MG tablet TAKE 1 TABLET BY MOUTH TWO TIMES A DAY 12/23/20   Historical Provider, MD   clopidogrel (PLAVIX) 75 MG tablet TAKE 1 TABLET BY MOUTH ONE TIME A DAY  6/1/20   Kathrin Saleem MD   losartan (COZAAR) 50 MG tablet TAKE 1 TABLET BY MOUTH ONE TIME A DAY  2/10/20   Kathrin Saleem MD   Niacin ER 1000 MG TBCR Take by mouth daily    Historical Provider, MD   furosemide (LASIX) 40 MG tablet Take one tablet by mouth in the morning and one half tablet in the evening. 5/5/19   Historical Provider, MD   metoprolol tartrate (LOPRESSOR) 25 MG tablet Take 0.5 tablets by mouth 2 times daily  Patient taking differently: Take 25 mg by mouth 2 times daily  9/21/18   KYLE Sosa - CNP   Omega-3 Fatty Acids (FISH OIL) 1000 MG CAPS Take 1,000 mg by mouth daily     Historical Provider, MD   nitroGLYCERIN (NITROSTAT) 0.4 MG SL tablet Place 1 tablet under tongue upon chest pain, wait 5 minutes and may repeat up to 3 doses in 15 minutes. Do not crush or break.  12/20/16   KYLE Mcclure - CNP   warfarin (COUMADIN) 5 MG tablet Take 5 mg by mouth Sunday and Wed 5mg, 2.5 mg  On other days    Historical Provider, MD   atorvastatin (LIPITOR) 40 MG tablet Take 40 mg by mouth nightly     Gisela Santamaria MD       Allergies   Allergen Reactions    Pcn [Penicillins] Hives    Amiodarone     Protonix [Pantoprazole Sodium] Itching         REVIEW OF SYSTEMS:     A detailed review of system was performed as already noted and is otherwise as above. PHYSICAL EXAM:     There were no vitals filed for this visit. Constitutional and General Appearance: Alert. Not in acute stress. Respiratory:  · Clear to auscultation b/l. No wheeze or crackles. Cardiovascular:  · Regular rate and rhythm. No murmurs. · Jugular venous pulsation is normal  Abdomen:  · No masses or tenderness  Extremities:  · Lower extremity edema - No  · Skin: Warm and dry  Neurological:  · Alert and oriented. · Moves all extremities well      Active Problems:    * No active hospital problems. *  Resolved Problems:    * No resolved hospital problems. *      Assessment:  1. Atrial fibrillation s/p ablation with recurrence of Atrial Fibrillation. Currently anticoagulated with coumadin. On sotalol 120 mg BID and metoprolol 50 mg BID. Plan:  1. Proceed with planned Cardioversion. The risks, benefits, and alternatives of the prcoedure were discussed in detail with the patient. The patient agrees to proceed with the procedure, verbalizes understanding and signed consent.        Alfonse Cushing, MD, MD  Fellow, [address] [place] [address]

## 2021-08-05 NOTE — PROGRESS NOTES
Patient admitted, consent signed, all questions answered. Pt ready for procedure. Bed in low position, call light to reach with side rails up 2 of 2.

## 2021-08-06 ENCOUNTER — HOSPITAL ENCOUNTER (OUTPATIENT)
Age: 61
Setting detail: SPECIMEN
Discharge: HOME | End: 2021-08-06
Payer: COMMERCIAL

## 2021-08-06 LAB
INR BLD: 3.4
PROTHROMBIN TIME: 32.7 SEC (ref 9.1–12.3)

## 2021-08-20 ENCOUNTER — HOSPITAL ENCOUNTER (OUTPATIENT)
Age: 61
Setting detail: SPECIMEN
Discharge: HOME | End: 2021-08-20
Payer: COMMERCIAL

## 2021-08-20 LAB
INR BLD: 2.2
PROTHROMBIN TIME: 22.5 SEC (ref 9.1–12.3)

## 2021-08-31 RX ORDER — TIZANIDINE 4 MG/1
TABLET ORAL
Qty: 90 TABLET | Refills: 0 | Status: SHIPPED | OUTPATIENT
Start: 2021-08-31 | End: 2021-12-01 | Stop reason: SDUPTHER

## 2021-09-20 ENCOUNTER — OFFICE VISIT (OUTPATIENT)
Dept: FAMILY MEDICINE CLINIC | Age: 61
End: 2021-09-20
Payer: COMMERCIAL

## 2021-09-20 ENCOUNTER — PATIENT MESSAGE (OUTPATIENT)
Dept: FAMILY MEDICINE CLINIC | Age: 61
End: 2021-09-20

## 2021-09-20 ENCOUNTER — HOSPITAL ENCOUNTER (OUTPATIENT)
Age: 61
Setting detail: SPECIMEN
Discharge: HOME | End: 2021-09-20

## 2021-09-20 ENCOUNTER — NURSE TRIAGE (OUTPATIENT)
Dept: OTHER | Facility: CLINIC | Age: 61
End: 2021-09-20

## 2021-09-20 VITALS
SYSTOLIC BLOOD PRESSURE: 120 MMHG | BODY MASS INDEX: 39.3 KG/M2 | HEIGHT: 63 IN | WEIGHT: 221.8 LBS | DIASTOLIC BLOOD PRESSURE: 86 MMHG | TEMPERATURE: 96.2 F | OXYGEN SATURATION: 97 % | HEART RATE: 122 BPM

## 2021-09-20 DIAGNOSIS — R11.0 NAUSEA: ICD-10-CM

## 2021-09-20 DIAGNOSIS — R19.7 DIARRHEA, UNSPECIFIED TYPE: ICD-10-CM

## 2021-09-20 DIAGNOSIS — R10.33 PERIUMBILICAL ABDOMINAL PAIN: ICD-10-CM

## 2021-09-20 DIAGNOSIS — R11.0 NAUSEA: Primary | ICD-10-CM

## 2021-09-20 DIAGNOSIS — R00.0 TACHYCARDIA: ICD-10-CM

## 2021-09-20 LAB
ABSOLUTE EOS #: 0.24 K/UL (ref 0–0.44)
ABSOLUTE IMMATURE GRANULOCYTE: 0.03 K/UL (ref 0–0.3)
ABSOLUTE LYMPH #: 1.42 K/UL (ref 1.1–3.7)
ABSOLUTE MONO #: 0.73 K/UL (ref 0.1–1.2)
ALBUMIN SERPL-MCNC: 4.2 G/DL (ref 3.5–5.2)
ALBUMIN/GLOBULIN RATIO: 1.6 (ref 1–2.5)
ALP BLD-CCNC: 74 U/L (ref 35–104)
ALT SERPL-CCNC: 25 U/L (ref 5–33)
ANION GAP SERPL CALCULATED.3IONS-SCNC: 22 MMOL/L (ref 9–17)
AST SERPL-CCNC: 18 U/L
BASOPHILS # BLD: 1 % (ref 0–2)
BASOPHILS ABSOLUTE: 0.06 K/UL (ref 0–0.2)
BILIRUB SERPL-MCNC: 0.7 MG/DL (ref 0.3–1.2)
BUN BLDV-MCNC: 24 MG/DL (ref 6–20)
BUN/CREAT BLD: ABNORMAL (ref 9–20)
CALCIUM SERPL-MCNC: 9 MG/DL (ref 8.6–10.4)
CHLORIDE BLD-SCNC: 100 MMOL/L (ref 98–107)
CO2: 23 MMOL/L (ref 20–31)
CREAT SERPL-MCNC: 1.11 MG/DL (ref 0.5–0.9)
DIFFERENTIAL TYPE: ABNORMAL
EOSINOPHILS RELATIVE PERCENT: 2 % (ref 1–4)
GFR AFRICAN AMERICAN: >60 ML/MIN
GFR NON-AFRICAN AMERICAN: 50 ML/MIN
GFR SERPL CREATININE-BSD FRML MDRD: ABNORMAL ML/MIN/{1.73_M2}
GFR SERPL CREATININE-BSD FRML MDRD: ABNORMAL ML/MIN/{1.73_M2}
GLUCOSE BLD-MCNC: 157 MG/DL (ref 70–99)
HCT VFR BLD CALC: 42.9 % (ref 36.3–47.1)
HEMOGLOBIN: 13.1 G/DL (ref 11.9–15.1)
IMMATURE GRANULOCYTES: 0 %
INR BLD: 2.9
LIPASE: 21 U/L (ref 13–60)
LYMPHOCYTES # BLD: 14 % (ref 24–43)
MAGNESIUM: 1.5 MG/DL (ref 1.6–2.6)
MCH RBC QN AUTO: 30.7 PG (ref 25.2–33.5)
MCHC RBC AUTO-ENTMCNC: 30.5 G/DL (ref 28.4–34.8)
MCV RBC AUTO: 100.5 FL (ref 82.6–102.9)
MONOCYTES # BLD: 7 % (ref 3–12)
NRBC AUTOMATED: 0.2 PER 100 WBC
PDW BLD-RTO: 14.6 % (ref 11.8–14.4)
PLATELET # BLD: 335 K/UL (ref 138–453)
PLATELET ESTIMATE: ABNORMAL
PMV BLD AUTO: 11.2 FL (ref 8.1–13.5)
POTASSIUM SERPL-SCNC: 4.6 MMOL/L (ref 3.7–5.3)
PROTHROMBIN TIME: 28.7 SEC (ref 9.1–12.3)
RBC # BLD: 4.27 M/UL (ref 3.95–5.11)
RBC # BLD: ABNORMAL 10*6/UL
SEDIMENTATION RATE, ERYTHROCYTE: 18 MM (ref 0–30)
SEG NEUTROPHILS: 76 % (ref 36–65)
SEGMENTED NEUTROPHILS ABSOLUTE COUNT: 7.67 K/UL (ref 1.5–8.1)
SODIUM BLD-SCNC: 145 MMOL/L (ref 135–144)
TOTAL PROTEIN: 6.9 G/DL (ref 6.4–8.3)
TSH SERPL DL<=0.05 MIU/L-ACNC: 2.81 MIU/L (ref 0.3–5)
WBC # BLD: 10.2 K/UL (ref 3.5–11.3)
WBC # BLD: ABNORMAL 10*3/UL

## 2021-09-20 PROCEDURE — 93000 ELECTROCARDIOGRAM COMPLETE: CPT | Performed by: NURSE PRACTITIONER

## 2021-09-20 PROCEDURE — 99214 OFFICE O/P EST MOD 30 MIN: CPT | Performed by: NURSE PRACTITIONER

## 2021-09-20 RX ORDER — FAMOTIDINE 40 MG/1
40 TABLET, FILM COATED ORAL EVERY EVENING
Qty: 30 TABLET | Refills: 0 | Status: SHIPPED | OUTPATIENT
Start: 2021-09-20 | End: 2021-10-21

## 2021-09-20 RX ORDER — CALCIUM CARBONATE 300MG(750)
1 TABLET,CHEWABLE ORAL NIGHTLY
Qty: 30 TABLET | Refills: 1 | Status: SHIPPED | OUTPATIENT
Start: 2021-09-20 | End: 2022-02-24

## 2021-09-20 RX ORDER — ONDANSETRON 4 MG/1
4 TABLET, ORALLY DISINTEGRATING ORAL 3 TIMES DAILY PRN
Qty: 21 TABLET | Refills: 0 | Status: SHIPPED | OUTPATIENT
Start: 2021-09-20 | End: 2021-10-21

## 2021-09-20 ASSESSMENT — ENCOUNTER SYMPTOMS
CONSTIPATION: 0
COUGH: 0
ABDOMINAL DISTENTION: 0
RHINORRHEA: 0
CHEST TIGHTNESS: 0
SHORTNESS OF BREATH: 0
ANAL BLEEDING: 0
DIARRHEA: 1
VOMITING: 0
SORE THROAT: 0
ABDOMINAL PAIN: 1
NAUSEA: 1
BLOOD IN STOOL: 0

## 2021-09-20 NOTE — TELEPHONE ENCOUNTER
Received call from [address] at [facility] with Red Flag Complaint. Brief description of triage:  Patient calling for concerns for intermittent upper middle abdominal pain over the past 10 days. Triage indicates for patient to see in office today. If no appointments available, go to walk-in clinic or [facility] for evaluation today. Care advice provided, patient verbalizes understanding; denies any other questions or concerns; instructed to call back for any new or worsening symptoms. Writer left message for [facility], ELIS ROCHA to call patient back for appointment. Message noted by Alison Dickinson. Advised patient that [facility] will call back with appointment. If she hasn't heard back within an hour, please call back to schedule. Attention Provider: Thank you for allowing me to participate in the care of your patient. The patient was connected to triage in response to information provided to the [facility]. Please do not respond through this encounter as the response is not directed to a shared pool. Reason for Disposition   MODERATE OR MILD pain that comes and goes (cramps) lasts > 24 hours    Answer Assessment - Initial Assessment Questions  1. LOCATION: \"Where does it hurt? \"       Above her belly button area    2. RADIATION: \"Does the pain shoot anywhere else? \" (e.g., chest, back)      No    3. ONSET: \"When did the pain begin? \" (e.g., minutes, hours or days ago)       About 10 days ago    4. SUDDEN: \"Gradual or sudden onset? \"      Gradual    5. PATTERN \"Does the pain come and go, or is it constant? \"     - If constant: \"Is it getting better, staying the same, or worsening? \"       (Note: Constant means the pain never goes away completely; most serious pain is constant and it progresses)      - If intermittent: \"How long does it last?\" \"Do you have pain now? \"      (Note: Intermittent means the pain goes away completely between bouts)      Comes and goes    6. SEVERITY: \"How bad is the pain? \"  (e.g., Scale 1-10; mild, moderate, or severe)    - MILD (1-3): doesn't interfere with normal activities, abdomen soft and not tender to touch     - MODERATE (4-7): interferes with normal activities or awakens from sleep, tender to touch     - SEVERE (8-10): excruciating pain, doubled over, unable to do any normal activities       No pain right now, worse:  7/10, tightness, \"just a pain\"    7. RECURRENT SYMPTOM: \"Have you ever had this type of abdominal pain before? \" If so, ask: \"When was the last time? \" and \"What happened that time? \"       Had an ulcer and can't remember how this feels    8. CAUSE: \"What do you think is causing the abdominal pain? \"      Possible ulcer    9. RELIEVING/AGGRAVATING FACTORS: \"What makes it better or worse? \" (e.g., movement, antacids, bowel movement)      Relieving:  Sometime with eating, Worse:  Walking    10. OTHER SYMPTOMS: \"Has there been any vomiting, diarrhea, constipation, or urine problems? \"        Nausea, no vomiting, no urinary symptoms, intermittent diarrhea/constipation    11. PREGNANCY: \"Is there any chance you are pregnant? \" \"When was your last menstrual period? \"        n/a    Protocols used: ABDOMINAL PAIN - FEMALE-ADULT-[place]

## 2021-09-20 NOTE — PROGRESS NOTES
[facility] SPECIALTY [facility] [place]  [address]  [place], [address]  [phone]      Erika Craig is a 63 y.o. female who presents today for her  medicalconditions/complaints as noted below. Erika Craig is c/o of Abdominal Pain (x1-1/2 wks,diarrhea on/off,taking nexium,has had ulcer in past,has been on x5 days,started to feel better,went out of town,trying to watch what she eats,hot food doesnt bother her,)  . HPI:    HPI  Patient here today for evaluation of new onset nausea, intermittent diarrhea and periumbilical abdominal pain for the past 1-1/2 weeks. States she has only had diarrhea maybe a handful of times. Denies blood or mucus in her stool or cramps. States she just has not been feeling well and did overeat on bad foods this weekend when she was traveling. She is drinking plenty of water and denies weakness, dizziness or headaches. Denies cough, shortness of breath, loss of taste or smell or fever or chills. States she has had a gastric ulcer in the past and recently started Nexium 20 mg once a day for the last 5 days. She states this slightly feels like it has in the past.  Denies any history of appendicitis, gallbladder issues or diverticulitis. Also feels like her heart rate has been higher than normal.  States she has had at least 6 cardioversions in the past for A. fib and highest rate has been about 140 -150 when she is needed to cardioversion. Continues with cardiology closely and is stable on all medications and is unaware when she is in A. fib. Last ablation was like 3 years ago.   Past Medical History:   Diagnosis Date    Abnormal Pap smear 1989    Had colposcopy    Allergy     PCN    Anemia     Anticoagulated     ON COUMADIN    Anticoagulated on Coumadin     Atrial fibrillation ([facility] [address] 12/05/2014    Blood transfusion 12/20/2011    4 units    CAD (coronary artery disease)     COPD (chronic obstructive pulmonary disease) (HCC)     Eczema     Examination of participant in clinical trial 2011    end date 3/25/15    Headache 2014    Heart disease 2011    card stents x 2    History of atrioventricular jenni ablation     Hyperlipidemia     Hypertension     Menopausal symptoms     MI, old     PAF (paroxysmal atrial fibrillation) [facility])     s/p cardioversion Dec 2015     Primary osteoarthritis of both hands 2019    Smoker     STEMI (ST elevation myocardial infarction) ([facility] [address] 2016    INFERIOR / OCCLUDED RCA    Type 2 diabetes mellitus without complication, without long-term current use of insulin [facility] 2017      Past Surgical History:   Procedure Laterality Date    ABLATION OF DYSRHYTHMIC FOCUS  2018    A FIB    CARDIAC CATHETERIZATION  2021    CARDIOVERSION  2018    DR Luci Vanegas  SUCCESSFUL    CARDIOVERSION  2014    CARDIOVERSION  2015    CARDIOVERSION  2018    DR Luci Vanegas     SECTION      COLPOSCOPY      CORONARY ANGIOPLASTY WITH STENT PLACEMENT  2016    PTCA AND GIRMA TO RCA    CORONARY ANGIOPLASTY WITH STENT PLACEMENT      DILATION AND CURETTAGE      ENDOMETRIAL ABLATION  2012    hydrothermal    LAPAROSCOPY      TRANSESOPHAGEAL ECHOCARDIOGRAM  2014    TUBAL LIGATION       Family History   Problem Relation Age of Onset    COPD Father     Coronary Art Dis Father     Cancer Mother     Heart Failure Maternal Grandmother     Breast Cancer Neg Hx     Colon Cancer Neg Hx     Diabetes Neg Hx     Eclampsia Neg Hx     Hypertension Neg Hx     Ovarian Cancer Neg Hx      Labor Neg Hx     Spont Abortions Neg Hx     Stroke Neg Hx      Social History     Tobacco Use    Smoking status: Former Smoker     Packs/day: 0.50     Years: 34.00     Pack years: 17.00     Types: Cigarettes     Quit date: 10/23/2018     Years since quittin.9    Smokeless tobacco: Never Used    Tobacco comment: smokes 5 cig/day   Substance Use Topics  Alcohol use: No     Alcohol/week: 0.0 standard drinks      Current Outpatient Medications   Medication Sig Dispense Refill    famotidine (PEPCID) 40 MG tablet Take 1 tablet by mouth every evening 30 tablet 0    tiZANidine (ZANAFLEX) 4 MG tablet TAKE 1 TABLET BY MOUTH AT BEDTIME AS NEEDED FOR BACK PAIN 90 tablet 0    glipiZIDE (GLUCOTROL) 5 MG tablet Take 1 tablet by mouth daily 30 tablet 3    metFORMIN (GLUCOPHAGE) 1000 MG tablet TAKE 1 TABLET BY MOUTH TWO TIMES A DAY WITH MEALS 180 tablet 0    sotalol (BETAPACE) 80 MG tablet 120 mg 2 times daily       clopidogrel (PLAVIX) 75 MG tablet TAKE 1 TABLET BY MOUTH ONE TIME A DAY  15 tablet 0    losartan (COZAAR) 50 MG tablet TAKE 1 TABLET BY MOUTH ONE TIME A DAY  90 tablet 1    Niacin ER 1000 MG TBCR Take by mouth daily      furosemide (LASIX) 40 MG tablet Take one tablet by mouth in the morning and one half tablet in the evening. 3    metoprolol tartrate (LOPRESSOR) 25 MG tablet Take 0.5 tablets by mouth 2 times daily (Patient taking differently: Take 25 mg by mouth 2 times daily ) 60 tablet 3    Omega-3 Fatty Acids (FISH OIL) 1000 MG CAPS Take 1,000 mg by mouth daily       warfarin (COUMADIN) 5 MG tablet Take 5 mg by mouth Sunday and Wed 5mg, 2.5 mg  On other days      atorvastatin (LIPITOR) 40 MG tablet Take 40 mg by mouth nightly       nitroGLYCERIN (NITROSTAT) 0.4 MG SL tablet Place 1 tablet under tongue upon chest pain, wait 5 minutes and may repeat up to 3 doses in 15 minutes. Do not crush or break. 25 tablet 3     No current facility-administered medications for this visit.      Allergies   Allergen Reactions    Pcn [Penicillins] Hives    Amiodarone     Protonix [Pantoprazole Sodium] Itching       Health Maintenance   Topic Date Due    Hepatitis B vaccine (1 of 3 - Risk 3-dose series) Never done    Shingles Vaccine (1 of 2) Never done    Cervical cancer screen  02/04/2015    Flu vaccine (1) 09/01/2021    Diabetic retinal exam  03/13/2022 (Originally 8/28/1970)    Diabetic microalbuminuria test  11/20/2021    Lipid screen  11/20/2021    Potassium monitoring  04/16/2022    Diabetic foot exam  07/07/2022    A1C test (Diabetic or Prediabetic)  07/07/2022    Creatinine monitoring  08/05/2022    Breast cancer screen  03/05/2023    Colon cancer screen fecal DNA test (Cologuard)  03/15/2024    DTaP/Tdap/Td vaccine (2 - Td or Tdap) 02/28/2027    Pneumococcal 0-64 years Vaccine (2 of 2 - PPSV23) 08/28/2027    COVID-19 Vaccine  Completed    Hepatitis C screen  Completed    HIV screen  Completed    Hepatitis A vaccine  Aged Out    Hib vaccine  Aged Out    Meningococcal (ACWY) vaccine  Aged Out       Subjective:      Review of Systems   Constitutional: Positive for activity change, appetite change and fatigue. Negative for chills, diaphoresis and fever. HENT: Negative for mouth sores, nosebleeds, postnasal drip, rhinorrhea and sore throat. Eyes: Negative for visual disturbance. Respiratory: Negative for cough, chest tightness and shortness of breath. Cardiovascular: Negative for chest pain, palpitations and leg swelling. Gastrointestinal: Positive for abdominal pain, diarrhea and nausea. Negative for abdominal distention, anal bleeding, blood in stool, constipation and vomiting. Genitourinary: Negative for decreased urine volume and difficulty urinating. Skin: Negative for rash. Allergic/Immunologic: Negative for immunocompromised state. Neurological: Negative for dizziness, weakness, light-headedness, numbness and headaches. Hematological: Negative for adenopathy. Psychiatric/Behavioral: Negative for sleep disturbance. The patient is not nervous/anxious. Objective:      Physical Exam  Vitals and nursing note reviewed. Constitutional:       General: She is not in acute distress. Appearance: Normal appearance. She is well-developed. She is obese. She is not ill-appearing or diaphoretic.    HENT:      Head: Normocephalic and atraumatic. Eyes:      Conjunctiva/sclera: Conjunctivae normal.   Cardiovascular:      Rate and Rhythm: Tachycardia present. Rhythm irregular. Pulses: Normal pulses. Heart sounds: Normal heart sounds. No murmur heard. Comments: No LE edema  Pulmonary:      Effort: Pulmonary effort is normal.      Breath sounds: Normal breath sounds. Abdominal:      General: Bowel sounds are normal.      Palpations: Abdomen is soft. Tenderness: There is abdominal tenderness (meredith umbilical and slight over LUQ). There is no guarding or rebound. Hernia: No hernia is present. Musculoskeletal:      Cervical back: Neck supple. Right lower leg: No edema. Left lower leg: No edema. Skin:     General: Skin is warm and dry. Neurological:      General: No focal deficit present. Mental Status: She is alert and oriented to person, place, and time. Mental status is at baseline. Psychiatric:         Mood and Affect: Mood normal.         Behavior: Behavior normal.         Thought Content: Thought content normal.         Judgment: Judgment normal.         Assessment:       Diagnosis Orders   1. Nausea  famotidine (PEPCID) 40 MG tablet    Comprehensive Metabolic Panel    CBC Auto Differential    Sedimentation Rate    Lipase   2. Diarrhea, unspecified type  Comprehensive Metabolic Panel    CBC Auto Differential    Sedimentation Rate    Lipase   3. Periumbilical abdominal pain  Comprehensive Metabolic Panel    CBC Auto Differential    Lipase   4. Tachycardia  EKG 12 Lead    Comprehensive Metabolic Panel    CBC Auto Differential    Magnesium    TSH   ekg:afib/flutter rate 123  Wt Readings from Last 3 Encounters:   09/20/21 221 lb 12.8 oz (100.6 kg)   08/05/21 220 lb (99.8 kg)   07/07/21 220 lb (99.8 kg)     BP Readings from Last 3 Encounters:   09/20/21 120/86   08/05/21 125/77   07/07/21 128/76         Plan:      Return if symptoms worsen or fail to improve.   Orders Placed This

## 2021-09-20 NOTE — PROGRESS NOTES
Visit Information    Have you changed or started any medications since your last visit including any over-the-counter medicines, vitamins, or herbal medicines? no   Have you stopped taking any of your medications? Is so, why? -  no  Are you having any side effects from any of your medications? - no    Have you seen any other physician or provider since your last visit?  no   Have you had any other diagnostic tests since your last visit?  no   Have you been seen in the emergency room and/or had an admission in a hospital since we last saw you?  no   Have you had your routine dental cleaning in the past 6 months?  no     Do you have an active MyChart account? If no, what is the barrier?   Yes    Patient Care Team:  Karen Sotelo PA-C as PCP - General (Physician Assistant)  Karen Sotelo PA-C as PCP - [facility] Provider  Jovan Mott MD as Consulting Physician (Cardiology)    Medical History Review  Past Medical, Family, and Social History reviewed and  contribute to the patient presenting condition    Health Maintenance   Topic Date Due    Hepatitis B vaccine (1 of 3 - Risk 3-dose series) Never done    Shingles Vaccine (1 of 2) Never done    Cervical cancer screen  02/04/2015    Flu vaccine (1) 09/01/2021    Diabetic retinal exam  03/13/2022 (Originally 8/28/1970)    Diabetic microalbuminuria test  11/20/2021    Lipid screen  11/20/2021    Potassium monitoring  04/16/2022    Diabetic foot exam  07/07/2022    A1C test (Diabetic or Prediabetic)  07/07/2022    Creatinine monitoring  08/05/2022    Breast cancer screen  03/05/2023    Colon cancer screen fecal DNA test (Cologuard)  03/15/2024    DTaP/Tdap/Td vaccine (2 - Td or Tdap) 02/28/2027    Pneumococcal 0-64 years Vaccine (2 of 2 - PPSV23) 08/28/2027    COVID-19 Vaccine  Completed    Hepatitis C screen  Completed    HIV screen  Completed    Hepatitis A vaccine  Aged Out    Hib vaccine  Aged Out    Meningococcal (ACWY) vaccine  Aged Out

## 2021-09-21 NOTE — TELEPHONE ENCOUNTER
From: Marylen Platts  Sent: 9/21/2021 2:31 PM EDT  To: Trina Buenrostro  Clinical Staff  Subject: RE:labs    Thank you for seeing me yesterday, I talked to the cardio nurse this morning about my INR and let her know I was in AFIB yesterday. I woke up this morning and my heart was back to normal, which it does that on occasion. I will wait for the script for the magnesium and if I have any more issues I will be sure to call to schedule a appointment.    Thank You  Tone Valle

## 2021-10-08 ENCOUNTER — HOSPITAL ENCOUNTER (OUTPATIENT)
Age: 61
Setting detail: SPECIMEN
Discharge: HOME | End: 2021-10-08
Payer: COMMERCIAL

## 2021-10-08 LAB
INR BLD: 1.8
PROTHROMBIN TIME: 18.3 SEC (ref 9.1–12.3)

## 2021-10-19 ENCOUNTER — HOSPITAL ENCOUNTER (OUTPATIENT)
Age: 61
Setting detail: SPECIMEN
Discharge: HOME | End: 2021-10-19
Payer: COMMERCIAL

## 2021-10-19 LAB
INR BLD: 1.7
PROTHROMBIN TIME: 17 SEC (ref 9.1–12.3)

## 2021-10-21 ENCOUNTER — HOSPITAL ENCOUNTER (OUTPATIENT)
Dept: PHARMACY | Age: 61
Setting detail: THERAPIES SERIES
Discharge: HOME | End: 2021-10-21
Payer: COMMERCIAL

## 2021-10-21 DIAGNOSIS — I48.91 ATRIAL FIBRILLATION WITH RVR (HCC): Primary | ICD-10-CM

## 2021-10-21 LAB
INR BLD: 2.7
PROTIME: 31.9 SECONDS

## 2021-10-21 PROCEDURE — 99212 OFFICE O/P EST SF 10 MIN: CPT

## 2021-10-21 PROCEDURE — 85610 PROTHROMBIN TIME: CPT

## 2021-10-21 NOTE — PROGRESS NOTES
Medication Management Service, Warfarin Management  SYED CUEVAS [facility], [phone]  First visit to ACS Office. Date: 10/21/2021     Education provided on indication and mechanism of warfarin, compliance, appropriate follow-up & monitoring, dietary and medication interactions, potential thromboembolic & bleeding complications, when to seek medical care, and office policy. Patient has been on warfarin in the past, regimen: 5mg on Sun, Wed, Fri only and 2.5mg all other days of the week. Patient states compliant with regimen, although admits to recently adjusting her dose when INR is low. No bleeding or thromboembolic side effects noted. No significant med or dietary changes. No significant recent illness or disease state changes. Discussion with patient regarding warfarin management prior to receiving referral including instructions to take warfarin seperate from magnesium. Subjective:   Alia Will is a 63 y.o. female who presents to clinic today for anticoagulation monitoring and adjustment. Patient seen in clinic for warfarin management due to  Indication:   atrial fibrillation. INR goal: of 2.0-3.0. Duration of therapy: indefinite. Assessment and PLAN   PT/INR done in office per protocol. INR today is 2.7, therapeutic, after 30mg this past week. Plan:  Patient has not been eating green vegetables but would like to do so. Will start slow and add in 2 small servings this next week, specifically mentioned broccoli, brussels sprouts and asparagus. Increase in regimen to warfarin 2.5mg on Tues, Sat only and 5mg all other days of the week. Using warfarin 5 mg tablets. Recheck INR in 1 week(s). Patient seen in room # 3. ED. OP. = as above. Patient signed CPA acknowledgement and HIPAA. COVID screening complete and temperature recorded. Patient verbalized understanding of dosing directions and information discussed. Dosing schedule given to patient. Progress note sent to referring office. Patient acknowledges working in consult agreement with pharmacist as referred by his/her physician.       Sirisha Dee RPh, EDVINP  Clinical Pharmacist Medication Management  10/21/2021  1:57 PM      For Pharmacy Admin Tracking Only     Intervention Detail: Adherence Monitorin and Dose Adjustment: 1, reason: Therapy Optimization   Total # of Interventions Recommended: 2   Total # of Interventions Accepted: 2   Time Spent (min): 45

## 2021-10-25 ENCOUNTER — OFFICE VISIT (OUTPATIENT)
Dept: FAMILY MEDICINE CLINIC | Age: 61
End: 2021-10-25
Payer: COMMERCIAL

## 2021-10-25 VITALS
DIASTOLIC BLOOD PRESSURE: 96 MMHG | BODY MASS INDEX: 38.34 KG/M2 | WEIGHT: 216.4 LBS | HEART RATE: 74 BPM | SYSTOLIC BLOOD PRESSURE: 146 MMHG | OXYGEN SATURATION: 90 % | HEIGHT: 63 IN | TEMPERATURE: 97.4 F

## 2021-10-25 DIAGNOSIS — I10 ESSENTIAL HYPERTENSION: Primary | ICD-10-CM

## 2021-10-25 PROCEDURE — 99213 OFFICE O/P EST LOW 20 MIN: CPT | Performed by: NURSE PRACTITIONER

## 2021-10-25 RX ORDER — AMLODIPINE BESYLATE 5 MG/1
5 TABLET ORAL DAILY
Qty: 30 TABLET | Refills: 3 | Status: SHIPPED | OUTPATIENT
Start: 2021-10-25 | End: 2021-11-09 | Stop reason: SDUPTHER

## 2021-10-25 ASSESSMENT — ENCOUNTER SYMPTOMS
NAUSEA: 0
SHORTNESS OF BREATH: 0
VOMITING: 0
CHEST TIGHTNESS: 0
COUGH: 0
ABDOMINAL PAIN: 0

## 2021-10-25 NOTE — PROGRESS NOTES
[facility] SPECIALTY [facility] [place]  [address]  [place], [address]  [phone]      Hiro Hartman is a 63 y.o. female who presents today for her  medicalconditions/complaints as noted below. Hiro Hartman is c/o of Hypertension (goes to coumadin clinic ST,BPs have been high for last month,is having headaches. the did tell her to take 2 losartan in a.m.,then started 2 in a.m. and p.m. Did buy new bp machine to make sure old one wasnt working)  . HPI:    HPI  For evaluation of high blood pressure. States she is recently switched to the [facility] Coumadin clinic for INR management as she was not satisfied with her cardiology office doing this. Her INR's were running low also and blood pressure has been running 160s over 100 for the last few weeks. She was advised by the cardiologist to increase her metoprolol and losartan doses and this has helped some but has not reduced it to less than 140/90. She has been having more headaches than normal.  Denies chest pain, shortness of breath, leg swelling or dizziness. She continues with a no salt substitute within her diet has not changed her diet denies any stress or pain issues. She has been on her losartan for several years and wonders if she needs a med change. She had recent labs and has orders for new labs to be rechecked by cardiology next week and also had an EKG recently. She has paroxysmal A. fib. Her blood pressure cuff at home will let her know when she is in A. fib.   Past Medical History:   Diagnosis Date    Abnormal Pap smear 1989    Had colposcopy    Allergy     PCN    Anemia     Anticoagulated     ON COUMADIN    Anticoagulated on Coumadin     Atrial fibrillation ([facility] [address] 12/05/2014    Blood transfusion 12/20/2011    4 units    CAD (coronary artery disease)     COPD (chronic obstructive pulmonary disease) (HCC)     Eczema     Examination of participant in clinical trial 01/12/2011    end date 3/25/15    Headache 12/05/2014  Heart disease 2011    card stents x 2    History of atrioventricular jenni ablation     Hyperlipidemia     Hypertension     Menopausal symptoms     MI, old     PAF (paroxysmal atrial fibrillation) [facility])     s/p cardioversion Dec 2015     Primary osteoarthritis of both hands 2019    Smoker     STEMI (ST elevation myocardial infarction) ([facility] [address] 2016    INFERIOR / OCCLUDED RCA    Type 2 diabetes mellitus without complication, without long-term current use of insulin ([facility] [address] 2017      Past Surgical History:   Procedure Laterality Date    ABLATION OF DYSRHYTHMIC FOCUS  2018    A FIB    CARDIAC CATHETERIZATION  2021    CARDIOVERSION  2018    DR Eri Murdock  SUCCESSFUL    CARDIOVERSION  2014    CARDIOVERSION  2015    CARDIOVERSION  2018    DR Eri Murdock     SECTION      COLPOSCOPY      CORONARY ANGIOPLASTY WITH STENT PLACEMENT  2016    PTCA AND GIRMA TO RCA    CORONARY ANGIOPLASTY WITH STENT PLACEMENT      DILATION AND CURETTAGE      ENDOMETRIAL ABLATION  2012    hydrothermal    LAPAROSCOPY      TRANSESOPHAGEAL ECHOCARDIOGRAM  2014    TUBAL LIGATION       Family History   Problem Relation Age of Onset    COPD Father     Coronary Art Dis Father     Cancer Mother     Heart Failure Maternal Grandmother     Breast Cancer Neg Hx     Colon Cancer Neg Hx     Diabetes Neg Hx     Eclampsia Neg Hx     Hypertension Neg Hx     Ovarian Cancer Neg Hx      Labor Neg Hx     Spont Abortions Neg Hx     Stroke Neg Hx      Social History     Tobacco Use    Smoking status: Former Smoker     Packs/day: 0.50     Years: 34.00     Pack years: 17.00     Types: Cigarettes     Quit date: 10/23/2018     Years since quitting: 3.0    Smokeless tobacco: Never Used    Tobacco comment: smokes 5 cig/day   Substance Use Topics    Alcohol use: No     Alcohol/week: 0.0 standard drinks      Current Outpatient Medications   Medication Sig Dispense Refill    amLODIPine (NORVASC) 5 MG tablet Take 1 tablet by mouth daily 30 tablet 3    metFORMIN (GLUCOPHAGE) 1000 MG tablet TAKE 1 TABLET BY MOUTH TWO TIMES A DAY WITH MEALS 180 tablet 0    Magnesium 400 MG TABS Take 1 tablet by mouth nightly 30 tablet 1    tiZANidine (ZANAFLEX) 4 MG tablet TAKE 1 TABLET BY MOUTH AT BEDTIME AS NEEDED FOR BACK PAIN 90 tablet 0    glipiZIDE (GLUCOTROL) 5 MG tablet Take 1 tablet by mouth daily (Patient taking differently: Take 10 mg by mouth daily Before evening meal.) 30 tablet 3    sotalol (BETAPACE) 80 MG tablet 120 mg 2 times daily       clopidogrel (PLAVIX) 75 MG tablet TAKE 1 TABLET BY MOUTH ONE TIME A DAY  15 tablet 0    losartan (COZAAR) 50 MG tablet TAKE 1 TABLET BY MOUTH ONE TIME A DAY  (Patient taking differently: 100 mg daily ) 90 tablet 1    Niacin ER 1000 MG TBCR Take by mouth daily      furosemide (LASIX) 40 MG tablet Take one tablet by mouth in the morning and one half tablet in the evening. 3    metoprolol tartrate (LOPRESSOR) 25 MG tablet Take 0.5 tablets by mouth 2 times daily (Patient taking differently: Take 50 mg by mouth 2 times daily ) 60 tablet 3    Omega-3 Fatty Acids (FISH OIL) 1000 MG CAPS Take 1,000 mg by mouth daily       nitroGLYCERIN (NITROSTAT) 0.4 MG SL tablet Place 1 tablet under tongue upon chest pain, wait 5 minutes and may repeat up to 3 doses in 15 minutes. Do not crush or break. 25 tablet 3    warfarin (COUMADIN) 5 MG tablet Take 5 mg by mouth Sunday and Wed 5mg, 2.5 mg  On other days      atorvastatin (LIPITOR) 40 MG tablet Take 40 mg by mouth nightly        No current facility-administered medications for this visit.      Allergies   Allergen Reactions    Pcn [Penicillins] Hives    Amiodarone     Protonix [Pantoprazole Sodium] Itching       Health Maintenance   Topic Date Due    Hepatitis B vaccine (1 of 3 - Risk 3-dose series) Never done    Shingles Vaccine (1 of 2) Never done    Cervical cancer screen  02/03/2018    Flu vaccine (1) 09/01/2021    Diabetic retinal exam  03/13/2022 (Originally 8/28/1970)    Diabetic microalbuminuria test  11/20/2021    Lipid screen  11/20/2021    Diabetic foot exam  07/07/2022    A1C test (Diabetic or Prediabetic)  07/07/2022    Potassium monitoring  09/20/2022    Creatinine monitoring  09/20/2022    Breast cancer screen  03/05/2023    Colon cancer screen fecal DNA test (Cologuard)  03/15/2024    DTaP/Tdap/Td vaccine (2 - Td or Tdap) 02/28/2027    Pneumococcal 0-64 years Vaccine (2 of 2 - PPSV23) 08/28/2027    COVID-19 Vaccine  Completed    Hepatitis C screen  Completed    HIV screen  Completed    Hepatitis A vaccine  Aged Out    Hib vaccine  Aged Out    Meningococcal (ACWY) vaccine  Aged Out       Subjective:      Review of Systems   Constitutional: Negative for activity change, appetite change, chills, diaphoresis, fatigue, fever and unexpected weight change. Eyes: Negative for visual disturbance. Respiratory: Negative for cough, chest tightness and shortness of breath. Cardiovascular: Negative for chest pain, palpitations and leg swelling. Gastrointestinal: Negative for abdominal pain, nausea and vomiting. Genitourinary: Negative for difficulty urinating. Skin: Negative for rash. Neurological: Positive for headaches. Negative for dizziness, weakness and numbness. Hematological: Negative for adenopathy. Psychiatric/Behavioral: Negative for sleep disturbance. The patient is not nervous/anxious. Objective:      Physical Exam  Vitals and nursing note reviewed. Constitutional:       General: She is not in acute distress. Appearance: Normal appearance. She is well-developed. She is obese. She is not ill-appearing or diaphoretic. HENT:      Head: Normocephalic and atraumatic. Eyes:      Conjunctiva/sclera: Conjunctivae normal.   Neck:      Vascular: No JVD.    Cardiovascular:      Rate and Rhythm: Normal rate and regular rhythm. Pulses: Normal pulses. Heart sounds: Normal heart sounds. No murmur heard. Comments: NO LE edema  Pulmonary:      Effort: Pulmonary effort is normal.      Breath sounds: Normal breath sounds. Musculoskeletal:      Cervical back: Neck supple. Skin:     General: Skin is warm and dry. Neurological:      General: No focal deficit present. Mental Status: She is alert and oriented to person, place, and time. Mental status is at baseline. Psychiatric:         Mood and Affect: Mood normal.         Behavior: Behavior normal.         Thought Content: Thought content normal.         Judgment: Judgment normal.         Assessment:       Diagnosis Orders   1. Essential hypertension  amLODIPine (NORVASC) 5 MG tablet     Wt Readings from Last 3 Encounters:   10/25/21 216 lb 6.4 oz (98.2 kg)   09/20/21 221 lb 12.8 oz (100.6 kg)   08/05/21 220 lb (99.8 kg)     BP Readings from Last 3 Encounters:   10/25/21 (!) 146/96   09/20/21 120/86   08/05/21 125/77         Plan:      Return in about 3 months (around 1/25/2022) for return for diabetes management.   Home BP readings scanned into chart and have reduced significantly since her recent blood pressure medication adjustments which was greater than 2 weeks ago  We will add low-dose amlodipine for now and have her call or send message to the office within 1 to 2 weeks with an update on blood pressure readings  Continue with cardiology for other health management and continue with [facility] INR management for Coumadin  She is unable to afford DOAC due to cost as she does not have insurance currently  Continue other same medications and med list updated  D DILSHAD/low-fat/diabetic diet advised and exercise as tolerated  Follow-up for Pap when able  Go to the ER if any chest pain, shortness of breath, dizziness or high spikes in blood pressure  Orders Placed This Encounter   Medications    amLODIPine (NORVASC) 5 MG tablet     Sig: Take 1 tablet by mouth daily     Dispense:  30 tablet     Refill:  3       Patient given educational materials - see patient instructions. Discussed use,benefit, and side effects of prescribed medications. All patient questions answered. Pt voiced understanding. Reviewed health maintenance. Instructed to continue currentmedications, diet and exercise.     Electronically signed by KYLE Barton CNP, CNP on 10/25/2021 at 9:23 AM

## 2021-10-25 NOTE — PROGRESS NOTES
Visit Information    Have you changed or started any medications since your last visit including any over-the-counter medicines, vitamins, or herbal medicines? no   Have you stopped taking any of your medications? Is so, why? -  no  Are you having any side effects from any of your medications? - no    Have you seen any other physician or provider since your last visit?  no   Have you had any other diagnostic tests since your last visit?  no   Have you been seen in the emergency room and/or had an admission in a hospital since we last saw you?  no   Have you had your routine dental cleaning in the past 6 months?  no     Do you have an active MyChart account? If no, what is the barrier?   Yes    Patient Care Team:  Flower Mckeon PA-C as PCP - General (Physician Assistant)  Flower Mckeon PA-C [facility]  Briseyda Seaman MD as Consulting Physician (Cardiology)    Medical History Review  Past Medical, Family, and Social History reviewed and  contribute to the patient presenting condition    Health Maintenance   Topic Date Due    Hepatitis B vaccine (1 of 3 - Risk 3-dose series) Never done    Shingles Vaccine (1 of 2) Never done    Cervical cancer screen  02/03/2018    Flu vaccine (1) 09/01/2021    Diabetic retinal exam  03/13/2022 (Originally 8/28/1970)    Diabetic microalbuminuria test  11/20/2021    Lipid screen  11/20/2021    Diabetic foot exam  07/07/2022    A1C test (Diabetic or Prediabetic)  07/07/2022    Potassium monitoring  09/20/2022    Creatinine monitoring  09/20/2022    Breast cancer screen  03/05/2023    Colon cancer screen fecal DNA test (Cologuard)  03/15/2024    DTaP/Tdap/Td vaccine (2 - Td or Tdap) 02/28/2027    Pneumococcal 0-64 years Vaccine (2 of 2 - PPSV23) 08/28/2027    COVID-19 Vaccine  Completed    Hepatitis C screen  Completed    HIV screen  Completed    Hepatitis A vaccine  Aged Out    Hib vaccine  Aged Out    Meningococcal (ACWY) vaccine  Aged Out

## 2021-10-29 ENCOUNTER — HOSPITAL ENCOUNTER (OUTPATIENT)
Dept: PHARMACY | Age: 61
Setting detail: THERAPIES SERIES
Discharge: HOME | End: 2021-10-29
Payer: COMMERCIAL

## 2021-10-29 DIAGNOSIS — I48.91 ATRIAL FIBRILLATION WITH RVR (HCC): Primary | ICD-10-CM

## 2021-10-29 LAB
INR BLD: 2.8
PROTIME: 33.8 SECONDS

## 2021-10-29 PROCEDURE — G0008 ADMIN INFLUENZA VIRUS VAC: HCPCS | Performed by: INTERNAL MEDICINE

## 2021-10-29 PROCEDURE — 99212 OFFICE O/P EST SF 10 MIN: CPT

## 2021-10-29 PROCEDURE — 85610 PROTHROMBIN TIME: CPT

## 2021-10-29 PROCEDURE — 6360000002 HC RX W HCPCS: Performed by: INTERNAL MEDICINE

## 2021-10-29 PROCEDURE — 90686 IIV4 VACC NO PRSV 0.5 ML IM: CPT | Performed by: INTERNAL MEDICINE

## 2021-10-29 RX ORDER — WARFARIN SODIUM 5 MG/1
TABLET ORAL
Qty: 90 TABLET | Refills: 1 | OUTPATIENT
Start: 2021-10-29 | End: 2022-08-03 | Stop reason: SDUPTHER

## 2021-10-29 RX ADMIN — INFLUENZA A VIRUS A/VICTORIA/2570/2019 IVR-215 (H1N1) ANTIGEN (PROPIOLACTONE INACTIVATED), INFLUENZA A VIRUS A/CAMBODIA/E0826360/2020 IVR-224 (H3N2) ANTIGEN (PROPIOLACTONE INACTIVATED), INFLUENZA B VIRUS B/VICTORIA/705/2018 BVR-11 ANTIGEN (PROPIOLACTONE INACTIVATED), INFLUENZA B VIRUS B/PHUKET/3073/2013 BVR-1B ANTIGEN (PROPIOLACTONE INACTIVATED) 0.5 ML: 15; 15; 15; 15 INJECTION, SUSPENSION INTRAMUSCULAR at 09:01

## 2021-10-29 NOTE — PROGRESS NOTES
Medication Management Service, Warfarin Management  [facility], [phone]  Visit Date: 10/29/2021   Subjective:   Hany Hanson is a 63 y.o. female who presents to clinic today for anticoagulation monitoring and adjustment. Patient seen in clinic for warfarin management due to  Indication:   atrial fibrillation. INR goal: of 2.0-3.0. Duration of therapy: indefinite. Assessment and PLAN   PT/INR done in office per protocol. INR today is 2.8, therapeutic. · Patient taking medication as prescribed  · At last appointment, patient expressed wanting to eat green vegetables. Patient consumed two servings of broccoli in the last week      Plan:    · Will continue current regimen of warfarin 2.5 mg on Tuesday and Saturday, and 5 mg all other days of the week     · Using warfarin 5 mg tablets. · Patient believes she may have enough medication supply until next appointment, but is uncertain  · Will call in refills to [facility]  · Influenza vaccine administered today  Recheck INR in 2 week(s). Patient seen in room # 1. ED. OP. = consistency of green vegetable consumption    Patient attested to self screening for COVID - 19. Patient verbalized understanding of dosing directions and information discussed. Dosing schedule given to patient. Progress note sent to referring office. Patient acknowledges working in consult agreement with pharmacist as referred by his/her physician.       Electronically signed by Adrianna Elizalde, [address] [place] [address] on 10/29/21 at 8:37 AM EDT    Adrianna Elizalde PharmD  PGY2 Ambulatory Care Pharmacy Resident    10/29/2021 8:41 AM    ========================================================================    For Pharmacy Admin Tracking Only     Intervention Detail: Refill(s) Provided and Vaccine Recommended/Administered   Total # of Interventions Recommended: 2   Total # of Interventions Accepted: 2   Time Spent (min): 45

## 2021-11-01 ENCOUNTER — PATIENT MESSAGE (OUTPATIENT)
Dept: FAMILY MEDICINE CLINIC | Age: 61
End: 2021-11-01

## 2021-11-01 NOTE — TELEPHONE ENCOUNTER
From: Ramos Holt  To: Petr hCappell APRN - CNP  Sent: 11/1/2021 8:49 AM EDT  Subject: Visit Follow-Up Question    Good Morning my Blood pressure has been a little better the highest it has been is 169/93 one day and 162/102 another. I take the Amlodipine in the morning, but I will say when I take my BP 2 hours later it will go down slightly. 143/84 to 133/73 another morning it was 155/95 to 140/82, I do feel better my INR is 2.8 since I have started the Drug Maintenance clinic. Please let me know if I should change anything .     Thank You  Jona Wilder

## 2021-11-09 DIAGNOSIS — I10 ESSENTIAL HYPERTENSION: ICD-10-CM

## 2021-11-09 RX ORDER — AMLODIPINE BESYLATE 5 MG/1
10 TABLET ORAL DAILY
Qty: 60 TABLET | Refills: 5 | Status: SHIPPED | OUTPATIENT
Start: 2021-11-09 | End: 2022-05-09

## 2021-11-11 ENCOUNTER — HOSPITAL ENCOUNTER (OUTPATIENT)
Dept: PHARMACY | Age: 61
Setting detail: THERAPIES SERIES
Discharge: HOME | End: 2021-11-11
Payer: COMMERCIAL

## 2021-11-11 DIAGNOSIS — I48.91 ATRIAL FIBRILLATION WITH RVR (HCC): Primary | ICD-10-CM

## 2021-11-11 LAB
INR BLD: 3.5
PROTIME: 41.9 SECONDS

## 2021-11-11 PROCEDURE — 85610 PROTHROMBIN TIME: CPT

## 2021-11-11 PROCEDURE — 99212 OFFICE O/P EST SF 10 MIN: CPT

## 2021-11-11 NOTE — PROGRESS NOTES
Medication Management Service, Warfarin Management  [address], [phone]  Visit Date: 2021   Subjective:   Ayo Flores is a 63 y.o. female who presents to clinic today for anticoagulation monitoring and adjustment. Patient seen in clinic for warfarin management due to  Indication:   atrial fibrillation. INR goal: of 2.0-3.0. Duration of therapy: indefinite. Assessment and PLAN   PT/INR done in office per protocol. INR today is 3.5, supratherapeutic. Elevated INR due to a decrease in vitamin K intake this past week. Patient had decreased appetite last week due to illness. Plan: Will decrease today's dose to 2.5 mg from 5 mg. Then continue current regimen of warfarin 2.5 mg Tue, Sat, and 5 mg all other days of the week. Using warfarin 5 mg tablets. Recheck INR in 3 week(s). Patient seen in room # 1. ED. OP. = educated patient on consistency with vegetables     Patient attested to self screening for COVID - 19. Patient verbalized understanding of dosing directions and information discussed. Dosing schedule given to patient. Progress note sent to referring office. Patient acknowledges working in consult agreement with pharmacist as referred by his/her physician.       Electronically signed by Mary Wilcox on 21 at 8:59 AM EST    For Pharmacy Admin Tracking Only     Intervention Detail: Adherence Monitorin and Dose Adjustment: 1, reason: Therapy Optimization   Total # of Interventions Recommended: 1   Total # of Interventions Accepted: 1   Time Spent (min): 20

## 2021-11-19 ENCOUNTER — PATIENT MESSAGE (OUTPATIENT)
Dept: FAMILY MEDICINE CLINIC | Age: 61
End: 2021-11-19

## 2021-11-19 NOTE — TELEPHONE ENCOUNTER
From: Ayo Flores  To: Andree Javed  Sent: 11/19/2021 6:17 AM EST  Subject: Blood Pressure    Good Morning, Update on my BP, we will start with a big Thank You, I am feeling so much better. My BP is still up a bit from the norm the highest it has been is 143/81, it is usually in the 130's over mid 70's to 89. I have been at a few times 120's/ mid 70's to 80's. Let me know if I should do anything different. I am still taking the new BP pill twice a day, I have backed off on taking my BP 4-6 times a day. I have the last couple of days only taken it twice.   Maria Luisa Healy

## 2021-11-21 RX ORDER — GLIPIZIDE 5 MG/1
TABLET ORAL
Qty: 30 TABLET | Refills: 0 | Status: SHIPPED | OUTPATIENT
Start: 2021-11-21 | End: 2021-12-27

## 2021-12-01 RX ORDER — TIZANIDINE 4 MG/1
TABLET ORAL
Qty: 90 TABLET | Refills: 0 | Status: SHIPPED | OUTPATIENT
Start: 2021-12-01 | End: 2022-03-01

## 2021-12-02 ENCOUNTER — HOSPITAL ENCOUNTER (OUTPATIENT)
Dept: PHARMACY | Age: 61
Setting detail: THERAPIES SERIES
Discharge: HOME | End: 2021-12-02
Payer: COMMERCIAL

## 2021-12-02 DIAGNOSIS — I48.91 ATRIAL FIBRILLATION WITH RVR (HCC): Primary | ICD-10-CM

## 2021-12-02 LAB
INR BLD: 3.7
PROTIME: 44.3 SECONDS

## 2021-12-02 PROCEDURE — 99212 OFFICE O/P EST SF 10 MIN: CPT

## 2021-12-02 PROCEDURE — 85610 PROTHROMBIN TIME: CPT

## 2021-12-02 NOTE — PROGRESS NOTES
Medication Management Service, Warfarin Management  [facility], [phone]  Visit Date: 2021   Subjective:   Mariana Thurston is a 63 y.o. female who presents to clinic today for anticoagulation monitoring and adjustment. Patient seen in clinic for warfarin management due to  Indication:   atrial fibrillation. INR goal: of 2.0-3.0. Duration of therapy: indefinite. Assessment and PLAN   PT/INR done in office per protocol. INR today is 3.7, remains supratherapeutic and up from 3.5 at last check when regimen was reduced. Rebecca Jay again reports fewer green vegetables. Plan: Will decrease regimen by 8.3% to warfarin 2.5mg on Tues, Thurs, Sat only and 5mg all other days of the week. to  today's dose to 2.5 mg from 5 mg. Then continue current regimen of warfarin 2.5 mg Tue, Sat, and 5 mg all other days of the week. Using warfarin 5 mg tablets. Recheck INR in 3 week(s). Patient seen in room # 3. ED. OP. = educated patient on consistency with vegetables     Patient attested to self screening for COVID - 19. Patient verbalized understanding of dosing directions and information discussed. Dosing schedule given to patient. Progress note sent to referring office. Patient acknowledges working in consult agreement with pharmacist as referred by his/her physician.       Souleymane Parham [facility], CACP  Clinical Pharmacist Medication Management  2021  10:11 AM     For Pharmacy Admin Tracking Only     Intervention Detail: Adherence Monitorin and Dose Adjustment: 1, reason: Improve Adherence, Therapy De-escalation   Total # of Interventions Recommended: 2   Total # of Interventions Accepted: 2   Time Spent (min): 20

## 2021-12-23 ENCOUNTER — HOSPITAL ENCOUNTER (OUTPATIENT)
Dept: PHARMACY | Age: 61
Setting detail: THERAPIES SERIES
Discharge: HOME | End: 2021-12-23
Payer: COMMERCIAL

## 2021-12-23 DIAGNOSIS — I48.91 ATRIAL FIBRILLATION WITH RVR (HCC): Primary | ICD-10-CM

## 2021-12-23 LAB
INR BLD: 2.6
PROTIME: 30.8 SECONDS

## 2021-12-23 PROCEDURE — 99211 OFF/OP EST MAY X REQ PHY/QHP: CPT

## 2021-12-23 PROCEDURE — 85610 PROTHROMBIN TIME: CPT

## 2021-12-23 NOTE — PROGRESS NOTES
Medication Management Service, Warfarin Management  [facility], [phone]  Visit Date: 12/23/2021   Subjective:   Radonna Dandy is a 63 y.o. female who presents to clinic today for anticoagulation monitoring and adjustment. Patient seen in clinic for warfarin management due to  Indication:   atrial fibrillation. INR goal: of 2.0-3.0. Duration of therapy: indefinite. Assessment and PLAN   PT/INR done in office per protocol. INR today is 2.6, therapeutic. Plan: Will continue current regimen of warfarin 2.5mg on Tues, Thurs, Sat only and 5mg all other days of the week. Using warfarin 5 mg tablets. Recheck INR in 4 week(s). Patient seen in room # 3. ED. OP. = stressed consistency with vegetables     Patient attested to self screening for COVID - 19. Patient verbalized understanding of dosing directions and information discussed. Dosing schedule given to patient. Progress note sent to referring office. Patient acknowledges working in consult agreement with pharmacist as referred by his/her physician.       Alivia Harding RP, CACP  Clinical Pharmacist Medication Management  12/23/2021  10:15 AM     For Pharmacy Admin Tracking Only     Total # of Interventions Accepted: 0   Time Spent (min): 15

## 2022-01-02 RX ORDER — GLIPIZIDE 5 MG/1
TABLET ORAL
Qty: 30 TABLET | Refills: 0 | Status: SHIPPED | OUTPATIENT
Start: 2022-01-02 | End: 2022-01-10 | Stop reason: SDUPTHER

## 2022-01-05 ENCOUNTER — HOSPITAL ENCOUNTER (OUTPATIENT)
Age: 62
Setting detail: SPECIMEN
Discharge: HOME | End: 2022-01-05

## 2022-01-05 DIAGNOSIS — E11.9 TYPE 2 DIABETES MELLITUS WITHOUT COMPLICATION, WITHOUT LONG-TERM CURRENT USE OF INSULIN (HCC): ICD-10-CM

## 2022-01-05 LAB
ABSOLUTE EOS #: 0.19 K/UL (ref 0–0.44)
ABSOLUTE IMMATURE GRANULOCYTE: 0.05 K/UL (ref 0–0.3)
ABSOLUTE LYMPH #: 1.2 K/UL (ref 1.1–3.7)
ABSOLUTE MONO #: 0.69 K/UL (ref 0.1–1.2)
ALBUMIN SERPL-MCNC: 4.5 G/DL (ref 3.5–5.2)
ALBUMIN/GLOBULIN RATIO: 1.7 (ref 1–2.5)
ALP BLD-CCNC: 72 U/L (ref 35–104)
ALT SERPL-CCNC: 12 U/L (ref 5–33)
ANION GAP SERPL CALCULATED.3IONS-SCNC: 15 MMOL/L (ref 9–17)
AST SERPL-CCNC: 16 U/L
BASOPHILS # BLD: 1 % (ref 0–2)
BASOPHILS ABSOLUTE: 0.05 K/UL (ref 0–0.2)
BILIRUB SERPL-MCNC: 0.35 MG/DL (ref 0.3–1.2)
BUN BLDV-MCNC: 19 MG/DL (ref 6–20)
BUN/CREAT BLD: ABNORMAL (ref 9–20)
CALCIUM SERPL-MCNC: 9.6 MG/DL (ref 8.6–10.4)
CHLORIDE BLD-SCNC: 99 MMOL/L (ref 98–107)
CHOLESTEROL/HDL RATIO: 4.4
CHOLESTEROL: 160 MG/DL
CO2: 25 MMOL/L (ref 20–31)
CREAT SERPL-MCNC: 0.92 MG/DL (ref 0.5–0.9)
CREATININE URINE: 90.8 MG/DL (ref 28–217)
DIFFERENTIAL TYPE: ABNORMAL
EOSINOPHILS RELATIVE PERCENT: 2 % (ref 1–4)
ESTIMATED AVERAGE GLUCOSE: 143 MG/DL
GFR AFRICAN AMERICAN: >60 ML/MIN
GFR NON-AFRICAN AMERICAN: >60 ML/MIN
GFR SERPL CREATININE-BSD FRML MDRD: ABNORMAL ML/MIN/{1.73_M2}
GFR SERPL CREATININE-BSD FRML MDRD: ABNORMAL ML/MIN/{1.73_M2}
GLUCOSE BLD-MCNC: 137 MG/DL (ref 70–99)
HBA1C MFR BLD: 6.6 % (ref 4–6)
HCT VFR BLD CALC: 44.9 % (ref 36.3–47.1)
HDLC SERPL-MCNC: 36 MG/DL
HEMOGLOBIN: 13.6 G/DL (ref 11.9–15.1)
IMMATURE GRANULOCYTES: 1 %
INR BLD: 3.1
LDL CHOLESTEROL: 73 MG/DL (ref 0–130)
LYMPHOCYTES # BLD: 12 % (ref 24–43)
MCH RBC QN AUTO: 29.2 PG (ref 25.2–33.5)
MCHC RBC AUTO-ENTMCNC: 30.3 G/DL (ref 28.4–34.8)
MCV RBC AUTO: 96.4 FL (ref 82.6–102.9)
MICROALBUMIN/CREAT 24H UR: 21 MG/L
MICROALBUMIN/CREAT UR-RTO: 23 MCG/MG CREAT
MONOCYTES # BLD: 7 % (ref 3–12)
NRBC AUTOMATED: 0 PER 100 WBC
PDW BLD-RTO: 13.7 % (ref 11.8–14.4)
PLATELET # BLD: 388 K/UL (ref 138–453)
PLATELET ESTIMATE: ABNORMAL
PMV BLD AUTO: 10.9 FL (ref 8.1–13.5)
POTASSIUM SERPL-SCNC: 4.6 MMOL/L (ref 3.7–5.3)
PROTHROMBIN TIME: 30.3 SEC (ref 9.1–12.3)
RBC # BLD: 4.66 M/UL (ref 3.95–5.11)
RBC # BLD: ABNORMAL 10*6/UL
SEG NEUTROPHILS: 77 % (ref 36–65)
SEGMENTED NEUTROPHILS ABSOLUTE COUNT: 7.95 K/UL (ref 1.5–8.1)
SODIUM BLD-SCNC: 139 MMOL/L (ref 135–144)
TOTAL PROTEIN: 7.2 G/DL (ref 6.4–8.3)
TRIGL SERPL-MCNC: 255 MG/DL
VLDLC SERPL CALC-MCNC: ABNORMAL MG/DL (ref 1–30)
WBC # BLD: 10.1 K/UL (ref 3.5–11.3)
WBC # BLD: ABNORMAL 10*3/UL

## 2022-01-06 ENCOUNTER — HOSPITAL ENCOUNTER (OUTPATIENT)
Dept: PHARMACY | Age: 62
Setting detail: THERAPIES SERIES
Discharge: HOME | End: 2022-01-06
Payer: COMMERCIAL

## 2022-01-06 ENCOUNTER — HOSPITAL ENCOUNTER (OUTPATIENT)
Dept: CARDIAC CATH/INVASIVE PROCEDURES | Age: 62
Discharge: HOME | End: 2022-01-06
Payer: COMMERCIAL

## 2022-01-06 VITALS
BODY MASS INDEX: 38.62 KG/M2 | OXYGEN SATURATION: 96 % | TEMPERATURE: 98.1 F | SYSTOLIC BLOOD PRESSURE: 113 MMHG | HEART RATE: 67 BPM | HEIGHT: 63 IN | DIASTOLIC BLOOD PRESSURE: 63 MMHG | WEIGHT: 218 LBS | RESPIRATION RATE: 17 BRPM

## 2022-01-06 DIAGNOSIS — I48.91 ATRIAL FIBRILLATION WITH RVR (HCC): Primary | ICD-10-CM

## 2022-01-06 LAB
POC INR: 3.4
PROTHROMBIN TIME, POC: 39 SEC (ref 10.4–14.2)

## 2022-01-06 PROCEDURE — 7100000001 HC PACU RECOVERY - ADDTL 15 MIN

## 2022-01-06 PROCEDURE — 85610 PROTHROMBIN TIME: CPT

## 2022-01-06 PROCEDURE — 6360000002 HC RX W HCPCS

## 2022-01-06 PROCEDURE — 93005 ELECTROCARDIOGRAM TRACING: CPT | Performed by: INTERNAL MEDICINE

## 2022-01-06 PROCEDURE — 2709999900 HC NON-CHARGEABLE SUPPLY

## 2022-01-06 PROCEDURE — 7100000000 HC PACU RECOVERY - FIRST 15 MIN

## 2022-01-06 PROCEDURE — 92960 CARDIOVERSION ELECTRIC EXT: CPT

## 2022-01-06 PROCEDURE — 99211 OFF/OP EST MAY X REQ PHY/QHP: CPT

## 2022-01-06 PROCEDURE — 2580000003 HC RX 258: Performed by: INTERNAL MEDICINE

## 2022-01-06 RX ORDER — SODIUM CHLORIDE 9 MG/ML
INJECTION, SOLUTION INTRAVENOUS CONTINUOUS
Status: DISCONTINUED | OUTPATIENT
Start: 2022-01-06 | End: 2022-01-07 | Stop reason: HOSPADM

## 2022-01-06 RX ADMIN — SODIUM CHLORIDE: 9 INJECTION, SOLUTION INTRAVENOUS at 09:22

## 2022-01-06 NOTE — H&P
[facility]  Procedure History and Physical Update          Patient Name: Bhaskar Felix  MRN:    [id number]  YOB: 1960  Date of evaluation:  1/6/2022    Procedure:    Cardioversion    Indication for procedure:  Atrial Fibrillation      Please refer to the office note completed by Cardiologist on 01/04/2021 in the medical record and note that:    [x] I have examined the patient and reviewed the H&P/Consult and there are no changes to be made to the assessment or plan.     [] I have examined the patient and reviewed the H&P/Consult and have noted the following changes:    Past Medical History:   Diagnosis Date    Abnormal Pap smear 1989    Had colposcopy    Allergy     PCN    Anemia     Anticoagulated     ON COUMADIN    Anticoagulated on Coumadin     Atrial fibrillation ([address] 12/05/2014    Blood transfusion 12/20/2011    4 units    CAD (coronary artery disease)     COPD (chronic obstructive pulmonary disease) (HCC)     Eczema     Examination of participant in clinical trial 01/12/2011    end date 3/25/15    Headache 12/05/2014    Heart disease 01/13/2011    card stents x 2    History of atrioventricular jenni ablation     Hyperlipidemia     Hypertension     Menopausal symptoms     MI, old 2011    PAF (paroxysmal atrial fibrillation) ([address]     s/p cardioversion Dec 2015     Primary osteoarthritis of both hands 07/31/2019    Smoker     STEMI (ST elevation myocardial infarction) ([address] 12/2016    INFERIOR / OCCLUDED RCA    Type 2 diabetes mellitus without complication, without long-term current use of insulin ([address] 12/21/2017       Past Surgical History:   Procedure Laterality Date    ABLATION OF DYSRHYTHMIC FOCUS  09/2018    A FIB    CARDIAC CATHETERIZATION  03/18/2021    CARDIOVERSION  02/08/2018    DR Brittney Joseph  SUCCESSFUL    CARDIOVERSION  12/2014 2014 /2016    CARDIOVERSION  2015    CARDIOVERSION  08/30/2018    DR [address]    COLPOSCOPY  1989    CORONARY ANGIOPLASTY WITH STENT PLACEMENT  2016    PTCA AND GIRMA TO RCA    CORONARY ANGIOPLASTY WITH STENT PLACEMENT      DILATION AND CURETTAGE      ENDOMETRIAL ABLATION  2012    hydrothermal    LAPAROSCOPY      TRANSESOPHAGEAL ECHOCARDIOGRAM  2014    TUBAL LIGATION         Family History   Problem Relation Age of Onset    COPD Father     Coronary Art Dis Father     Cancer Mother     Heart Failure Maternal Grandmother     Breast Cancer Neg Hx     Colon Cancer Neg Hx     Diabetes Neg Hx     Eclampsia Neg Hx     Hypertension Neg Hx     Ovarian Cancer Neg Hx      Labor Neg Hx     Spont Abortions Neg Hx     Stroke Neg Hx        Allergies   Allergen Reactions    Pcn [Penicillins] Hives    Amiodarone     Protonix [Pantoprazole Sodium] Itching       Prior to Admission medications    Medication Sig Start Date End Date Taking?  Authorizing Provider   glipiZIDE (GLUCOTROL) 5 MG tablet TAKE 1 TABLET BY MOUTH EVERY DAY [facility] KAYCEE Manriquez-C   metFORMIN (GLUCOPHAGE) 1000 MG tablet TAKE 1 TABLET BY MOUTH TWO TIMES A DAY WITH MEALS 21[facility] ForKAYCEE herrera-C   tiZANidine (ZANAFLEX) 4 MG tablet TAKE 1 TABLET BY MOUTH AT BEDTIME AS NEEDED FOR BACK PAIN 21[facility] ForKAYCEE herrera-C   amLODIPine (NORVASC) 5 MG tablet Take 2 tablets by mouth daily  Patient taking differently: Take 5 mg by mouth 2 times daily  [facility] ForKAYCEE herrera-C   warfarin (COUMADIN) 5 MG tablet Take 0.5 - 1 tablet daily as instructed by the [facility]. 10/29/21   Jj Salazar MD   Magnesium 400 MG TABS Take 1 tablet by mouth nightly 21   Grecia Dobbs APRN - CNP   sotalol (BETAPACE) 80 MG tablet 120 mg 2 times daily  20   Historical Provider, MD   clopidogrel (PLAVIX) 75 MG tablet TAKE 1 TABLET BY MOUTH ONE TIME A DAY  20   Kathrin Hortencia Fairchild MD   losartan (COZAAR) 50 MG tablet TAKE 1 TABLET BY MOUTH ONE TIME A DAY   Patient taking differently: 100 mg daily  2/10/20   Kathrin Nahid Underwood MD   Niacin ER 1000 MG TBCR Take by mouth daily    Historical Provider, MD   furosemide (LASIX) 40 MG tablet Take one tablet by mouth in the morning and one half tablet in the evening. 5/5/19   Historical Provider, MD   metoprolol tartrate (LOPRESSOR) 25 MG tablet Take 0.5 tablets by mouth 2 times daily  Patient taking differently: Take 50 mg by mouth 2 times daily  9/21/18   KYLE Ortiz - CNP   Omega-3 Fatty Acids (FISH OIL) 1000 MG CAPS Take 1,000 mg by mouth daily     Historical Provider, MD   nitroGLYCERIN (NITROSTAT) 0.4 MG SL tablet Place 1 tablet under tongue upon chest pain, wait 5 minutes and may repeat up to 3 doses in 15 minutes. Do not crush or break. 12/20/16   KYLE Falcon - CNP   atorvastatin (LIPITOR) 40 MG tablet Take 40 mg by mouth nightly     Maria Esther Gongora MD         There were no vitals filed for this visit. Constitutional and General Appearance:   alert, cooperative, no distress and appears stated age  [de-identified]:  PERRL, EOMI  Respiratory:  Normal excursion and expansion without use of accessory muscles  Resp Auscultation:  Good respiratory effort. No for increased work of breathing. On auscultation: clear to auscultation bilaterally  Cardiovascular:  Irregular rate and rythm. No murmurs. Abdomen:  Soft  Bowel sounds present  Non-tender to palpation  Extremities:  No cyanosis or clubbing  Lower extremity edema: No.  Skin:  Warm and dry  Neurological:  Alert and oriented. Moves all extremities well              Plan:  Proceed with planned procedure. Further orders to follow. Risks, benefits, alternatives, and details discussed extensively. Accepts and consents. Roland Velasco MD       Cardiovascular Fellow PGY-4  1/6/2022, 9:01 AM      Attending Physician Statement  I have discussed the case of Aris Martinez including pertinent history and exam findings with the resident.  I have seen and examined the patient and the key elements of the encounter have been performed by me. I agree with the assessment, plan and orders as documented by the resident With changes made to the note.      Electronically signed by Laurita Corral MD on 1/6/2022 at 5:01 PM.    [facility]      [phone]

## 2022-01-06 NOTE — PROCEDURES
[facility]  Cardioversion Procedure Note         Today's Date: 1/6/2022  Indication: Atrial fibrillation/Atrial flutter    Patient seen and examined. History and Physical reviewed. Labs reviewed. After informed consent was obtained with explanation of the risks and benefits, the patient was prepared using standard tecqniques. All Conscious Sedation was administered via the Cardiologist.     CARDIOVERSION:    After an adequate level of sedation was achieved  200J in biphasic synchronized delivery was administered. conversion to normal sinus rhythm. The patient awoke without complications. A post procedure 12 L ECG was ordered and reviewed. Impression:  Successful Consious Sedation - safely  Successful Cardioversion      Complications: There were no complications encountered. The patient will continue with the discharge meds and has been instructed to follow-up with your primary cardiologist for continued long term care and cardiovascular management. There were no complications encountered. Electronically signed on 01/06/22 at 10:31 AM by:    Toyin Salinas MD, MD   Fellow, [address]       Physician Statement  I have discussed the case of Rosie Luu including pertinent history and exam findings with the resident. I have seen and examined the patient and the key elements of the encounter have been performed by me. I agree with the assessment, plan and orders as documented by the resident With changes made to the note. Procedure performed by me.     Electronically signed by Denia Lopez MD on 1/6/2022 at 5:01 PM.    [facility]      [phone]

## 2022-01-06 NOTE — PROGRESS NOTES
Received post procedure to  to room 6. Assessment obtained. Restrictions reviewed with patient. Post procedure pathway initiated. Patient without complaints.

## 2022-01-06 NOTE — PROGRESS NOTES
Discharge teaching completed and pt phoned a friend for ride home. IV removed and pressure drsg to site. All questions answered and pt discharged at 1200.

## 2022-01-06 NOTE — PROGRESS NOTES
Patient admitted, consent signed and questions answered. Patient ready for procedure. Call light to reach with side rails up 2 of 2. History and physical needs to be completed.

## 2022-01-06 NOTE — PROGRESS NOTES
Patient scheduled for cardiac ablation today, INR done yesterday pre-procedure, result = 3.1. I spoke directly with Yoan Teran today to review results, and verify dosing for last evening. Patient reports no change, followed current plan. No need to adjust regimen at this time, will schedule for next INR check in clinic in 4 weeks.       Marsha Carreon RPh, CACP  Clinical Pharmacist Medication Management  1/6/2022  2:51 PM

## 2022-01-07 LAB
EKG ATRIAL RATE: 250 BPM
EKG Q-T INTERVAL: 364 MS
EKG QRS DURATION: 84 MS
EKG QTC CALCULATION (BAZETT): 455 MS
EKG R AXIS: -9 DEGREES
EKG T AXIS: -135 DEGREES
EKG VENTRICULAR RATE: 94 BPM

## 2022-01-10 ENCOUNTER — OFFICE VISIT (OUTPATIENT)
Dept: FAMILY MEDICINE CLINIC | Age: 62
End: 2022-01-10
Payer: COMMERCIAL

## 2022-01-10 VITALS
HEIGHT: 63 IN | HEART RATE: 68 BPM | DIASTOLIC BLOOD PRESSURE: 68 MMHG | SYSTOLIC BLOOD PRESSURE: 120 MMHG | WEIGHT: 218 LBS | BODY MASS INDEX: 38.62 KG/M2 | TEMPERATURE: 98.5 F

## 2022-01-10 DIAGNOSIS — E11.9 TYPE 2 DIABETES MELLITUS WITHOUT COMPLICATION, WITHOUT LONG-TERM CURRENT USE OF INSULIN (HCC): Primary | ICD-10-CM

## 2022-01-10 PROCEDURE — 99213 OFFICE O/P EST LOW 20 MIN: CPT | Performed by: PHYSICIAN ASSISTANT

## 2022-01-10 RX ORDER — GLIPIZIDE 5 MG/1
TABLET ORAL
Qty: 90 TABLET | Refills: 1 | Status: SHIPPED | OUTPATIENT
Start: 2022-01-10 | End: 2022-10-10

## 2022-01-10 ASSESSMENT — ENCOUNTER SYMPTOMS
WHEEZING: 0
COUGH: 0
DIARRHEA: 0
CONSTIPATION: 0
VOMITING: 0
NAUSEA: 0
SHORTNESS OF BREATH: 0
ABDOMINAL PAIN: 0
COLOR CHANGE: 0

## 2022-01-10 NOTE — PROGRESS NOTES
[facility]  [address] [address]  Dept: [phone]  Dept Fax: [phone]    Erickson Loja is a 63 y.o. female who presents today for her medical conditions/complaintsas noted below. Erickson Loja is c/o of   Chief Complaint   Patient presents with    Diabetes       HPI:     Diabetes  She presents for her follow-up diabetic visit. She has type 2 diabetes mellitus. Her disease course has been stable. There are no hypoglycemic associated symptoms. Pertinent negatives for hypoglycemia include no nervousness/anxiousness or pallor. There are no diabetic associated symptoms. Pertinent negatives for diabetes include no chest pain, no fatigue and no weakness. There are no hypoglycemic complications. Symptoms are stable. There are no diabetic complications. Risk factors for coronary artery disease include diabetes mellitus, family history, dyslipidemia, hypertension and post-menopausal. Current diabetic treatment includes oral agent (dual therapy). She is compliant with treatment all of the time. She is following a generally healthy diet. She participates in exercise intermittently. patient updating she had another cardiac ablation last week. States feeling much better now. Following with Dr. Patrick Ashford. Heart has stayed in normal rhythm since recent ablation  She has completed labs and would like to review these    Hemoglobin A1C (%)   Date Value   01/05/2022 6.6 (H)   07/07/2021 5.9   03/03/2021 6.2             ( goal A1Cis < 7)   Microalb/Crt.  Ratio (mcg/mg creat)   Date Value   01/05/2022 23     LDL Cholesterol (mg/dL)   Date Value   01/05/2022 73   11/20/2020 73   02/11/2019 48     LDL Calculated (mg/dL)   Date Value   08/12/2015 40   09/18/2014 71       (goal LDL is <100)   AST (U/L)   Date Value   01/05/2022 16     ALT (U/L)   Date Value   01/05/2022 12     BUN (mg/dL)   Date Value   01/05/2022 19     BP Readings from Last 3 Encounters:   01/10/22 120/68   22 113/63   10/25/21 (!) 146/96          (goal 120/80)    Past Medical History:   Diagnosis Date    Abnormal Pap smear     Had colposcopy    Allergy     PCN    Anemia     Anticoagulated     ON COUMADIN    Anticoagulated on Coumadin     Atrial fibrillation ([address] 2014    Blood transfusion 2011    4 units    CAD (coronary artery disease)     COPD (chronic obstructive pulmonary disease) (HCC)     Eczema     Examination of participant in clinical trial 2011    end date 3/25/15    Headache 2014    Heart disease 2011    card stents x 2    History of atrioventricular jenni ablation     Hyperlipidemia     Hypertension     Menopausal symptoms     MI, old     PAF (paroxysmal atrial fibrillation) ([address]     s/p cardioversion Dec 2015     Primary osteoarthritis of both hands 2019    Smoker     STEMI (ST elevation myocardial infarction) ([address] 2016    INFERIOR / OCCLUDED RCA    Type 2 diabetes mellitus without complication, without long-term current use of insulin ([address] 2017      Past Surgical History:   Procedure Laterality Date    ABLATION OF DYSRHYTHMIC FOCUS  2018    A FIB    CARDIAC CATHETERIZATION  2021    CARDIOVERSION  2018    DR Franklin Alba  SUCCESSFUL    CARDIOVERSION  2014    CARDIOVERSION  2015    CARDIOVERSION  2018    DR Franklin Alba    CARDIOVERSION  2022     SECTION  1986    COLPOSCOPY      CORONARY ANGIOPLASTY WITH STENT PLACEMENT  2016    PTCA AND GIRMA TO RCA    CORONARY ANGIOPLASTY WITH STENT PLACEMENT      DILATION AND CURETTAGE      ENDOMETRIAL ABLATION  2012    hydrothermal    LAPAROSCOPY      TRANSESOPHAGEAL ECHOCARDIOGRAM  2014    TUBAL LIGATION         Family History   Problem Relation Age of Onset    COPD Father     Coronary Art Dis Father     Cancer Mother     Heart Failure Maternal Grandmother     Breast Cancer Neg Hx     Colon Cancer Neg Hx     Diabetes Neg Hx     Eclampsia Neg Hx     Hypertension Neg Hx     Ovarian Cancer Neg Hx      Labor Neg Hx     Spont Abortions Neg Hx     Stroke Neg Hx        Social History     Tobacco Use    Smoking status: Former Smoker     Packs/day: 0.50     Years: 34.00     Pack years: 17.00     Types: Cigarettes     Quit date: 10/23/2018     Years since quitting: 3.2    Smokeless tobacco: Never Used    Tobacco comment: smokes 5 cig/day   Substance Use Topics    Alcohol use: No     Alcohol/week: 0.0 standard drinks      Current Outpatient Medications   Medication Sig Dispense Refill    glipiZIDE (GLUCOTROL) 5 MG tablet TAKE 1 TABLET BY MOUTH EVERY DAY 90 tablet 1    metFORMIN (GLUCOPHAGE) 1000 MG tablet TAKE 1 TABLET BY MOUTH TWO TIMES A DAY WITH MEALS 180 tablet 0    tiZANidine (ZANAFLEX) 4 MG tablet TAKE 1 TABLET BY MOUTH AT BEDTIME AS NEEDED FOR BACK PAIN 90 tablet 0    amLODIPine (NORVASC) 5 MG tablet Take 2 tablets by mouth daily (Patient taking differently: Take 5 mg by mouth 2 times daily ) 60 tablet 5    warfarin (COUMADIN) 5 MG tablet Take 0.5 - 1 tablet daily as instructed by the [facility]. 90 tablet 1    Magnesium 400 MG TABS Take 1 tablet by mouth nightly (Patient taking differently: Take 1 tablet by mouth nightly Takes 250 mg) 30 tablet 1    sotalol (BETAPACE) 80 MG tablet 120 mg 2 times daily       clopidogrel (PLAVIX) 75 MG tablet TAKE 1 TABLET BY MOUTH ONE TIME A DAY  15 tablet 0    losartan (COZAAR) 50 MG tablet TAKE 1 TABLET BY MOUTH ONE TIME A DAY  (Patient taking differently: 50 mg 2 times daily ) 90 tablet 1    Niacin ER 1000 MG TBCR Take by mouth daily      furosemide (LASIX) 40 MG tablet Take one tablet by mouth in the morning and one half tablet in the evening.   3    metoprolol tartrate (LOPRESSOR) 25 MG tablet Take 0.5 tablets by mouth 2 times daily (Patient taking differently: Take 50 mg by mouth 2 times daily ) 60 tablet 3    Omega-3 Fatty Acids (FISH OIL) 1000 MG CAPS Take 1,000 mg by mouth daily       nitroGLYCERIN (NITROSTAT) 0.4 MG SL tablet Place 1 tablet under tongue upon chest pain, wait 5 minutes and may repeat up to 3 doses in 15 minutes. Do not crush or break. 25 tablet 3    atorvastatin (LIPITOR) 40 MG tablet Take 40 mg by mouth nightly        No current facility-administered medications for this visit. Allergies   Allergen Reactions    Pcn [Penicillins] Hives    Amiodarone     Protonix [Pantoprazole Sodium] Itching       Health Maintenance   Topic Date Due    Hepatitis B vaccine (1 of 3 - Risk 3-dose series) Never done    Shingles Vaccine (1 of 2) Never done    Cervical cancer screen  02/03/2018    COVID-19 Vaccine (3 - Booster for [place] series) 10/09/2021    Diabetic retinal exam  03/13/2022 (Originally 8/28/1980)    Depression Screen  03/03/2022    Diabetic foot exam  07/07/2022    A1C test (Diabetic or Prediabetic)  01/05/2023    Diabetic microalbuminuria test  01/05/2023    Lipid screen  01/05/2023    Potassium monitoring  01/05/2023    Creatinine monitoring  01/05/2023    Breast cancer screen  03/05/2023    Colon cancer screen fecal DNA test (Cologuard)  03/15/2024    DTaP/Tdap/Td vaccine (2 - Td or Tdap) 02/28/2027    Pneumococcal 0-64 years Vaccine (2 of 2 - PPSV23) 08/28/2027    Flu vaccine  Completed    Hepatitis C screen  Completed    HIV screen  Completed    Hepatitis A vaccine  Aged Out    Hib vaccine  Aged Out    Meningococcal (ACWY) vaccine  Aged Out       Subjective:     Review of Systems   Constitutional: Negative for activity change, appetite change, fatigue, fever and unexpected weight change.         /68 (Site: Left Upper Arm, Position: Sitting, Cuff Size: Large Adult)   Pulse 68   Temp 98.5 °F (36.9 °C) (Tympanic)   Ht 5' 3\" (1.6 m)   Wt 218 lb (98.9 kg)   LMP 07/03/2014 (Approximate)   BMI 38.62 kg/m²    Respiratory: Negative for cough, shortness of breath and wheezing. Cardiovascular: Negative for chest pain, palpitations and leg swelling. Gastrointestinal: Negative for abdominal pain, constipation, diarrhea, nausea and vomiting. Genitourinary: Negative for dysuria. Skin: Negative for color change, pallor, rash and wound. Neurological: Negative for weakness. Psychiatric/Behavioral: The patient is not nervous/anxious. Objective:     Physical Exam  Vitals and nursing note reviewed. Constitutional:       General: She is not in acute distress. Appearance: Normal appearance. She is well-developed. She is not ill-appearing. HENT:      Head: Normocephalic and atraumatic. Cardiovascular:      Rate and Rhythm: Normal rate and regular rhythm. Heart sounds: No murmur heard. Pulmonary:      Effort: Pulmonary effort is normal. No respiratory distress. Breath sounds: Normal breath sounds. No wheezing, rhonchi or rales. Skin:     General: Skin is warm and dry. Coloration: Skin is not pale. Findings: No erythema or rash. Neurological:      Mental Status: She is alert and oriented to person, place, and time. Psychiatric:         Mood and Affect: Mood normal.         Behavior: Behavior normal.         Thought Content: Thought content normal.         Judgment: Judgment normal.       /68 (Site: Left Upper Arm, Position: Sitting, Cuff Size: Large Adult)   Pulse 68   Temp 98.5 °F (36.9 °C) (Tympanic)   Ht 5' 3\" (1.6 m)   Wt 218 lb (98.9 kg)   LMP 07/03/2014 (Approximate)   BMI 38.62 kg/m²     Assessment:       Diagnosis Orders   1. Type 2 diabetes mellitus without complication, without long-term current use of insulin ([facility])  Comprehensive Metabolic Panel    Hemoglobin A1C    Microalbumin, Ur             Plan:      Return in about 6 months (around 7/10/2022) for diabetes. Reviewed recent labs.   Blood sugar is just slightly higher than previous test.  Encouraged healthy low sugar diet and regular exercise as able. We will plan to repeat labs in 6 months. Continue present medication.   Continue with cardiologist as planned  Patient agreed with treatment plan    Orders Placed This Encounter   Procedures    Comprehensive Metabolic Panel     Standing Status:   Future     Standing Expiration Date:   1/10/2023    Hemoglobin A1C     Standing Status:   Future     Standing Expiration Date:   1/10/2023    Microalbumin, Ur     Standing Status:   Future     Standing Expiration Date:   1/10/2023      Lab Results   Component Value Date    LABA1C 6.6 (H) 01/05/2022     Lab Results   Component Value Date     01/05/2022     Lab Results   Component Value Date     01/05/2022    K 4.6 01/05/2022    CL 99 01/05/2022    CO2 25 01/05/2022    BUN 19 01/05/2022    CREATININE 0.92 (H) 01/05/2022    GLUCOSE 137 (H) 01/05/2022    CALCIUM 9.6 01/05/2022    PROT 7.2 01/05/2022    LABALBU 4.5 01/05/2022    BILITOT 0.35 01/05/2022    ALKPHOS 72 01/05/2022    AST 16 01/05/2022    ALT 12 01/05/2022    LABGLOM >60 01/05/2022    GFRAA >60 01/05/2022       Lab Results   Component Value Date    CHOL 160 01/05/2022    CHOL 150 11/20/2020    CHOL 133 07/07/2017     Lab Results   Component Value Date    TRIG 255 (H) 01/05/2022    TRIG 200 (H) 11/20/2020    TRIG 228 (H) 07/07/2017     Lab Results   Component Value Date    HDL 36 (L) 01/05/2022    HDL 37 (L) 11/20/2020    HDL 35 (L) 02/11/2019     Lab Results   Component Value Date    LDLCHOLESTEROL 73 01/05/2022    LDLCHOLESTEROL 73 11/20/2020    LDLCHOLESTEROL 48 02/11/2019    LDLCALC 40 08/12/2015    LDLCALC 71 09/18/2014     Lab Results   Component Value Date    VLDL NOT REPORTED (H) 01/05/2022    VLDL NOT REPORTED (H) 11/20/2020    VLDL NOT REPORTED 02/11/2019     Lab Results   Component Value Date    CHOLHDLRATIO 4.4 01/05/2022    CHOLHDLRATIO 4.1 11/20/2020    CHOLHDLRATIO 3.2 02/11/2019     Lab Results   Component Value Date    WBC 10.1 01/05/2022    HGB 13.6 01/05/2022 HCT 44.9 01/05/2022    MCV 96.4 01/05/2022     01/05/2022        Patient given educational materials - see patient instructions. Discussed use, benefit, and side effects of prescribed medications. All patientquestions answered. Pt voiced understanding. Reviewed health maintenance. Instructedto continue current medications, diet and exercise. Patient agreed with treatmentplan. Follow up as directed.      Electronically signed by Elizabeth Dominog PA-C on 1/10/2022 at 10:56 AM

## 2022-01-10 NOTE — PROGRESS NOTES
Visit Information    Have you changed or started any medications since your last visit including any over-the-counter medicines, vitamins, or herbal medicines? no   Are you having any side effects from any of your medications? -  no  Have you stopped taking any of your medications? Is so, why? -  no    Have you seen any other physician or provider since your last visit? No  Have you had any other diagnostic tests since your last visit? No  Have you been seen in the emergency room and/or had an admission to a hospital since we last saw you? No  Have you had your routine dental cleaning in the past 6 months? no    Have you activated your "[employer] account? If not, what are your barriers?  Yes     Patient Care Team:  Magali Spain PA-C as PCP - General (Physician Assistant)  Magali Spain PA-C as PCP - [facility]  Dinora Ruby MD as Consulting Physician (Cardiology)    Medical History Review  Past Medical, Family, and Social History reviewed and  contribute to the patient presenting condition    Health Maintenance   Topic Date Due    Hepatitis B vaccine (1 of 3 - Risk 3-dose series) Never done    Shingles Vaccine (1 of 2) Never done    Cervical cancer screen  02/03/2018    COVID-19 Vaccine (3 - Booster for Bib [place] series) 10/09/2021    Diabetic retinal exam  03/13/2022 (Originally 8/28/1980)    Depression Screen  03/03/2022    Diabetic foot exam  07/07/2022    A1C test (Diabetic or Prediabetic)  01/05/2023    Diabetic microalbuminuria test  01/05/2023    Lipid screen  01/05/2023    Potassium monitoring  01/05/2023    Creatinine monitoring  01/05/2023    Breast cancer screen  03/05/2023    Colon cancer screen fecal DNA test (Cologuard)  03/15/2024    DTaP/Tdap/Td vaccine (2 - Td or Tdap) 02/28/2027    Pneumococcal 0-64 years Vaccine (2 of 2 - PPSV23) 08/28/2027    Flu vaccine  Completed    Hepatitis C screen  Completed    HIV screen  Completed    Hepatitis A vaccine  Aged C/ [place] [address] 19 Hib vaccine  Aged Out    Meningococcal (ACWY) vaccine  Aged Out

## 2022-02-01 ENCOUNTER — HOSPITAL ENCOUNTER (OUTPATIENT)
Dept: PHARMACY | Age: 62
Setting detail: THERAPIES SERIES
Discharge: HOME | End: 2022-02-01
Payer: COMMERCIAL

## 2022-02-01 DIAGNOSIS — I48.91 ATRIAL FIBRILLATION WITH RVR (HCC): Primary | ICD-10-CM

## 2022-02-01 LAB
INR BLD: 3.6
PROTIME: 43.2 SECONDS

## 2022-02-01 PROCEDURE — 99212 OFFICE O/P EST SF 10 MIN: CPT

## 2022-02-01 PROCEDURE — 85610 PROTHROMBIN TIME: CPT

## 2022-02-01 NOTE — PROGRESS NOTES
Medication Management Service, Warfarin Management  [facility] C[facility], [phone]  Visit Date: 2022   Subjective:   Alok Pacheco is a 63 y.o. female who presents to clinic today for anticoagulation monitoring and adjustment. Patient seen in clinic for warfarin management due to  Indication:   atrial fibrillation. INR goal: of 2.0-3.0. Duration of therapy: indefinite. Assessment and PLAN   PT/INR done in office per protocol. INR today is 3.6, supratherapeutic. Elevated INR may be due to regimen being too high and a decrease in vitamin K intake this past week. Plan: Will decrease current regimen by 9.1% weekly. New regimen will be warfarin 5 mg Mon, Wed, fri and 2.5 mg all other days of the week. Using warfarin 5 mg tablets. Recheck INR in 2 week(s). Patient seen in room # 1. ED. OP. = Patient only had one serving of broccoli this week. She usually has 2 or 3 servings of vegetables (raw broccoli in salads)  each week. Patient attested to self screening for COVID - 19. Patient verbalized understanding of dosing directions and information discussed. Dosing schedule given to patient. Progress note sent to referring office. Patient acknowledges working in consult agreement with pharmacist as referred by his/her physician.       For Pharmacy Admin Tracking Only     Intervention Detail: Adherence Monitorin and Dose Adjustment: 1, reason: Therapy Optimization   Total # of Interventions Recommended: 2   Total # of Interventions Accepted: 2   Time Spent (min): 30

## 2022-02-03 ENCOUNTER — TELEPHONE (OUTPATIENT)
Dept: PHARMACY | Age: 62
End: 2022-02-03

## 2022-02-03 NOTE — TELEPHONE ENCOUNTER
Judychris Melanie called today to report that lisinopril is being DC'd, and she is starting valsartan. No interactions, no change in warfarin regimen.       Marilynn Dupont RPh, CACP  Clinical Pharmacist Medication Management  2/3/2022  2:34 PM

## 2022-02-15 ENCOUNTER — HOSPITAL ENCOUNTER (OUTPATIENT)
Dept: PHARMACY | Age: 62
Setting detail: THERAPIES SERIES
Discharge: HOME | End: 2022-02-15
Payer: COMMERCIAL

## 2022-02-15 DIAGNOSIS — I48.91 ATRIAL FIBRILLATION WITH RVR (HCC): Primary | ICD-10-CM

## 2022-02-15 LAB
INR BLD: 2.7
PROTIME: 32.9 SECONDS

## 2022-02-15 PROCEDURE — 85610 PROTHROMBIN TIME: CPT

## 2022-02-15 PROCEDURE — 99211 OFF/OP EST MAY X REQ PHY/QHP: CPT

## 2022-02-15 NOTE — PROGRESS NOTES
Medication Management Service, Warfarin Management  [facility] CUEVAS [facility], [phone]  Visit Date: 2/15/2022   Subjective:   Oly Hope is a 63 y.o. female who presents to clinic today for anticoagulation monitoring and adjustment. Patient seen in clinic for warfarin management due to  Indication:   atrial fibrillation. INR goal: of 2.0-3.0. Duration of therapy: indefinite. Assessment and PLAN   PT/INR done in office per protocol. INR today is 2.7, therapeutic. Plan: Will continue current regimen of warfarin 5 mg every Monday, Wednesday, Friday; 2.5 mg all other days of the week. Losartan and lisinopril were discontinued and valsartan was initiated 2 weeks ago. No interactions were found with warfarin. Recheck INR in 4 week(s). Patient seen in room # 1. ED. OP. = Patient reports being consistent with her 2-3 servings of green vegetables every week (raw broccoli in salads). Patient attested to self screening for COVID - 19. Patient verbalized understanding of dosing directions and information discussed. Dosing schedule given to patient. Progress note sent to referring office. Patient acknowledges working in consult agreement with pharmacist as referred by his/her physician.       Electronically signed by Vaishali Johnson on 2/15/22 at 10:23 AM EST    For Pharmacy Admin Tracking Only     Intervention Detail: Adherence Monitorin   Total # of Interventions Recommended: 1   Total # of Interventions Accepted: 1   Time Spent (min): 20

## 2022-02-21 ENCOUNTER — HOSPITAL ENCOUNTER (OUTPATIENT)
Age: 62
Setting detail: SPECIMEN
Discharge: HOME | End: 2022-02-21

## 2022-02-21 LAB
ANION GAP SERPL CALCULATED.3IONS-SCNC: 15 MMOL/L (ref 9–17)
BUN BLDV-MCNC: 21 MG/DL (ref 6–20)
CALCIUM SERPL-MCNC: 9.6 MG/DL (ref 8.6–10.4)
CHLORIDE BLD-SCNC: 103 MMOL/L (ref 98–107)
CO2: 27 MMOL/L (ref 20–31)
CREAT SERPL-MCNC: 0.82 MG/DL (ref 0.5–0.9)
GFR AFRICAN AMERICAN: >60 ML/MIN
GFR NON-AFRICAN AMERICAN: >60 ML/MIN
GFR SERPL CREATININE-BSD FRML MDRD: ABNORMAL ML/MIN/{1.73_M2}
GLUCOSE FASTING: 123 MG/DL (ref 70–99)
POTASSIUM SERPL-SCNC: 4.7 MMOL/L (ref 3.7–5.3)
SODIUM BLD-SCNC: 145 MMOL/L (ref 135–144)

## 2022-02-24 ENCOUNTER — OFFICE VISIT (OUTPATIENT)
Dept: FAMILY MEDICINE CLINIC | Age: 62
End: 2022-02-24
Payer: COMMERCIAL

## 2022-02-24 VITALS
TEMPERATURE: 97.5 F | DIASTOLIC BLOOD PRESSURE: 62 MMHG | HEIGHT: 63 IN | WEIGHT: 220 LBS | HEART RATE: 68 BPM | BODY MASS INDEX: 38.98 KG/M2 | SYSTOLIC BLOOD PRESSURE: 118 MMHG | OXYGEN SATURATION: 96 %

## 2022-02-24 DIAGNOSIS — L03.011 PARONYCHIA OF RIGHT INDEX FINGER: Primary | ICD-10-CM

## 2022-02-24 PROCEDURE — 99213 OFFICE O/P EST LOW 20 MIN: CPT | Performed by: PHYSICIAN ASSISTANT

## 2022-02-24 RX ORDER — CEPHALEXIN 500 MG/1
500 CAPSULE ORAL 2 TIMES DAILY
Qty: 14 CAPSULE | Refills: 0 | Status: SHIPPED | OUTPATIENT
Start: 2022-02-24 | End: 2022-03-03

## 2022-02-24 ASSESSMENT — ENCOUNTER SYMPTOMS: COLOR CHANGE: 0

## 2022-02-24 NOTE — PROGRESS NOTES
Visit Information    Have you changed or started any medications since your last visit including any over-the-counter medicines, vitamins, or herbal medicines? no   Are you having any side effects from any of your medications? -  no  Have you stopped taking any of your medications? Is so, why? -  no    Have you seen any other physician or provider since your last visit? No  Have you had any other diagnostic tests since your last visit? No  Have you been seen in the emergency room and/or had an admission to a hospital since we last saw you? No  Have you had your routine dental cleaning in the past 6 months? no    Have you activated your [employer] account? If not, what are your barriers?  Yes     Patient Care Team:  Vashti Veronica PA-C as PCP - General (Physician Assistant)  Vashti Veronica PA-C as PCP - [facility]  Carmelo Rueda MD as Consulting Physician (Cardiology)    Medical History Review  Past Medical, Family, and Social History reviewed and  contribute to the patient presenting condition    Health Maintenance   Topic Date Due    Hepatitis B vaccine (1 of 3 - Risk 3-dose series) Never done    Shingles Vaccine (1 of 2) Never done    Cervical cancer screen  02/03/2018    Depression Screen  03/03/2022    Diabetic retinal exam  03/13/2022 (Originally [birth year]28/1978)    Diabetic foot exam  07/07/2022    A1C test (Diabetic or Prediabetic)  01/05/2023    Diabetic microalbuminuria test  01/05/2023    Lipid screen  01/05/2023    Potassium monitoring  02/21/2023    Creatinine monitoring  02/21/2023    Breast cancer screen  03/05/2023    Colorectal Cancer Screen  03/15/2024    DTaP/Tdap/Td vaccine (2 - Td or Tdap) 02/28/2027    Pneumococcal 0-64 years Vaccine (2 of 2 - PPSV23) 08/28/2027    Flu vaccine  Completed    COVID-19 Vaccine  Completed    Hepatitis C screen  Completed    HIV screen  Completed    Hepatitis A vaccine  Aged Out    Hib vaccine  Aged Out    Meningococcal (ACWY) vaccine Aged Out

## 2022-02-24 NOTE — PROGRESS NOTES
[facility]  [address]  [address] [place] [id number]  Dept: [phone]  Dept Fax: [phone]    Christina Hickey is a 63 y.o. female who presents today for her medical conditions/complaintsas noted below. Christina Hickey is c/o of   Chief Complaint   Patient presents with    Finger Pain     right hand ring finger infection around the nail         HPI:     HPI    Patient reporting that her right index finger has been swollen, red and painful around the nail. She denies any trauma, picking or clipping of the nail or skin in that area. She has not had any drainage from the area. She does occasionally note moisture on the side of the nail. She is going in for cardiac ablation in the upcoming weeks. She is back in atrial fibrillation at this time. Hemoglobin A1C (%)   Date Value   01/05/2022 6.6 (H)   07/07/2021 5.9   03/03/2021 6.2             ( goal A1Cis < 7)   Microalb/Crt.  Ratio (mcg/mg creat)   Date Value   01/05/2022 23     LDL Cholesterol (mg/dL)   Date Value   01/05/2022 73   11/20/2020 73   02/11/2019 48     LDL Calculated (mg/dL)   Date Value   08/12/2015 40   09/18/2014 71       (goal LDL is <100)   AST (U/L)   Date Value   01/05/2022 16     ALT (U/L)   Date Value   01/05/2022 12     BUN (mg/dL)   Date Value   02/21/2022 21 (H)     BP Readings from Last 3 Encounters:   02/24/22 118/62   01/10/22 120/68   01/06/22 113/63          (goal 120/80)    Past Medical History:   Diagnosis Date    Abnormal Pap smear 1989    Had colposcopy    Allergy     PCN    Anemia     Anticoagulated     ON COUMADIN    Anticoagulated on Coumadin     Atrial fibrillation ([facility] [address] 12/05/2014    Blood transfusion 12/20/2011    4 units    CAD (coronary artery disease)     COPD (chronic obstructive pulmonary disease) (HCC)     Eczema     Examination of participant in clinical trial 01/12/2011    end date 3/25/15    Headache 12/05/2014    Heart disease 2011    card stents x 2    History of atrioventricular jenni ablation     Hyperlipidemia     Hypertension     Menopausal symptoms     MI, old     PAF (paroxysmal atrial fibrillation) [facility])     s/p cardioversion Dec 2015     Primary osteoarthritis of both hands 2019    Smoker     STEMI (ST elevation myocardial infarction) ([facility] [address] 2016    INFERIOR / OCCLUDED RCA    Type 2 diabetes mellitus without complication, without long-term current use of insulin ([facility] [address] 2017      Past Surgical History:   Procedure Laterality Date    ABLATION OF DYSRHYTHMIC FOCUS  2018    A FIB    CARDIAC CATHETERIZATION  2021    CARDIOVERSION  2018    DR Kirstie Mchugh  SUCCESSFUL    CARDIOVERSION  2014    CARDIOVERSION  2015    CARDIOVERSION  2018    DR Kirstie Mchugh    CARDIOVERSION  2022     SECTION      COLPOSCOPY      CORONARY ANGIOPLASTY WITH STENT PLACEMENT  2016    PTCA AND GIRMA TO RCA    CORONARY ANGIOPLASTY WITH STENT PLACEMENT      DILATION AND CURETTAGE      ENDOMETRIAL ABLATION  2012    hydrothermal    LAPAROSCOPY      TRANSESOPHAGEAL ECHOCARDIOGRAM  2014    TUBAL LIGATION         Family History   Problem Relation Age of Onset    COPD Father     Coronary Art Dis Father     Cancer Mother     Heart Failure Maternal Grandmother     Breast Cancer Neg Hx     Colon Cancer Neg Hx     Diabetes Neg Hx     Eclampsia Neg Hx     Hypertension Neg Hx     Ovarian Cancer Neg Hx      Labor Neg Hx     Spont Abortions Neg Hx     Stroke Neg Hx        Social History     Tobacco Use    Smoking status: Former Smoker     Packs/day: 0.50     Years: 34.00     Pack years: 17.00     Types: Cigarettes     Quit date: 10/23/2018     Years since quitting: 3.3    Smokeless tobacco: Never Used    Tobacco comment: smokes 5 cig/day   Substance Use Topics    Alcohol use: No     Alcohol/week: 0.0 standard drinks      Current Outpatient Medications   Medication Sig Dispense Refill    cephALEXin (KEFLEX) 500 MG capsule Take 1 capsule by mouth 2 times daily for 7 days 14 capsule 0    glipiZIDE (GLUCOTROL) 5 MG tablet TAKE 1 TABLET BY MOUTH EVERY DAY 90 tablet 1    metFORMIN (GLUCOPHAGE) 1000 MG tablet TAKE 1 TABLET BY MOUTH TWO TIMES A DAY WITH MEALS 180 tablet 0    tiZANidine (ZANAFLEX) 4 MG tablet TAKE 1 TABLET BY MOUTH AT BEDTIME AS NEEDED FOR BACK PAIN 90 tablet 0    amLODIPine (NORVASC) 5 MG tablet Take 2 tablets by mouth daily (Patient taking differently: Take 5 mg by mouth 2 times daily ) 60 tablet 5    warfarin (COUMADIN) 5 MG tablet Take 0.5 - 1 tablet daily as instructed by the [facility]. 90 tablet 1    sotalol (BETAPACE) 80 MG tablet 120 mg 2 times daily       clopidogrel (PLAVIX) 75 MG tablet TAKE 1 TABLET BY MOUTH ONE TIME A DAY  15 tablet 0    Niacin ER 1000 MG TBCR Take by mouth daily      furosemide (LASIX) 40 MG tablet Take one tablet by mouth in the morning and one half tablet in the evening. 3    metoprolol tartrate (LOPRESSOR) 25 MG tablet Take 0.5 tablets by mouth 2 times daily (Patient taking differently: Take 50 mg by mouth 2 times daily ) 60 tablet 3    Omega-3 Fatty Acids (FISH OIL) 1000 MG CAPS Take 1,000 mg by mouth daily       nitroGLYCERIN (NITROSTAT) 0.4 MG SL tablet Place 1 tablet under tongue upon chest pain, wait 5 minutes and may repeat up to 3 doses in 15 minutes. Do not crush or break. 25 tablet 3    atorvastatin (LIPITOR) 40 MG tablet Take 40 mg by mouth nightly       Magnesium 400 MG TABS Take 1 tablet by mouth nightly (Patient taking differently: Take 1 tablet by mouth nightly Takes 250 mg) 30 tablet 1    losartan (COZAAR) 50 MG tablet TAKE 1 TABLET BY MOUTH ONE TIME A DAY  (Patient taking differently: 50 mg 2 times daily ) 90 tablet 1     No current facility-administered medications for this visit.      Allergies   Allergen Reactions    Pcn [Penicillins] Hives    Amiodarone     Protonix [Pantoprazole Sodium] Itching       Health Maintenance   Topic Date Due    Hepatitis B vaccine (1 of 3 - Risk 3-dose series) Never done    Shingles Vaccine (1 of 2) Never done    Cervical cancer screen  02/03/2018    Depression Screen  03/03/2022    Diabetic retinal exam  03/13/2022 (Originally [birth year]/28/1980)    Diabetic foot exam  07/07/2022    A1C test (Diabetic or Prediabetic)  01/05/2023    Diabetic microalbuminuria test  01/05/2023    Lipid screen  01/05/2023    Potassium monitoring  02/21/2023    Creatinine monitoring  02/21/2023    Breast cancer screen  03/05/2023    Colorectal Cancer Screen  03/15/2024    DTaP/Tdap/Td vaccine (2 - Td or Tdap) 02/28/2027    Pneumococcal 0-64 years Vaccine (2 of 2 - PPSV23) 08/28/2027    Flu vaccine  Completed    COVID-19 Vaccine  Completed    Hepatitis C screen  Completed    HIV screen  Completed    Hepatitis A vaccine  Aged Out    Hib vaccine  Aged Out    Meningococcal (ACWY) vaccine  Aged Out       Subjective:     Review of Systems   Constitutional: Negative for activity change, appetite change, fatigue, fever and unexpected weight change. Skin: Positive for wound. Negative for color change, pallor and rash. Neurological: Negative for weakness and numbness. Hematological: Negative for adenopathy. Psychiatric/Behavioral: The patient is not nervous/anxious. Objective:     Physical Exam  Vitals and nursing note reviewed. Constitutional:       General: She is not in acute distress. Appearance: Normal appearance. She is well-developed. She is not ill-appearing. HENT:      Head: Normocephalic and atraumatic. Skin:     General: Skin is warm and dry. Coloration: Skin is not pale. Findings: Lesion (erythema, dryness, skin peeling at base and surrounding right index finger nail/tissue. no present weeping. tender with palpation) present. No erythema or rash.    Neurological:      Mental Status: She is alert and oriented to person, place, and time. Psychiatric:         Mood and Affect: Mood normal.         Behavior: Behavior normal.         Thought Content: Thought content normal.         Judgment: Judgment normal.       /62 (Site: Left Upper Arm, Position: Sitting, Cuff Size: Large Adult)   Pulse 68   Temp 97.5 °F (36.4 °C) (Tympanic)   Ht 5' 3\" (1.6 m)   Wt 220 lb (99.8 kg)   LMP 07/03/2014 (Approximate)   SpO2 96%   BMI 38.97 kg/m²     Assessment:       Diagnosis Orders   1. Paronychia of right index finger  cephALEXin (KEFLEX) 500 MG capsule             Plan:      Return if symptoms worsen or fail to improve. Patient is dealing with a paronychia on the right index finger. Given that she is having a cardiac ablation in the near future I recommended we do an oral antibiotic at this time. We discussed the use and side effect of Keflex. She does have an allergy to penicillin but states that was mild. She is interested in trying Keflex at this time. We will start this twice a day for 7 days. I encouraged her to keep the skin around the nail clean, dry and avoid picking or clipping the area. She will call if there is any concerns or questions that arise  Continue with cardiology as planned  Patient agreed with treatment plan      Patient given educational materials - see patient instructions. Discussed use, benefit, and side effects of prescribed medications. All patientquestions answered. Pt voiced understanding. Reviewed health maintenance. Instructedto continue current medications, diet and exercise. Patient agreed with treatmentplan. Follow up as directed.      Electronically signed by Anthony Fish PA-C on 2/24/2022 at 1:41 PM

## 2022-03-01 RX ORDER — TIZANIDINE 4 MG/1
TABLET ORAL
Qty: 90 TABLET | Refills: 0 | Status: SHIPPED | OUTPATIENT
Start: 2022-03-01 | End: 2022-06-02

## 2022-03-04 ENCOUNTER — TELEPHONE (OUTPATIENT)
Dept: PHARMACY | Age: 62
End: 2022-03-04

## 2022-03-04 RX ORDER — DIGOXIN 125 MCG
125 TABLET ORAL DAILY
COMMUNITY

## 2022-03-04 NOTE — TELEPHONE ENCOUNTER
Patient called to report that she has started digoxin 125 mcg daily and just completed a course of Keflex therapy. No interaction with digoxin and antibiotic is completed. Patient called us with antibiotic last week after clinic was closed but did not leave a VM. [address] [place] [address]  Ph., CACP, Clinical Pharmacist  Anticoagulation Services, [address] [place] [address] Coumadin [facility]  3/4/2022  9:16 AM    For Pharmacy Admin Tracking Only     Intervention Detail:    Total # of Interventions Recommended: 0   Total # of Interventions Accepted: 0   Time Spent (min): 10

## 2022-03-15 ENCOUNTER — HOSPITAL ENCOUNTER (OUTPATIENT)
Dept: PHARMACY | Age: 62
Setting detail: THERAPIES SERIES
Discharge: HOME | End: 2022-03-15
Payer: COMMERCIAL

## 2022-03-15 DIAGNOSIS — I48.91 ATRIAL FIBRILLATION WITH RVR (HCC): Primary | ICD-10-CM

## 2022-03-15 LAB
INR BLD: 3.1
PROTIME: 37.2 SECONDS

## 2022-03-15 PROCEDURE — 85610 PROTHROMBIN TIME: CPT

## 2022-03-15 PROCEDURE — 99211 OFF/OP EST MAY X REQ PHY/QHP: CPT

## 2022-03-15 NOTE — PROGRESS NOTES
Medication Management Service, Warfarin Management  [facility], [phone]  Visit Date: 3/15/2022   Subjective:   Jose David Barga is a 63 y.o. female who presents to clinic today for anticoagulation monitoring and adjustment. Patient seen in clinic for warfarin management due to  Indication:   atrial fibrillation. INR goal: of 2.0-3.0. Duration of therapy: indefinite. Assessment and PLAN   PT/INR done in office per protocol. INR today is 3.1, just above goal.    Plan: No adjustment made since patient is currently in a-fib would rather INR be above goal than risk a sub-therapeutic INR. Continue current regimen of warfarin 5 mg Mon, Wed, fri and 2.5 mg all other days of the week. Using warfarin 5 mg tablets. Recheck INR in 4 week(s). Patient seen in room # 2. ED. OP. = Patient only had one serving of broccoli this week. She usually has 2 or 3 servings of vegetables (raw broccoli in salads)  each week. Patient attested to self screening for COVID - 19. Patient verbalized understanding of dosing directions and information discussed. Dosing schedule given to patient. Progress note sent to referring office. Patient acknowledges working in consult agreement with pharmacist as referred by his/her physician. [address] [place] [address]  Ph., CACP, Clinical Pharmacist  Anticoagulation Services, [address] [place] [address] Coumadin Clinic  3/15/2022  10:16 AM    For Pharmacy Admin Tracking Only     Intervention Detail:    Total # of Interventions Recommended: 0   Total # of Interventions Accepted: 0   Time Spent (min): 20

## 2022-03-17 ENCOUNTER — HOSPITAL ENCOUNTER (OUTPATIENT)
Dept: NON INVASIVE DIAGNOSTICS | Age: 62
Discharge: HOME | End: 2022-03-17
Payer: COMMERCIAL

## 2022-03-17 DIAGNOSIS — I48.0 PAROXYSMAL ATRIAL FIBRILLATION (HCC): ICD-10-CM

## 2022-03-17 DIAGNOSIS — I25.10 CORONARY ARTERY DISEASE, UNSPECIFIED VESSEL OR LESION TYPE, UNSPECIFIED WHETHER ANGINA PRESENT, UNSPECIFIED WHETHER NATIVE OR TRANSPLANTED HEART: ICD-10-CM

## 2022-03-17 LAB
LV EF: 33 %
LVEF MODALITY: NORMAL

## 2022-03-17 PROCEDURE — 93306 TTE W/DOPPLER COMPLETE: CPT

## 2022-04-04 ENCOUNTER — HOSPITAL ENCOUNTER (OUTPATIENT)
Dept: NON INVASIVE DIAGNOSTICS | Age: 62
Discharge: HOME | End: 2022-04-04
Payer: COMMERCIAL

## 2022-04-04 ENCOUNTER — HOSPITAL ENCOUNTER (OUTPATIENT)
Dept: NUCLEAR MEDICINE | Age: 62
Discharge: HOME | End: 2022-04-06
Payer: COMMERCIAL

## 2022-04-04 DIAGNOSIS — I48.0 AF (PAROXYSMAL ATRIAL FIBRILLATION) (HCC): ICD-10-CM

## 2022-04-04 DIAGNOSIS — I25.10 CVD (CARDIOVASCULAR DISEASE): ICD-10-CM

## 2022-04-04 LAB
LV EF: 43 %
LVEF MODALITY: NORMAL

## 2022-04-04 PROCEDURE — 3430000000 HC RX DIAGNOSTIC RADIOPHARMACEUTICAL: Performed by: INTERNAL MEDICINE

## 2022-04-04 PROCEDURE — 78452 HT MUSCLE IMAGE SPECT MULT: CPT

## 2022-04-04 PROCEDURE — 93017 CV STRESS TEST TRACING ONLY: CPT

## 2022-04-04 PROCEDURE — 2580000003 HC RX 258: Performed by: INTERNAL MEDICINE

## 2022-04-04 PROCEDURE — A9500 TC99M SESTAMIBI: HCPCS | Performed by: INTERNAL MEDICINE

## 2022-04-04 PROCEDURE — 6360000002 HC RX W HCPCS: Performed by: INTERNAL MEDICINE

## 2022-04-04 RX ORDER — NITROGLYCERIN 0.4 MG/1
0.4 TABLET SUBLINGUAL EVERY 5 MIN PRN
Status: DISCONTINUED | OUTPATIENT
Start: 2022-04-04 | End: 2022-04-04 | Stop reason: ALTCHOICE

## 2022-04-04 RX ORDER — METOPROLOL TARTRATE 5 MG/5ML
5 INJECTION INTRAVENOUS EVERY 5 MIN PRN
Status: DISCONTINUED | OUTPATIENT
Start: 2022-04-04 | End: 2022-04-04 | Stop reason: ALTCHOICE

## 2022-04-04 RX ORDER — SODIUM CHLORIDE 0.9 % (FLUSH) 0.9 %
10 SYRINGE (ML) INJECTION PRN
Status: DISCONTINUED | OUTPATIENT
Start: 2022-04-04 | End: 2022-04-07 | Stop reason: HOSPADM

## 2022-04-04 RX ORDER — ATROPINE SULFATE 0.1 MG/ML
0.5 INJECTION INTRAVENOUS EVERY 5 MIN PRN
Status: DISCONTINUED | OUTPATIENT
Start: 2022-04-04 | End: 2022-04-04 | Stop reason: ALTCHOICE

## 2022-04-04 RX ORDER — SODIUM CHLORIDE 9 MG/ML
500 INJECTION, SOLUTION INTRAVENOUS CONTINUOUS PRN
Status: DISCONTINUED | OUTPATIENT
Start: 2022-04-04 | End: 2022-04-04 | Stop reason: ALTCHOICE

## 2022-04-04 RX ORDER — AMINOPHYLLINE DIHYDRATE 25 MG/ML
50 INJECTION, SOLUTION INTRAVENOUS PRN
Status: DISCONTINUED | OUTPATIENT
Start: 2022-04-04 | End: 2022-04-04 | Stop reason: ALTCHOICE

## 2022-04-04 RX ORDER — ALBUTEROL SULFATE 90 UG/1
2 AEROSOL, METERED RESPIRATORY (INHALATION) PRN
Status: DISCONTINUED | OUTPATIENT
Start: 2022-04-04 | End: 2022-04-04 | Stop reason: ALTCHOICE

## 2022-04-04 RX ORDER — SODIUM CHLORIDE 0.9 % (FLUSH) 0.9 %
5-40 SYRINGE (ML) INJECTION PRN
Status: DISCONTINUED | OUTPATIENT
Start: 2022-04-04 | End: 2022-04-04 | Stop reason: ALTCHOICE

## 2022-04-04 RX ADMIN — SODIUM CHLORIDE, PRESERVATIVE FREE 10 ML: 5 INJECTION INTRAVENOUS at 10:39

## 2022-04-04 RX ADMIN — TETRAKIS(2-METHOXYISOBUTYLISOCYANIDE)COPPER(I) TETRAFLUOROBORATE 15.2 MILLICURIE: 1 INJECTION, POWDER, LYOPHILIZED, FOR SOLUTION INTRAVENOUS at 09:03

## 2022-04-04 RX ADMIN — SODIUM CHLORIDE, PRESERVATIVE FREE 10 ML: 5 INJECTION INTRAVENOUS at 10:10

## 2022-04-04 RX ADMIN — SODIUM CHLORIDE, PRESERVATIVE FREE 10 ML: 5 INJECTION INTRAVENOUS at 10:43

## 2022-04-04 RX ADMIN — SODIUM CHLORIDE, PRESERVATIVE FREE 10 ML: 5 INJECTION INTRAVENOUS at 09:03

## 2022-04-04 RX ADMIN — TETRAKIS(2-METHOXYISOBUTYLISOCYANIDE)COPPER(I) TETRAFLUOROBORATE 39 MILLICURIE: 1 INJECTION, POWDER, LYOPHILIZED, FOR SOLUTION INTRAVENOUS at 10:43

## 2022-04-04 RX ADMIN — REGADENOSON 0.4 MG: 0.08 INJECTION, SOLUTION INTRAVENOUS at 10:38

## 2022-04-04 NOTE — PROCEDURES
[address]                  [address] [facility] [address]                              CARDIAC STRESS TEST    PATIENT NAME: Norah Kraus                   :        1960  MED REC NO:   [id number]                             ROOM:  ACCOUNT NO:   [de-identified]                           ADMIT DATE: 2022  PROVIDER:     Bennett Elkins    DATE OF STUDY:  2022    ORDERING PROVIDER:  Myron Corral MD    PRIMARY CARE PROVIDER:  Michelle Torres PA-C    INTERPRETING PHYSICIAN:  Bennett Elkins MD    LEXISCAN STRESS STUDY:  Indication:  Other (cardiovascular disease/paroxysmal atrial  fibrillation)    Procedure was explained and consent form was signed    Medications:  Lexiscan, 0.4 mg  Resting heart rate:  102 bpm  Resting blood pressure:  144/90 mm/Hg  Infusion heart rate:   113 bpm  Infusion blood pressure:  158/92 mm/Hg  Resting EKG:  Abnormal (atrial fibrillation/nonspecific T-wave changes)  Stress heart response:  Normal response  Stress BP response:  Appropriate  Stress EKG(S):  No changes seen  Chest discomfort:  None  Ischemic EKG changes:  Uninterpretable    Comments:  Abnormal pre-test ECG precludes ECG criteria for myocardial  ischemia. IMPRESSION:  Electrocardiographically uninterpretable Lexiscan stress study. Radio-isotope results to follow from the department of Nuclear Medicine.           Ervin Crespo    D: 2022 11:44:36       T: 2022 11:47:15     AS/LORRIE  Job#: [id number]     Doc#: Unknown    CC:    ()

## 2022-04-12 ENCOUNTER — HOSPITAL ENCOUNTER (OUTPATIENT)
Dept: PHARMACY | Age: 62
Setting detail: THERAPIES SERIES
Discharge: HOME | End: 2022-04-12
Payer: COMMERCIAL

## 2022-04-12 DIAGNOSIS — I48.91 ATRIAL FIBRILLATION WITH RVR (HCC): Primary | ICD-10-CM

## 2022-04-12 LAB
INR BLD: 2.6
PROTIME: 31 SECONDS

## 2022-04-12 PROCEDURE — 85610 PROTHROMBIN TIME: CPT

## 2022-04-12 PROCEDURE — 99211 OFF/OP EST MAY X REQ PHY/QHP: CPT

## 2022-04-12 NOTE — PROGRESS NOTES
Medication Management Service, Warfarin Management  [facility], [phone]  Visit Date: 4/12/2022   Subjective:   Brady López is a 63 y.o. female who presents to clinic today for anticoagulation monitoring and adjustment. Patient seen in clinic for warfarin management due to  Indication:   atrial fibrillation. INR goal: of 2.0-3.0. Duration of therapy: indefinite. Assessment and PLAN   PT/INR done in office per protocol. INR today is 2.6, therapeutic. Plan: Continue current regimen of warfarin 5 mg Mon, Wed, fri and 2.5 mg all other days of the week. Using warfarin 5 mg tablets. Recheck INR in 6 week(s). Patient seen in room # 2. Patient attested to self screening for COVID - 19. Patient verbalized understanding of dosing directions and information discussed. Dosing schedule given to patient. Progress note sent to referring office. Patient acknowledges working in consult agreement with pharmacist as referred by his/her physician. [address] [place] [address]  Ph., CACP, Clinical Pharmacist  [facility], [facility] Cou[facility]in [facility]  4/12/2022  8:21 AM    For Pharmacy [id number]  Only     Intervention Detail:    Total # of Interventions Recommended: 0   Total # of Interventions Accepted: 0   Time Spent (min): 20

## 2022-04-14 ENCOUNTER — HOSPITAL ENCOUNTER (OUTPATIENT)
Dept: CARDIAC CATH/INVASIVE PROCEDURES | Age: 62
Discharge: HOME | End: 2022-04-14
Payer: COMMERCIAL

## 2022-04-14 VITALS
OXYGEN SATURATION: 97 % | BODY MASS INDEX: 40.4 KG/M2 | SYSTOLIC BLOOD PRESSURE: 110 MMHG | HEART RATE: 65 BPM | WEIGHT: 228 LBS | RESPIRATION RATE: 22 BRPM | DIASTOLIC BLOOD PRESSURE: 76 MMHG | TEMPERATURE: 98.2 F | HEIGHT: 63 IN

## 2022-04-14 LAB
GFR NON-AFRICAN AMERICAN: >60 ML/MIN
GFR SERPL CREATININE-BSD FRML MDRD: >60 ML/MIN
GFR SERPL CREATININE-BSD FRML MDRD: NORMAL ML/MIN/{1.73_M2}
GLUCOSE BLD-MCNC: 120 MG/DL (ref 74–100)
POC BUN: 14 MG/DL (ref 8–26)
POC CHLORIDE: 105 MMOL/L (ref 98–107)
POC CREATININE: 0.88 MG/DL (ref 0.51–1.19)
POC HEMATOCRIT: 44 % (ref 36–46)
POC HEMOGLOBIN: 15 G/DL (ref 12–16)
POC INR: 2.7
POC POTASSIUM: 4.1 MMOL/L (ref 3.5–4.5)
POC SODIUM: 143 MMOL/L (ref 138–146)
PROTHROMBIN TIME, POC: 30.7 SEC (ref 10.4–14.2)

## 2022-04-14 PROCEDURE — 7100000010 HC PHASE II RECOVERY - FIRST 15 MIN

## 2022-04-14 PROCEDURE — 84295 ASSAY OF SERUM SODIUM: CPT

## 2022-04-14 PROCEDURE — 84520 ASSAY OF UREA NITROGEN: CPT

## 2022-04-14 PROCEDURE — 92960 CARDIOVERSION ELECTRIC EXT: CPT

## 2022-04-14 PROCEDURE — 6360000002 HC RX W HCPCS

## 2022-04-14 PROCEDURE — 82435 ASSAY OF BLOOD CHLORIDE: CPT

## 2022-04-14 PROCEDURE — 7100000011 HC PHASE II RECOVERY - ADDTL 15 MIN

## 2022-04-14 PROCEDURE — 93005 ELECTROCARDIOGRAM TRACING: CPT | Performed by: INTERNAL MEDICINE

## 2022-04-14 PROCEDURE — 2709999900 HC NON-CHARGEABLE SUPPLY

## 2022-04-14 PROCEDURE — 85014 HEMATOCRIT: CPT

## 2022-04-14 PROCEDURE — 76937 US GUIDE VASCULAR ACCESS: CPT

## 2022-04-14 PROCEDURE — 82947 ASSAY GLUCOSE BLOOD QUANT: CPT

## 2022-04-14 PROCEDURE — 85610 PROTHROMBIN TIME: CPT

## 2022-04-14 PROCEDURE — 82565 ASSAY OF CREATININE: CPT

## 2022-04-14 PROCEDURE — 99152 MOD SED SAME PHYS/QHP 5/>YRS: CPT

## 2022-04-14 PROCEDURE — 84132 ASSAY OF SERUM POTASSIUM: CPT

## 2022-04-14 RX ORDER — SODIUM CHLORIDE 9 MG/ML
INJECTION, SOLUTION INTRAVENOUS CONTINUOUS
Status: DISCONTINUED | OUTPATIENT
Start: 2022-04-14 | End: 2022-04-15 | Stop reason: HOSPADM

## 2022-04-14 NOTE — H&P
[facility]  Procedure History and Physical Update          Patient Name: Mitesh Estrella  MRN:    [id number]  YOB: 1960  Date of evaluation:  2022    Procedure:    Cardioversion    Indication for procedure:  Afib      Please refer to the office note completed by Dr. Lawrence Cronin on  in the medical record and note that:    [x] I have examined the patient and reviewed the H&P/Consult and there are no changes to be made to the assessment or plan.     [] I have examined the patient and reviewed the H&P/Consult and have noted the following changes:          Past Medical History:   Diagnosis Date    Abnormal Pap smear     Had colposcopy    Allergy     PCN    Anemia     Anticoagulated     ON COUMADIN    Anticoagulated on Coumadin     Atrial fibrillation ([address] 2014    Blood transfusion 2011    4 units    CAD (coronary artery disease)     COPD (chronic obstructive pulmonary disease) (HCC)     Eczema     Examination of participant in clinical trial 2011    end date 3/25/15    Headache 2014    Heart disease 2011    card stents x 2    History of atrioventricular jenni ablation     Hyperlipidemia     Hypertension     Menopausal symptoms     MI, old     PAF (paroxysmal atrial fibrillation) ([address]     s/p cardioversion Dec 2015     Primary osteoarthritis of both hands 2019    Smoker     STEMI (ST elevation myocardial infarction) ([address] 2016    INFERIOR / OCCLUDED RCA    Type 2 diabetes mellitus without complication, without long-term current use of insulin ([address] 2017       Past Surgical History:   Procedure Laterality Date    ABLATION OF DYSRHYTHMIC FOCUS  2018    A FIB    CARDIAC CATHETERIZATION  2021    CARDIOVERSION  2018    DR Joanna Bennett  SUCCESSFUL    CARDIOVERSION  2014    CARDIOVERSION  2015    CARDIOVERSION  2018    DR Joanna Bennett    CARDIOVERSION  2022    CARDIOVERSION  2022     SECTION  1986    COLPOSCOPY      CORONARY ANGIOPLASTY WITH STENT PLACEMENT  2016    PTCA AND GIRMA TO RCA    CORONARY ANGIOPLASTY WITH STENT PLACEMENT      DILATION AND CURETTAGE      ENDOMETRIAL ABLATION  2012    hydrothermal    LAPAROSCOPY      TRANSESOPHAGEAL ECHOCARDIOGRAM  2014    TUBAL LIGATION         Family History   Problem Relation Age of Onset    COPD Father     Coronary Art Dis Father     Cancer Mother     Heart Failure Maternal Grandmother     Breast Cancer Neg Hx     Colon Cancer Neg Hx     Diabetes Neg Hx     Eclampsia Neg Hx     Hypertension Neg Hx     Ovarian Cancer Neg Hx      Labor Neg Hx     Spont Abortions Neg Hx     Stroke Neg Hx        Allergies   Allergen Reactions    Pcn [Penicillins] Hives    Amiodarone     Protonix [Pantoprazole Sodium] Itching       Prior to Admission medications    Medication Sig Start Date End Date Taking?  Authorizing Provider   metFORMIN (GLUCOPHAGE) 1000 MG tablet TAKE 1 TABLET BY MOUTH TWO TIMES A DAY WITH MEALS 3/20/22   Ellen A Mitra PA-C   digoxin (LANOXIN) 125 MCG tablet Take 125 mcg by mouth daily    Historical Provider, MD   tiZANidine (ZANAFLEX) 4 MG tablet TAKE 1 TABLET BY MOUTH AT BEDTIME AS NEEDED for back pain 3/1/22   Ellen A Forsharon PA-C   glipiZIDE (GLUCOTROL) 5 MG tablet TAKE 1 TABLET BY MOUTH EVERY DAY 1/10/22   Ellen A Forsharon PA-C   amLODIPine (NORVASC) 5 MG tablet Take 2 tablets by mouth daily  Patient taking differently: Take 5 mg by mouth 2 times daily  21ine A Forsharon PA-C   warfarin (COUMADIN) 5 MG tablet Take 0.5 - 1 tablet daily as instructed by the [facility] Management Clinic. 10/29/21   Rosamond Scheuermann, MD   sotalol (BETAPACE) 80 MG tablet 120 mg 2 times daily  20   Historical Provider, MD   clopidogrel (PLAVIX) 75 MG tablet TAKE 1 TABLET BY MOUTH ONE TIME A DAY  20   Kathrin Ronquillo MD   Niacin ER 1000 MG TBCR Take by mouth daily    Historical Provider, MD   furosemide (LASIX) 40 MG tablet Take one tablet by mouth in the morning and one half tablet in the evening. 5/5/19   Historical Provider, MD   metoprolol tartrate (LOPRESSOR) 25 MG tablet Take 0.5 tablets by mouth 2 times daily  Patient taking differently: Take 50 mg by mouth 2 times daily  9/21/18   KYLE Jackson CNP   Omega-3 Fatty Acids (FISH OIL) 1000 MG CAPS Take 1,000 mg by mouth daily     Historical Provider, MD   nitroGLYCERIN (NITROSTAT) 0.4 MG SL tablet Place 1 tablet under tongue upon chest pain, wait 5 minutes and may repeat up to 3 doses in 15 minutes. Do not crush or break. 12/20/16   KYLE Roberts CNP   atorvastatin (LIPITOR) 40 MG tablet Take 40 mg by mouth nightly     Archana Obrien MD         Vitals:    04/14/22 0819   Pulse: 80   Temp: 98.2 °F (36.8 °C)   SpO2: 97%       Constitutional and General Appearance:   alert, cooperative, no distress and appears stated age  HEENT:  PERRL, EOMI  Respiratory:  Normal excursion and expansion without use of accessory muscles  Resp Auscultation:  Good respiratory effort. No for increased work of breathing. On auscultation: clear to auscultation bilaterally  Cardiovascular:  irregular rate and rhythm. S1/S2  No murmurs. The apical impulse is not displaced  Abdomen:  Soft  Bowel sounds present  Non-tender to palpation  Extremities:  No cyanosis or clubbing  Lower extremity edema: No.  Skin:  Warm and dry  Neurological:  Alert and oriented. Moves all extremities well      Plan:  Proceed with planned procedure. Further orders to follow. Pre Procedure Conscious Sedation Data:     ASA Class:                  [] I [x] II [] III [] IV     Mallampati Class:       [] I [x] II [] III [] IV    Risks, benefits, alternatives, and details discussed extensively. Accepts and consents.       Electronically signed on 04/14/22 at 8:39 AM by:    Tianna Finney MD, MD   Fellow, [address]      Attending Physician Statement  I have discussed the case of Varghese Cavazos including pertinent history and exam findings with the resident. I have seen and examined the patient and the key elements of the encounter have been performed by me. I agree with the assessment, plan and orders as documented by the resident With changes made to the note.      Electronically signed by Yevonne Goodpasture, MD on 4/14/2022 at [facility]      [phone]

## 2022-04-14 NOTE — PROGRESS NOTES
Patient admitted, consent signed, all questions answered. Pt ready for procedure. Bed in low position, call light to reach with side rails up 2 of 2. Family  at bedside with patient.

## 2022-04-15 LAB
EKG ATRIAL RATE: 258 BPM
EKG Q-T INTERVAL: 388 MS
EKG QRS DURATION: 82 MS
EKG QTC CALCULATION (BAZETT): 455 MS
EKG R AXIS: -7 DEGREES
EKG T AXIS: -145 DEGREES
EKG VENTRICULAR RATE: 83 BPM

## 2022-04-15 PROCEDURE — 93010 ELECTROCARDIOGRAM REPORT: CPT | Performed by: INTERNAL MEDICINE

## 2022-05-08 DIAGNOSIS — I10 ESSENTIAL HYPERTENSION: ICD-10-CM

## 2022-05-09 RX ORDER — AMLODIPINE BESYLATE 5 MG/1
TABLET ORAL
Qty: 60 TABLET | Refills: 5 | Status: SHIPPED | OUTPATIENT
Start: 2022-05-09 | End: 2022-10-30

## 2022-05-24 ENCOUNTER — HOSPITAL ENCOUNTER (OUTPATIENT)
Dept: PHARMACY | Age: 62
Setting detail: THERAPIES SERIES
Discharge: HOME | End: 2022-05-24
Payer: COMMERCIAL

## 2022-05-24 DIAGNOSIS — I48.91 ATRIAL FIBRILLATION WITH RVR (HCC): Primary | ICD-10-CM

## 2022-05-24 LAB
INR BLD: 2.8
PROTIME: 33.2 SECONDS

## 2022-05-24 PROCEDURE — 99211 OFF/OP EST MAY X REQ PHY/QHP: CPT

## 2022-05-24 PROCEDURE — 85610 PROTHROMBIN TIME: CPT

## 2022-05-24 NOTE — PROGRESS NOTES
Medication Management Service, Warfarin Management  [address], [phone]  Visit Date: 5/24/2022   Subjective:   Isabel Zhao is a 63 y.o. female who presents to clinic today for anticoagulation monitoring and adjustment. Patient seen in clinic for warfarin management due to  Indication:   atrial fibrillation. INR goal: of 2.0-3.0. Duration of therapy: indefinite. Assessment and PLAN   PT/INR done in office per protocol. INR today is 2.8, therapeutic. Patient had a cardioversion 4/17/22. Patient states this past week she has been traveling so has not eaten as many green vegetables as she normally does (typically 2-3 times a week). Plan: Will continue current regimen of warfarin 5 mg MWF and 2.5 mg all other days of the week. Using warfarin 5 mg tablets. Recheck INR in 8 week(s). Patient seen in room # 1. Patient attested to self screening for COVID - 19. Patient verbalized understanding of dosing directions and information discussed. Dosing schedule given to patient. Progress note sent to referring office. Patient acknowledges working in consult agreement with pharmacist as referred by his/her physician.       Electronically signed by Allison Khan [facility] on 5/24/22 at 8:40 AM EDT    For Pharmacy Admin Tracking Only     Intervention Detail: 0   Total # of Interventions Recommended: 0   Total # of Interventions Accepted: 0   Time Spent (min): 20

## 2022-06-02 RX ORDER — TIZANIDINE 4 MG/1
TABLET ORAL
Qty: 90 TABLET | Refills: 0 | Status: SHIPPED | OUTPATIENT
Start: 2022-06-02 | End: 2022-09-06

## 2022-07-15 ENCOUNTER — HOSPITAL ENCOUNTER (OUTPATIENT)
Dept: PHARMACY | Age: 62
Setting detail: THERAPIES SERIES
Discharge: HOME | End: 2022-07-15
Payer: COMMERCIAL

## 2022-07-15 DIAGNOSIS — I48.91 ATRIAL FIBRILLATION WITH RVR (HCC): Primary | ICD-10-CM

## 2022-07-15 LAB
INR BLD: 3.7
PROTIME: 44.9 SECONDS

## 2022-07-15 PROCEDURE — 85610 PROTHROMBIN TIME: CPT

## 2022-07-15 PROCEDURE — 99212 OFFICE O/P EST SF 10 MIN: CPT

## 2022-07-15 NOTE — PROGRESS NOTES
Medication Management Service, Warfarin Management  [facility], [phone]  Visit Date: 7/15/2022   Subjective:   Momo Pierce is a 63 y.o. female who presents to clinic today for anticoagulation monitoring and adjustment. Patient seen in clinic for warfarin management due to  Indication:   atrial fibrillation. INR goal: of 2.0-3.0. Duration of therapy: indefinite. Assessment and PLAN   PT/INR done in office per protocol. INR today is 3.7, supratherapeutic. Elevated INR due to patient reducing her vitamin K intake. Plan: Reduce warfarin from 5 mg to 2.5 mg today only. Then continue current regimen of warfarin 5 mg Mon, Wed, fri and 2.5 mg all other days of the week. Patient will also resume her normal intake of broccoli. Using warfarin 5 mg tablets. Recheck INR in 1 & 1/2 week(s). Patient seen in room # 2. Patient attested to self screening for COVID - 19. ED OP:  Stressed impact  Patient verbalized understanding of dosing directions and information discussed. Dosing schedule given to patient. Progress note sent to referring office. Patient acknowledges working in consult agreement with pharmacist as referred by his/her physician. [address] [place] [address]  Ph., CACP, Clinical Pharmacist  Anticoagulation Services, [facility]  7/15/2022  8:46 AM  ======================================================================================    For Pharmacy Admin Tracking Only    Intervention Detail: Adherence Monitorin and Dose Adjustment: 1, reason: Therapy Optimization  Total # of Interventions Recommended: 2  Total # of Interventions Accepted: 2  Time Spent (min): 20

## 2022-07-20 ENCOUNTER — HOSPITAL ENCOUNTER (OUTPATIENT)
Age: 62
Setting detail: SPECIMEN
Discharge: HOME | End: 2022-07-20

## 2022-07-20 DIAGNOSIS — E11.9 TYPE 2 DIABETES MELLITUS WITHOUT COMPLICATION, WITHOUT LONG-TERM CURRENT USE OF INSULIN (HCC): ICD-10-CM

## 2022-07-20 LAB
ALBUMIN SERPL-MCNC: 4.5 G/DL (ref 3.5–5.2)
ALBUMIN/GLOBULIN RATIO: 1.8 (ref 1–2.5)
ALP BLD-CCNC: 78 U/L (ref 35–104)
ALT SERPL-CCNC: 10 U/L (ref 5–33)
ANION GAP SERPL CALCULATED.3IONS-SCNC: 14 MMOL/L (ref 9–17)
AST SERPL-CCNC: 14 U/L
BILIRUB SERPL-MCNC: 0.48 MG/DL (ref 0.3–1.2)
BUN BLDV-MCNC: 17 MG/DL (ref 6–20)
CALCIUM SERPL-MCNC: 9.6 MG/DL (ref 8.6–10.4)
CHLORIDE BLD-SCNC: 102 MMOL/L (ref 98–107)
CO2: 26 MMOL/L (ref 20–31)
CREAT SERPL-MCNC: 0.97 MG/DL (ref 0.5–0.9)
GFR AFRICAN AMERICAN: >60 ML/MIN
GFR NON-AFRICAN AMERICAN: 59 ML/MIN
GFR SERPL CREATININE-BSD FRML MDRD: ABNORMAL ML/MIN/{1.73_M2}
GLUCOSE BLD-MCNC: 133 MG/DL (ref 70–99)
POTASSIUM SERPL-SCNC: 4.7 MMOL/L (ref 3.7–5.3)
SODIUM BLD-SCNC: 142 MMOL/L (ref 135–144)
TOTAL PROTEIN: 7 G/DL (ref 6.4–8.3)

## 2022-07-21 LAB
ESTIMATED AVERAGE GLUCOSE: 134 MG/DL
HBA1C MFR BLD: 6.3 % (ref 4–6)

## 2022-07-27 ENCOUNTER — APPOINTMENT (OUTPATIENT)
Dept: PHARMACY | Age: 62
End: 2022-07-27
Payer: COMMERCIAL

## 2022-08-03 ENCOUNTER — HOSPITAL ENCOUNTER (OUTPATIENT)
Dept: PHARMACY | Age: 62
Setting detail: THERAPIES SERIES
Discharge: HOME | End: 2022-08-03
Payer: COMMERCIAL

## 2022-08-03 DIAGNOSIS — I48.91 ATRIAL FIBRILLATION WITH RVR (HCC): Primary | ICD-10-CM

## 2022-08-03 LAB
INR BLD: 3.5
PROTIME: 41.7 SECONDS

## 2022-08-03 PROCEDURE — 99212 OFFICE O/P EST SF 10 MIN: CPT

## 2022-08-03 PROCEDURE — 99211 OFF/OP EST MAY X REQ PHY/QHP: CPT

## 2022-08-03 PROCEDURE — 85610 PROTHROMBIN TIME: CPT

## 2022-08-03 PROCEDURE — 99213 OFFICE O/P EST LOW 20 MIN: CPT

## 2022-08-03 RX ORDER — WARFARIN SODIUM 5 MG/1
TABLET ORAL
Qty: 80 TABLET | Refills: 1 | Status: SHIPPED | OUTPATIENT
Start: 2022-08-03

## 2022-08-03 NOTE — PROGRESS NOTES
Medication Management Service, Warfarin Management  [address], [phone]  Visit Date: 8/3/2022   Subjective:   Dearloretta Rowan is a 63 y.o. female who presents to clinic today for anticoagulation monitoring and adjustment. Patient seen in clinic for warfarin management due to  Indication:   atrial fibrillation. INR goal: of 2.0-3.0. Duration of therapy: indefinite. Assessment and PLAN   PT/INR done in office per protocol. INR today is 3.5, supratherapeutic. INR remains elevated this visit due to an overall decrease in diet and green vegetables consumption. Plan: Will decrease current regimen of warfarin for 2 weeks, changing her 5mg warfarin to 2.5mg warfarin on Wednesday. This will make her regimen 5mg warfarin on Monday and Friday and 2.5mg warfarin on all other days of the week while she is caring for her boyfriend post op. Then will resume her maintenance regimen of warfarin 5 mg Mon, Wed, Friday and 2.5 mg all other days of the week. Patient should be able to resume her normal diet as the demands of taking care of her boyfriend post op decline. Called in new RX of warfarin 5mg tablets [facility] [address]. Using warfarin 5 mg tablets. Recheck INR in 3 week(s). Patient seen in room # 2. ED. OP. = Instructed her to intake more broccoli and she says she will once her boyfriend's post op recovery is completed. She was instructed on the 2 week change in her regimen and the importance of eating her normal diet. Patient attested to self screening for COVID - 19. Patient verbalized understanding of dosing directions and information discussed. Dosing schedule given to patient. Progress note sent to referring office. Patient acknowledges working in consult agreement with pharmacist as referred by his/her physician.       Electronically signed by Kayla Barlow on 8/3/22 at 4:12 PM EDT  ==============================================================  For Pharmacy Admin Tracking Only    Intervention Detail: Refill(s) Provided  Total # of Interventions Recommended: 2  Total # of Interventions Accepted: 2  Time Spent (min): 20

## 2022-08-25 ENCOUNTER — TELEPHONE (OUTPATIENT)
Dept: PHARMACY | Age: 62
End: 2022-08-25

## 2022-08-25 NOTE — TELEPHONE ENCOUNTER
Patient called to let us know she wont be in for her appointment today. She said she would call later to reschedule.

## 2022-09-01 ENCOUNTER — HOSPITAL ENCOUNTER (OUTPATIENT)
Dept: PHARMACY | Age: 62
Setting detail: THERAPIES SERIES
Discharge: HOME | End: 2022-09-01
Payer: COMMERCIAL

## 2022-09-01 DIAGNOSIS — I48.91 ATRIAL FIBRILLATION WITH RVR (HCC): Primary | ICD-10-CM

## 2022-09-01 LAB
INR BLD: 3
PROTIME: 36.1 SECONDS

## 2022-09-01 PROCEDURE — 85610 PROTHROMBIN TIME: CPT

## 2022-09-01 PROCEDURE — 99211 OFF/OP EST MAY X REQ PHY/QHP: CPT

## 2022-09-01 NOTE — PROGRESS NOTES
Medication Management Service, Warfarin Management  [facility], [phone]  Visit Date: 2022   Subjective:   Juany Brewster is a 63 y.o. female who presents to clinic today for anticoagulation monitoring and adjustment. Patient seen in clinic for warfarin management due to  Indication:   atrial fibrillation. INR goal: of 2.0-3.0. Duration of therapy: indefinite. Assessment and PLAN   PT/INR done in office per protocol. INR today is 3.0, therapeutic and down from 3.5 at last check. Patient has increased her green vegetable intake back to more usual plan. Plan: Will continue current regimen of warfarin 5mg Mon, Wed, Fri only; 2.5mg all other days of the week. Using warfarin 5 mg tablets. Recheck INR in 4 week(s). Patient seen in room # 3. ED. OP. = Instructed her to be consistent with green vegetables, OK to increase but do not decrease. Patient verbalized understanding of dosing directions and information discussed. Dosing schedule given to patient. Progress note sent to referring office. Patient acknowledges working in consult agreement with pharmacist as referred by his/her physician.         Jacqui Jackson RP, CACP  Clinical Pharmacist Medication Management  2022  12:53 PM      For Pharmacy Admin Tracking Only    Intervention Detail: Adherence Monitorin  Total # of Interventions Recommended: 1  Total # of Interventions Accepted: 1  Time Spent (min): 20

## 2022-09-06 RX ORDER — TIZANIDINE 4 MG/1
TABLET ORAL
Qty: 90 TABLET | Refills: 0 | Status: SHIPPED | OUTPATIENT
Start: 2022-09-06

## 2022-09-29 ENCOUNTER — HOSPITAL ENCOUNTER (OUTPATIENT)
Dept: PHARMACY | Age: 62
Setting detail: THERAPIES SERIES
Discharge: HOME | End: 2022-09-29
Payer: COMMERCIAL

## 2022-09-29 DIAGNOSIS — I48.91 ATRIAL FIBRILLATION WITH RVR (HCC): Primary | ICD-10-CM

## 2022-09-29 LAB
INR BLD: 2.9
PROTIME: 35 SECONDS

## 2022-09-29 PROCEDURE — 99211 OFF/OP EST MAY X REQ PHY/QHP: CPT

## 2022-09-29 PROCEDURE — 85610 PROTHROMBIN TIME: CPT

## 2022-09-29 NOTE — PROGRESS NOTES
Medication Management Service, Warfarin Management  [facility], [phone]  Visit Date: 9/29/2022   Subjective:   Hiro Hartman is a 63 y.o. female who presents to clinic today for anticoagulation monitoring and adjustment. Patient seen in clinic for warfarin management due to  Indication:   atrial fibrillation. INR goal: of 2.0-3.0. Duration of therapy: indefinite. Assessment and PLAN   PT/INR done in office per protocol. INR today is 2.9, therapeutic. Plan: Will continue current regimen of warfarin 5 mg Monday, Wednesday, Friday and 2.5 mg on all other days. Using warfarin 5 mg tablets. Recheck INR in 4 week(s). Patient seen in room # 1. Patient attested to self screening for COVID - 19. Patient verbalized understanding of dosing directions and information discussed. Dosing schedule given to patient. Progress note sent to referring office. Patient acknowledges working in consult agreement with pharmacist as referred by his/her physician.       Electronically signed by CLEMENTINA Ibarra [facility] on 9/29/22 at 11:58 AM EDT    For Pharmacy Admin Tracking Only    Intervention Detail:   Total # of Interventions Recommended: 0  Total # of Interventions Accepted: 0  Time Spent (min): 15

## 2022-10-04 ENCOUNTER — CARE COORDINATION (OUTPATIENT)
Dept: CARE COORDINATION | Age: 62
End: 2022-10-04

## 2022-10-05 NOTE — CARE COORDINATION
[facility] Nurse Telephone Evaluation    10/4/2022      Hermenia Dress 1960    Referral received from [place] [id number] CHF clinic for [facility] nurse follow up-  Patient referred for CHF clinic OP services and she reports she is unable to afford out of pocket expenses for CHF clinic. Living Situation    Camilla Cordero is a 63 y.o. female living with family:   The home is:  unknown . She has a good social support network. Support includes: patient, friend, and daughter. Social History:   Social History     Socioeconomic History    Marital status:      Spouse name: Not on file    Number of children: Not on file    Years of education: Not on file    Highest education level: Not on file   Occupational History    Work seasonal job in summer months    Tobacco Use    Smoking status: Former     Packs/day: 0.50     Years: 34.00     Pack years: 17.00     Types: Cigarettes     Quit date: 10/23/2018     Years since quitting: 3.9    Smokeless tobacco: Never    Tobacco comments:     smokes 5 cig/day   Vaping Use    Vaping Use: Never used   Substance and Sexual Activity    Alcohol use: No     Alcohol/week: 0.0 standard drinks    Drug use: No    Sexual activity: Never   Other Topics Concern    Not on file   Social History Narrative    Not on file     Social Determinants of Health     Financial Resource Strain:  Reports she receives SNAP benefits and is eligible for [place] Medicaid benefits in fall and winter seasons. States she is able to afford housing costs, utilities, food and transportation expenses.    She applied for financial assistance through [facility] ([facility] for hospital expenses   Food Insecurity: No   Transportation Needs: Drives    Physical Activity:  Does not engage in strenuous activities and minimizes use of stairs  Ambulates short distances and paces activities to tolerance    Stress: Not on file   Social Connections: Yes    Intimate Partner Violence: Not on file   Housing Stability: Not on file         Self Care Deficits and Amaya 6 presented Alert, oriented, thought content appropriate, alertness: alert, orientation: time, date, person, place, city, president, when questioned about suicide, the patient expresses no suicidal ideation. Self Care Deficits Noted:  finances and difficulty tolerating stairs-  causes shortness of breath . Durable Medical Equipment: none  Oxygen: No   Nebulizer: No   CPAP: No   Enteral Feedings: No   Home Infusions: No   Wound Vac: No     Risk Analysis    Frequent Admission or ED visits: No   Chronic Illness: Atrial fibrillation with RVR, Bowens disease, CAD with stents, Chronic pain disorder, HTN, Mixed hyperlipidemia, Primary osteoarthritis of both hands,  S/P ablation of atrial fibrillation, type 2 diabetes without long term current use of insulin. Homeless or lacks support: No   Drives: Yes   Compliance Issues: no  Guardianship issues:no  Age > [de-identified]: No   Uninsured or under insured: Yes   10 + Medications: Yes   History of Falls: No   Balance Issues: No   Unsteady Gait: No     Risk for Falls- Internal Environment  Questions for the Client:    Do you urinate frequently, especially at night? Yes  If yes, how often? 2 times. Are you taking medication that increases urination frequency? Yes    Do you remain alert for sudden movements of pets underfoot, if present? NA      Do you avoid rushing when you move, such as to answer the door or telephone? Yes    Do you hold on to grab bars when getting in or out of the tub or shower or when changing position while bathing? Yes    Have you had any previous falls or injuries in the home?   No    Financial Assessment          Affordable Medications:  States she uses Good Rx medication plan for assistance with medication costs and is able to afford medications  Adequate Food: Yes   Appropriate Housing: Yes   Connected with Services ([employer]/PASSPORT/COA/Meals onWheels/Mcaid/Aid[employer]vcs/Cleaning or [employer]): No   Health Literacy Assessment    Patient demonstrates the ability to:      Current Outpatient Medications   Medication Sig Dispense Refill    metFORMIN (GLUCOPHAGE) 1000 MG tablet TAKE 1 TABLET BY MOUTH TWO TIMES A DAY WITH FOOD 180 tablet 0    tiZANidine (ZANAFLEX) 4 MG tablet TAKE 1 TABLET BY MOUTH AT BEDTIME AS NEEDED FOR BACK PAIN 90 tablet 0    warfarin (COUMADIN) 5 MG tablet Take 1/2 to 1 whole tablet  (or as instructed by the [facility] Management Clinic) by mouth once daily. 80 tablet 1    amLODIPine (NORVASC) 5 MG tablet TAKE 2 TABLETS BY MOUTH EVERY DAY 60 tablet 5    digoxin (LANOXIN) 125 MCG tablet Take 125 mcg by mouth daily      glipiZIDE (GLUCOTROL) 5 MG tablet TAKE 1 TABLET BY MOUTH EVERY DAY 90 tablet 1    sotalol (BETAPACE) 80 MG tablet 120 mg 2 times daily       clopidogrel (PLAVIX) 75 MG tablet TAKE 1 TABLET BY MOUTH ONE TIME A DAY  15 tablet 0    Niacin ER 1000 MG TBCR Take by mouth daily      furosemide (LASIX) 40 MG tablet Take one tablet by mouth in the morning and one half tablet in the evening. 3    metoprolol tartrate (LOPRESSOR) 25 MG tablet Take 0.5 tablets by mouth 2 times daily (Patient taking differently: Take 50 mg by mouth 2 times daily) 60 tablet 3    Omega-3 Fatty Acids (FISH OIL) 1000 MG CAPS Take 1,000 mg by mouth daily       nitroGLYCERIN (NITROSTAT) 0.4 MG SL tablet Place 1 tablet under tongue upon chest pain, wait 5 minutes and may repeat up to 3 doses in 15 minutes. Do not crush or break. 25 tablet 3    atorvastatin (LIPITOR) 40 MG tablet Take 40 mg by mouth nightly        No current facility-administered medications for this visit.        Reviewed Medications with patient: Yes   Frequency of Internet use: Yes   Pt demonstrates accurate med administration: Yes     Anticipated Needs Post Evaluation    Jason Jackson reports she has experienced long term illness with atrial fibrillation and has had multiple cardioversion treatments and one ablation to treat without long term success. She verbalizes good understanding of low fat, low salt diet and states she monitors fluid intake. Jeane Ottoerin understanding of signs and symptoms of CHF to report and when to call the doctor for uncontrolled atrial fibrillation with fast heart rate. She reports slight lower leg edema that is recent in onset and states she wears support hose and elevates her legs for short periods during the day and at night. She obtains scheduled blood work at [address] and attends PCP appt 2 x year and states she schedules regular visits with cardiology for follow up. States she monitors her blood pressure at home and pulse rate. She had not been weighing daily and was instructed to begin weighing herself daily in the morning before eating and record. Instructed to notify her cardiologist for weight increase more than 2 lb in one day or over 5 lb in one week. She verbalized understanding of instruction. Jodie Villanueva reports she is able to afford private pay for cardiology visits and does not want to incur additional expenses for private pay CHF clinic appt at this time. She was advised of services of the OP Chf clinic at [address] [place] [address]  and is not willing to engage with CHF clinic services at this time. States she will consider initiating CHF clinic services if her symptoms worsen or are not controlled with current treatment.          Simona Grande RN, BSN  [facility]

## 2022-10-06 ENCOUNTER — HOSPITAL ENCOUNTER (OUTPATIENT)
Age: 62
Setting detail: SPECIMEN
Discharge: HOME | End: 2022-10-06

## 2022-10-06 LAB
ANION GAP SERPL CALCULATED.3IONS-SCNC: 19 MMOL/L (ref 9–17)
BUN BLDV-MCNC: 21 MG/DL (ref 8–23)
CALCIUM SERPL-MCNC: 9.5 MG/DL (ref 8.6–10.4)
CHLORIDE BLD-SCNC: 99 MMOL/L (ref 98–107)
CO2: 25 MMOL/L (ref 20–31)
CREAT SERPL-MCNC: 1 MG/DL (ref 0.5–0.9)
DIGOXIN LEVEL: 0.9 NG/ML (ref 0.5–2)
GFR SERPL CREATININE-BSD FRML MDRD: >60 ML/MIN/1.73M2
GLUCOSE BLD-MCNC: 122 MG/DL (ref 70–99)
POTASSIUM SERPL-SCNC: 4.1 MMOL/L (ref 3.7–5.3)
SODIUM BLD-SCNC: 143 MMOL/L (ref 135–144)

## 2022-10-10 RX ORDER — GLIPIZIDE 5 MG/1
TABLET ORAL
Qty: 90 TABLET | Refills: 0 | Status: SHIPPED | OUTPATIENT
Start: 2022-10-10

## 2022-10-27 ENCOUNTER — HOSPITAL ENCOUNTER (OUTPATIENT)
Dept: PHARMACY | Age: 62
Setting detail: THERAPIES SERIES
Discharge: HOME | End: 2022-10-27
Payer: COMMERCIAL

## 2022-10-27 DIAGNOSIS — I48.91 ATRIAL FIBRILLATION WITH RVR (HCC): Primary | ICD-10-CM

## 2022-10-27 LAB
INR BLD: 3.7
PROTIME: 44.7 SECONDS

## 2022-10-27 PROCEDURE — 99212 OFFICE O/P EST SF 10 MIN: CPT

## 2022-10-27 PROCEDURE — 85610 PROTHROMBIN TIME: CPT

## 2022-10-27 NOTE — PROGRESS NOTES
Medication Management Service, Warfarin Management  [facility], [phone]  Visit Date: 10/27/2022   Subjective:   Eden Beck is a 63 y.o. female who presents to clinic today for anticoagulation monitoring and adjustment. Patient seen in clinic for warfarin management due to  Indication:   atrial fibrillation. INR goal: of 2.0-3.0. Duration of therapy: indefinite. Assessment and PLAN   PT/INR done in office per protocol. INR today is 3.7, supratherapeutic. Due to having two servings of green vegetables as compared to normally having 4 to 5 servings. Also, took cold and flu medicine with Tylenol this past weekend. Plan: Will hold warfarin today then continue current regimen of warfarin 5mg Mon, Wed, and Fri and 2.5mg all other days of the week. Using warfarin 5 mg tablets. Recheck INR in 2 week(s). Patient seen in room # 1. ED. OP. = Educated patient to continue 4 to 5 servings of green vegetables per week. Patient verbalized understanding of dosing directions and information discussed. Dosing schedule given to patient. Progress note sent to referring office. Patient acknowledges working in consult agreement with pharmacist as referred by his/her physician.       Electronically signed by Nabil Chavez on 10/27/22 at 8:33 AM EDT     For Pharmacy Admin Tracking Only    Intervention Detail: Adherence Monitorin and Dose Adjustment: 1, reason: Therapy De-escalation  Total # of Interventions Recommended: 2  Total # of Interventions Accepted: 2  Time Spent (min): 20

## 2022-10-30 DIAGNOSIS — I10 ESSENTIAL HYPERTENSION: ICD-10-CM

## 2022-10-30 RX ORDER — AMLODIPINE BESYLATE 5 MG/1
TABLET ORAL
Qty: 60 TABLET | Refills: 5 | Status: SHIPPED | OUTPATIENT
Start: 2022-10-30

## 2022-11-08 ENCOUNTER — OFFICE VISIT (OUTPATIENT)
Dept: FAMILY MEDICINE CLINIC | Age: 62
End: 2022-11-08
Payer: COMMERCIAL

## 2022-11-08 VITALS
WEIGHT: 228 LBS | BODY MASS INDEX: 40.4 KG/M2 | OXYGEN SATURATION: 95 % | SYSTOLIC BLOOD PRESSURE: 112 MMHG | DIASTOLIC BLOOD PRESSURE: 72 MMHG | HEIGHT: 63 IN | HEART RATE: 82 BPM | TEMPERATURE: 97.5 F

## 2022-11-08 DIAGNOSIS — M79.641 PAIN IN BOTH HANDS: ICD-10-CM

## 2022-11-08 DIAGNOSIS — L03.011 PARONYCHIA OF RIGHT INDEX FINGER: Primary | ICD-10-CM

## 2022-11-08 DIAGNOSIS — M79.642 PAIN IN BOTH HANDS: ICD-10-CM

## 2022-11-08 PROBLEM — B35.9 TINEA: Status: ACTIVE | Noted: 2022-11-08

## 2022-11-08 PROCEDURE — 3074F SYST BP LT 130 MM HG: CPT | Performed by: PHYSICIAN ASSISTANT

## 2022-11-08 PROCEDURE — 99213 OFFICE O/P EST LOW 20 MIN: CPT | Performed by: PHYSICIAN ASSISTANT

## 2022-11-08 PROCEDURE — 3078F DIAST BP <80 MM HG: CPT | Performed by: PHYSICIAN ASSISTANT

## 2022-11-08 RX ORDER — VALSARTAN 80 MG/1
TABLET ORAL
COMMUNITY
Start: 2022-10-18

## 2022-11-08 RX ORDER — CLOTRIMAZOLE 1 %
CREAM (GRAM) TOPICAL
Qty: 30 G | Refills: 1 | Status: SHIPPED | OUTPATIENT
Start: 2022-11-08 | End: 2022-11-15

## 2022-11-08 ASSESSMENT — ENCOUNTER SYMPTOMS: COLOR CHANGE: 0

## 2022-11-08 ASSESSMENT — PATIENT HEALTH QUESTIONNAIRE - PHQ9
1. LITTLE INTEREST OR PLEASURE IN DOING THINGS: 0
2. FEELING DOWN, DEPRESSED OR HOPELESS: 0
SUM OF ALL RESPONSES TO PHQ QUESTIONS 1-9: 0
SUM OF ALL RESPONSES TO PHQ9 QUESTIONS 1 & 2: 0
SUM OF ALL RESPONSES TO PHQ QUESTIONS 1-9: 0

## 2022-11-08 NOTE — PROGRESS NOTES
[facility]  [address] [address]  Dept: [phone]  Dept Fax: [phone]    Alfredo Chappell is a 63 y.o. female who presents today for her medical conditions/complaintsas noted below. Alfredo Chappell is c/o of   Chief Complaint   Patient presents with    Finger Pain     Right index finger infected    Arthritis     Discuss medications- Tylenol arthritis is not helping with any pain           HPI:     Arthritis  She reports no joint swelling. Associated symptoms include rash. Pertinent negatives include no fatigue or fever. Patient here reporting her right index finger was doing well when wearing dish washing gloves. She recently stopped and now symptoms have started again. She is also having arthritis in there joints. Using tylenol but not helping that much. Patient states both hands and feet hurt the most. She also occasionally using zanaflex with some improvement. Does keep her awake at night intermittently. Wearing socks helps a little  Avoids nsaids due to current medications and cardiac history    Hemoglobin A1C (%)   Date Value   07/20/2022 6.3 (H)   01/05/2022 6.6 (H)   07/07/2021 5.9             ( goal A1Cis < 7)   Microalb/Crt.  Ratio (mcg/mg creat)   Date Value   01/05/2022 23     LDL Cholesterol (mg/dL)   Date Value   01/05/2022 73   11/20/2020 73   02/11/2019 48     LDL Calculated (mg/dL)   Date Value   08/12/2015 40   09/18/2014 71       (goal LDL is <100)   AST (U/L)   Date Value   07/20/2022 14     ALT (U/L)   Date Value   07/20/2022 10     BUN (mg/dL)   Date Value   10/06/2022 21     BP Readings from Last 3 Encounters:   11/08/22 112/72   04/14/22 110/76   02/24/22 118/62          (goal 120/80)    Past Medical History:   Diagnosis Date    Abnormal Pap smear 1989    Had colposcopy    Allergy     PCN    Anemia     Anticoagulated     ON COUMADIN    Anticoagulated on Coumadin     Atrial fibrillation ([address] 2014    Blood transfusion 2011    4 units    CAD (coronary artery disease)     COPD (chronic obstructive pulmonary disease) (HCC)     Eczema     Examination of participant in clinical trial 2011    end date 3/25/15    Headache 2014    Heart disease 2011    card stents x 2    History of atrioventricular jenni ablation     Hyperlipidemia     Hypertension     Menopausal symptoms     MI, old     PAF (paroxysmal atrial fibrillation) ([address]     s/p cardioversion Dec 2015     Primary osteoarthritis of both hands 2019    Smoker     STEMI (ST elevation myocardial infarction) ([address] 2016    INFERIOR / OCCLUDED RCA    Type 2 diabetes mellitus without complication, without long-term current use of insulin ([address] 2017      Past Surgical History:   Procedure Laterality Date    ABLATION OF DYSRHYTHMIC FOCUS  2018    A FIB    CARDIAC CATHETERIZATION  2021    CARDIOVERSION  2018    DR Anthony Hewitt  SUCCESSFUL    CARDIOVERSION  2014    CARDIOVERSION  2015    CARDIOVERSION  2018    DR Anthony Hewitt    CARDIOVERSION  2022    CARDIOVERSION  2022     SECTION  1986    COLPOSCOPY      CORONARY ANGIOPLASTY WITH STENT PLACEMENT  2016    PTCA AND GIRMA TO RCA    CORONARY ANGIOPLASTY WITH STENT PLACEMENT      DILATION AND CURETTAGE      ENDOMETRIAL ABLATION  2012    hydrothermal    LAPAROSCOPY      TRANSESOPHAGEAL ECHOCARDIOGRAM  2014    TUBAL LIGATION         Family History   Problem Relation Age of Onset    COPD Father     Coronary Art Dis Father     Cancer Mother     Heart Failure Maternal Grandmother     Coronary Art Dis Maternal Grandmother         Open Heart Surgery    Coronary Art Dis Paternal Grandmother         Pacemaker    Breast Cancer Neg Hx     Colon Cancer Neg Hx     Diabetes Neg Hx     Eclampsia Neg Hx     Hypertension Neg Hx     Ovarian Cancer Neg Hx      Labor Neg Hx     Spont Abortions Neg Hx Stroke Neg Hx        Social History     Tobacco Use    Smoking status: Former     Packs/day: 0.50     Years: 34.00     Pack years: 17.00     Types: Cigarettes     Quit date: 10/23/2018     Years since quittin.0    Smokeless tobacco: Never    Tobacco comments:     smokes 5 cig/day   Substance Use Topics    Alcohol use: No      Current Outpatient Medications   Medication Sig Dispense Refill    valsartan (DIOVAN) 80 MG tablet TAKE 1 TABLET BY MOUTH EVERY DAY      clotrimazole (LOTRIMIN) 1 % cream Apply topically 2 times daily. 30 g 1    amLODIPine (NORVASC) 5 MG tablet TAKE 2 TABLETS BY MOUTH EVERY DAY 60 tablet 5    glipiZIDE (GLUCOTROL) 5 MG tablet TAKE 1 TABLET BY MOUTH EVERY DAY 90 tablet 0    metFORMIN (GLUCOPHAGE) 1000 MG tablet TAKE 1 TABLET BY MOUTH TWO TIMES A DAY WITH FOOD 180 tablet 0    tiZANidine (ZANAFLEX) 4 MG tablet TAKE 1 TABLET BY MOUTH AT BEDTIME AS NEEDED FOR BACK PAIN 90 tablet 0    warfarin (COUMADIN) 5 MG tablet Take 1/2 to 1 whole tablet  (or as instructed by the [facility]) by mouth once daily. 80 tablet 1    digoxin (LANOXIN) 125 MCG tablet Take 125 mcg by mouth daily      sotalol (BETAPACE) 80 MG tablet 120 mg 2 times daily       clopidogrel (PLAVIX) 75 MG tablet TAKE 1 TABLET BY MOUTH ONE TIME A DAY  15 tablet 0    Niacin ER 1000 MG TBCR Take by mouth daily      furosemide (LASIX) 40 MG tablet Take one tablet by mouth in the morning and one half tablet in the evening. 3    metoprolol tartrate (LOPRESSOR) 25 MG tablet Take 0.5 tablets by mouth 2 times daily (Patient taking differently: Take 50 mg by mouth 2 times daily) 60 tablet 3    Omega-3 Fatty Acids (FISH OIL) 1000 MG CAPS Take 1,000 mg by mouth daily       nitroGLYCERIN (NITROSTAT) 0.4 MG SL tablet Place 1 tablet under tongue upon chest pain, wait 5 minutes and may repeat up to 3 doses in 15 minutes. Do not crush or break.  25 tablet 3    atorvastatin (LIPITOR) 40 MG tablet Take 40 mg by mouth nightly        No current facility-administered medications for this visit. Allergies   Allergen Reactions    Pcn [Penicillins] Hives    Amiodarone     Protonix [Pantoprazole Sodium] Itching       Health Maintenance   Topic Date Due    Shingles vaccine (1 of 2) Never done    Cervical cancer screen  02/03/2018    COVID-19 Vaccine (4 - Booster for Moderna series) 12/22/2021    Depression Screen  03/03/2022    Diabetic foot exam  07/07/2022    Flu vaccine (1) 08/01/2022    Diabetic microalbuminuria test  01/05/2023    Lipids  01/05/2023    Diabetic retinal exam  02/07/2023    Breast cancer screen  03/05/2023    A1C test (Diabetic or Prediabetic)  07/20/2023    Colorectal Cancer Screen  03/15/2024    DTaP/Tdap/Td vaccine (2 - Td or Tdap) 02/28/2027    Pneumococcal 0-64 years Vaccine  Completed    Hepatitis C screen  Completed    HIV screen  Completed    Hepatitis A vaccine  Aged Out    Hib vaccine  Aged Out    Meningococcal (ACWY) vaccine  Aged Out       Subjective:     Review of Systems   Constitutional:  Negative for activity change, appetite change, fatigue and fever. Musculoskeletal:  Positive for arthralgias and arthritis. Negative for joint swelling. Skin:  Positive for rash. Negative for color change, pallor and wound. Neurological:  Negative for weakness and numbness. Hematological:  Negative for adenopathy. Psychiatric/Behavioral:  Positive for sleep disturbance. The patient is not nervous/anxious. Objective:     Physical Exam  Vitals and nursing note reviewed. Constitutional:       General: She is not in acute distress. Appearance: Normal appearance. She is well-developed. She is not ill-appearing. HENT:      Head: Normocephalic and atraumatic. Skin:     General: Skin is dry. Coloration: Skin is not pale. Findings: No erythema or rash. Comments: Right index finger with nail lifting and skin peeling at the cuticle.  No open areas, no visible infection   Neurological:      Mental Status: She is alert and oriented to person, place, and time. Psychiatric:         Mood and Affect: Mood normal.         Behavior: Behavior normal.         Thought Content: Thought content normal.         Judgment: Judgment normal.     /72 (Site: Left Upper Arm, Position: Sitting, Cuff Size: Large Adult)   Pulse 82   Temp 97.5 °F (36.4 °C) (Tympanic)   Ht 5' 3\" (1.6 m)   Wt 228 lb (103.4 kg)   LMP 07/03/2014 (Approximate)   SpO2 95%   BMI 40.39 kg/m²     Assessment:       Diagnosis Orders   1. Paronychia of right index finger  clotrimazole (LOTRIMIN) 1 % cream      2. Pain in both hands                    Plan:      Return if symptoms worsen or fail to improve. Will go ahead and try topical antifungal to the cuticle area. I also recommended under the nail bed and keeping the nail short. Keeping it dry also really seems to help her so encouraged. ? Psoriasis as possibility to this nail. Monitor for improvement    Patient and I also discussed the hand and foot pain that she has that has been chronic. Suspected to be osteoarthritis. She is limited with medications that she can use due to current cardiac meds. Avoid NSAIDs still recommended. She can use Tylenol but recommended sparingly. Topical agents and keeping the hands warm should also benefit this discomfort. If symptoms continue to persist or worsen she is could consider seeing Ortho. Patient agreed with treatment plan    Orders Placed This Encounter   Medications    clotrimazole (LOTRIMIN) 1 % cream     Sig: Apply topically 2 times daily. Dispense:  30 g     Refill:  1       Patient given educational materials - see patient instructions. Discussed use, benefit, and side effects of prescribed medications. All patientquestions answered. Pt voiced understanding. Reviewed health maintenance. Instructedto continue current medications, diet and exercise. Patient agreed with treatmentplan. Follow up as directed.        Please note that this chart was generated using voice recognition Dragon dictation software. Although every effort was made to ensure the accuracy of this automated transcription, some errors in transcription may have occurred.      Electronically signed by Rodney Franks PA-C on 11/8/2022 at 12:44 PM

## 2022-11-08 NOTE — PROGRESS NOTES
Visit Information    Have you changed or started any medications since your last visit including any over-the-counter medicines, vitamins, or herbal medicines? no   Are you having any side effects from any of your medications? -  no  Have you stopped taking any of your medications? Is so, why? -  no    Have you seen any other physician or provider since your last visit? No  Have you had any other diagnostic tests since your last visit? No  Have you been seen in the emergency room and/or had an admission to a hospital since we last saw you? No  Have you had your routine dental cleaning in the past 6 months? no    Have you activated your [employer] account? If not, what are your barriers?  Yes     Patient Care Team:  Kirill Mckeon PA-C as PCP - General (Physician Assistant)  Kirill Mckeon PA-C as PCP - [facility]  Lane Higgins MD as Consulting Physician (Cardiology)    Medical History Review  Past Medical, Family, and Social History reviewed and  contribute to the patient presenting condition    Health Maintenance   Topic Date Due    Shingles vaccine (1 of 2) Never done    Cervical cancer screen  02/03/2018    COVID-19 Vaccine (4 - Booster for Derrel Klippel series) 12/22/2021    Depression Screen  03/03/2022    Diabetic foot exam  07/07/2022    Flu vaccine (1) 08/01/2022    Diabetic microalbuminuria test  01/05/2023    Lipids  01/05/2023    Diabetic retinal exam  02/07/2023    Breast cancer screen  03/05/2023    A1C test (Diabetic or Prediabetic)  07/20/2023    Colorectal Cancer Screen  03/15/2024    DTaP/Tdap/Td vaccine (2 - Td or Tdap) 02/28/2027    Pneumococcal 0-64 years Vaccine  Completed    Hepatitis C screen  Completed    HIV screen  Completed    Hepatitis A vaccine  Aged Out    Hib vaccine  Aged Out    Meningococcal (ACWY) vaccine  Aged Out

## 2022-11-10 ENCOUNTER — HOSPITAL ENCOUNTER (OUTPATIENT)
Dept: PHARMACY | Age: 62
Setting detail: THERAPIES SERIES
Discharge: HOME | End: 2022-11-10
Payer: COMMERCIAL

## 2022-11-10 DIAGNOSIS — I48.91 ATRIAL FIBRILLATION WITH RVR (HCC): Primary | ICD-10-CM

## 2022-11-10 LAB
INR BLD: 2.6
PROTIME: 30.7 SECONDS

## 2022-11-10 PROCEDURE — 99211 OFF/OP EST MAY X REQ PHY/QHP: CPT

## 2022-11-10 PROCEDURE — 85610 PROTHROMBIN TIME: CPT

## 2022-11-10 NOTE — PROGRESS NOTES
Medication Management Service, Warfarin Management  [facility], [phone]  Visit Date: 11/10/2022   Subjective:   Radha Gambino is a 63 y.o. female who presents to clinic today for anticoagulation monitoring and adjustment. Patient seen in clinic for warfarin management due to  Indication:   atrial fibrillation. INR goal: of 2.0-3.0. Duration of therapy: indefinite. Assessment and PLAN   PT/INR done in office per protocol. INR today is 2.6, therapeutic. Plan: Will continue current regimen of warfarin 5 mg Monday, Wednesday, Friday and 2.5 mg on all other days. Using warfarin 5 mg tablets. Recheck INR in 4 weeks. Patient seen in room # 3. ED OP: Counseled patient on keeping intake of green vegetables consistent with her usual 4-5 servings weekly. Patient reported that she was vaccinated for Influenza on 10/28/2022    Patient attested to self screening for COVID - 19. Patient verbalized understanding of dosing directions and information discussed. Dosing schedule given to patient. Progress note sent to referring office. Patient acknowledges working in consult agreement with pharmacist as referred by his/her physician.       Electronically signed by Ronnie Castaneda, Pharmacy Student 11/10/2022 8:35 AM    For Pharmacy Admin Tracking Only    Intervention Detail:   Total # of Interventions Recommended: 0  Total # of Interventions Accepted: 0  Time Spent (min): 15

## 2022-11-18 ENCOUNTER — TELEPHONE (OUTPATIENT)
Dept: FAMILY MEDICINE CLINIC | Age: 62
End: 2022-11-18

## 2022-11-18 DIAGNOSIS — M25.532 LEFT WRIST PAIN: Primary | ICD-10-CM

## 2022-12-02 RX ORDER — TIZANIDINE 4 MG/1
TABLET ORAL
Qty: 90 TABLET | Refills: 0 | Status: SHIPPED | OUTPATIENT
Start: 2022-12-02

## 2022-12-08 ENCOUNTER — HOSPITAL ENCOUNTER (OUTPATIENT)
Dept: PHARMACY | Age: 62
Setting detail: THERAPIES SERIES
Discharge: HOME | End: 2022-12-08
Payer: COMMERCIAL

## 2022-12-08 DIAGNOSIS — I48.91 ATRIAL FIBRILLATION WITH RVR (HCC): Primary | ICD-10-CM

## 2022-12-08 LAB
INR BLD: 2.6
PROTIME: 31.4 SECONDS

## 2022-12-08 PROCEDURE — 85610 PROTHROMBIN TIME: CPT

## 2022-12-08 PROCEDURE — 99211 OFF/OP EST MAY X REQ PHY/QHP: CPT

## 2022-12-08 NOTE — PROGRESS NOTES
Medication Management Service, Warfarin Management  [facility], [phone]  Visit Date: 12/8/2022   Subjective:   Dee Posey is a 63 y.o. female who presents to clinic today for anticoagulation monitoring and adjustment. Patient seen in clinic for warfarin management due to  Indication:   atrial fibrillation. INR goal: of 2.0-3.0. Duration of therapy: indefinite. Assessment and PLAN   PT/INR done in office per protocol. INR today is 2.6, therapeutic. Plan: Will continue current regimen of warfarin 5 mg Mon, Wed, Fri and 2.5 mg on all others days. Using warfarin 5 mg tablets. Recheck INR in 4 week(s). Patient seen in room # 1. Patient attested to self screening for COVID - 19. Patient verbalized understanding of dosing directions and information discussed. Dosing schedule given to patient. Progress note sent to referring office. Patient acknowledges working in consult agreement with pharmacist as referred by his/her physician.       Electronically signed by Markus Jain, [facility] on 12/8/22 at 9:55 AM EST    For Pharmacy Admin Tracking Only    Intervention Detail:   Total # of Interventions Recommended: 0  Total # of Interventions Accepted: 0  Time Spent (min): 15

## 2023-01-04 ENCOUNTER — HOSPITAL ENCOUNTER (OUTPATIENT)
Dept: PHARMACY | Age: 63
Setting detail: THERAPIES SERIES
Discharge: HOME | End: 2023-01-04
Payer: COMMERCIAL

## 2023-01-04 DIAGNOSIS — I48.91 ATRIAL FIBRILLATION WITH RVR (HCC): Primary | ICD-10-CM

## 2023-01-04 LAB
INR BLD: 0 (ref 2–3)
INR BLD: 2.6
PROTIME: 31.1 SECONDS

## 2023-01-04 PROCEDURE — 99212 OFFICE O/P EST SF 10 MIN: CPT

## 2023-01-04 PROCEDURE — 85610 PROTHROMBIN TIME: CPT

## 2023-01-04 RX ORDER — WARFARIN SODIUM 5 MG/1
TABLET ORAL
Qty: 80 TABLET | Refills: 1 | Status: SHIPPED | OUTPATIENT
Start: 2023-01-04

## 2023-01-04 NOTE — PROGRESS NOTES
Medication Management Service, Warfarin Management  [facility], [place], [phone]  Visit Date: 1/4/2023   Subjective:   Quincy Coburn is a 63 y.o. female who presents to clinic today for anticoagulation monitoring and adjustment. Patient seen in clinic for warfarin management due to  Indication:   atrial fibrillation. INR goal: of 2.0-3.0. Duration of therapy: indefinite. Assessment and PLAN   PT/INR done in office per protocol. INR today is 2.6, therapeutic. Plan: Will continue current regimen of warfarin 5 mg Mon, Wed, Fri and 2.5 mg on all others days. Using warfarin 5 mg tablets. -refills sent today. Recheck INR in 5 week(s). Patient seen in room # 2. ED. OP. = Patient started taking Pepto bismol tablets about 3 times a week at night to treat diarrhea symptoms. Patient was advised to only use as needed due to the salicylate increasing the risk for bleeds. Patient also reports not taking her evening dose of Metformin due to the diarrhea symptoms. Patient will follow up with provider regarding her diarrhea symptoms. Patient verbalized understanding of dosing directions and information discussed. Dosing schedule given to patient. Progress note sent to referring office. Patient acknowledges working in consult agreement with pharmacist as referred by his/her physician.       Thank you,  Judy Harvey  PharmD Candidate 2023    For Pharmacy Admin Tracking Only    Intervention Detail: Refill(s) Provided  Total # of Interventions Recommended: 1  Total # of Interventions Accepted: 1  Time Spent (min): 30

## 2023-01-08 RX ORDER — GLIPIZIDE 5 MG/1
TABLET ORAL
Qty: 90 TABLET | Refills: 0 | Status: SHIPPED | OUTPATIENT
Start: 2023-01-08

## 2023-02-08 ENCOUNTER — APPOINTMENT (OUTPATIENT)
Dept: PHARMACY | Age: 63
End: 2023-02-08
Payer: COMMERCIAL

## 2023-02-15 ENCOUNTER — HOSPITAL ENCOUNTER (OUTPATIENT)
Dept: PHARMACY | Age: 63
Setting detail: THERAPIES SERIES
Discharge: HOME | End: 2023-02-15
Payer: COMMERCIAL

## 2023-02-15 DIAGNOSIS — I48.91 ATRIAL FIBRILLATION WITH RVR (HCC): Primary | ICD-10-CM

## 2023-02-15 LAB
INR BLD: 4.5
PROTIME: 54.5 SECONDS

## 2023-02-15 PROCEDURE — 99212 OFFICE O/P EST SF 10 MIN: CPT

## 2023-02-15 PROCEDURE — 85610 PROTHROMBIN TIME: CPT

## 2023-02-15 NOTE — PROGRESS NOTES
Medication Management Service, Warfarin Management  [facility], [phone]  Visit Date: 2/15/2023   Subjective:   Sole Plaza is a 63 y.o. female who presents to clinic today for anticoagulation monitoring and adjustment. Patient seen in clinic for warfarin management due to  Indication:   atrial fibrillation. INR goal: of 2.0-3.0. Duration of therapy: indefinite. Assessment and PLAN   PT/INR done in office per protocol. INR today is 4.5, supratherapeutic. Elevated INR due to a decrease in vitamin K intake. Plan: Will hold warfarin today only. Then continue current regimen of warfarin 5 mg Mon, Wed, Fri and 2.5 mg on all others days. Using warfarin 5 mg tablets. Recheck INR in 1 week(s). Patient seen in room # 2. Patient verbalized understanding of dosing directions and information discussed. Dosing schedule given to patient. Progress note sent to referring office. Patient acknowledges working in consult agreement with pharmacist as referred by his/her physician. [address] [place] [address]  Ph., CACP, Clinical Pharmacist  Anticoagulation Services, [address] [place] [address] Coumadin Clinic  2/15/2023  10:56 AM      For Pharmacy Admin Tracking Only    Intervention Detail: Adherence Monitorin and Dose Adjustment: 1, reason: Therapy De-escalation  Total # of Interventions Recommended: 2  Total # of Interventions Accepted: 2  Time Spent (min): 20

## 2023-02-23 ENCOUNTER — HOSPITAL ENCOUNTER (OUTPATIENT)
Dept: PHARMACY | Age: 63
Setting detail: THERAPIES SERIES
Discharge: HOME | End: 2023-02-23
Payer: COMMERCIAL

## 2023-02-23 DIAGNOSIS — I48.91 ATRIAL FIBRILLATION WITH RVR (HCC): Primary | ICD-10-CM

## 2023-02-23 LAB
INR BLD: 4.1
PROTIME: 49.6 SECONDS

## 2023-02-23 PROCEDURE — 99212 OFFICE O/P EST SF 10 MIN: CPT

## 2023-02-23 PROCEDURE — 85610 PROTHROMBIN TIME: CPT

## 2023-02-23 NOTE — PROGRESS NOTES
Medication Management Service, Warfarin Management  [facility], [phone]  Visit Date: 2023   Subjective:   Lily Becerril is a 63 y.o. female who presents to clinic today for anticoagulation monitoring and adjustment. Patient seen in clinic for warfarin management due to  Indication:   atrial fibrillation. INR goal: of 2.0-3.0. Duration of therapy: indefinite. Assessment and PLAN   PT/INR done in office per protocol. INR today is 4.1, supratherapeutic but down from 4.5 at last visit. Likely caused by increase in total acetaminophen intake secondary to use of Coricidn HBP in addition to usual routine use of Tylenol Arthritis (650mg per cap, using two caps per day). Patient reports having a bit extra of green vegetables over the past few days. Also, has stopped using Coricidin D, at least has not needed today. Plan: Will hold warfarin today only, and swap doses for this Thursday and Friday, then continue current regimen of warfarin 5mg Mon, Wed, Fri only; 2.5 mg on all others days. Using warfarin 5 mg tablets. Recheck INR in ~ 1.5 week(s). Patient seen in room # 3. Stressed importance of keeping intake of green vegetables as usual, no extra. Patient verbalized understanding of dosing directions and information discussed. Dosing schedule given to patient. Progress note sent to referring office.     Maura Khalil RPh, CACP  Clinical Pharmacist Medication Management  2023  11:10 AM      For Pharmacy Admin Tracking Only    Intervention Detail: Adherence Monitorin and Dose Adjustment: 1, reason: Improve Adherence, Therapy Optimization  Total # of Interventions Recommended: 2  Total # of Interventions Accepted: 2  Time Spent (min): 20

## 2023-03-06 ENCOUNTER — HOSPITAL ENCOUNTER (OUTPATIENT)
Dept: PHARMACY | Age: 63
Setting detail: THERAPIES SERIES
Discharge: HOME | End: 2023-03-06
Payer: COMMERCIAL

## 2023-03-06 DIAGNOSIS — I48.91 ATRIAL FIBRILLATION WITH RVR (HCC): Primary | ICD-10-CM

## 2023-03-06 LAB
INR BLD: 5.6
PROTIME: 66.9 SECONDS

## 2023-03-06 PROCEDURE — 99212 OFFICE O/P EST SF 10 MIN: CPT

## 2023-03-06 PROCEDURE — 85610 PROTHROMBIN TIME: CPT

## 2023-03-06 RX ORDER — TIZANIDINE 4 MG/1
TABLET ORAL
Qty: 90 TABLET | Refills: 0 | Status: SHIPPED | OUTPATIENT
Start: 2023-03-06

## 2023-03-06 NOTE — PROGRESS NOTES
Medication Management Service, Warfarin Management  [facility], [phone]  Visit Date: 3/6/2023   Subjective:   Radames Klein is a 63 y.o. female who presents to clinic today for anticoagulation monitoring and adjustment. Patient seen in clinic for warfarin management due to  Indication:   atrial fibrillation. INR goal: of 2.0-3.0. Duration of therapy: indefinite. Assessment and PLAN   PT/INR done in office per protocol. INR today is 5.6, supratherapeutic. Increase in INR may be due to a combination of OTC products      Plan: Will hold warfarin today only. Then reduce current regimen of warfarin by 10% weekly to 5 mg Mon, Fri and 2.5 mg on all others days. Using warfarin 5 mg tablets. Recheck INR in 4 days   Patient seen in room # 2. Continues to have two servings of green vegetables (always green beans). Patient verbalized understanding of dosing directions and information discussed. Dosing schedule given to patient. Progress note sent to referring office. Patient acknowledges working in consult agreement with pharmacist as referred by his/her physician. [address] [place] [address]  Ph., CACP, Clinical Pharmacist  Anticoagulation Services, [facility] Cou[facility]in Clinic  3/6/2023  10:46 AM    For Pharmacy Admin Tracking Only    Intervention Detail: Adherence Monitorin and Dose Adjustment: 1, reason: Therapy De-escalation  Total # of Interventions Recommended: 2  Total # of Interventions Accepted: 2  Time Spent (min): 20

## 2023-03-10 ENCOUNTER — HOSPITAL ENCOUNTER (OUTPATIENT)
Dept: PHARMACY | Age: 63
Setting detail: THERAPIES SERIES
Discharge: HOME | End: 2023-03-10
Payer: COMMERCIAL

## 2023-03-10 DIAGNOSIS — I48.91 ATRIAL FIBRILLATION WITH RVR (HCC): Primary | ICD-10-CM

## 2023-03-10 LAB
INR BLD: 2.6
PROTIME: 31 SECONDS

## 2023-03-10 PROCEDURE — 85610 PROTHROMBIN TIME: CPT

## 2023-03-10 PROCEDURE — 99211 OFF/OP EST MAY X REQ PHY/QHP: CPT

## 2023-03-10 NOTE — PROGRESS NOTES
Medication Management Service, Warfarin Management  [facility], [phone]  Visit Date: 3/10/2023   Subjective:   Marthe Denver is a 63 y.o. female who presents to clinic today for anticoagulation monitoring and adjustment. Patient seen in clinic for warfarin management due to  Indication:   atrial fibrillation. INR goal: of 2.0-3.0. Duration of therapy: indefinite. Assessment and PLAN   PT/INR done in office per protocol. INR today is 2.6, therapeutic. At last appt on 3/6, patient was using a significant amount of Tylenol Arthritis tablets, which contributed to her elevated INR of 5.1. Today, she reports she has not taken any OTC products and has eaten 3 mixed greens salads over the past 4 days since last appt. Plan: Will continue current regimen of warfarin 5 mg Mon, Fri and 2.5 mg all other days of the week. Using warfarin 5 mg tablets. Recheck INR in 2 week(s). Patient seen in room # 2. Of note: Patient plans on eating 2-3 servings of green vegetables per week. This is an increase from 2 servings per week at last appt. Of note: Patient plans to continue limiting Tylenol for arthritis pain and avoiding NSAIDs. She said she has started using topical CBD oil for arthritis pain that has been working well. Patient verbalized understanding of dosing directions and information discussed. Dosing schedule given to patient. Progress note sent to referring office. Patient acknowledges working in consult agreement with pharmacist as referred by his/her physician.       Electronically signed by Dina Anne on 3/10/23 at 11:00 AM EST     For Pharmacy Admin Tracking Only    Intervention Detail:   Total # of Interventions Recommended: 0  Total # of Interventions Accepted: 0  Time Spent (min): 20

## 2023-03-23 ENCOUNTER — HOSPITAL ENCOUNTER (OUTPATIENT)
Dept: PHARMACY | Age: 63
Setting detail: THERAPIES SERIES
Discharge: HOME | End: 2023-03-23
Payer: COMMERCIAL

## 2023-03-23 DIAGNOSIS — I48.91 ATRIAL FIBRILLATION WITH RVR (HCC): Primary | ICD-10-CM

## 2023-03-23 LAB
INR BLD: 4.2
PROTIME: 50.6 SECONDS

## 2023-03-23 PROCEDURE — 99212 OFFICE O/P EST SF 10 MIN: CPT

## 2023-03-23 PROCEDURE — 85610 PROTHROMBIN TIME: CPT

## 2023-03-23 NOTE — PROGRESS NOTES
Medication Management Service, Warfarin Management  [facility] C[facility], [phone]  Visit Date: 3/23/2023   Subjective:   Cherry Morales is a 63 y.o. female who presents to clinic today for anticoagulation monitoring and adjustment. Patient seen in clinic for warfarin management due to  Indication:   atrial fibrillation. INR goal: of 2.0-3.0. Duration of therapy: indefinite. Assessment and PLAN   PT/INR done in office per protocol. INR today is 4.2, supratherapeutic. Regimen may be too high for patient based on recent elevated INR readings-no other cause identified for increase in INR. Plan: Will hold warfarin today only. Then reduce current regimen of warfarin by 11.1% or 2.5 mg (half a tablet) weekly to 5 mg Mon, and 2.5 mg on all others days. Using warfarin 5 mg tablets. Recheck INR in 2 weeks   Patient seen in room # 2. Continues to have three to four servings of green vegetables. She is limiting Tylenol use to 1000 mg daily. Patient verbalized understanding of dosing directions and information discussed. Dosing schedule given to patient. Progress note sent to referring office. Patient acknowledges working in consult agreement with pharmacist as referred by his/her physician. [address] [place] [address]  Ph., CACP, Clinical Pharmacist  Anticoagulation Services, [address] [place] [address] Coumadin Clinic  3/23/2023  10:40 AM    For Pharmacy Admin Tracking Only    Intervention Detail: Dose Adjustment: 1, reason: Therapy De-escalation  Total # of Interventions Recommended: 1  Total # of Interventions Accepted: 1  Time Spent (min): 30

## 2023-04-06 ENCOUNTER — HOSPITAL ENCOUNTER (OUTPATIENT)
Dept: PHARMACY | Age: 63
Setting detail: THERAPIES SERIES
Discharge: HOME | End: 2023-04-06
Payer: COMMERCIAL

## 2023-04-06 DIAGNOSIS — I48.91 ATRIAL FIBRILLATION WITH RVR (HCC): Primary | ICD-10-CM

## 2023-04-06 LAB
INR BLD: 3.3
PROTIME: 39.8 SECONDS

## 2023-04-06 PROCEDURE — 99211 OFF/OP EST MAY X REQ PHY/QHP: CPT

## 2023-04-06 PROCEDURE — 85610 PROTHROMBIN TIME: CPT

## 2023-04-06 NOTE — PROGRESS NOTES
Medication Management Service, Warfarin Management  [facility], [phone]  Visit Date: 2023   Subjective:   Adrienne Corbett is a 63 y.o. female who presents to clinic today for anticoagulation monitoring and adjustment. Patient seen in clinic for warfarin management due to  Indication:   atrial fibrillation. INR goal: of 2.0-3.0. Duration of therapy: indefinite. Assessment and PLAN   PT/INR done in office per protocol. INR today is 3.3, slightly above goal range, may still be due to regimen as patient reports consistent intake with green vegetables, and denies other usual causes. Has reduced / limits acetaminophen to 1,000mg total per day. Recent 11% reduction in regimen. Plan: Will continue current regimen of warfarin 5 mg on  only, 2.5 mg on all others days. Using warfarin 5 mg tablets. Consider change in tablet strength with next new script, check supply at next visit. Recheck INR in 2 weeks   Patient seen in room # 3. Patient verbalized understanding of dosing directions and information discussed. Dosing schedule given to patient. Progress note sent to referring office. Patient acknowledges working in consult agreement with pharmacist as referred by his/her physician.       Dianna Vidal RPh, CACP  Clinical Pharmacist Medication Management  2023  10:53 AM      For Pharmacy Admin Tracking Only    Intervention Detail: Adherence Monitorin  Total # of Interventions Recommended: 1  Total # of Interventions Accepted: 1  Time Spent (min): 20

## 2023-04-20 ENCOUNTER — HOSPITAL ENCOUNTER (OUTPATIENT)
Dept: PHARMACY | Age: 63
Setting detail: THERAPIES SERIES
Discharge: HOME | End: 2023-04-20
Payer: COMMERCIAL

## 2023-04-20 DIAGNOSIS — I48.91 ATRIAL FIBRILLATION WITH RVR (HCC): Primary | ICD-10-CM

## 2023-04-20 LAB
INR BLD: 3.4
PROTIME: 40.3 SECONDS

## 2023-04-20 PROCEDURE — 99211 OFF/OP EST MAY X REQ PHY/QHP: CPT

## 2023-04-20 PROCEDURE — 85610 PROTHROMBIN TIME: CPT

## 2023-04-20 NOTE — PROGRESS NOTES
Medication Management Service, Warfarin Management  [facility], [phone]  Visit Date: 4/20/2023   Subjective:   Teo White is a 63 y.o. female who presents to clinic today for anticoagulation monitoring and adjustment. Patient seen in clinic for warfarin management due to  Indication:   atrial fibrillation. INR goal: of 2.0-3.0. Duration of therapy: indefinite. Assessment and PLAN   PT/INR done in office per protocol. INR today is 3.4, supra- therapeutic. Increase in INR due to diminished appetite and nausea and vomiting secondary to patient being in a-fib. Plan: Will not decrease warfarin dose at this time since patient is in a-fib and at increased risk of clotting. Continue current regimen of warfarin 5 mg Mon, and 2.5 mg all other days of the week. Using warfarin 5 mg tablets. Recheck INR in 2 week(s). Patient seen in room # 2. Of note: Patient did not have 2-3 servings of green vegetables this past week. She is struggling to eat much of anything. Patient verbalized understanding of dosing directions and information discussed. Dosing schedule given to patient. Progress note sent to referring office. Patient acknowledges working in consult agreement with pharmacist as referred by his/her physician. [address] [place] [address]  Ph., CACP, Clinical Pharmacist  Anticoagulation Services, [facility] Coumadin Clinic  4/20/2023  11:00 AM  =======================================================================    For Pharmacy Admin Tracking Only    Intervention Detail:   Total # of Interventions Recommended: 0  Total # of Interventions Accepted: 0  Time Spent (min): 20

## 2023-04-30 DIAGNOSIS — I10 ESSENTIAL HYPERTENSION: ICD-10-CM

## 2023-04-30 RX ORDER — AMLODIPINE BESYLATE 5 MG/1
TABLET ORAL
Qty: 60 TABLET | Refills: 0 | Status: SHIPPED | OUTPATIENT
Start: 2023-04-30

## 2023-05-04 ENCOUNTER — OFFICE VISIT (OUTPATIENT)
Dept: FAMILY MEDICINE CLINIC | Age: 63
End: 2023-05-04
Payer: COMMERCIAL

## 2023-05-04 VITALS
OXYGEN SATURATION: 96 % | SYSTOLIC BLOOD PRESSURE: 126 MMHG | BODY MASS INDEX: 38.27 KG/M2 | WEIGHT: 216 LBS | HEIGHT: 63 IN | DIASTOLIC BLOOD PRESSURE: 80 MMHG | HEART RATE: 72 BPM

## 2023-05-04 DIAGNOSIS — B35.1 ONYCHOMYCOSIS: ICD-10-CM

## 2023-05-04 DIAGNOSIS — Z12.31 VISIT FOR SCREENING MAMMOGRAM: ICD-10-CM

## 2023-05-04 DIAGNOSIS — Z13.820 SCREENING FOR OSTEOPOROSIS: ICD-10-CM

## 2023-05-04 DIAGNOSIS — E11.9 TYPE 2 DIABETES MELLITUS WITHOUT COMPLICATION, WITHOUT LONG-TERM CURRENT USE OF INSULIN (HCC): Primary | ICD-10-CM

## 2023-05-04 LAB — HBA1C MFR BLD: 6.7 %

## 2023-05-04 PROCEDURE — 83036 HEMOGLOBIN GLYCOSYLATED A1C: CPT | Performed by: PHYSICIAN ASSISTANT

## 2023-05-04 PROCEDURE — 3074F SYST BP LT 130 MM HG: CPT | Performed by: PHYSICIAN ASSISTANT

## 2023-05-04 PROCEDURE — 3044F HG A1C LEVEL LT 7.0%: CPT | Performed by: PHYSICIAN ASSISTANT

## 2023-05-04 PROCEDURE — 99213 OFFICE O/P EST LOW 20 MIN: CPT | Performed by: PHYSICIAN ASSISTANT

## 2023-05-04 PROCEDURE — 3079F DIAST BP 80-89 MM HG: CPT | Performed by: PHYSICIAN ASSISTANT

## 2023-05-04 SDOH — ECONOMIC STABILITY: INCOME INSECURITY: HOW HARD IS IT FOR YOU TO PAY FOR THE VERY BASICS LIKE FOOD, HOUSING, MEDICAL CARE, AND HEATING?: NOT HARD AT ALL

## 2023-05-04 SDOH — ECONOMIC STABILITY: FOOD INSECURITY: WITHIN THE PAST 12 MONTHS, YOU WORRIED THAT YOUR FOOD WOULD RUN OUT BEFORE YOU GOT MONEY TO BUY MORE.: NEVER TRUE

## 2023-05-04 SDOH — ECONOMIC STABILITY: HOUSING INSECURITY
IN THE LAST 12 MONTHS, WAS THERE A TIME WHEN YOU DID NOT HAVE A STEADY PLACE TO SLEEP OR SLEPT IN A SHELTER (INCLUDING NOW)?: NO

## 2023-05-04 SDOH — ECONOMIC STABILITY: FOOD INSECURITY: WITHIN THE PAST 12 MONTHS, THE FOOD YOU BOUGHT JUST DIDN'T LAST AND YOU DIDN'T HAVE MONEY TO GET MORE.: NEVER TRUE

## 2023-05-04 ASSESSMENT — PATIENT HEALTH QUESTIONNAIRE - PHQ9
SUM OF ALL RESPONSES TO PHQ QUESTIONS 1-9: 0
SUM OF ALL RESPONSES TO PHQ QUESTIONS 1-9: 0
1. LITTLE INTEREST OR PLEASURE IN DOING THINGS: 0
2. FEELING DOWN, DEPRESSED OR HOPELESS: 0
SUM OF ALL RESPONSES TO PHQ9 QUESTIONS 1 & 2: 0
SUM OF ALL RESPONSES TO PHQ QUESTIONS 1-9: 0
SUM OF ALL RESPONSES TO PHQ QUESTIONS 1-9: 0

## 2023-05-04 ASSESSMENT — ENCOUNTER SYMPTOMS
ABDOMINAL PAIN: 0
COUGH: 0
SHORTNESS OF BREATH: 0
DIARRHEA: 0
WHEEZING: 0
CONSTIPATION: 0
NAUSEA: 0
VOMITING: 0
COLOR CHANGE: 0

## 2023-05-04 NOTE — PROGRESS NOTES
[address] [facility]  [address] [address]  Dept: [phone]  Dept Fax: [phone]    Cody Sanderson is a 63 y.o. female who presents today for her medical conditions/complaintsas noted below. Cody Sanderson is c/o of   Chief Complaint   Patient presents with    Diabetes         HPI:     Diabetes  She presents for her follow-up diabetic visit. She has type 2 diabetes mellitus. Her disease course has been stable. Pertinent negatives for hypoglycemia include no nervousness/anxiousness or pallor. Pertinent negatives for diabetes include no chest pain, no fatigue and no weakness. There are no hypoglycemic complications. Symptoms are stable. Risk factors for coronary artery disease include family history, dyslipidemia, diabetes mellitus, hypertension, post-menopausal and sedentary lifestyle. Current diabetic treatment includes oral agent (dual therapy). She is compliant with treatment most of the time. She is following a generally healthy diet. She participates in exercise intermittently. An ACE inhibitor/angiotensin II receptor blocker is being taken. Hemoglobin A1C (%)   Date Value   05/04/2023 6.7   07/20/2022 6.3 (H)   01/05/2022 6.6 (H)             ( goal A1Cis < 7)   Microalb/Crt.  Ratio (mcg/mg creat)   Date Value   01/05/2022 23     LDL Cholesterol (mg/dL)   Date Value   01/05/2022 73   11/20/2020 73   02/11/2019 48     LDL Calculated (mg/dL)   Date Value   08/12/2015 40   09/18/2014 71       (goal LDL is <100)   AST (U/L)   Date Value   07/20/2022 14     ALT (U/L)   Date Value   07/20/2022 10     BUN (mg/dL)   Date Value   10/06/2022 21     BP Readings from Last 3 Encounters:   05/04/23 126/80   11/08/22 112/72   04/14/22 110/76          (goal 120/80)    Past Medical History:   Diagnosis Date    Abnormal Pap smear 1989    Had colposcopy    Allergy     PCN    Anemia     Anticoagulated     ON COUMADIN    Anticoagulated on Coumadin

## 2023-05-04 NOTE — PROGRESS NOTES
Visit Information    Have you changed or started any medications since your last visit including any over-the-counter medicines, vitamins, or herbal medicines? no   Are you having any side effects from any of your medications? -  no  Have you stopped taking any of your medications? Is so, why? -  no    Have you seen any other physician or provider since your last visit? No  Have you had any other diagnostic tests since your last visit? No  Have you been seen in the emergency room and/or had an admission to a hospital since we last saw you? No  Have you had your routine dental cleaning in the past 6 months? no    Have you activated your [employer] account? If not, what are your barriers?  Yes     Patient Care Team:  Gary Dumont PA-C as PCP - General (Physician Assistant)  Gary Dumont PA-C as PCP - Empaneled Provider  Ghazala Vital MD as Consulting Physician (Cardiology)    Medical History Review  Past Medical, Family, and Social History reviewed and  contribute to the patient presenting condition    Health Maintenance   Topic Date Due    Shingles vaccine (1 of 2) Never done    Cervical cancer screen  02/03/2018    COVID-19 Vaccine (4 - Booster for [employer] series) 12/22/2021    Diabetic foot exam  07/07/2022    Diabetic Alb to Cr ratio (uACR) test  01/05/2023    Lipids  01/05/2023    Diabetic retinal exam  02/07/2023    Breast cancer screen  03/05/2023    A1C test (Diabetic or Prediabetic)  07/20/2023    GFR test (Diabetes, CKD 3-4, OR last GFR 15-59)  10/06/2023    Depression Screen  11/08/2023    Colorectal Cancer Screen  03/15/2024    DTaP/Tdap/Td vaccine (2 - Td or Tdap) 02/28/2027    Flu vaccine  Completed    Pneumococcal 0-64 years Vaccine  Completed    Hepatitis C screen  Completed    HIV screen  Completed    Hepatitis A vaccine  Aged Out    Hib vaccine  Aged Out    Meningococcal (ACWY) vaccine  Aged Out

## 2023-05-05 ENCOUNTER — HOSPITAL ENCOUNTER (OUTPATIENT)
Age: 63
Setting detail: SPECIMEN
Discharge: HOME | End: 2023-05-05

## 2023-05-05 ENCOUNTER — HOSPITAL ENCOUNTER (OUTPATIENT)
Dept: PHARMACY | Age: 63
Setting detail: THERAPIES SERIES
Discharge: HOME | End: 2023-05-05
Payer: COMMERCIAL

## 2023-05-05 DIAGNOSIS — I48.91 ATRIAL FIBRILLATION WITH RVR (HCC): Primary | ICD-10-CM

## 2023-05-05 DIAGNOSIS — E11.9 TYPE 2 DIABETES MELLITUS WITHOUT COMPLICATION, WITHOUT LONG-TERM CURRENT USE OF INSULIN (HCC): ICD-10-CM

## 2023-05-05 LAB
ABSOLUTE EOS #: 0.16 K/UL (ref 0–0.44)
ABSOLUTE IMMATURE GRANULOCYTE: 0.03 K/UL (ref 0–0.3)
ABSOLUTE LYMPH #: 1.1 K/UL (ref 1.1–3.7)
ABSOLUTE MONO #: 0.6 K/UL (ref 0.1–1.2)
ALBUMIN SERPL-MCNC: 4 G/DL (ref 3.5–5.2)
ALBUMIN/GLOBULIN RATIO: 1.3 (ref 1–2.5)
ALP SERPL-CCNC: 86 U/L (ref 35–104)
ALT SERPL-CCNC: 14 U/L (ref 5–33)
ANION GAP SERPL CALCULATED.3IONS-SCNC: 12 MMOL/L (ref 9–17)
ANION GAP SERPL CALCULATED.3IONS-SCNC: 12 MMOL/L (ref 9–17)
AST SERPL-CCNC: 21 U/L
BASOPHILS # BLD: 1 % (ref 0–2)
BASOPHILS ABSOLUTE: 0.08 K/UL (ref 0–0.2)
BILIRUB SERPL-MCNC: 0.7 MG/DL (ref 0.3–1.2)
BUN SERPL-MCNC: 20 MG/DL (ref 8–23)
BUN SERPL-MCNC: 20 MG/DL (ref 8–23)
CALCIUM SERPL-MCNC: 9.7 MG/DL (ref 8.6–10.4)
CALCIUM SERPL-MCNC: 9.7 MG/DL (ref 8.6–10.4)
CHLORIDE SERPL-SCNC: 101 MMOL/L (ref 98–107)
CHLORIDE SERPL-SCNC: 101 MMOL/L (ref 98–107)
CHOLEST SERPL-MCNC: 146 MG/DL
CHOLESTEROL/HDL RATIO: 4.4
CO2 SERPL-SCNC: 29 MMOL/L (ref 20–31)
CO2 SERPL-SCNC: 29 MMOL/L (ref 20–31)
CREAT SERPL-MCNC: 1.14 MG/DL (ref 0.5–0.9)
CREAT SERPL-MCNC: 1.14 MG/DL (ref 0.5–0.9)
EOSINOPHILS RELATIVE PERCENT: 2 % (ref 1–4)
GFR SERPL CREATININE-BSD FRML MDRD: 55 ML/MIN/1.73M2
GFR SERPL CREATININE-BSD FRML MDRD: 55 ML/MIN/1.73M2
GLUCOSE SERPL-MCNC: 127 MG/DL (ref 70–99)
GLUCOSE SERPL-MCNC: 127 MG/DL (ref 70–99)
HCT VFR BLD AUTO: 45.2 % (ref 36.3–47.1)
HDLC SERPL-MCNC: 33 MG/DL
HGB BLD-MCNC: 14.1 G/DL (ref 11.9–15.1)
IMMATURE GRANULOCYTES: 0 %
INR BLD: 3.8
LDLC SERPL CALC-MCNC: 63 MG/DL (ref 0–130)
LYMPHOCYTES # BLD: 14 % (ref 24–43)
MCH RBC QN AUTO: 30.1 PG (ref 25.2–33.5)
MCHC RBC AUTO-ENTMCNC: 31.2 G/DL (ref 28.4–34.8)
MCV RBC AUTO: 96.6 FL (ref 82.6–102.9)
MONOCYTES # BLD: 8 % (ref 3–12)
NRBC AUTOMATED: 0 PER 100 WBC
PDW BLD-RTO: 13.2 % (ref 11.8–14.4)
PLATELET # BLD AUTO: 312 K/UL (ref 138–453)
PMV BLD AUTO: 10.7 FL (ref 8.1–13.5)
POTASSIUM SERPL-SCNC: 4.3 MMOL/L (ref 3.7–5.3)
POTASSIUM SERPL-SCNC: 4.3 MMOL/L (ref 3.7–5.3)
PROT SERPL-MCNC: 7 G/DL (ref 6.4–8.3)
PROTIME: 45.3 SECONDS
RBC # BLD: 4.68 M/UL (ref 3.95–5.11)
SEG NEUTROPHILS: 75 % (ref 36–65)
SEGMENTED NEUTROPHILS ABSOLUTE COUNT: 5.97 K/UL (ref 1.5–8.1)
SODIUM SERPL-SCNC: 142 MMOL/L (ref 135–144)
SODIUM SERPL-SCNC: 142 MMOL/L (ref 135–144)
TRIGL SERPL-MCNC: 250 MG/DL
WBC # BLD AUTO: 7.9 K/UL (ref 3.5–11.3)

## 2023-05-05 PROCEDURE — 85610 PROTHROMBIN TIME: CPT

## 2023-05-05 PROCEDURE — 99212 OFFICE O/P EST SF 10 MIN: CPT

## 2023-05-05 PROCEDURE — 80048 BASIC METABOLIC PNL TOTAL CA: CPT

## 2023-05-05 NOTE — PROGRESS NOTES
Medication Management Service, Warfarin Management  [facility], [place], [phone]  Visit Date: 2023   Subjective:   Kathleen Bowling is a 63 y.o. female who presents to clinic today for anticoagulation monitoring and adjustment. Patient seen in clinic for warfarin management due to  Indication:   atrial fibrillation. INR goal: of 2.0-3.0. Duration of therapy: indefinite. Assessment and PLAN   PT/INR done in office per protocol. INR today is 3.8, supra- therapeutic. Increase in INR due to diminished appetite and nausea and vomiting secondary to patient being in a-fib. Plan: Will not decrease warfarin dose at this time since patient is in a-fib, at increased risk of clotting and a half tablet (2.5 mg) decrease would be a 12.5% weekly reduction in dosage. Patient agrees to have some spinach for lunch today and one additional serving this week. Continue current regimen of warfarin 5 mg Mon, and 2.5 mg all other days of the week. Using warfarin 5 mg tablets. Recheck INR in 2 week(s). Patient seen in room # 2. Of note: Patient did not have 2-3 servings of green vegetables this past week. She is struggling to eat much of anything. Patient verbalized understanding of dosing directions and information discussed. Dosing schedule given to patient. Progress note sent to referring office. [address] [place] [address]  Ph., CACP, Clinical Pharmacist  Anticoagulation Services, [address] [place] [address] Coumadin Clinic  2023  9:35 AM  ======================================================================  For Pharmacy Admin Tracking Only    Intervention Detail: Adherence Monitorin  Total # of Interventions Recommended: 1  Total # of Interventions Accepted: 1  Time Spent (min): 30

## 2023-05-18 ENCOUNTER — HOSPITAL ENCOUNTER (OUTPATIENT)
Dept: PHARMACY | Age: 63
Setting detail: THERAPIES SERIES
Discharge: HOME | End: 2023-05-18
Payer: COMMERCIAL

## 2023-05-18 DIAGNOSIS — I48.91 ATRIAL FIBRILLATION WITH RVR (HCC): Primary | ICD-10-CM

## 2023-05-18 LAB
INR BLD: 3
PROTIME: 35.7 SECONDS

## 2023-05-18 PROCEDURE — 99606 MTMS BY PHARM EST 15 MIN: CPT

## 2023-05-18 PROCEDURE — 99212 OFFICE O/P EST SF 10 MIN: CPT

## 2023-05-18 NOTE — PROGRESS NOTES
Medication Management Service, Warfarin Management  [facility], [phone]  Visit Date: 2023   Subjective:   Emily Oreilly is a 63 y.o. female who presents to clinic today for anticoagulation monitoring and adjustment. Patient seen in clinic for warfarin management due to  Indication:   atrial fibrillation. INR goal: of 2.0-3.0. Duration of therapy: indefinite. Assessment and PLAN   PT/INR done in office per protocol. INR today is 3.0, therapeutic. Patient reports that she is in Afib, treatment course not yet determined. Patient reports continued issues with nausea associated with Afib, but has been able to eat OK at lunch when she is having green vegetables. Plan: Will continue current regimen of warfarin 5 mg  only; 2.5 mg all other days of the week. Using warfarin 5 mg tablets. Change tablet strength on next visit to 3mg tablets and adjust regimen accordingly. Recheck INR in 2 week(s). Patient seen in room # 3. Of note: Patient will remain focused on consistent intake of green vegetables. Discussed use of vitamin D supplement, for possible improvement in arthritis type symptoms, just the usual RDA. Reviewed information related to chemical conversion of Afib. Patient verbalized understanding of dosing directions and information discussed. Dosing schedule given to patient. Progress note sent to referring office.     Ashtyn Ya RPh, EDVINP  Clinical Pharmacist Medication Management  2023  9:03 AM    For Pharmacy Admin Tracking Only    Intervention Detail: Adherence Monitorin  Total # of Interventions Recommended: 1  Total # of Interventions Accepted: 1  Time Spent (min):  35

## 2023-05-28 DIAGNOSIS — I10 ESSENTIAL HYPERTENSION: ICD-10-CM

## 2023-05-29 DIAGNOSIS — I10 ESSENTIAL HYPERTENSION: ICD-10-CM

## 2023-05-29 RX ORDER — AMLODIPINE BESYLATE 5 MG/1
TABLET ORAL
Qty: 60 TABLET | Refills: 0 | Status: SHIPPED | OUTPATIENT
Start: 2023-05-29 | End: 2023-05-30

## 2023-05-29 RX ORDER — AMLODIPINE BESYLATE 5 MG/1
TABLET ORAL
Qty: 60 TABLET | Refills: 0 | OUTPATIENT
Start: 2023-05-29

## 2023-05-30 RX ORDER — AMLODIPINE BESYLATE 5 MG/1
TABLET ORAL
Qty: 60 TABLET | Refills: 5 | Status: SHIPPED | OUTPATIENT
Start: 2023-05-30

## 2023-06-04 RX ORDER — TIZANIDINE 4 MG/1
TABLET ORAL
Qty: 90 TABLET | Refills: 0 | Status: SHIPPED | OUTPATIENT
Start: 2023-06-04

## 2023-06-05 RX ORDER — TIZANIDINE 4 MG/1
TABLET ORAL
Qty: 90 TABLET | Refills: 0 | OUTPATIENT
Start: 2023-06-05

## 2023-06-05 RX ORDER — TIZANIDINE 4 MG/1
TABLET ORAL
Qty: 90 TABLET | Refills: 0 | Status: SHIPPED | OUTPATIENT
Start: 2023-06-05 | End: 2023-06-06

## 2023-06-06 RX ORDER — TIZANIDINE 4 MG/1
TABLET ORAL
Qty: 90 TABLET | Refills: 0 | Status: SHIPPED | OUTPATIENT
Start: 2023-06-06

## 2023-06-08 ENCOUNTER — HOSPITAL ENCOUNTER (OUTPATIENT)
Dept: PHARMACY | Age: 63
Setting detail: THERAPIES SERIES
Discharge: HOME | End: 2023-06-08
Payer: COMMERCIAL

## 2023-06-08 DIAGNOSIS — I48.91 ATRIAL FIBRILLATION WITH RVR (HCC): Primary | ICD-10-CM

## 2023-06-08 LAB
INR BLD: 2.6
PROTIME: 31 SECONDS

## 2023-06-08 PROCEDURE — 85610 PROTHROMBIN TIME: CPT

## 2023-06-08 PROCEDURE — 99211 OFF/OP EST MAY X REQ PHY/QHP: CPT

## 2023-06-08 NOTE — PROGRESS NOTES
Medication Management Service, Warfarin Management  [facility], [phone]  Visit Date: 6/8/2023   Subjective:   Radha Gambino is a 63 y.o. female who presents to clinic today for anticoagulation monitoring and adjustment. Patient seen in clinic for warfarin management due to  Indication:   atrial fibrillation. INR goal: of 2.0-3.0. Duration of therapy: indefinite. Assessment and PLAN   PT/INR done in office per protocol. INR today is 2.6, therapeutic. Plan:   Continue current regimen of warfarin 5 mg Mon, and 2.5 mg all other days of the week. Using warfarin 5 mg tablets. Recheck INR in 4 week(s). Patient seen in room # 2. Of note: Patient reports having  2-3 servings of green vegetables  (brussels sprouts twice, and broccoli) this past week. Patient verbalized understanding of dosing directions and information discussed. Dosing schedule given to patient. Progress note sent to referring office. [address] [place] [address]  Ph., CACP, Clinical Pharmacist  Anticoagulation Services, [address] [place] [address] Coumadin Clinic  6/8/2023  8:13 AM  ======================================================================For Pharmacy Admin Tracking Only    Intervention Detail:   Total # of Interventions Recommended: 0  Total # of Interventions Accepted: 0  Time Spent (min): 20

## 2023-06-16 ENCOUNTER — HOSPITAL ENCOUNTER (OUTPATIENT)
Dept: MAMMOGRAPHY | Age: 63
Discharge: HOME | End: 2023-06-18
Payer: COMMERCIAL

## 2023-06-16 DIAGNOSIS — Z13.820 SCREENING FOR OSTEOPOROSIS: ICD-10-CM

## 2023-06-16 DIAGNOSIS — Z12.31 VISIT FOR SCREENING MAMMOGRAM: ICD-10-CM

## 2023-06-16 PROCEDURE — 77080 DXA BONE DENSITY AXIAL: CPT

## 2023-06-16 PROCEDURE — 77063 BREAST TOMOSYNTHESIS BI: CPT

## 2023-06-19 ENCOUNTER — TELEPHONE (OUTPATIENT)
Dept: PHARMACY | Age: 63
End: 2023-06-19

## 2023-06-19 NOTE — TELEPHONE ENCOUNTER
Luis Enrique Mcpherson called to report that she is starting calcium, and vitamin D supplement. No interaction so no change in warfarin regimen at this time. I did suggest that she take the warfarin separate from the calcium.       Remington Leroy [facility], CACP  Clinical Pharmacist Medication Management  2023  10:01 AM      For Pharmacy Admin Tracking Only    Intervention Detail: Adherence Monitorin  Total # of Interventions Recommended: 1  Total # of Interventions Accepted: 1  Time Spent (min): 5

## 2023-07-06 ENCOUNTER — HOSPITAL ENCOUNTER (OUTPATIENT)
Dept: PHARMACY | Age: 63
Setting detail: THERAPIES SERIES
Discharge: HOME | End: 2023-07-06
Payer: COMMERCIAL

## 2023-07-06 DIAGNOSIS — I48.91 ATRIAL FIBRILLATION WITH RVR (HCC): Primary | ICD-10-CM

## 2023-07-06 LAB
INR BLD: 3
PROTIME: 36.3 SECONDS

## 2023-07-06 PROCEDURE — 99211 OFF/OP EST MAY X REQ PHY/QHP: CPT

## 2023-07-06 PROCEDURE — 85610 PROTHROMBIN TIME: CPT

## 2023-07-06 NOTE — PROGRESS NOTES
Medication Management Service, Warfarin Management  [facility], [phone]  Visit Date: 7/6/2023   Subjective:   Duane Mina is a 63 y.o. female who presents to clinic today for anticoagulation monitoring and adjustment. Patient seen in clinic for warfarin management due to  Indication:   atrial fibrillation. INR goal: of 2.0-3.0. Duration of therapy: indefinite. Assessment and PLAN   PT/INR done in office per protocol. INR today is 3.0, therapeutic. Plan:   Continue current regimen of warfarin 5 mg Mon, and 2.5 mg all other days of the week. Using warfarin 5 mg tablets. Recheck INR in 4 week(s). Patient seen in room # 2. Of note: Patient reports having 4 servings of green vegetables an increase from her normal 2-3 weekly this past week. Patient verbalized understanding of dosing directions and information discussed. Dosing schedule given to patient. Progress note sent to referring office. [address] [facility]  Ph., KARLA, Clinical Pharmacist  Anticoagulation Services, [address] [place] [address] Coumadin Clinic  7/6/2023  8:26 AM  ======================================================================For Pharmacy Admin Tracking Only    Intervention Detail:   Total # of Interventions Recommended: 0  Total # of Interventions Accepted: 0  Time Spent (min): 20

## 2023-07-09 RX ORDER — GLIPIZIDE 5 MG/1
TABLET ORAL
Qty: 90 TABLET | Refills: 0 | Status: SHIPPED | OUTPATIENT
Start: 2023-07-09

## 2023-08-03 ENCOUNTER — HOSPITAL ENCOUNTER (OUTPATIENT)
Dept: PHARMACY | Age: 63
Setting detail: THERAPIES SERIES
Discharge: HOME | End: 2023-08-03
Payer: COMMERCIAL

## 2023-08-03 DIAGNOSIS — I48.91 ATRIAL FIBRILLATION WITH RVR (HCC): Primary | ICD-10-CM

## 2023-08-03 LAB
INR BLD: 2.5
PROTIME: 30.3 SECONDS

## 2023-08-03 PROCEDURE — 85610 PROTHROMBIN TIME: CPT

## 2023-08-03 PROCEDURE — 99213 OFFICE O/P EST LOW 20 MIN: CPT

## 2023-08-03 RX ORDER — WARFARIN SODIUM 3 MG/1
3 TABLET ORAL DAILY
Qty: 90 TABLET | Refills: 1 | Status: SHIPPED | OUTPATIENT
Start: 2023-08-03

## 2023-08-03 NOTE — PROGRESS NOTES
Medication Management Service, Warfarin Management  [facility], [phone]  Visit Date: 8/3/2023   Subjective:   Darya Pompa is a 63 y.o. female who presents to clinic today for anticoagulation monitoring and adjustment. Patient seen in clinic for warfarin management due to  Indication:   atrial fibrillation. INR goal: of 2.0-3.0. Duration of therapy: indefinite. Assessment and PLAN   PT/INR done in office per protocol. INR today is 2.5, therapeutic. Patient is in Afib and wants her INR closer to 3  Plan:  Change to warfarin 3 mg tablets from 5 mg tablets. Patient will take 3 mg daily. This is a 5 % or 1 mg increase in weekly warfarin regimen. Using warfarin 3 mg tablets. Recheck INR in 4 week(s). Patient seen in room # 2. Will call in 3 mg tablets for patient    Going in Monday to a new to be evaluated to get another ablation. Patient verbalized understanding of dosing directions and information discussed. Dosing schedule given to patient. Progress note sent to referring office. Maikel Tobin, Pharm. D., PGY2 Ambulatory Care Resident  08/03/23    =========================================================    For Pharmacy Admin Tracking Only    Intervention Detail: Dose Adjustment: 1, reason: Therapy Optimization and New Rx: 1, reason: Patient Preference  Total # of Interventions Recommended: 2  Total # of Interventions Accepted: 2  Time Spent (min): 20

## 2023-08-07 ENCOUNTER — TELEPHONE (OUTPATIENT)
Dept: PHARMACY | Age: 63
End: 2023-08-07

## 2023-08-07 ENCOUNTER — OFFICE VISIT (OUTPATIENT)
Age: 63
End: 2023-08-07
Payer: COMMERCIAL

## 2023-08-07 VITALS
HEIGHT: 63 IN | OXYGEN SATURATION: 94 % | BODY MASS INDEX: 37.88 KG/M2 | SYSTOLIC BLOOD PRESSURE: 115 MMHG | DIASTOLIC BLOOD PRESSURE: 67 MMHG | TEMPERATURE: 98.6 F | WEIGHT: 213.8 LBS | RESPIRATION RATE: 20 BRPM | HEART RATE: 72 BPM

## 2023-08-07 DIAGNOSIS — I48.0 PAF (PAROXYSMAL ATRIAL FIBRILLATION) (HCC): Primary | ICD-10-CM

## 2023-08-07 DIAGNOSIS — E11.9 TYPE 2 DIABETES MELLITUS WITHOUT COMPLICATION, WITHOUT LONG-TERM CURRENT USE OF INSULIN (HCC): ICD-10-CM

## 2023-08-07 DIAGNOSIS — I10 ESSENTIAL HYPERTENSION: ICD-10-CM

## 2023-08-07 DIAGNOSIS — I25.10 CORONARY ARTERY DISEASE INVOLVING NATIVE HEART WITHOUT ANGINA PECTORIS, UNSPECIFIED VESSEL OR LESION TYPE: ICD-10-CM

## 2023-08-07 PROCEDURE — 3044F HG A1C LEVEL LT 7.0%: CPT | Performed by: SPECIALIST

## 2023-08-07 PROCEDURE — 3078F DIAST BP <80 MM HG: CPT | Performed by: SPECIALIST

## 2023-08-07 PROCEDURE — 99203 OFFICE O/P NEW LOW 30 MIN: CPT | Performed by: SPECIALIST

## 2023-08-07 PROCEDURE — 3074F SYST BP LT 130 MM HG: CPT | Performed by: SPECIALIST

## 2023-08-07 ASSESSMENT — ENCOUNTER SYMPTOMS: SHORTNESS OF BREATH: 1

## 2023-08-07 NOTE — TELEPHONE ENCOUNTER
Dionicio Itz called today; she may be starting Tikosyn, but no for certain at this time. No interaction with warfarin. She agreed to keep us updated.         Mary Rausch, [facility], CACP  Clinical Pharmacist Medication Management  8/7/2023  12:05 PM

## 2023-08-14 ENCOUNTER — TELEPHONE (OUTPATIENT)
Dept: VASCULAR SURGERY | Age: 63
End: 2023-08-14

## 2023-08-14 ENCOUNTER — TELEPHONE (OUTPATIENT)
Age: 63
End: 2023-08-14

## 2023-08-14 NOTE — TELEPHONE ENCOUNTER
Called and spoke with the patient in regards to being admitted for due to the medication that the patient needs to be put on, the patient requested that if possible can it be done on a thursday, patient was informed that according to Dr. Isidro Madera that it was not possible due to any possible intervention that the patient may need. The patient states that she can not do this as she is the only one at her office.

## 2023-08-14 NOTE — TELEPHONE ENCOUNTER
Called the access line to set up direct admit for 8/16 at 8:00 with telemetery and stepdown bed for 3 days per request of Dr. Elliot Wheat to observe while starting a new medication.  Patient was informed and agreed to this time and date

## 2023-08-14 NOTE — TELEPHONE ENCOUNTER
Patient called and states she can be admitted on Wednesday 08/16/2023 for treatment.   Please call her back at [phone]

## 2023-08-16 ENCOUNTER — HOSPITAL ENCOUNTER (INPATIENT)
Age: 63
LOS: 3 days | Discharge: HOME | DRG: 201 | End: 2023-08-19
Attending: SPECIALIST | Admitting: INTERNAL MEDICINE
Payer: COMMERCIAL

## 2023-08-16 ENCOUNTER — TELEPHONE (OUTPATIENT)
Age: 63
End: 2023-08-16

## 2023-08-16 DIAGNOSIS — I48.0 PAROXYSMAL ATRIAL FIBRILLATION (HCC): Primary | ICD-10-CM

## 2023-08-16 DIAGNOSIS — I48.91 ATRIAL FIBRILLATION, UNSPECIFIED TYPE (HCC): ICD-10-CM

## 2023-08-16 LAB
ANION GAP SERPL CALCULATED.3IONS-SCNC: 16 MMOL/L (ref 9–17)
BASOPHILS # BLD: 0.05 K/UL (ref 0–0.2)
BASOPHILS NFR BLD: 1 % (ref 0–2)
BUN SERPL-MCNC: 20 MG/DL (ref 8–23)
CALCIUM SERPL-MCNC: 9.7 MG/DL (ref 8.6–10.4)
CHLORIDE SERPL-SCNC: 101 MMOL/L (ref 98–107)
CO2 SERPL-SCNC: 27 MMOL/L (ref 20–31)
CREAT SERPL-MCNC: 1.1 MG/DL (ref 0.5–0.9)
EOSINOPHIL # BLD: 0.19 K/UL (ref 0–0.44)
EOSINOPHILS RELATIVE PERCENT: 2 % (ref 1–4)
ERYTHROCYTE [DISTWIDTH] IN BLOOD BY AUTOMATED COUNT: 12.7 % (ref 11.8–14.4)
GFR SERPL CREATININE-BSD FRML MDRD: 58 ML/MIN/1.73M2
GLUCOSE SERPL-MCNC: 176 MG/DL (ref 70–99)
HCT VFR BLD AUTO: 48.8 % (ref 36.3–47.1)
HGB BLD-MCNC: 16 G/DL (ref 11.9–15.1)
IMM GRANULOCYTES # BLD AUTO: <0.03 K/UL (ref 0–0.3)
IMM GRANULOCYTES NFR BLD: 0 %
INR PPP: 2.1
LYMPHOCYTES NFR BLD: 1.33 K/UL (ref 1.1–3.7)
LYMPHOCYTES RELATIVE PERCENT: 16 % (ref 24–43)
MCH RBC QN AUTO: 31.4 PG (ref 25.2–33.5)
MCHC RBC AUTO-ENTMCNC: 32.8 G/DL (ref 28.4–34.8)
MCV RBC AUTO: 95.9 FL (ref 82.6–102.9)
MONOCYTES NFR BLD: 0.45 K/UL (ref 0.1–1.2)
MONOCYTES NFR BLD: 6 % (ref 3–12)
NEUTROPHILS NFR BLD: 75 % (ref 36–65)
NEUTS SEG NFR BLD: 6.07 K/UL (ref 1.5–8.1)
NRBC BLD-RTO: 0 PER 100 WBC
PLATELET # BLD AUTO: 286 K/UL (ref 138–453)
PMV BLD AUTO: 10.2 FL (ref 8.1–13.5)
POTASSIUM SERPL-SCNC: 3.7 MMOL/L (ref 3.7–5.3)
PROTHROMBIN TIME: 23.3 SEC (ref 11.7–14.9)
RBC # BLD AUTO: 5.09 M/UL (ref 3.95–5.11)
SODIUM SERPL-SCNC: 144 MMOL/L (ref 135–144)
WBC OTHER # BLD: 8.1 K/UL (ref 3.5–11.3)

## 2023-08-16 PROCEDURE — 85610 PROTHROMBIN TIME: CPT

## 2023-08-16 PROCEDURE — 36415 COLL VENOUS BLD VENIPUNCTURE: CPT

## 2023-08-16 PROCEDURE — 2580000003 HC RX 258: Performed by: STUDENT IN AN ORGANIZED HEALTH CARE EDUCATION/TRAINING PROGRAM

## 2023-08-16 PROCEDURE — 6370000000 HC RX 637 (ALT 250 FOR IP): Performed by: STUDENT IN AN ORGANIZED HEALTH CARE EDUCATION/TRAINING PROGRAM

## 2023-08-16 PROCEDURE — 80048 BASIC METABOLIC PNL TOTAL CA: CPT

## 2023-08-16 PROCEDURE — 85025 COMPLETE CBC W/AUTO DIFF WBC: CPT

## 2023-08-16 PROCEDURE — 2060000000 HC ICU INTERMEDIATE R&B

## 2023-08-16 PROCEDURE — 93005 ELECTROCARDIOGRAM TRACING: CPT | Performed by: STUDENT IN AN ORGANIZED HEALTH CARE EDUCATION/TRAINING PROGRAM

## 2023-08-16 RX ORDER — SODIUM CHLORIDE 9 MG/ML
INJECTION, SOLUTION INTRAVENOUS PRN
Status: DISCONTINUED | OUTPATIENT
Start: 2023-08-16 | End: 2023-08-19 | Stop reason: HOSPADM

## 2023-08-16 RX ORDER — SODIUM CHLORIDE 0.9 % (FLUSH) 0.9 %
10 SYRINGE (ML) INJECTION PRN
Status: DISCONTINUED | OUTPATIENT
Start: 2023-08-16 | End: 2023-08-19 | Stop reason: HOSPADM

## 2023-08-16 RX ORDER — ATORVASTATIN CALCIUM 40 MG/1
40 TABLET, FILM COATED ORAL DAILY
Status: DISCONTINUED | OUTPATIENT
Start: 2023-08-16 | End: 2023-08-19 | Stop reason: HOSPADM

## 2023-08-16 RX ORDER — SODIUM CHLORIDE 0.9 % (FLUSH) 0.9 %
10 SYRINGE (ML) INJECTION EVERY 12 HOURS SCHEDULED
Status: DISCONTINUED | OUTPATIENT
Start: 2023-08-16 | End: 2023-08-19 | Stop reason: HOSPADM

## 2023-08-16 RX ORDER — PROMETHAZINE HYDROCHLORIDE 12.5 MG/1
12.5 TABLET ORAL EVERY 6 HOURS PRN
Status: DISCONTINUED | OUTPATIENT
Start: 2023-08-16 | End: 2023-08-16

## 2023-08-16 RX ORDER — CLOPIDOGREL BISULFATE 75 MG/1
75 TABLET ORAL DAILY
Status: DISCONTINUED | OUTPATIENT
Start: 2023-08-16 | End: 2023-08-19 | Stop reason: HOSPADM

## 2023-08-16 RX ORDER — DOFETILIDE 0.25 MG/1
250 CAPSULE ORAL EVERY 12 HOURS SCHEDULED
Status: DISCONTINUED | OUTPATIENT
Start: 2023-08-17 | End: 2023-08-19 | Stop reason: HOSPADM

## 2023-08-16 RX ORDER — ACETAMINOPHEN 325 MG/1
650 TABLET ORAL EVERY 6 HOURS PRN
Status: DISCONTINUED | OUTPATIENT
Start: 2023-08-16 | End: 2023-08-19 | Stop reason: HOSPADM

## 2023-08-16 RX ORDER — WARFARIN SODIUM 3 MG/1
3 TABLET ORAL DAILY
Status: DISCONTINUED | OUTPATIENT
Start: 2023-08-16 | End: 2023-08-19 | Stop reason: HOSPADM

## 2023-08-16 RX ORDER — ACETAMINOPHEN 650 MG/1
650 SUPPOSITORY RECTAL EVERY 6 HOURS PRN
Status: DISCONTINUED | OUTPATIENT
Start: 2023-08-16 | End: 2023-08-19 | Stop reason: HOSPADM

## 2023-08-16 RX ORDER — ONDANSETRON 2 MG/ML
4 INJECTION INTRAMUSCULAR; INTRAVENOUS EVERY 6 HOURS PRN
Status: DISCONTINUED | OUTPATIENT
Start: 2023-08-16 | End: 2023-08-16

## 2023-08-16 RX ORDER — POLYETHYLENE GLYCOL 3350 17 G/17G
17 POWDER, FOR SOLUTION ORAL DAILY PRN
Status: DISCONTINUED | OUTPATIENT
Start: 2023-08-16 | End: 2023-08-19 | Stop reason: HOSPADM

## 2023-08-16 RX ORDER — DIGOXIN 125 MCG
125 TABLET ORAL DAILY
Status: DISCONTINUED | OUTPATIENT
Start: 2023-08-16 | End: 2023-08-19 | Stop reason: HOSPADM

## 2023-08-16 RX ADMIN — WARFARIN SODIUM 3 MG: 3 TABLET ORAL at 20:46

## 2023-08-16 RX ADMIN — METOPROLOL TARTRATE 75 MG: 25 TABLET ORAL at 20:45

## 2023-08-16 RX ADMIN — ACETAMINOPHEN 650 MG: 325 TABLET ORAL at 22:33

## 2023-08-16 RX ADMIN — DESMOPRESSIN ACETATE 40 MG: 0.2 TABLET ORAL at 20:46

## 2023-08-16 RX ADMIN — SODIUM CHLORIDE, PRESERVATIVE FREE 10 ML: 5 INJECTION INTRAVENOUS at 20:47

## 2023-08-16 ASSESSMENT — PAIN SCALES - GENERAL
PAINLEVEL_OUTOF10: 0
PAINLEVEL_OUTOF10: 6
PAINLEVEL_OUTOF10: 0

## 2023-08-16 NOTE — TELEPHONE ENCOUNTER
Informed patient that a bed has become available so that she can be admitted, 533 so that she can be regulated for medication, Dr. Yoko Joaquin was informed of this and said that it was ok to send her in now.

## 2023-08-16 NOTE — PLAN OF CARE
Problem: Chronic Conditions and Co-morbidities  Goal: Patient's chronic conditions and co-morbidity symptoms are monitored and maintained or improved  Outcome: Progressing     Problem: Discharge Planning  Goal: Discharge to home or other facility with appropriate resources  Outcome: Progressing     Problem: Pain  Goal: Verbalizes/displays adequate comfort level or baseline comfort level  Outcome: Progressing     Problem: ABCDS Injury Assessment  Goal: Absence of physical injury  Outcome: Progressing     Problem: Neurosensory - Adult  Goal: Achieves maximal functionality and self care  Outcome: Progressing     Problem: Respiratory - Adult  Goal: Achieves optimal ventilation and oxygenation  Outcome: Progressing     Problem: Cardiovascular - Adult  Goal: Maintains optimal cardiac output and hemodynamic stability  Outcome: Progressing  Goal: Absence of cardiac dysrhythmias or at baseline  Outcome: Progressing     Problem: Gastrointestinal - Adult  Goal: Maintains or returns to baseline bowel function  Outcome: Progressing  Goal: Maintains adequate nutritional intake  Outcome: Progressing

## 2023-08-17 PROBLEM — Z79.899 VISIT FOR MONITORING TIKOSYN THERAPY: Status: ACTIVE | Noted: 2023-08-17

## 2023-08-17 PROBLEM — Z51.81 VISIT FOR MONITORING TIKOSYN THERAPY: Status: ACTIVE | Noted: 2023-08-17

## 2023-08-17 LAB
ANION GAP SERPL CALCULATED.3IONS-SCNC: 15 MMOL/L (ref 9–17)
BASOPHILS # BLD: 0.06 K/UL (ref 0–0.2)
BASOPHILS NFR BLD: 1 % (ref 0–2)
BUN SERPL-MCNC: 18 MG/DL (ref 8–23)
CALCIUM SERPL-MCNC: 9.5 MG/DL (ref 8.6–10.4)
CHLORIDE SERPL-SCNC: 99 MMOL/L (ref 98–107)
CO2 SERPL-SCNC: 25 MMOL/L (ref 20–31)
CREAT SERPL-MCNC: 0.8 MG/DL (ref 0.5–0.9)
EKG ATRIAL RATE: 111 BPM
EKG ATRIAL RATE: 326 BPM
EKG ATRIAL RATE: 92 BPM
EKG Q-T INTERVAL: 338 MS
EKG Q-T INTERVAL: 350 MS
EKG Q-T INTERVAL: 354 MS
EKG QRS DURATION: 78 MS
EKG QRS DURATION: 82 MS
EKG QRS DURATION: 90 MS
EKG QTC CALCULATION (BAZETT): 392 MS
EKG QTC CALCULATION (BAZETT): 406 MS
EKG QTC CALCULATION (BAZETT): 411 MS
EKG R AXIS: -12 DEGREES
EKG R AXIS: -12 DEGREES
EKG R AXIS: -6 DEGREES
EKG T AXIS: -152 DEGREES
EKG T AXIS: -152 DEGREES
EKG T AXIS: -163 DEGREES
EKG VENTRICULAR RATE: 81 BPM
EOSINOPHIL # BLD: 0.23 K/UL (ref 0–0.44)
EOSINOPHILS RELATIVE PERCENT: 3 % (ref 1–4)
ERYTHROCYTE [DISTWIDTH] IN BLOOD BY AUTOMATED COUNT: 12.7 % (ref 11.8–14.4)
GFR SERPL CREATININE-BSD FRML MDRD: >60 ML/MIN/1.73M2
GLUCOSE SERPL-MCNC: 164 MG/DL (ref 70–99)
HCT VFR BLD AUTO: 46.5 % (ref 36.3–47.1)
HGB BLD-MCNC: 15.6 G/DL (ref 11.9–15.1)
IMM GRANULOCYTES # BLD AUTO: <0.03 K/UL (ref 0–0.3)
IMM GRANULOCYTES NFR BLD: 0 %
INR PPP: 2
LYMPHOCYTES NFR BLD: 1.19 K/UL (ref 1.1–3.7)
LYMPHOCYTES RELATIVE PERCENT: 14 % (ref 24–43)
MAGNESIUM SERPL-MCNC: 1.9 MG/DL (ref 1.6–2.6)
MCH RBC QN AUTO: 32.3 PG (ref 25.2–33.5)
MCHC RBC AUTO-ENTMCNC: 33.5 G/DL (ref 28.4–34.8)
MCV RBC AUTO: 96.3 FL (ref 82.6–102.9)
MONOCYTES NFR BLD: 0.72 K/UL (ref 0.1–1.2)
MONOCYTES NFR BLD: 9 % (ref 3–12)
NEUTROPHILS NFR BLD: 73 % (ref 36–65)
NEUTS SEG NFR BLD: 6.07 K/UL (ref 1.5–8.1)
NRBC BLD-RTO: 0 PER 100 WBC
PLATELET # BLD AUTO: 332 K/UL (ref 138–453)
PMV BLD AUTO: 10.6 FL (ref 8.1–13.5)
POTASSIUM SERPL-SCNC: 3.6 MMOL/L (ref 3.7–5.3)
PROTHROMBIN TIME: 22.7 SEC (ref 11.7–14.9)
RBC # BLD AUTO: 4.83 M/UL (ref 3.95–5.11)
SODIUM SERPL-SCNC: 139 MMOL/L (ref 135–144)
WBC OTHER # BLD: 8.3 K/UL (ref 3.5–11.3)

## 2023-08-17 PROCEDURE — 80048 BASIC METABOLIC PNL TOTAL CA: CPT

## 2023-08-17 PROCEDURE — 93010 ELECTROCARDIOGRAM REPORT: CPT | Performed by: INTERNAL MEDICINE

## 2023-08-17 PROCEDURE — 36415 COLL VENOUS BLD VENIPUNCTURE: CPT

## 2023-08-17 PROCEDURE — 2580000003 HC RX 258: Performed by: STUDENT IN AN ORGANIZED HEALTH CARE EDUCATION/TRAINING PROGRAM

## 2023-08-17 PROCEDURE — 85610 PROTHROMBIN TIME: CPT

## 2023-08-17 PROCEDURE — 2060000000 HC ICU INTERMEDIATE R&B

## 2023-08-17 PROCEDURE — 6370000000 HC RX 637 (ALT 250 FOR IP): Performed by: STUDENT IN AN ORGANIZED HEALTH CARE EDUCATION/TRAINING PROGRAM

## 2023-08-17 PROCEDURE — 85025 COMPLETE CBC W/AUTO DIFF WBC: CPT

## 2023-08-17 PROCEDURE — 83735 ASSAY OF MAGNESIUM: CPT

## 2023-08-17 PROCEDURE — 99223 1ST HOSP IP/OBS HIGH 75: CPT | Performed by: INTERNAL MEDICINE

## 2023-08-17 PROCEDURE — 6370000000 HC RX 637 (ALT 250 FOR IP): Performed by: SPECIALIST

## 2023-08-17 PROCEDURE — 93005 ELECTROCARDIOGRAM TRACING: CPT | Performed by: SPECIALIST

## 2023-08-17 RX ORDER — POTASSIUM CHLORIDE 20 MEQ/1
20 TABLET, EXTENDED RELEASE ORAL 2 TIMES DAILY WITH MEALS
Status: DISCONTINUED | OUTPATIENT
Start: 2023-08-17 | End: 2023-08-19 | Stop reason: HOSPADM

## 2023-08-17 RX ORDER — TIZANIDINE 4 MG/1
4 TABLET ORAL NIGHTLY PRN
Status: DISCONTINUED | OUTPATIENT
Start: 2023-08-17 | End: 2023-08-19 | Stop reason: HOSPADM

## 2023-08-17 RX ADMIN — DESMOPRESSIN ACETATE 40 MG: 0.2 TABLET ORAL at 19:57

## 2023-08-17 RX ADMIN — METOPROLOL TARTRATE 75 MG: 25 TABLET ORAL at 19:57

## 2023-08-17 RX ADMIN — DIGOXIN 125 MCG: 125 TABLET ORAL at 08:53

## 2023-08-17 RX ADMIN — DOFETILIDE 250 MCG: 0.25 CAPSULE ORAL at 08:56

## 2023-08-17 RX ADMIN — POTASSIUM CHLORIDE 20 MEQ: 1500 TABLET, EXTENDED RELEASE ORAL at 14:22

## 2023-08-17 RX ADMIN — CLOPIDOGREL BISULFATE 75 MG: 75 TABLET, FILM COATED ORAL at 08:53

## 2023-08-17 RX ADMIN — SODIUM CHLORIDE, PRESERVATIVE FREE 10 ML: 5 INJECTION INTRAVENOUS at 08:55

## 2023-08-17 RX ADMIN — TIZANIDINE 4 MG: 4 TABLET ORAL at 19:57

## 2023-08-17 RX ADMIN — POTASSIUM CHLORIDE 20 MEQ: 1500 TABLET, EXTENDED RELEASE ORAL at 17:44

## 2023-08-17 RX ADMIN — SODIUM CHLORIDE, PRESERVATIVE FREE 10 ML: 5 INJECTION INTRAVENOUS at 19:40

## 2023-08-17 RX ADMIN — DOFETILIDE 250 MCG: 0.25 CAPSULE ORAL at 19:57

## 2023-08-17 RX ADMIN — WARFARIN SODIUM 3 MG: 3 TABLET ORAL at 17:43

## 2023-08-17 RX ADMIN — METOPROLOL TARTRATE 75 MG: 25 TABLET ORAL at 08:53

## 2023-08-17 ASSESSMENT — PAIN SCALES - GENERAL: PAINLEVEL_OUTOF10: 6

## 2023-08-17 NOTE — PROGRESS NOTES
Dr. Arellano Men notified of the need for serial EKGs for Tikosyn dosing.    Electronically signed by Crispin Caballero RN on 8/17/2023 at 8:14 AM

## 2023-08-17 NOTE — PLAN OF CARE
Problem: Chronic Conditions and Co-morbidities  Goal: Patient's chronic conditions and co-morbidity symptoms are monitored and maintained or improved  8/17/2023 0633 by Corby Shah RN  Outcome: Progressing  8/16/2023 1959 by Wojciech Penny RN  Outcome: Progressing     Problem: Discharge Planning  Goal: Discharge to home or other facility with appropriate resources  8/17/2023 0633 by Corby Shah RN  Outcome: Progressing  8/16/2023 1959 by Wojciech Penny RN  Outcome: Progressing     Problem: Pain  Goal: Verbalizes/displays adequate comfort level or baseline comfort level  8/17/2023 0633 by Corby Shah RN  Outcome: Progressing  8/16/2023 1959 by Wojciech Penny RN  Outcome: Progressing     Problem: ABCDS Injury Assessment  Goal: Absence of physical injury  8/17/2023 0633 by Corby Shah RN  Outcome: Progressing  8/16/2023 1959 by Wojciech Penny RN  Outcome: Progressing     Problem: Neurosensory - Adult  Goal: Achieves maximal functionality and self care  8/17/2023 0633 by Corby Shah RN  Outcome: Progressing  8/16/2023 1959 by Wojciech Penny RN  Outcome: Progressing     Problem: Respiratory - Adult  Goal: Achieves optimal ventilation and oxygenation  8/17/2023 0633 by Corby Shah RN  Outcome: Progressing  8/16/2023 1959 by Wojciech Penny RN  Outcome: Progressing     Problem: Cardiovascular - Adult  Goal: Maintains optimal cardiac output and hemodynamic stability  8/17/2023 0633 by Corby Shah RN  Outcome: Progressing  8/16/2023 1959 by Wojciech Penny RN  Outcome: Progressing  Goal: Absence of cardiac dysrhythmias or at baseline  8/17/2023 0633 by Corby Shah RN  Outcome: Progressing  8/16/2023 1959 by Wojciech Penny RN  Outcome: Progressing     Problem: Cardiovascular - Adult  Goal: Absence of cardiac dysrhythmias or at baseline  8/17/2023 0633 by Corby Shah RN  Outcome: Progressing  8/16/2023 1959 by Wojciech Penny RN  Outcome: Progressing     Problem: Gastrointestinal - Adult  Goal: Maintains or returns to baseline bowel function  8/17/2023 0633 by Skylar Rubio RN  Outcome: Progressing  8/16/2023 1959 by Rosmery Crockett RN  Outcome: Progressing  Goal: Maintains adequate nutritional intake  8/17/2023 0633 by Skylar Rubio RN  Outcome: Progressing  8/16/2023 1959 by Rosmery Crockett RN  Outcome: Progressing     Problem: Gastrointestinal - Adult  Goal: Maintains or returns to baseline bowel function  8/17/2023 0633 by Skylar Rubio RN  Outcome: Progressing  8/16/2023 1959 by Rosmery Crockett RN  Outcome: Progressing  Goal: Maintains adequate nutritional intake  8/17/2023 0633 by Skylar Rubio RN  Outcome: Progressing  8/16/2023 1959 by Rosmery Crockett RN  Outcome: Progressing

## 2023-08-17 NOTE — CONSULTS
Well-known to me from recent encounter as an outpatient. This is a 63years old female with history of persistent atrial fibrillation now for the past year or 2. In the past she had pulmonary vein isolation with done with documented recurrence. She failed sotalol. Known to have obstructive coronary artery disease and ejection fraction that was reported at 1 point to be lower than 40%. Patient was given the option of amiodarone versus Tikosyn if financially acceptable. She called and said the Tikosyn is acceptable with her and she is admitted to be started on Tikosyn in the hospital for fear of probe arrhythmias. At 1 point she could be considered for ablation. The lab is not ready at this point for redo PVI's. There is financial issues and insurance coverage that will  soon and patient was insistent on being admitted to the hospital to be started on the medicine. The proarrhythmic effect of Tikosyn was discussed in details with the patient. Success rate of converting her spontaneously on Tikosyn versus needing DC cardioversion were discussed with the patient. In the past she failed sotalol and sotalol with cardioversions. The most recent echo was done in 2017 according to the available records to me and her ejection fraction was reported abnormal we will repeated. Past medical history significant also for essential hypertension and diabetes mellitus. Patient was on sotalol at 1 point and she received DC cardioversion. She felt much better when she was sinus rhythm. Patient is symptomatic when she is in atrial fibrillation. The available EKGs documents atrial fibrillation with moderate ventricular response and QTc of 447 ms. She is reported in the past to have bradycardia. Her initial EKG on admission showed her QTc to be 450 ms. So far there is no reported arrhythmia documented.   Renal functions were checked    Potassium is 3.6 today BUN 18 creatinine 0.8 with GFR is

## 2023-08-17 NOTE — H&P
[facility] Cardiology Cardiology    Consult / H&P               Today's Date: 2023  Patient Name: Willa Brody  Date of admission: 2023  4:14 PM  Patient's age: 63 y.o., 1960  Admission Dx: Paroxysmal atrial fibrillation ([address]) [I48.0]  PAF (paroxysmal atrial fibrillation) ([address]) [I48.0]    Requesting Physician: Emilio Valdivia MD    History Obtained From: patient and chart review     History of Present Illness: This patient 63y.o. years old with past medical history given below. Who is admitted as per  request to be initiated on Tikosyn. Please see full note from Dr. Isidro Madera. She is started on Tikosyn this morning with frequent EKG. She denied any active complaint. Hemodynamically stable. Past Medical History:   has a past medical history of Abnormal Pap smear, Allergy, Anemia, Anticoagulated, Anticoagulated on Coumadin, Atrial fibrillation ([address]), Blood transfusion, CAD (coronary artery disease), COPD (chronic obstructive pulmonary disease) ([address]), Eczema, Examination of participant in clinical trial, Headache, Heart disease, History of atrioventricular jenni ablation, Hyperlipidemia, Hypertension, Menopausal symptoms, MI, old, PAF (paroxysmal atrial fibrillation) ([address]), Primary osteoarthritis of both hands, Smoker, STEMI (ST elevation myocardial infarction) ([address]), and Type 2 diabetes mellitus without complication, without long-term current use of insulin ([address]). Past Surgical History:   has a past surgical history that includes  section (); Dilation & curettage (); Tubal ligation (); Colposcopy (); laparoscopy (); Endometrial ablation (2012); Coronary angioplasty with stent (2016); Cardioversion (2018); transesophageal echocardiogram (2014); Cardioversion (2014); Coronary angioplasty with stent (); Cardioversion (); Cardioversion (2018); ablation of dysrhythmic focus (2018);  Cardiac catheterization

## 2023-08-17 NOTE — PLAN OF CARE
Problem: Chronic Conditions and Co-morbidities  Goal: Patient's chronic conditions and co-morbidity symptoms are monitored and maintained or improved  8/17/2023 1537 by Artur Doyle RN  Outcome: Progressing     Problem: Discharge Planning  Goal: Discharge to home or other facility with appropriate resources  8/17/2023 1537 by Artur Doyle RN  Outcome: Progressing     Problem: Pain  Goal: Verbalizes/displays adequate comfort level or baseline comfort level  8/17/2023 1537 by Artur Doyle RN  Outcome: Progressing    Problem: ABCDS Injury Assessment  Goal: Absence of physical injury  8/17/2023 1537 by Artur Doyle RN  Outcome: Progressing     Problem: Neurosensory - Adult  Goal: Achieves maximal functionality and self care  8/17/2023 1537 by Artur Doyle RN  Outcome: Progressing     Problem: Respiratory - Adult  Goal: Achieves optimal ventilation and oxygenation  8/17/2023 1537 by Artur Doyle RN  Outcome: Progressing     Problem: Cardiovascular - Adult  Goal: Maintains optimal cardiac output and hemodynamic stability  8/17/2023 1537 by Artur Doyle RN  Outcome: Progressing     Problem: Cardiovascular - Adult  Goal: Absence of cardiac dysrhythmias or at baseline  8/17/2023 1537 by Artur Doyle RN  Outcome: Progressing     Problem: Gastrointestinal - Adult  Goal: Maintains or returns to baseline bowel function  8/17/2023 1537 by Artur Doyle RN  Outcome: Progressing     Problem: Gastrointestinal - Adult  Goal: Maintains adequate nutritional intake  8/17/2023 1537 by Artur Doyle RN  Outcome: Progressing   Electronically signed by Artur Doyle RN on 8/17/2023 at 3:38 PM

## 2023-08-17 NOTE — CARE COORDINATION
Case Management Assessment  Initial Evaluation    Date/Time of Evaluation: 8/17/2023 2:10PM  Assessment Completed by: David Gomes RN    If patient is discharged prior to next notation, then this note serves as note for discharge by case management. Patient Name: Christ Basurto                   YOB: 1960  Diagnosis: Paroxysmal atrial fibrillation ([address]) [I48.0]  PAF (paroxysmal atrial fibrillation) ([address]) [I48.0]                   Date / Time: 8/16/2023  4:14 PM    Patient Admission Status: Inpatient   Readmission Risk (Low < 19, Mod (19-27), High > 27): Readmission Risk Score: 5.4    Current PCP: Willian Marquez PA-C  PCP verified by CM? (P) Yes (Derick Kimbrough)    Chart Reviewed: Yes      History Provided by: (P) Patient  Patient Orientation: (P) Alert and Oriented, Place, Person, Situation    Patient Cognition: (P) Alert    Hospitalization in the last 30 days (Readmission):  No    If yes, Readmission Assessment in CM Navigator will be completed. Advance Directives:      Code Status: Full Code   Patient's Primary Decision Maker is: (P) Legal Next of Kin      Discharge Planning:    Patient lives with: (P) Spouse/Significant Other Type of Home: (P) House  Primary Care Giver: (P) Self  Patient Support Systems include: (P) Spouse/Significant Other, Children, Friends/Neighbors   Current Financial resources:    Current community resources:    Current services prior to admission: (P) None            Current DME:              Type of Home Care services:  (P) None    ADLS  Prior functional level: (P) Independent in ADLs/IADLs  Current functional level: (P) Independent in ADLs/IADLs    PT AM-PAC:   /24  OT AM-PAC:   /24    Family can provide assistance at DC: Would you like Case Management to discuss the discharge plan with any other family members/significant others, and if so, who?     Plans to Return to Present Housing: (P) Yes  Other Identified Issues/Barriers to RETURNING to current housing: none  Potential Assistance needed at discharge: (P) N/A            Potential DME:    Patient expects to discharge to: (P) [address] [place] for transportation at discharge:   [address]: [address] / Plan: [address] / Product Type: *No Product type* /     Does insurance require precert for SNF: Yes, but plan is home    Potential assistance Purchasing Medications: (P) No  Meds-to-Beds request: Yes      [id number] - [address], [address] [phone] - F [phone] [address]  Phone: [phone] Fax: [phone]      Notes:    Factors facilitating achievement of predicted outcomes: Friend support, Motivated, Cooperative, and Pleasant    Barriers to discharge: Medical complications    Additional Case Management Notes: Transitional planning-talked with patient. Plan is to go home with her BF Jaylin Hernández. Has ride. Verified address, emergency contacts and insurance with patient. The Plan for Transition of Care is related to the following treatment goals of Paroxysmal atrial fibrillation (HCC) [I48.0]  PAF (paroxysmal atrial fibrillation) ([address]) [P44.3]    IF APPLICABLE: The Patient and/or patient representative Ira Levin and her family were provided with a choice of provider and agrees with the discharge plan. Freedom of choice list with basic dialogue that supports the patient's individualized plan of care/goals and shares the quality data associated with the providers was provided to: (P) Patient   Patient Representative Name:       The Patient and/or Patient Representative Agree with the Discharge Plan?  (P) Yes    Rafaela Ballesteros RN  Case Management Department  Ph: [phone] Fax: [phone]

## 2023-08-17 NOTE — PROGRESS NOTES
Pharmacy Note  Warfarin Consult follow-up      Recent Labs     08/17/23  0518   INR 2.0     Recent Labs     08/16/23  1925 08/17/23  0518   HGB 16.0* 15.6*   HCT 48.8* 46.5    332       Significant Drug-Drug Interactions:  New warfarin drug-drug interactions: none  Discontinued drug-drug interactions: none    Continue home dose. Notes:                     Daily PT/INR while inpatient.        Arabella Dan PharmD, BCPS 8/17/2023 10:07 AM

## 2023-08-17 NOTE — PROGRESS NOTES
Pharmacy Note  Warfarin Consult    To Sanchez is a 63 y.o. female for whom pharmacy has been consulted to manage warfarin therapy. Consulting Physician: Rey Saba  Reason for Admission: Afb    Warfarin dose prior to admission: 3mg daily  Warfarin indication: afib  Target INR range: 2-3     Past Medical History:   Diagnosis Date    Abnormal Pap smear 1989    Had colposcopy    Allergy     PCN    Anemia     Anticoagulated     ON COUMADIN    Anticoagulated on Coumadin     Atrial fibrillation ([address]) 12/05/2014    Blood transfusion 12/20/2011    4 units    CAD (coronary artery disease)     COPD (chronic obstructive pulmonary disease) (HCC)     Eczema     Examination of participant in clinical trial 01/12/2011    end date 3/25/15    Headache 12/05/2014    Heart disease 01/13/2011    card stents x 2    History of atrioventricular jenni ablation     Hyperlipidemia     Hypertension     Menopausal symptoms     MI, old 2011    PAF (paroxysmal atrial fibrillation) ([address])     s/p cardioversion Dec 2015     Primary osteoarthritis of both hands 07/31/2019    Smoker     STEMI (ST elevation myocardial infarction) ([address]) 12/2016    INFERIOR / OCCLUDED RCA    Type 2 diabetes mellitus without complication, without long-term current use of insulin ([address]) 12/21/2017                Recent Labs     08/16/23  1925   INR 2.1     Recent Labs     08/16/23 1925   HGB 16.0*   HCT 48.8*          Current warfarin drug-drug interactions:       Date             INR        Dose   8/16/2023            2.1       3 mg        Thank you for the consult. Will continue to follow.       Scout Bishop PharmD, BCPS 8/16/2023 8:03 PM

## 2023-08-18 LAB
ANION GAP SERPL CALCULATED.3IONS-SCNC: 14 MMOL/L (ref 9–17)
BASOPHILS # BLD: 0.04 K/UL (ref 0–0.2)
BASOPHILS NFR BLD: 1 % (ref 0–2)
BNP SERPL-MCNC: 828 PG/ML
BUN SERPL-MCNC: 19 MG/DL (ref 8–23)
CALCIUM SERPL-MCNC: 9.8 MG/DL (ref 8.6–10.4)
CHLORIDE SERPL-SCNC: 102 MMOL/L (ref 98–107)
CO2 SERPL-SCNC: 26 MMOL/L (ref 20–31)
CREAT SERPL-MCNC: 1 MG/DL (ref 0.5–0.9)
DIGOXIN SERPL-MCNC: 0.9 NG/ML (ref 0.5–2)
EKG ATRIAL RATE: 277 BPM
EKG ATRIAL RATE: 283 BPM
EKG Q-T INTERVAL: 360 MS
EKG Q-T INTERVAL: 388 MS
EKG QRS DURATION: 78 MS
EKG QRS DURATION: 84 MS
EKG QTC CALCULATION (BAZETT): 433 MS
EKG QTC CALCULATION (BAZETT): 472 MS
EKG R AXIS: -3 DEGREES
EKG T AXIS: -155 DEGREES
EKG T AXIS: 179 DEGREES
EKG VENTRICULAR RATE: 87 BPM
EKG VENTRICULAR RATE: 89 BPM
EOSINOPHIL # BLD: 0.18 K/UL (ref 0–0.44)
EOSINOPHILS RELATIVE PERCENT: 2 % (ref 1–4)
ERYTHROCYTE [DISTWIDTH] IN BLOOD BY AUTOMATED COUNT: 12.8 % (ref 11.8–14.4)
GFR SERPL CREATININE-BSD FRML MDRD: >60 ML/MIN/1.73M2
GLUCOSE SERPL-MCNC: 149 MG/DL (ref 70–99)
HCT VFR BLD AUTO: 46.8 % (ref 36.3–47.1)
HGB BLD-MCNC: 15.7 G/DL (ref 11.9–15.1)
IMM GRANULOCYTES # BLD AUTO: 0.03 K/UL (ref 0–0.3)
IMM GRANULOCYTES NFR BLD: 0 %
INR PPP: 2.1
LYMPHOCYTES NFR BLD: 1.08 K/UL (ref 1.1–3.7)
LYMPHOCYTES RELATIVE PERCENT: 14 % (ref 24–43)
MAGNESIUM SERPL-MCNC: 2 MG/DL (ref 1.6–2.6)
MCH RBC QN AUTO: 31.2 PG (ref 25.2–33.5)
MCHC RBC AUTO-ENTMCNC: 33.5 G/DL (ref 28.4–34.8)
MCV RBC AUTO: 92.9 FL (ref 82.6–102.9)
MONOCYTES NFR BLD: 0.73 K/UL (ref 0.1–1.2)
MONOCYTES NFR BLD: 9 % (ref 3–12)
NEUTROPHILS NFR BLD: 74 % (ref 36–65)
NEUTS SEG NFR BLD: 5.83 K/UL (ref 1.5–8.1)
NRBC BLD-RTO: 0 PER 100 WBC
PLATELET # BLD AUTO: 247 K/UL (ref 138–453)
PMV BLD AUTO: 10.6 FL (ref 8.1–13.5)
POTASSIUM SERPL-SCNC: 4.5 MMOL/L (ref 3.7–5.3)
PROTHROMBIN TIME: 23.2 SEC (ref 11.7–14.9)
RBC # BLD AUTO: 5.04 M/UL (ref 3.95–5.11)
SODIUM SERPL-SCNC: 142 MMOL/L (ref 135–144)
WBC OTHER # BLD: 7.9 K/UL (ref 3.5–11.3)

## 2023-08-18 PROCEDURE — 80048 BASIC METABOLIC PNL TOTAL CA: CPT

## 2023-08-18 PROCEDURE — 93005 ELECTROCARDIOGRAM TRACING: CPT | Performed by: INTERNAL MEDICINE

## 2023-08-18 PROCEDURE — 6370000000 HC RX 637 (ALT 250 FOR IP): Performed by: STUDENT IN AN ORGANIZED HEALTH CARE EDUCATION/TRAINING PROGRAM

## 2023-08-18 PROCEDURE — 93005 ELECTROCARDIOGRAM TRACING: CPT | Performed by: SPECIALIST

## 2023-08-18 PROCEDURE — 2060000000 HC ICU INTERMEDIATE R&B

## 2023-08-18 PROCEDURE — 80162 ASSAY OF DIGOXIN TOTAL: CPT

## 2023-08-18 PROCEDURE — 83880 ASSAY OF NATRIURETIC PEPTIDE: CPT

## 2023-08-18 PROCEDURE — 6370000000 HC RX 637 (ALT 250 FOR IP): Performed by: SPECIALIST

## 2023-08-18 PROCEDURE — 83735 ASSAY OF MAGNESIUM: CPT

## 2023-08-18 PROCEDURE — 85025 COMPLETE CBC W/AUTO DIFF WBC: CPT

## 2023-08-18 PROCEDURE — 2580000003 HC RX 258: Performed by: STUDENT IN AN ORGANIZED HEALTH CARE EDUCATION/TRAINING PROGRAM

## 2023-08-18 PROCEDURE — 93005 ELECTROCARDIOGRAM TRACING: CPT | Performed by: STUDENT IN AN ORGANIZED HEALTH CARE EDUCATION/TRAINING PROGRAM

## 2023-08-18 PROCEDURE — 85610 PROTHROMBIN TIME: CPT

## 2023-08-18 PROCEDURE — 36415 COLL VENOUS BLD VENIPUNCTURE: CPT

## 2023-08-18 PROCEDURE — 99233 SBSQ HOSP IP/OBS HIGH 50: CPT | Performed by: NURSE PRACTITIONER

## 2023-08-18 RX ORDER — SODIUM CHLORIDE 0.9 % (FLUSH) 0.9 %
5-40 SYRINGE (ML) INJECTION PRN
Status: CANCELLED | OUTPATIENT
Start: 2023-08-18

## 2023-08-18 RX ORDER — SODIUM CHLORIDE 0.9 % (FLUSH) 0.9 %
5-40 SYRINGE (ML) INJECTION EVERY 12 HOURS SCHEDULED
Status: CANCELLED | OUTPATIENT
Start: 2023-08-18

## 2023-08-18 RX ORDER — SODIUM CHLORIDE 9 MG/ML
INJECTION, SOLUTION INTRAVENOUS PRN
Status: CANCELLED | OUTPATIENT
Start: 2023-08-18

## 2023-08-18 RX ADMIN — SODIUM CHLORIDE, PRESERVATIVE FREE 10 ML: 5 INJECTION INTRAVENOUS at 20:14

## 2023-08-18 RX ADMIN — WARFARIN SODIUM 3 MG: 3 TABLET ORAL at 18:39

## 2023-08-18 RX ADMIN — POTASSIUM CHLORIDE 20 MEQ: 1500 TABLET, EXTENDED RELEASE ORAL at 08:59

## 2023-08-18 RX ADMIN — DOFETILIDE 250 MCG: 0.25 CAPSULE ORAL at 08:59

## 2023-08-18 RX ADMIN — DOFETILIDE 250 MCG: 0.25 CAPSULE ORAL at 20:14

## 2023-08-18 RX ADMIN — METOPROLOL TARTRATE 75 MG: 25 TABLET ORAL at 08:59

## 2023-08-18 RX ADMIN — DESMOPRESSIN ACETATE 40 MG: 0.2 TABLET ORAL at 20:14

## 2023-08-18 RX ADMIN — METOPROLOL TARTRATE 75 MG: 25 TABLET ORAL at 20:14

## 2023-08-18 RX ADMIN — SODIUM CHLORIDE, PRESERVATIVE FREE 10 ML: 5 INJECTION INTRAVENOUS at 08:59

## 2023-08-18 RX ADMIN — ACETAMINOPHEN 650 MG: 325 TABLET ORAL at 20:14

## 2023-08-18 RX ADMIN — CLOPIDOGREL BISULFATE 75 MG: 75 TABLET, FILM COATED ORAL at 08:58

## 2023-08-18 RX ADMIN — POTASSIUM CHLORIDE 20 MEQ: 1500 TABLET, EXTENDED RELEASE ORAL at 18:39

## 2023-08-18 RX ADMIN — DIGOXIN 125 MCG: 125 TABLET ORAL at 08:58

## 2023-08-18 NOTE — PROGRESS NOTES
Tolerating Tikosyn 0.25 mg p.o. twice daily fairly well. No proarrhythmia. At one point she converted to what looks to be atrial flutter with controlled to slow ventricular response. She was definitely nocturnal bradycardia during her atrial fibrillation\" atrial flutter. No malignant arrhythmias no malignant pauses. The most recent echo describes ejection fraction of 33% a change from before. Potassium remains lower than 4 mEq  She is getting potassium supplements. We will get BMP on her its been a while. The rest of her labs seems to be fairly well. QTc is 472 ms this morning. The left atrium by echo was described to be dilated with EF of 33% there is a degree of mild left ventricular hypertrophy. No significant valvular issues. Patient is comfortable in bed. Vitals are stable. JVP is not elevated. Clear and equal bilateral air entry. Heart sounds appreciated no gallop rub or murmur no hepatosplenomegaly traces of pedal edema. Impression so far 3 Tikosyn doses of 0.25 mg p.o. twice daily and so far so good. No dysrhythmia of significance. At 1 point she converted to TO be atrial flutter. This is a lady that has had PCI done in the past.    Her Tikosyn dose and loading should be finished as of Sunday morning. She should be considered for DC cardioversion if she remains in atrial flutter or fibrillation. Tentatively I scheduled her for cardioversion on Monday around 1 PM.    Patient wants to go home after the loading of the Tikosyn and she said she will present again for the cardioversion I advised her against that but again it is her decision. If she is available on Monday we will do the cardioversion for her.

## 2023-08-18 NOTE — PLAN OF CARE
Problem: Chronic Conditions and Co-morbidities  Goal: Patient's chronic conditions and co-morbidity symptoms are monitored and maintained or improved  8/18/2023 0320 by Idalia Copeland RN  Outcome: Progressing  8/17/2023 1537 by Raina Drummond RN  Outcome: Progressing     Problem: Discharge Planning  Goal: Discharge to home or other facility with appropriate resources  8/18/2023 0320 by Idalia Copeland RN  Outcome: Progressing  8/17/2023 1537 by Raina Drummond RN  Outcome: Progressing     Problem: Pain  Goal: Verbalizes/displays adequate comfort level or baseline comfort level  8/18/2023 0320 by Idalia Copeland RN  Outcome: Progressing  8/17/2023 1537 by Raina Drummond RN  Outcome: Progressing     Problem: ABCDS Injury Assessment  Goal: Absence of physical injury  8/18/2023 0320 by Idalia Copeland RN  Outcome: Progressing  8/17/2023 1537 by Raina Drummond RN  Outcome: Progressing     Problem: Neurosensory - Adult  Goal: Achieves maximal functionality and self care  8/18/2023 0320 by Idalia Copeland RN  Outcome: Progressing  8/17/2023 1537 by Raina Drummond RN  Outcome: Progressing     Problem: Respiratory - Adult  Goal: Achieves optimal ventilation and oxygenation  8/18/2023 0320 by Idalia Copeland RN  Outcome: Progressing  8/17/2023 1537 by Raina Drummond RN  Outcome: Progressing     Problem: Cardiovascular - Adult  Goal: Maintains optimal cardiac output and hemodynamic stability  8/18/2023 0320 by Idalia Copeland RN  Outcome: Progressing  8/17/2023 1537 by Raina Drummond RN  Outcome: Progressing  Goal: Absence of cardiac dysrhythmias or at baseline  8/18/2023 0320 by Idalia Cpoeland RN  Outcome: Progressing  8/17/2023 1537 by Raina Drummond RN  Outcome: Progressing     Problem: Gastrointestinal - Adult  Goal: Maintains or returns to baseline bowel function  8/18/2023 0320 by Idalia Copeland RN  Outcome: Progressing  8/17/2023 1537 by Raina Drummond RN  Outcome: Progressing  Goal:

## 2023-08-19 VITALS
SYSTOLIC BLOOD PRESSURE: 128 MMHG | HEART RATE: 87 BPM | WEIGHT: 206.57 LBS | OXYGEN SATURATION: 93 % | BODY MASS INDEX: 36.6 KG/M2 | RESPIRATION RATE: 15 BRPM | DIASTOLIC BLOOD PRESSURE: 94 MMHG | HEIGHT: 63 IN | TEMPERATURE: 98.8 F

## 2023-08-19 LAB
INR PPP: 2.2
MAGNESIUM SERPL-MCNC: 2 MG/DL (ref 1.6–2.6)
PROTHROMBIN TIME: 24.3 SEC (ref 11.7–14.9)

## 2023-08-19 PROCEDURE — 2580000003 HC RX 258: Performed by: STUDENT IN AN ORGANIZED HEALTH CARE EDUCATION/TRAINING PROGRAM

## 2023-08-19 PROCEDURE — 6370000000 HC RX 637 (ALT 250 FOR IP): Performed by: SPECIALIST

## 2023-08-19 PROCEDURE — 99233 SBSQ HOSP IP/OBS HIGH 50: CPT | Performed by: NURSE PRACTITIONER

## 2023-08-19 PROCEDURE — 83735 ASSAY OF MAGNESIUM: CPT

## 2023-08-19 PROCEDURE — 36415 COLL VENOUS BLD VENIPUNCTURE: CPT

## 2023-08-19 PROCEDURE — 6370000000 HC RX 637 (ALT 250 FOR IP): Performed by: STUDENT IN AN ORGANIZED HEALTH CARE EDUCATION/TRAINING PROGRAM

## 2023-08-19 PROCEDURE — 85610 PROTHROMBIN TIME: CPT

## 2023-08-19 RX ORDER — DOFETILIDE 0.25 MG/1
250 CAPSULE ORAL 2 TIMES DAILY
Qty: 60 CAPSULE | Refills: 11 | Status: SHIPPED | OUTPATIENT
Start: 2023-08-19 | End: 2023-08-21 | Stop reason: ALTCHOICE

## 2023-08-19 RX ORDER — DOFETILIDE 0.25 MG/1
250 CAPSULE ORAL EVERY 12 HOURS SCHEDULED
Qty: 60 CAPSULE | Refills: 11 | Status: SHIPPED | OUTPATIENT
Start: 2023-08-19 | End: 2023-08-19 | Stop reason: HOSPADM

## 2023-08-19 RX ORDER — DOFETILIDE 0.25 MG/1
250 CAPSULE ORAL EVERY 12 HOURS SCHEDULED
Qty: 60 CAPSULE | Refills: 3 | Status: SHIPPED | OUTPATIENT
Start: 2023-08-19 | End: 2023-08-19 | Stop reason: SDUPTHER

## 2023-08-19 RX ADMIN — SODIUM CHLORIDE, PRESERVATIVE FREE 10 ML: 5 INJECTION INTRAVENOUS at 08:47

## 2023-08-19 RX ADMIN — POTASSIUM CHLORIDE 20 MEQ: 1500 TABLET, EXTENDED RELEASE ORAL at 08:46

## 2023-08-19 RX ADMIN — DIGOXIN 125 MCG: 125 TABLET ORAL at 08:46

## 2023-08-19 RX ADMIN — DOFETILIDE 250 MCG: 0.25 CAPSULE ORAL at 08:46

## 2023-08-19 RX ADMIN — CLOPIDOGREL BISULFATE 75 MG: 75 TABLET, FILM COATED ORAL at 08:46

## 2023-08-19 RX ADMIN — METOPROLOL TARTRATE 75 MG: 25 TABLET ORAL at 08:46

## 2023-08-19 NOTE — PLAN OF CARE
Problem: Chronic Conditions and Co-morbidities  Goal: Patient's chronic conditions and co-morbidity symptoms are monitored and maintained or improved  Outcome: Progressing     Problem: Discharge Planning  Goal: Discharge to home or other facility with appropriate resources  Outcome: Progressing     Problem: Pain  Goal: Verbalizes/displays adequate comfort level or baseline comfort level  Outcome: Progressing     Problem: ABCDS Injury Assessment  Goal: Absence of physical injury  Outcome: Progressing     Problem: Neurosensory - Adult  Goal: Achieves maximal functionality and self care  Outcome: Progressing     Problem: Respiratory - Adult  Goal: Achieves optimal ventilation and oxygenation  Outcome: Progressing     Problem: Cardiovascular - Adult  Goal: Maintains optimal cardiac output and hemodynamic stability  Outcome: Progressing  Goal: Absence of cardiac dysrhythmias or at baseline  Outcome: Progressing     Problem: Gastrointestinal - Adult  Goal: Maintains or returns to baseline bowel function  Outcome: Progressing  Goal: Maintains adequate nutritional intake  Outcome: Progressing     Problem: Safety - Adult  Goal: Free from fall injury  Outcome: Progressing

## 2023-08-19 NOTE — PLAN OF CARE
Problem: Chronic Conditions and Co-morbidities  Goal: Patient's chronic conditions and co-morbidity symptoms are monitored and maintained or improved  8/19/2023 1008 by Jeannette Loera RN  Outcome: Progressing  8/18/2023 2316 by Makenzie Blanco RN  Outcome: Progressing     Problem: Discharge Planning  Goal: Discharge to home or other facility with appropriate resources  8/19/2023 1008 by Jeannette Loera RN  Outcome: Progressing  8/18/2023 2316 by Makenzie Blanco RN  Outcome: Progressing     Problem: Pain  Goal: Verbalizes/displays adequate comfort level or baseline comfort level  8/19/2023 1008 by Jeannette Loera RN  Outcome: Progressing  8/18/2023 2316 by Makenzie Blanco RN  Outcome: Progressing     Problem: ABCDS Injury Assessment  Goal: Absence of physical injury  8/19/2023 1008 by Jeannette Loera RN  Outcome: Progressing  8/18/2023 2316 by Makenzie Blanco RN  Outcome: Progressing     Problem: Neurosensory - Adult  Goal: Achieves maximal functionality and self care  8/19/2023 1008 by Jeannette Loera RN  Outcome: Progressing  8/18/2023 2316 by Makenzie Blanco RN  Outcome: Progressing     Problem: Respiratory - Adult  Goal: Achieves optimal ventilation and oxygenation  8/19/2023 1008 by Jeannette Loera RN  Outcome: Progressing  8/18/2023 2316 by Makenzie Blanco RN  Outcome: Progressing     Problem: Cardiovascular - Adult  Goal: Maintains optimal cardiac output and hemodynamic stability  8/19/2023 1008 by Jeannette Loera RN  Outcome: Progressing  8/18/2023 2316 by Makenzie Blanco RN  Outcome: Progressing  Goal: Absence of cardiac dysrhythmias or at baseline  8/19/2023 1008 by Jeannette Loera RN  Outcome: Progressing  8/18/2023 2316 by Makenzie Blanco RN  Outcome: Progressing     Problem: Gastrointestinal - Adult  Goal: Maintains or returns to baseline bowel function  8/19/2023 1008 by Jeannette Loera RN  Outcome: Progressing  8/18/2023 2316 by Makenzie Blanco RN  Outcome: Progressing  Goal: Maintains adequate

## 2023-08-19 NOTE — DISCHARGE INSTRUCTIONS
Please make follow-up appointment with Cardiology in 2 weeks. [phone]  Plans for Cardioversion on Monday 8/21/23 at 1PM. Patient to call cath registration at 0800 on Monday.

## 2023-08-19 NOTE — PROGRESS NOTES
Pharmacy Note  Warfarin Consult follow-up      Recent Labs     08/19/23  0848   INR 2.2     Recent Labs     08/16/23  1925 08/17/23  0518 08/18/23  0536   HGB 16.0* 15.6* 15.7*   HCT 48.8* 46.5 46.8    332 247       Significant Drug-Drug Interactions:  New warfarin drug-drug interactions: none  Discontinued drug-drug interactions: none      Notes:                   Give warfarin 3 mg today   Daily PT/INR while inpatient.       Iesha Orourke, PharmD 8/19/2023 12:28 PM

## 2023-08-19 NOTE — PLAN OF CARE
Problem: Chronic Conditions and Co-morbidities  Goal: Patient's chronic conditions and co-morbidity symptoms are monitored and maintained or improved  8/19/2023 1200 by Sierra Silva RN  Outcome: Completed  8/19/2023 1159 by Sierra Silva RN  Outcome: Adequate for Discharge  8/19/2023 1008 by Sierra Silva RN  Outcome: Progressing  8/18/2023 2316 by Eleanor Espino, RN  Outcome: Progressing     Problem: Discharge Planning  Goal: Discharge to home or other facility with appropriate resources  8/19/2023 1200 by Sierra Silva RN  Outcome: Completed  8/19/2023 1159 by Sierra Silva RN  Outcome: Adequate for Discharge  8/19/2023 1008 by Sierra Silva, RN  Outcome: Progressing  8/18/2023 2316 by Eleanor Espino, RN  Outcome: Progressing     Problem: Pain  Goal: Verbalizes/displays adequate comfort level or baseline comfort level  8/19/2023 1200 by Sierra Silva RN  Outcome: Completed  8/19/2023 1159 by Sierra Silva RN  Outcome: Adequate for Discharge  8/19/2023 1008 by Sierra Silva, RN  Outcome: Progressing  8/18/2023 2316 by Eleanor Espino, RN  Outcome: Progressing     Problem: ABCDS Injury Assessment  Goal: Absence of physical injury  8/19/2023 1200 by Sierra Silva, RN  Outcome: Completed  8/19/2023 1159 by Sierra Silva RN  Outcome: Adequate for Discharge  8/19/2023 1008 by Sierra Silva, RN  Outcome: Progressing  8/18/2023 2316 by Eleanor Espino, RN  Outcome: Progressing     Problem: Neurosensory - Adult  Goal: Achieves maximal functionality and self care  8/19/2023 1200 by Sierra Silva, RN  Outcome: Completed  8/19/2023 1159 by Sierra Silva RN  Outcome: Adequate for Discharge  8/19/2023 1008 by Sierra Silva, RN  Outcome: Progressing  8/18/2023 2316 by Eleanor Espino, RN  Outcome: Progressing     Problem: Respiratory - Adult  Goal: Achieves optimal ventilation and oxygenation  8/19/2023 1200 by Sierra Silva, RN  Outcome: Completed  8/19/2023 1159 by Sierra Silva, RN  Outcome: Adequate for Discharge  8/19/2023 1008 by Jeannette Loera RN  Outcome: Progressing  8/18/2023 2316 by Makenzie Blanco RN  Outcome: Progressing     Problem: Cardiovascular - Adult  Goal: Maintains optimal cardiac output and hemodynamic stability  8/19/2023 1200 by Jeannette Loera RN  Outcome: Completed  8/19/2023 1159 by Jeannette Loera RN  Outcome: Adequate for Discharge  8/19/2023 1008 by Jeannette Loera RN  Outcome: Progressing  8/18/2023 2316 by Makenzie Blanco RN  Outcome: Progressing  Goal: Absence of cardiac dysrhythmias or at baseline  8/19/2023 1200 by Jeannette Loera RN  Outcome: Completed  8/19/2023 1159 by Jeannette Loera RN  Outcome: Adequate for Discharge  8/19/2023 1008 by Jeannette Loera RN  Outcome: Progressing  8/18/2023 2316 by Makenzie Blanco RN  Outcome: Progressing     Problem: Gastrointestinal - Adult  Goal: Maintains or returns to baseline bowel function  8/19/2023 1200 by Jeannette Loera RN  Outcome: Completed  8/19/2023 1159 by Jeannette Loera RN  Outcome: Adequate for Discharge  8/19/2023 1008 by Jeannette Loera RN  Outcome: Progressing  8/18/2023 2316 by Makenzie Blanco RN  Outcome: Progressing  Goal: Maintains adequate nutritional intake  8/19/2023 1200 by Jeannette Loera RN  Outcome: Completed  8/19/2023 1159 by Jeannette Loera RN  Outcome: Adequate for Discharge  8/19/2023 1008 by Jeannette Loera RN  Outcome: Progressing  8/18/2023 2316 by Makenzie Blanco RN  Outcome: Progressing     Problem: Safety - Adult  Goal: Free from fall injury  8/19/2023 1200 by Jeannette Loera RN  Outcome: Completed  8/19/2023 1159 by Jeannette Loera RN  Outcome: Adequate for Discharge  8/19/2023 1008 by Jeannette Loera RN  Outcome: Progressing  8/18/2023 2316 by Makenzie Blanco RN  Outcome: Progressing

## 2023-08-19 NOTE — PLAN OF CARE
Problem: Chronic Conditions and Co-morbidities  Goal: Patient's chronic conditions and co-morbidity symptoms are monitored and maintained or improved  8/19/2023 1159 by Baron Ventura RN  Outcome: Adequate for Discharge  8/19/2023 1008 by Baron Ventura RN  Outcome: Progressing  8/18/2023 2316 by Zbigniew Khoury RN  Outcome: Progressing     Problem: Discharge Planning  Goal: Discharge to home or other facility with appropriate resources  8/19/2023 1159 by Baron Ventura RN  Outcome: Adequate for Discharge  8/19/2023 1008 by Baron Ventura RN  Outcome: Progressing  8/18/2023 2316 by Zbigniew Khoury RN  Outcome: Progressing     Problem: Pain  Goal: Verbalizes/displays adequate comfort level or baseline comfort level  8/19/2023 1159 by Baron Ventura RN  Outcome: Adequate for Discharge  8/19/2023 1008 by Baron Ventura RN  Outcome: Progressing  8/18/2023 2316 by Zbigniew Khoury RN  Outcome: Progressing     Problem: ABCDS Injury Assessment  Goal: Absence of physical injury  8/19/2023 1159 by Baron Ventura RN  Outcome: Adequate for Discharge  8/19/2023 1008 by Baron Ventura RN  Outcome: Progressing  8/18/2023 2316 by Zbigniew Khoury RN  Outcome: Progressing     Problem: Neurosensory - Adult  Goal: Achieves maximal functionality and self care  8/19/2023 1159 by Baron Ventura RN  Outcome: Adequate for Discharge  8/19/2023 1008 by Baron Ventura RN  Outcome: Progressing  8/18/2023 2316 by Zbigniew Khoury RN  Outcome: Progressing     Problem: Respiratory - Adult  Goal: Achieves optimal ventilation and oxygenation  8/19/2023 1159 by Baron Ventura RN  Outcome: Adequate for Discharge  8/19/2023 1008 by Baron Ventura RN  Outcome: Progressing  8/18/2023 2316 by Zbigniew Khoury RN  Outcome: Progressing     Problem: Cardiovascular - Adult  Goal: Maintains optimal cardiac output and hemodynamic stability  8/19/2023 1159 by Baron Ventura RN  Outcome: Adequate for Discharge  8/19/2023 1008 by Baron Ventura RN  Outcome: Progressing  8/18/2023 2316 by Anna Cerrato RN  Outcome: Progressing  Goal: Absence of cardiac dysrhythmias or at baseline  8/19/2023 1159 by Yessi Cruz RN  Outcome: Adequate for Discharge  8/19/2023 1008 by Yessi Cruz RN  Outcome: Progressing  8/18/2023 2316 by Anna Cerrato RN  Outcome: Progressing     Problem: Gastrointestinal - Adult  Goal: Maintains or returns to baseline bowel function  8/19/2023 1159 by Yessi Cruz RN  Outcome: Adequate for Discharge  8/19/2023 1008 by Yessi Cruz RN  Outcome: Progressing  8/18/2023 2316 by Anna Cerrato RN  Outcome: Progressing  Goal: Maintains adequate nutritional intake  8/19/2023 1159 by Yessi Cruz RN  Outcome: Adequate for Discharge  8/19/2023 1008 by Yessi Cruz RN  Outcome: Progressing  8/18/2023 2316 by Anna Cerrato RN  Outcome: Progressing     Problem: Safety - Adult  Goal: Free from fall injury  8/19/2023 1159 by Yessi Cruz RN  Outcome: Adequate for Discharge  8/19/2023 1008 by Yessi Cruz RN  Outcome: Progressing  8/18/2023 2316 by Anna Cerrato RN  Outcome: Progressing

## 2023-08-21 ENCOUNTER — HOSPITAL ENCOUNTER (OUTPATIENT)
Age: 63
Setting detail: OUTPATIENT SURGERY
Discharge: HOME | End: 2023-08-23
Attending: SPECIALIST
Payer: COMMERCIAL

## 2023-08-21 VITALS
TEMPERATURE: 98.3 F | DIASTOLIC BLOOD PRESSURE: 60 MMHG | RESPIRATION RATE: 24 BRPM | OXYGEN SATURATION: 99 % | HEART RATE: 85 BPM | SYSTOLIC BLOOD PRESSURE: 103 MMHG

## 2023-08-21 DIAGNOSIS — I48.91 ATRIAL FIBRILLATION STATUS POST CARDIOVERSION (HCC): Primary | ICD-10-CM

## 2023-08-21 LAB
EKG ATRIAL RATE: 258 BPM
EKG ATRIAL RATE: 277 BPM
EKG ATRIAL RATE: 283 BPM
EKG Q-T INTERVAL: 360 MS
EKG Q-T INTERVAL: 366 MS
EKG Q-T INTERVAL: 388 MS
EKG QRS DURATION: 78 MS
EKG QRS DURATION: 84 MS
EKG QRS DURATION: 84 MS
EKG QTC CALCULATION (BAZETT): 433 MS
EKG QTC CALCULATION (BAZETT): 437 MS
EKG QTC CALCULATION (BAZETT): 472 MS
EKG R AXIS: -13 DEGREES
EKG R AXIS: -3 DEGREES
EKG T AXIS: -145 DEGREES
EKG T AXIS: -155 DEGREES
EKG T AXIS: 179 DEGREES
EKG VENTRICULAR RATE: 86 BPM
EKG VENTRICULAR RATE: 87 BPM
EKG VENTRICULAR RATE: 89 BPM
POC INR: 3.4
PROTHROMBIN TIME, POC: 38.5 SEC (ref 10.4–14.2)

## 2023-08-21 PROCEDURE — 92960 CARDIOVERSION ELECTRIC EXT: CPT

## 2023-08-21 PROCEDURE — 93005 ELECTROCARDIOGRAM TRACING: CPT

## 2023-08-21 PROCEDURE — 6360000002 HC RX W HCPCS: Performed by: SPECIALIST

## 2023-08-21 PROCEDURE — 85610 PROTHROMBIN TIME: CPT

## 2023-08-21 PROCEDURE — 7100000010 HC PHASE II RECOVERY - FIRST 15 MIN

## 2023-08-21 PROCEDURE — 2580000003 HC RX 258: Performed by: SPECIALIST

## 2023-08-21 PROCEDURE — 7100000011 HC PHASE II RECOVERY - ADDTL 15 MIN

## 2023-08-21 RX ORDER — PROPOFOL 10 MG/ML
INJECTION, EMULSION INTRAVENOUS CONTINUOUS PRN
Status: COMPLETED | OUTPATIENT
Start: 2023-08-21 | End: 2023-08-21

## 2023-08-21 RX ORDER — SODIUM CHLORIDE 0.9 % (FLUSH) 0.9 %
5-40 SYRINGE (ML) INJECTION PRN
Status: DISCONTINUED | OUTPATIENT
Start: 2023-08-21 | End: 2023-08-24 | Stop reason: HOSPADM

## 2023-08-21 RX ORDER — SODIUM CHLORIDE 0.9 % (FLUSH) 0.9 %
5-40 SYRINGE (ML) INJECTION EVERY 12 HOURS SCHEDULED
Status: DISCONTINUED | OUTPATIENT
Start: 2023-08-21 | End: 2023-08-24 | Stop reason: HOSPADM

## 2023-08-21 RX ORDER — AMIODARONE HYDROCHLORIDE 100 MG/1
200 TABLET ORAL 3 TIMES DAILY
Qty: 90 TABLET | Refills: 3 | Status: SHIPPED | OUTPATIENT
Start: 2023-08-24

## 2023-08-21 RX ORDER — SODIUM CHLORIDE 9 MG/ML
INJECTION, SOLUTION INTRAVENOUS CONTINUOUS
Status: DISCONTINUED | OUTPATIENT
Start: 2023-08-21 | End: 2023-08-24 | Stop reason: HOSPADM

## 2023-08-21 RX ORDER — SODIUM CHLORIDE 9 MG/ML
INJECTION, SOLUTION INTRAVENOUS PRN
Status: DISCONTINUED | OUTPATIENT
Start: 2023-08-21 | End: 2023-08-24 | Stop reason: HOSPADM

## 2023-08-21 RX ORDER — WARFARIN SODIUM 2.5 MG/1
2.5 TABLET ORAL DAILY
Qty: 30 TABLET | Refills: 2 | Status: SHIPPED | OUTPATIENT
Start: 2023-08-21

## 2023-08-21 RX ADMIN — SODIUM CHLORIDE: 0.9 INJECTION, SOLUTION INTRAVENOUS at 12:45

## 2023-08-21 RX ADMIN — PROPOFOL 50 MCG: 10 INJECTION, EMULSION INTRAVENOUS at 13:42

## 2023-08-21 NOTE — DISCHARGE INSTRUCTIONS
------------Discontinue the Tikosyn Today    ------------Start amiodarone on Thursday 8/24/23, and follow the dosing schedule on bottle.    ------------Follow up with Dr. Geronimo Plascencia in 1 month              DISCHARGE INSTRUCTIONS for Cardioversion        You may feel sleepy after the procedure because of anesthesia or a sedative. Rest when you arrive home. NO DRIVING FOR 24 HOURS   If your chest skin is irritated, apply an ointment or hydrocortisone cream, as directed by your doctor. Do not apply any bandages. Resume your normal diet after the procedure. You may feel sleepy so rest as needed. Avoid heavy lifting or strenuous activity immediately following your procedure. Ask your doctor when you can resume normal physical activity (typically, the day after the cardioversion). Call Your Doctor If Any of the Following Occurs :  Blisters, redness, or open sores on your chest/any signs of infection   Confusion, lightheadedness, or dizziness   Sensation of your heart fluttering (palpitations)   Sensation of a skipped or missed hearbeat, or an irregular pulse   Severe nausea or vomiting, Cough, difficulty breathing, shortness of breath   Chest pain or pain in your left arm or jaw   Pain in your abdomen, back, arms, or legs   Blood in your urine   Changes in vision or speech   Difficulty walking or using your limbs, Drooping facial muscles  In case of an emergency, call 911 immediately. ------------------------------------------------------------------------------------------------------------              Cardioversion is a procedure used to shock the heart back into a normal rhythm. Paddles or electrodes applied to the chest are used to deliver an electric shock to the   heart so that it will return to a normal rhythm. Electrical Cardioversion: Care Instructions  Your Care Instructions    Electrical cardioversion is a treatment for an abnormal heartbeat.  For example, it may be used

## 2023-08-21 NOTE — H&P
Patient was recently discharged from the hospital after she was started on Tikosyn 0.25 mg p.o. twice daily. Patient went home and she presented today for DC cardioversion. She was sent home 2 days ago August 19. In the hospital she was started on Tikosyn and she tolerated Tikosyn but did not convert to sinus. Known history of atrial fibrillation coronary artery disease preserved left ventricular systolic function. Has had A-fib ablation done in the past with recurrence. .    On examination alert oriented cooperative no distress. Vitals were stable. JVP is not elevated. Fair and equal bilateral air entry. Distant heart sounds no gallop rub or murmur. No hepatosplenomegaly and no pedal edema. Informed consent was obtained. Plans for DC cardioversion and discharged home after that.

## 2023-08-21 NOTE — PROCEDURES
Procedure : DC CV   Pre op diagnosis: a fib persistent despite Tikosyn 0.25 mgm po BID  Post op : successful dc cv  Pt was brought to the ep lab in the post absorptive phase, informed consent was obtained, heavy sedation was induced with 50 mgm Propofol  A single synchronized dc shock delivered remberto-posteriorly of 360 Joules in biphasic forms resulted in sinus. EKG shoed QTC of 490 mSec   Pt tolerated the procedure well.

## 2023-08-21 NOTE — PROGRESS NOTES
Made Dr. Veronica Murillo aware pt is back in A-Fib starting at [phone]. Dr. Veronica Murillo to see patient prior to discharge. Pt to be discharged at 1700 until further notice.  Pt. Stable

## 2023-08-21 NOTE — PROGRESS NOTES
Per Dr. Aniceto Yepez the Tikosyn Today. Start amiodarone on Thursday 8/24/23, and follow the dosing schedule on bottle. Follow up with Dr. Geoff Ron in 1 month. Prescription given to patient. New AVS printed and given to patient. All discharge instructions reviewed, questions answered, paper signed and given a copy. Patient to be discharged with all belongings. Pulses obtained & unchanged. Pt ambulatory. Patient to be ambulatory at discharge and transported via. Boyfriend vehicle.

## 2023-08-21 NOTE — PROGRESS NOTES
TRANSFER - OUT REPORT:    Verbal report given to brandon(name) on [facility] being transferred to [facility](unit) for routine post-op       Report consisted of patient's Situation, Background, Assessment and   Recommendations(SBAR). Information from the following report(s) Nurse Handoff Report was reviewed with the receiving nurse. Opportunity for questions and clarification was provided.       Patient transported with:   O2 @ 3 liters  Registered Nurse  [facility]

## 2023-08-21 NOTE — PROGRESS NOTES
Patient returned to room post cardioversion in NS rhythm. RN at bedside. Side rails up x2 and call light within reach. Will continue to monitor.

## 2023-08-21 NOTE — PROGRESS NOTES
All discharge instructions reviewed, questions answered, paper signed and given a copy. Pulses obtained & unchanged.  Pt stable

## 2023-08-21 NOTE — PROGRESS NOTES
Pt went into a fib shrtely after dc cv on Tikosyn 0.25 mgm po bid, with qtc of almost 500 m Sec  Will dc Tikosyn. When we discussed amiodarone she did not remember significant side effects fro it, may be a rash, she is willing to try it again  Will start loading her on Thursday, will adjust Dig and Warfarin dose.   Will f/u in 1-2 months

## 2023-08-21 NOTE — PROGRESS NOTES
Patient admitted, consent signed and questions answered. Call light to reach with side rails up 2 of 2.  RN at bedside with patient. History and physical needs updated. VS & Pulses obtained and documented. Will continue to monitor.      EKG-Obtained  PT 38.5  INR 3.4

## 2023-08-22 ENCOUNTER — TELEPHONE (OUTPATIENT)
Dept: PHARMACY | Age: 63
End: 2023-08-22

## 2023-08-22 LAB
EKG ATRIAL RATE: 288 BPM
EKG ATRIAL RATE: 326 BPM
EKG Q-T INTERVAL: 350 MS
EKG Q-T INTERVAL: 386 MS
EKG QRS DURATION: 80 MS
EKG QRS DURATION: 82 MS
EKG QTC CALCULATION (BAZETT): 406 MS
EKG QTC CALCULATION (BAZETT): 477 MS
EKG R AXIS: -12 DEGREES
EKG R AXIS: -4 DEGREES
EKG T AXIS: -145 DEGREES
EKG T AXIS: -152 DEGREES
EKG VENTRICULAR RATE: 81 BPM
EKG VENTRICULAR RATE: 92 BPM

## 2023-08-22 NOTE — TELEPHONE ENCOUNTER
Spoke with Milana Shannon today;  she reports that Tikosyn has been stopped and amiodarone started. She is starting amiodarone om 2023 with a loading dose and then will taper to usual regimen of amiodarone 200mg one time daily. INR yesterday was 3.4, so I have instructed her to take warfarin 1.5mg orally today only as a one time reduction, then continue current regimen of warfarin 3mg one time daily. Will recheck INR at next currently scheduled appt on 2023. Will adjust warfarin regimen secondary to amiodarone start at that appointment. Patient expressed understanding.      Arya Aj, [facility], CACP  Clinical Pharmacist Medication Management  2023  11:59 AM    For Pharmacy Admin Tracking Only    Intervention Detail: Adherence Monitorin and Dose Adjustment: 1, reason: Therapy De-escalation  Total # of Interventions Recommended: 1  Total # of Interventions Accepted: 1  Time Spent (min): 10

## 2023-08-23 LAB
EKG ATRIAL RATE: 52 BPM
EKG P AXIS: 89 DEGREES
EKG P-R INTERVAL: 196 MS
EKG Q-T INTERVAL: 530 MS
EKG QRS DURATION: 90 MS
EKG QTC CALCULATION (BAZETT): 492 MS
EKG R AXIS: -3 DEGREES
EKG T AXIS: -170 DEGREES
EKG VENTRICULAR RATE: 52 BPM

## 2023-08-31 ENCOUNTER — HOSPITAL ENCOUNTER (OUTPATIENT)
Dept: PHARMACY | Age: 63
Setting detail: THERAPIES SERIES
Discharge: HOME | End: 2023-08-31
Payer: COMMERCIAL

## 2023-08-31 DIAGNOSIS — I48.91 ATRIAL FIBRILLATION WITH RVR (HCC): Primary | ICD-10-CM

## 2023-08-31 LAB
INR BLD: 3.2
PROTIME: 38.9 SECONDS

## 2023-08-31 PROCEDURE — 99212 OFFICE O/P EST SF 10 MIN: CPT

## 2023-08-31 PROCEDURE — 85610 PROTHROMBIN TIME: CPT

## 2023-08-31 RX ORDER — WARFARIN SODIUM 3 MG/1
3 TABLET ORAL DAILY
COMMUNITY

## 2023-08-31 NOTE — PROGRESS NOTES
Medication Management Service, Warfarin Management  [facility], [phone]  Visit Date: 2023   Subjective:   Christ Basurto is a 66 y.o. female who presents to clinic today for anticoagulation monitoring and adjustment. Patient seen in clinic for warfarin management due to  Indication:   atrial fibrillation. INR goal: of 2.0-3.0. Duration of therapy: indefinite. Assessment and PLAN   PT/INR done in office per protocol. INR today is 3.2, just above goal range, no significant findings / cause for elevated INR today. She did start amiodarone but that was just 6 days ago. Plan: Will reduce regimen by 7% to warfarin 1.5mg on  only; 3mg all other days of the week. Additionally on  she will take warfarin 1.5mg which is a temporary change based on starting date of amiodarone. If INR in range at next check, regimen will need to be changed. Using warfarin 3mg tablets. Recheck INR in 2 week(s), 3 weeks post start of amiodarone. Patient seen in room # 3. ED OP = Continue usual intake of green vegetables. Advised on timing of Flu vaccine administration. Ideally best to wait until at least mid-September if not even into mid-late October. Of note; patient reports feeling much better! Patient verbalized understanding of dosing directions and information discussed. Dosing schedule given to patient. Progress note sent to referring office.     Ekta Pimentel RPh, CACP  Clinical Pharmacist Medication Management  2023  8:52 AM      For Pharmacy Admin Tracking Only    Intervention Detail: Adherence Monitorin and Dose Adjustment: 1, reason: Improve Adherence, Therapy De-escalation  Total # of Interventions Recommended: 3  Total # of Interventions Accepted: 3  Time Spent (min):  25

## 2023-09-13 ENCOUNTER — TELEPHONE (OUTPATIENT)
Dept: PHARMACY | Age: 63
End: 2023-09-13

## 2023-09-13 NOTE — TELEPHONE ENCOUNTER
Patient called asking if she can use ibuprofen for her arthritis pain. Tylenol is not working at all. I advised using it as sparingly as possibly, take with food,  and to watch for any signs or symptoms of bleeding. She agreed to above plan. [address]  Ph., CACP, Clinical Pharmacist  Anticoagulation Services, [address] [place] [address] Coumadin [facility]  9/13/2023  1:31 PM      For Pharmacy Admin Tracking Only    Intervention Detail:   Total # of Interventions Recommended: 0  Total # of Interventions Accepted: 0  Time Spent (min): 5

## 2023-09-14 ENCOUNTER — HOSPITAL ENCOUNTER (OUTPATIENT)
Dept: PHARMACY | Age: 63
Setting detail: THERAPIES SERIES
Discharge: HOME | End: 2023-09-14
Payer: COMMERCIAL

## 2023-09-14 DIAGNOSIS — I48.91 ATRIAL FIBRILLATION WITH RVR (HCC): Primary | ICD-10-CM

## 2023-09-14 LAB
INR BLD: 3.9
PROTIME: 47.3 SECONDS

## 2023-09-14 PROCEDURE — 99212 OFFICE O/P EST SF 10 MIN: CPT

## 2023-09-14 PROCEDURE — 85610 PROTHROMBIN TIME: CPT

## 2023-09-14 PROCEDURE — 99213 OFFICE O/P EST LOW 20 MIN: CPT

## 2023-09-14 NOTE — PROGRESS NOTES
Medication Management Service, Warfarin Management  [facility]3], [phone]  Visit Date: 2023   Subjective:   Velma Brennan is a 66 y.o. female who presents to clinic today for anticoagulation monitoring and adjustment. Patient seen in clinic for warfarin management due to  Indication:   atrial fibrillation. INR goal: of 2.0-3.0. Duration of therapy: indefinite. Assessment and PLAN   PT/INR done in office per protocol. INR today is 3.9,supratherapeutic. She began taking amiodarone 17 days ago. Plan: Will reduce regimen to warfarin 1.5mg on ,,and ; 3 mg all other days of the week. Using warfarin 3mg tablets. Recheck INR in 1 week(s)  Patient seen in room #2. ED OP = Continue usual intake of green vegetables. Of note; patient reports feeling much better, and walking more since starting amiodarone. Patient verbalized understanding of dosing directions and information discussed. Dosing schedule given to patient. Progress note sent to referring office. Abel Reed  Candidate  2023  8:44 AM      For Pharmacy Admin Tracking Only    Intervention Detail: Adherence Monitorin and Dose Adjustment: 1, reason: Therapy De-escalation  Total # of Interventions Recommended: 2  Total # of Interventions Accepted: 2  Time Spent (min): 20

## 2023-09-20 ENCOUNTER — HOSPITAL ENCOUNTER (OUTPATIENT)
Dept: PHARMACY | Age: 63
Setting detail: THERAPIES SERIES
Discharge: HOME | End: 2023-09-20
Payer: COMMERCIAL

## 2023-09-20 DIAGNOSIS — I48.91 ATRIAL FIBRILLATION WITH RVR (HCC): Primary | ICD-10-CM

## 2023-09-20 LAB
INR BLD: 4
PROTIME: 47.5 SECONDS

## 2023-09-20 PROCEDURE — 85610 PROTHROMBIN TIME: CPT

## 2023-09-20 PROCEDURE — 99212 OFFICE O/P EST SF 10 MIN: CPT

## 2023-09-20 NOTE — PROGRESS NOTES
Medication Management Service, Warfarin Management  [facility], [phone]  Visit Date: 2023   Subjective:   Steve Rose is a 66 y.o. female who presents to clinic today for anticoagulation monitoring and adjustment. Patient seen in clinic for warfarin management due to  Indication:   atrial fibrillation. INR goal: of 2.0-3.0. Duration of therapy: indefinite. Assessment and PLAN   PT/INR done in office per protocol. INR today is 4.0. supratherapeutic. Patient reports that she did not have any green vegetables last week. Also, started amiodarone 24 days ago, now at 200mg orally once daily. Plan: Will reduce regimen by 9.1% to warfarin 3mg on Sun, Tues, Thurs only; 1.5mg all other days of the week. Patient will have free rein of green vegetables this evening, so not reducing dose for today. The reduction this week in combination of others totals 28% from the 21mg per week prior to starting amiodarone. Using warfarin 3mg tablets. Recheck INR in ~ 2 weeks. Patient seen in room #3. ED OP = Continue usual intake of green vegetables, with extra today. Patient verbalized understanding of dosing directions and information discussed. Dosing schedule given to patient. Progress note sent to referring office.      Liu Dumont RPh, KARLA  Clinical Pharmacist Medication Management  2023  2:39 PM      For Pharmacy Admin Tracking Only    Intervention Detail: Adherence Monitorin and Dose Adjustment: 1, reason: Improve Adherence, Interaction, Therapy De-escalation  Total # of Interventions Recommended: 2  Total # of Interventions Accepted: 2  Time Spent (min): 20

## 2023-10-02 ENCOUNTER — HOSPITAL ENCOUNTER (OUTPATIENT)
Dept: PHARMACY | Age: 63
Setting detail: THERAPIES SERIES
Discharge: HOME | End: 2023-10-02
Payer: COMMERCIAL

## 2023-10-02 DIAGNOSIS — I48.91 ATRIAL FIBRILLATION WITH RVR (HCC): Primary | ICD-10-CM

## 2023-10-02 LAB
INR BLD: 2.2
PROTIME: 26 SECONDS

## 2023-10-02 PROCEDURE — 99211 OFF/OP EST MAY X REQ PHY/QHP: CPT

## 2023-10-02 PROCEDURE — 85610 PROTHROMBIN TIME: CPT

## 2023-10-02 NOTE — PROGRESS NOTES
Medication Management Service, Warfarin Management  [facility], [phone]  Visit Date: 2023   Subjective:   Tosin Blanchard is a 66 y.o. female who presents to clinic today for anticoagulation monitoring and adjustment. Patient seen in clinic for warfarin management due to  Indication:   atrial fibrillation. INR goal: of 2.0-3.0. Duration of therapy: indefinite. Assessment and PLAN   PT/INR done in office per protocol. INR today is 2.2, therapeutic. Plan: Will continue current regimen of warfarin 3mg on Sun, Tues, Thurs only; 1.5mg all other days of the week. Using warfarin 3mg tablets. Recheck INR in 2 weeks. Patient seen in room #3. ED OP = Patient prefers her INR to be in higher range so will slightly reduce intake of green vegetables. Patient verbalized understanding of dosing directions and information discussed. Dosing schedule given to patient. Progress note sent to referring office.      Carmita Romo RPh, EDVINP  Clinical Pharmacist Medication Management  10/2/2023  8:19 AM      For Pharmacy Admin Tracking Only    Intervention Detail: Adherence Monitorin  Total # of Interventions Recommended: 1  Total # of Interventions Accepted: 1  Time Spent (min): 15

## 2023-10-05 RX ORDER — GLIPIZIDE 5 MG/1
TABLET ORAL
Qty: 90 TABLET | Refills: 0 | Status: SHIPPED | OUTPATIENT
Start: 2023-10-05

## 2023-10-11 ENCOUNTER — OFFICE VISIT (OUTPATIENT)
Age: 63
End: 2023-10-11

## 2023-10-11 VITALS
SYSTOLIC BLOOD PRESSURE: 139 MMHG | RESPIRATION RATE: 20 BRPM | DIASTOLIC BLOOD PRESSURE: 77 MMHG | HEIGHT: 63 IN | TEMPERATURE: 97.3 F | WEIGHT: 211.8 LBS | OXYGEN SATURATION: 98 % | BODY MASS INDEX: 37.53 KG/M2 | HEART RATE: 69 BPM

## 2023-10-11 DIAGNOSIS — I48.19 PERSISTENT ATRIAL FIBRILLATION (HCC): Primary | ICD-10-CM

## 2023-10-11 RX ORDER — AMIODARONE HYDROCHLORIDE 200 MG/1
200 TABLET ORAL DAILY
COMMUNITY

## 2023-10-11 NOTE — PROGRESS NOTES
mouth daily 30 tablet 5    Metoprolol Tartrate 75 MG TABS TAKE 1 TABLET BY MOUTH 2 TIMES A DAY 60 tablet 7    metFORMIN (GLUCOPHAGE) 1000 MG tablet TAKE 1 TABLET BY MOUTH TWO TIMES A DAY WITH FOOD (Patient taking differently: Take 1 tablet by mouth Daily) 180 tablet 0    tiZANidine (ZANAFLEX) 4 MG tablet TAKE 1 TABLET BY MOUTH AT BEDTIME AS NEEDED FOR BACK PAIN 90 tablet 0    amLODIPine (NORVASC) 5 MG tablet TAKE 2 TABLETS BY MOUTH EVERY DAY 60 tablet 5    atorvastatin (LIPITOR) 40 MG tablet TAKE 1 TABLET BY MOUTH EVERY DAY 30 tablet 11    valsartan (DIOVAN) 80 MG tablet Take 1 tablet by mouth daily 90 tablet 3    clopidogrel (PLAVIX) 75 MG tablet TAKE 1 TABLET BY MOUTH ONE TIME A DAY  15 tablet 0    Niacin ER 1000 MG TBCR Take by mouth daily      furosemide (LASIX) 40 MG tablet Take 1 tablet by mouth 2 times daily  3    Omega-3 Fatty Acids (FISH OIL) 1000 MG CAPS Take 1 capsule by mouth daily      nitroGLYCERIN (NITROSTAT) 0.4 MG SL tablet Place 1 tablet under tongue upon chest pain, wait 5 minutes and may repeat up to 3 doses in 15 minutes. Do not crush or break. 25 tablet 3     No current facility-administered medications for this visit.         Patient Active Problem List   Diagnosis    S/P endometrial ablation    Essential hypertension    Mixed hyperlipidemia    CAD (coronary artery disease) with stents    SOB (shortness of breath)    Type 2 diabetes mellitus without complication, without long-term current use of insulin ([facility])    S/P ablation of atrial fibrillation    Bowen's disease    Atrial fibrillation with RVR ([facility])    Chronic pain disorder    Chronic pain of multiple joints    Primary osteoarthritis of both hands    Tinea    Visit for monitoring Tikosyn therapy    Persistent atrial fibrillation [facility])       Past Medical History:   Diagnosis Date    Abnormal Pap smear 1989    Had colposcopy    Allergy     PCN    Anemia     Anticoagulated     ON COUMADIN    Anticoagulated on Coumadin     Atrial fibrillation

## 2023-10-16 ENCOUNTER — HOSPITAL ENCOUNTER (OUTPATIENT)
Dept: PHARMACY | Age: 63
Setting detail: THERAPIES SERIES
Discharge: HOME | End: 2023-10-16
Payer: COMMERCIAL

## 2023-10-16 DIAGNOSIS — I48.91 ATRIAL FIBRILLATION WITH RVR (HCC): Primary | ICD-10-CM

## 2023-10-16 LAB
INR BLD: 1.9
PROTIME: 22.5 SECONDS

## 2023-10-16 PROCEDURE — 85610 PROTHROMBIN TIME: CPT

## 2023-10-16 PROCEDURE — 99212 OFFICE O/P EST SF 10 MIN: CPT

## 2023-10-16 NOTE — PROGRESS NOTES
Medication Management Service, Warfarin Management  [facility], [phone]  Visit Date: 2023   Subjective:   Latisha Moeller is a 66 y.o. female who presents to clinic today for anticoagulation monitoring and adjustment. Patient seen in clinic for warfarin management due to  Indication:   atrial fibrillation. INR goal: of 2.0-3.0. Duration of therapy: indefinite. Assessment and PLAN   PT/INR done in office per protocol. INR today is 1.9, just below goal range, cardioversion on Wednesday. Plan: Will increase regimen by 10% to warfarin 1.5mg on Mon, Wed, Fri only; 3mg all other days of the week. Using warfarin 3mg tablets. Recheck INR in 2 weeks. Patient seen in room #3. ED OP = Patient prefers her INR to be in higher range so increasing regimen and she will increase gv slightly vs further reduction in gv. Patient verbalized understanding of dosing directions and information discussed. Dosing schedule given to patient. Progress note sent to referring office.      Keri Iniguez RPh, EDVINP  Clinical Pharmacist Medication Management  10/16/2023  12:00 PM      For Pharmacy Admin Tracking Only    Intervention Detail: Adherence Monitorin and Dose Adjustment: 1, reason: Improve Adherence, Therapy Optimization  Total # of Interventions Recommended: 2  Total # of Interventions Accepted: 2  Time Spent (min): 20

## 2023-10-18 ENCOUNTER — HOSPITAL ENCOUNTER (OUTPATIENT)
Age: 63
Setting detail: OUTPATIENT SURGERY
Discharge: HOME | End: 2023-10-20
Attending: SPECIALIST
Payer: COMMERCIAL

## 2023-10-18 VITALS
OXYGEN SATURATION: 97 % | RESPIRATION RATE: 16 BRPM | SYSTOLIC BLOOD PRESSURE: 127 MMHG | HEART RATE: 59 BPM | DIASTOLIC BLOOD PRESSURE: 70 MMHG

## 2023-10-18 DIAGNOSIS — I48.19 PERSISTENT ATRIAL FIBRILLATION (HCC): ICD-10-CM

## 2023-10-18 LAB
BUN BLD-MCNC: 20 MG/DL (ref 8–26)
CHLORIDE BLD-SCNC: 104 MMOL/L (ref 98–107)
EGFR, POC: >60 ML/MIN/1.73M2
GLUCOSE BLD-MCNC: 128 MG/DL (ref 74–100)
HCT VFR BLD AUTO: 47 % (ref 36–46)
POC CREATININE: 0.8 MG/DL (ref 0.51–1.19)
POC HEMOGLOBIN (CALC): 16 G/DL (ref 12–16)
POC INR: 2.1
POTASSIUM BLD-SCNC: 4.1 MMOL/L (ref 3.5–4.5)
PROTHROMBIN TIME, POC: 24.5 SEC (ref 10.4–14.2)
SODIUM BLD-SCNC: 144 MMOL/L (ref 138–146)

## 2023-10-18 PROCEDURE — 84132 ASSAY OF SERUM POTASSIUM: CPT

## 2023-10-18 PROCEDURE — 84295 ASSAY OF SERUM SODIUM: CPT

## 2023-10-18 PROCEDURE — 7100000010 HC PHASE II RECOVERY - FIRST 15 MIN

## 2023-10-18 PROCEDURE — 7100000011 HC PHASE II RECOVERY - ADDTL 15 MIN

## 2023-10-18 PROCEDURE — 82947 ASSAY GLUCOSE BLOOD QUANT: CPT

## 2023-10-18 PROCEDURE — 85014 HEMATOCRIT: CPT

## 2023-10-18 PROCEDURE — 92960 CARDIOVERSION ELECTRIC EXT: CPT

## 2023-10-18 PROCEDURE — 2580000003 HC RX 258: Performed by: SPECIALIST

## 2023-10-18 PROCEDURE — 84520 ASSAY OF UREA NITROGEN: CPT

## 2023-10-18 PROCEDURE — 82435 ASSAY OF BLOOD CHLORIDE: CPT

## 2023-10-18 PROCEDURE — 82565 ASSAY OF CREATININE: CPT

## 2023-10-18 PROCEDURE — 85610 PROTHROMBIN TIME: CPT

## 2023-10-18 PROCEDURE — 6360000002 HC RX W HCPCS: Performed by: SPECIALIST

## 2023-10-18 PROCEDURE — 93005 ELECTROCARDIOGRAM TRACING: CPT | Performed by: SPECIALIST

## 2023-10-18 RX ORDER — SODIUM CHLORIDE 9 MG/ML
INJECTION, SOLUTION INTRAVENOUS CONTINUOUS
Status: DISCONTINUED | OUTPATIENT
Start: 2023-10-18 | End: 2023-10-21 | Stop reason: HOSPADM

## 2023-10-18 RX ADMIN — PROPOFOL 50 MG: 10 INJECTION, EMULSION INTRAVENOUS at 10:54

## 2023-10-18 RX ADMIN — SODIUM CHLORIDE: 9 INJECTION, SOLUTION INTRAVENOUS at 10:20

## 2023-10-18 NOTE — H&P
Very well-known to me from previous encounters. History of atrial fibrillation, failed PVI ablation in the past.  Failed Tikosyn in the past.  Has been on amiodarone now for more than a months and she seems to be tolerating it. But she remains in atrial fibrillation with controlled ventricular response. She is here today for DC cardioversion. The procedure was discussed with the patient in details. She has had repeated cardioversions in the past.  She understands that this is may be a little bit different now that she is on amiodarone we have a better chance of remaining in sinus rhythm. Her atrial fibrillation is nonvalvular nonthyroid related. On examination alert oriented cooperative nice lady in no distress. Vitals are stable. Remains in atrial fibrillation with moderate ventricular response. JVP is not elevated. Fair and equal bilateral air entry. Heart sounds appreciated no gallop rub or murmur. No hepatosplenomegaly traces of pedal edema. EKG prior to the procedure shows atrial fibrillation with controlled ventricular response. Impression atrial fibrillation for DC cardioversion. The procedure of cardioversion was discussed in details with the patient she understands and she agrees.   End of dictation

## 2023-10-18 NOTE — PROGRESS NOTES
TRANSFER - OUT REPORT:    Verbal report given to kadie(name) on Rozella Canavan being transferred to [facility]  (unit) for routine post-op       Report consisted of patient's Situation, Background, Assessment and   Recommendations(SBAR). Information from the following report(s) Nurse Handoff Report was reviewed with the receiving nurse. Opportunity for questions and clarification was provided.       Patient transported with:   O2 @ 3 liters

## 2023-10-18 NOTE — PROGRESS NOTES
Received post procedure to [facility] to room 11. Assessment obtained. Restrictions reviewed with patient. Post cardioversion procedure pathway initiated. sNo complaintsPatient without complaints.

## 2023-10-18 NOTE — DISCHARGE INSTRUCTIONS
Discharge Instructions for Cardioversion     Cardioversion is a procedure used to shock the heart back into a normal rhythm. Paddles or electrodes applied to the chest are used to deliver an electric shock to the heart so that it will return to a normal rhythm. What You Will Need   Someone to drive you home from the hospital  NO DRIVING FOR [address] [place] [address] may feel sleepy after the procedure because of anesthesia or a sedative. Rest when you arrive home. If your chest skin is irritated, apply an ointment or hydrocortisone cream, as directed by your doctor. Do not apply any bandages. Diet   Resume your normal diet after the procedure     Physical Activity   You may feel sleepy after the procedure and should rest as needed. In addition:   Rest as needed. Avoid heavy lifting or strenuous activity immediately following your procedure. Ask your doctor when you can resume normal physical activity (typically, the day after the cardioversion). Medications   You may be given a medicine called an anti-arrhythmic. This type of drug prevents your heart from having abnormal rhythms. You may also be told to take a blood thinner medication to prevent blood clots. Your doctor will tell you when you should resume any normal medications. If you are taking medications, follow these general guidelines:   Take your medication as directed. Do not change the amount or the schedule. Do not stop taking them without talking to your doctor. Do not share them. Know what the results and side effects. Report them to your doctor. Some drugs can be dangerous when mixed. Talk to a doctor or pharmacist if you are taking more than one drug. This includes over-the-counter medication and herb or dietary supplements. Plan ahead for refills so you don't run out. Follow-up   Schedule a follow-up appointment as directed by your doctor.  If you are started on blood thinner medication, you may need regular

## 2023-10-20 LAB
EKG ATRIAL RATE: 300 BPM
EKG Q-T INTERVAL: 408 MS
EKG QRS DURATION: 86 MS
EKG QTC CALCULATION (BAZETT): 400 MS
EKG R AXIS: -15 DEGREES
EKG T AXIS: -150 DEGREES
EKG VENTRICULAR RATE: 58 BPM

## 2023-10-30 ENCOUNTER — HOSPITAL ENCOUNTER (OUTPATIENT)
Dept: PHARMACY | Age: 63
Setting detail: THERAPIES SERIES
Discharge: HOME | End: 2023-10-30
Payer: COMMERCIAL

## 2023-10-30 DIAGNOSIS — I48.91 ATRIAL FIBRILLATION WITH RVR (HCC): Primary | ICD-10-CM

## 2023-10-30 LAB
INR BLD: 2.3
PROTIME: 28 SECONDS

## 2023-10-30 PROCEDURE — 85610 PROTHROMBIN TIME: CPT

## 2023-10-30 PROCEDURE — 99211 OFF/OP EST MAY X REQ PHY/QHP: CPT

## 2023-10-30 NOTE — PROGRESS NOTES
Medication Management Service, Warfarin Management  [facility], [phone]  Visit Date: 10/30/2023   Subjective:   Eryn Hanna is a 66 y.o. female who presents to clinic today for anticoagulation monitoring and adjustment. Patient seen in clinic for warfarin management due to  Indication:   atrial fibrillation. INR goal: of 2.0-3.0. Duration of therapy: indefinite. Assessment and PLAN   PT/INR done in office per protocol. INR today is 2.3, therapeutic. Plan: Will continue current regimen of warfarin 1.5 mg every Monday, Wednesday, Friday; 3 mg all other days of the week. Using warfarin 3 mg tablets. Recheck INR in 3 week(s). Patient seen in room # 1. I recommended vaccines today but she will get at doctor office tomorrow. Patient verbalized understanding of dosing directions and information discussed. Dosing schedule given to patient. Progress note sent to referring office.      Electronically signed by CLEMENTINA Harris [facility] on 10/30/23 at 8:57 AM EDT    For Pharmacy Admin Tracking Only    Intervention Detail: Vaccine Recommended/Administered  Total # of Interventions Recommended: 1  Total # of Interventions Accepted: 0  Time Spent (min): 20

## 2023-10-31 ENCOUNTER — OFFICE VISIT (OUTPATIENT)
Dept: FAMILY MEDICINE CLINIC | Age: 63
End: 2023-10-31
Payer: COMMERCIAL

## 2023-10-31 VITALS
HEART RATE: 55 BPM | BODY MASS INDEX: 36.5 KG/M2 | WEIGHT: 206 LBS | DIASTOLIC BLOOD PRESSURE: 68 MMHG | TEMPERATURE: 97.7 F | OXYGEN SATURATION: 96 % | SYSTOLIC BLOOD PRESSURE: 116 MMHG | HEIGHT: 63 IN

## 2023-10-31 DIAGNOSIS — Z23 NEED FOR INFLUENZA VACCINATION: ICD-10-CM

## 2023-10-31 DIAGNOSIS — Z00.00 ENCOUNTER FOR WELL ADULT EXAM WITHOUT ABNORMAL FINDINGS: Primary | ICD-10-CM

## 2023-10-31 DIAGNOSIS — E78.2 MIXED HYPERLIPIDEMIA: ICD-10-CM

## 2023-10-31 DIAGNOSIS — E11.9 TYPE 2 DIABETES MELLITUS WITHOUT COMPLICATION, WITHOUT LONG-TERM CURRENT USE OF INSULIN (HCC): ICD-10-CM

## 2023-10-31 PROCEDURE — 99396 PREV VISIT EST AGE 40-64: CPT | Performed by: PHYSICIAN ASSISTANT

## 2023-10-31 PROCEDURE — 90471 IMMUNIZATION ADMIN: CPT | Performed by: PHYSICIAN ASSISTANT

## 2023-10-31 PROCEDURE — 90674 CCIIV4 VAC NO PRSV 0.5 ML IM: CPT | Performed by: PHYSICIAN ASSISTANT

## 2023-10-31 PROCEDURE — 3078F DIAST BP <80 MM HG: CPT | Performed by: PHYSICIAN ASSISTANT

## 2023-10-31 PROCEDURE — 3074F SYST BP LT 130 MM HG: CPT | Performed by: PHYSICIAN ASSISTANT

## 2023-10-31 ASSESSMENT — ENCOUNTER SYMPTOMS
SHORTNESS OF BREATH: 0
BLOOD IN STOOL: 0
EYE DISCHARGE: 0
VOICE CHANGE: 0
SORE THROAT: 0
BACK PAIN: 0
ABDOMINAL PAIN: 0
WHEEZING: 0
VOMITING: 0
CHEST TIGHTNESS: 0
COLOR CHANGE: 0
RHINORRHEA: 0
CONSTIPATION: 0
SINUS PRESSURE: 0
EYE PAIN: 0
COUGH: 0
SINUS PAIN: 0
TROUBLE SWALLOWING: 0
DIARRHEA: 0
NAUSEA: 0

## 2023-10-31 NOTE — PROGRESS NOTES
Meningococcal (ACWY) vaccine  Aged Out   After obtaining consent, and per orders of Tavares ZARCO, injection of Flu Vaccine given in Left deltoid by Wendi Severance, MA. Patient instructed to remain in clinic for 20 minutes afterwards, and to report any adverse reaction to me immediately. Vaccine Information Sheet, \"Influenza - Inactivated\"  given to Taiwo Dickinson, or parent/legal guardian of  Taiwo Dickinson and verbalized understanding. Patient responses:    Have you ever had a reaction to a flu vaccine? No  Are you able to eat eggs without adverse effects? Yes  Do you have any current illness? No  Have you ever had [place] Syndrome? No    Flu vaccine given per order. Please see immunization tab.
erythematous, retracted or bulging. Left Ear: Tympanic membrane, ear canal and external ear normal. There is no impacted cerumen. Tympanic membrane is not erythematous, retracted or bulging. Nose: No congestion or rhinorrhea. Mouth/Throat:      Mouth: Mucous membranes are moist.      Pharynx: No oropharyngeal exudate or posterior oropharyngeal erythema. Eyes:      Pupils: Pupils are equal, round, and reactive to light. Neck:      Thyroid: No thyromegaly. Cardiovascular:      Rate and Rhythm: Normal rate and regular rhythm. Heart sounds: Normal heart sounds. No murmur heard. Pulmonary:      Effort: Pulmonary effort is normal. No respiratory distress. Breath sounds: Normal breath sounds. No wheezing, rhonchi or rales. Abdominal:      General: Bowel sounds are normal. There is no distension. Palpations: Abdomen is soft. There is no mass. Tenderness: There is no abdominal tenderness. There is no right CVA tenderness, left CVA tenderness, guarding or rebound. Musculoskeletal:         General: Normal range of motion. Cervical back: Normal range of motion. Right lower leg: No edema. Left lower leg: No edema. Lymphadenopathy:      Cervical: No cervical adenopathy. Skin:     General: Skin is warm and dry. Findings: No lesion or rash. Neurological:      Mental Status: She is alert and oriented to person, place, and time. Motor: No weakness. Gait: Gait normal.   Psychiatric:         Mood and Affect: Mood normal.         Behavior: Behavior normal.         Thought Content: Thought content normal.         Judgment: Judgment normal.         Assessment   Plan   1. Encounter for well adult exam without abnormal findings  2. Need for influenza vaccination  -     Influenza, FLUCELVAX, (age 10 mo+), IM, Preservative Free, 0.5 mL  3. Mixed hyperlipidemia  -     Lipid Panel; Future  -     Comprehensive Metabolic Panel; Future  4.  Type 2 diabetes mellitus

## 2023-11-06 ENCOUNTER — HOSPITAL ENCOUNTER (OUTPATIENT)
Age: 63
Setting detail: SPECIMEN
Discharge: HOME | End: 2023-11-06

## 2023-11-06 ENCOUNTER — OFFICE VISIT (OUTPATIENT)
Age: 63
End: 2023-11-06
Payer: COMMERCIAL

## 2023-11-06 VITALS
OXYGEN SATURATION: 97 % | TEMPERATURE: 97 F | HEIGHT: 63 IN | HEART RATE: 52 BPM | WEIGHT: 210 LBS | RESPIRATION RATE: 16 BRPM | SYSTOLIC BLOOD PRESSURE: 119 MMHG | DIASTOLIC BLOOD PRESSURE: 72 MMHG | BODY MASS INDEX: 37.21 KG/M2

## 2023-11-06 DIAGNOSIS — E78.2 MIXED HYPERLIPIDEMIA: ICD-10-CM

## 2023-11-06 DIAGNOSIS — I48.0 PAF (PAROXYSMAL ATRIAL FIBRILLATION) (HCC): Primary | ICD-10-CM

## 2023-11-06 DIAGNOSIS — E11.9 TYPE 2 DIABETES MELLITUS WITHOUT COMPLICATION, WITHOUT LONG-TERM CURRENT USE OF INSULIN (HCC): ICD-10-CM

## 2023-11-06 LAB
ALBUMIN SERPL-MCNC: 4.1 G/DL (ref 3.5–5.2)
ALBUMIN/GLOB SERPL: 1.4 {RATIO} (ref 1–2.5)
ALP SERPL-CCNC: 75 U/L (ref 35–104)
ALT SERPL-CCNC: 20 U/L (ref 5–33)
ANION GAP SERPL CALCULATED.3IONS-SCNC: 15 MMOL/L (ref 9–17)
AST SERPL-CCNC: 28 U/L
BILIRUB SERPL-MCNC: 0.6 MG/DL (ref 0.3–1.2)
BUN SERPL-MCNC: 24 MG/DL (ref 8–23)
CALCIUM SERPL-MCNC: 9.3 MG/DL (ref 8.6–10.4)
CHLORIDE SERPL-SCNC: 96 MMOL/L (ref 98–107)
CHOLEST SERPL-MCNC: 183 MG/DL
CHOLESTEROL/HDL RATIO: 4.6
CO2 SERPL-SCNC: 29 MMOL/L (ref 20–31)
CREAT SERPL-MCNC: 1.3 MG/DL (ref 0.5–0.9)
EST. AVERAGE GLUCOSE BLD GHB EST-MCNC: 131 MG/DL
GFR SERPL CREATININE-BSD FRML MDRD: 47 ML/MIN/1.73M2
GLUCOSE SERPL-MCNC: 120 MG/DL (ref 70–99)
HBA1C MFR BLD: 6.2 % (ref 4–6)
HDLC SERPL-MCNC: 40 MG/DL
LDLC SERPL CALC-MCNC: 96 MG/DL (ref 0–130)
POTASSIUM SERPL-SCNC: 4.1 MMOL/L (ref 3.7–5.3)
PROT SERPL-MCNC: 7 G/DL (ref 6.4–8.3)
SODIUM SERPL-SCNC: 140 MMOL/L (ref 135–144)
TRIGL SERPL-MCNC: 233 MG/DL

## 2023-11-06 PROCEDURE — 3074F SYST BP LT 130 MM HG: CPT | Performed by: SPECIALIST

## 2023-11-06 PROCEDURE — 3078F DIAST BP <80 MM HG: CPT | Performed by: SPECIALIST

## 2023-11-06 PROCEDURE — 99213 OFFICE O/P EST LOW 20 MIN: CPT | Performed by: SPECIALIST

## 2023-11-06 NOTE — PROGRESS NOTES
[address] [address]     Date of Visit:  2023  Patient Name: Lawson Alas   Patient :  1960     CHIEF COMPLAINT/HPI:     Lawson Alas is a 66 y.o. female who presents today for an general visit to be evaluated for the following condition(s):  Chief Complaint   Patient presents with    Follow-up     Follow up after cardioversion     Since the cardioversion she remains in sinus rhythm now after the loading of amiodarone. She feels significantly better. Her symptoms are much better. Recent EKG done by her primary care which she was told it was in sinus. Gives no symptoms suggest intolerance to amiodarone not so far. She feels much better she is very faithful with her Coumadin. She is happy the way things are. She is on amiodarone 200 mg p.o. daily.   REVIEW OF SYSTEM      Review of Systems    REVIEWED INFORMATION      Allergies   Allergen Reactions    Pcn [Penicillins] Hives    Amiodarone Rash     2021  PATIENT STATES TO MD POSSIBLE RASH BUT UNSURE AND WILL CONTINUE TO MONITOR    Protonix [Pantoprazole Sodium] Itching       Current Outpatient Medications   Medication Sig Dispense Refill    amiodarone (CORDARONE) 200 MG tablet Take 1 tablet by mouth daily      glipiZIDE (GLUCOTROL) 5 MG tablet TAKE 1 TABLET BY MOUTH EVERY DAY 90 tablet 0    warfarin (COUMADIN) 3 MG tablet Take 1 tablet by mouth daily Take as directed by Medication Management clinic.      digoxin (LANOXIN) 125 MCG tablet Take 1 tablet by mouth daily 30 tablet 5    Metoprolol Tartrate 75 MG TABS TAKE 1 TABLET BY MOUTH 2 TIMES A DAY 60 tablet 7    metFORMIN (GLUCOPHAGE) 1000 MG tablet TAKE 1 TABLET BY MOUTH TWO TIMES A DAY WITH FOOD (Patient taking differently: Take 1 tablet by mouth Daily) 180 tablet 0    tiZANidine (ZANAFLEX) 4 MG tablet TAKE 1 TABLET BY MOUTH AT BEDTIME AS NEEDED FOR BACK PAIN 90 tablet 0

## 2023-11-08 RX ORDER — AMIODARONE HYDROCHLORIDE 200 MG/1
TABLET ORAL
Qty: 90 TABLET | Refills: 0 | OUTPATIENT
Start: 2023-11-08

## 2023-11-20 ENCOUNTER — HOSPITAL ENCOUNTER (OUTPATIENT)
Dept: PHARMACY | Age: 63
Setting detail: THERAPIES SERIES
Discharge: HOME | End: 2023-11-20
Payer: COMMERCIAL

## 2023-11-20 DIAGNOSIS — I48.91 ATRIAL FIBRILLATION WITH RVR (HCC): Primary | ICD-10-CM

## 2023-11-20 LAB
INR BLD: 2.8
PROTIME: 33.1 SECONDS

## 2023-11-20 PROCEDURE — G0009 ADMIN PNEUMOCOCCAL VACCINE: HCPCS | Performed by: INTERNAL MEDICINE

## 2023-11-20 PROCEDURE — 85610 PROTHROMBIN TIME: CPT

## 2023-11-20 PROCEDURE — 6360000002 HC RX W HCPCS: Performed by: INTERNAL MEDICINE

## 2023-11-20 PROCEDURE — 99212 OFFICE O/P EST SF 10 MIN: CPT

## 2023-11-20 PROCEDURE — 90677 PCV20 VACCINE IM: CPT | Performed by: INTERNAL MEDICINE

## 2023-11-20 RX ORDER — WARFARIN SODIUM 3 MG/1
TABLET ORAL
Qty: 80 TABLET | Refills: 1 | Status: SHIPPED | OUTPATIENT
Start: 2023-11-20

## 2023-11-20 RX ADMIN — PNEUMOCOCCAL 20-VALENT CONJUGATE VACCINE 0.5 ML
2.2; 2.2; 2.2; 2.2; 2.2; 2.2; 2.2; 2.2; 2.2; 2.2; 2.2; 2.2; 2.2; 2.2; 2.2; 2.2; 4.4; 2.2; 2.2; 2.2 INJECTION, SUSPENSION INTRAMUSCULAR at 09:47

## 2023-11-20 NOTE — PROGRESS NOTES
Medication Management Service, Warfarin Management  [facility], [phone]  Visit Date: 2023   Subjective:   Radha Parekh is a 66 y.o. female who presents to clinic today for anticoagulation monitoring and adjustment. Patient seen in clinic for warfarin management due to  Indication:   atrial fibrillation. INR goal: of 2.0-3.0. Duration of therapy: indefinite. Assessment and PLAN   PT/INR done in office per protocol. INR today is 2.8, therapeutic. Plan: Will continue current regimen of warfarin 1.5 mg every Monday, Wednesday, Friday; 3 mg all other days of the week. Using warfarin 3 mg tablets. Recheck INR in 5 week(s). Patient seen in room # 3. Amiodarone continues, as does NSR! Administration of  PCV 20 vaccine today. New prescription sent electronically to patient's preferred pharmacy. Patient verbalized understanding of dosing directions and information discussed. Dosing schedule given to patient. Progress note sent to referring office.      Boris Serrano RPh, EDVINP  Clinical Pharmacist Medication Management  2023  9:50 AM      For Pharmacy Admin Tracking Only    Intervention Detail: Adherence Monitorin, Refill(s) Provided, and Vaccine Recommended/Administered  Total # of Interventions Recommended: 3  Total # of Interventions Accepted: 3  Time Spent (min): 30

## 2023-11-30 RX ORDER — TIZANIDINE 4 MG/1
TABLET ORAL
Qty: 90 TABLET | Refills: 0 | Status: SHIPPED | OUTPATIENT
Start: 2023-11-30

## 2023-12-31 DIAGNOSIS — I10 ESSENTIAL HYPERTENSION: ICD-10-CM

## 2024-01-01 RX ORDER — AMLODIPINE BESYLATE 5 MG/1
TABLET ORAL
Qty: 60 TABLET | Refills: 0 | Status: SHIPPED | OUTPATIENT
Start: 2024-01-01

## 2024-01-02 ENCOUNTER — HOSPITAL ENCOUNTER (OUTPATIENT)
Dept: PHARMACY | Age: 64
Setting detail: THERAPIES SERIES
Discharge: HOME | End: 2024-01-02

## 2024-01-02 DIAGNOSIS — I48.91 ATRIAL FIBRILLATION WITH RVR (HCC): Primary | ICD-10-CM

## 2024-01-02 LAB
INR BLD: 3.5
INR BLD: 3.6
PROTIME: 42.8 SECONDS

## 2024-01-02 PROCEDURE — 85610 PROTHROMBIN TIME: CPT

## 2024-01-02 PROCEDURE — 99212 OFFICE O/P EST SF 10 MIN: CPT

## 2024-01-02 NOTE — PROGRESS NOTES
Medication Management Service, Warfarin Management  [facility], [phone]  Visit Date: 2024   Subjective:   Lula Ayon is a 63 y.o. female who presents to clinic today for anticoagulation monitoring and adjustment.  Patient seen in clinic for warfarin management due to  Indication:   atrial fibrillation.   INR goal: of 2.0-3.0.  Duration of therapy: indefinite.    Assessment and PLAN   PT/INR done in office per protocol.   INR today is 3.6 supratherapeutic likely due to reduction in green vegetables, reduction in overall intake may also be contributing.  Has been on amiodarone for some time now.      Plan:  For now will reduce dose for today to 1.5mg, then continue current regimen of warfarin 1.5 mg every Monday, Wednesday, Friday; 3 mg all other days of the week. Using warfarin 3 mg tablets.  If needs a change in regimen will consider change in tablet strength.   Recheck INR in 3 week(s).   Patient seen in room # 3.      Amiodarone continues, as does NSR!    22# intentional weight loss over past 6 months, per patient report.     Patient to focus on resuming previous intake of green vegetables.     Patient verbalized understanding of dosing directions and information discussed. Dosing schedule given to patient. Progress note sent to referring office.     Kizzy Tuttle RP, CACP  Clinical Pharmacist Medication Management  2024  9:22 AM      For Pharmacy Admin Tracking Only    Intervention Detail: Adherence Monitorin and Dose Adjustment: 1, reason: Improve Adherence, Therapy De-escalation, Therapy Optimization  Total # of Interventions Recommended: 2  Total # of Interventions Accepted: 2  Time Spent (min): 20

## 2024-01-08 RX ORDER — GLIPIZIDE 5 MG/1
TABLET ORAL
Qty: 90 TABLET | Refills: 0 | Status: SHIPPED | OUTPATIENT
Start: 2024-01-08

## 2024-01-18 RX ORDER — GLIPIZIDE 5 MG/1
TABLET ORAL
Qty: 90 TABLET | Refills: 0 | OUTPATIENT
Start: 2024-01-18

## 2024-01-23 ENCOUNTER — HOSPITAL ENCOUNTER (OUTPATIENT)
Dept: PHARMACY | Age: 64
Setting detail: THERAPIES SERIES
Discharge: HOME | End: 2024-01-23

## 2024-01-23 DIAGNOSIS — I48.91 ATRIAL FIBRILLATION WITH RVR (HCC): Primary | ICD-10-CM

## 2024-01-23 LAB
INR BLD: 3.9
PROTIME: 46.8 SECONDS

## 2024-01-23 PROCEDURE — 85610 PROTHROMBIN TIME: CPT

## 2024-01-23 PROCEDURE — 99212 OFFICE O/P EST SF 10 MIN: CPT

## 2024-01-23 RX ORDER — WARFARIN SODIUM 2 MG/1
2 TABLET ORAL SEE ADMIN INSTRUCTIONS
COMMUNITY

## 2024-01-23 NOTE — PROGRESS NOTES
Medication Management Service, Warfarin Management  [facility], [phone]  Visit Date: 2024   Subjective:   Lula Ayon is a 63 y.o. female who presents to clinic today for anticoagulation monitoring and adjustment.  Patient seen in clinic for warfarin management due to  Indication:   atrial fibrillation.   INR goal: of 2.0-3.0.  Duration of therapy: indefinite.    Assessment and PLAN   PT/INR done in office per protocol.   INR today is 3.9, supratherapeutic  and likely due to diet and regimen.        Plan:  Will reduce dose for today to 1.5mg, and reduce regimen by 9% to warfarin 3mg on Tues, Thurs, Sat only; 3mg all other days of the week.  Using warfarin 3 mg tablets, CHANGE TO 2MG TABLETS WITHIN NEXT 2 CLINIC APPTS.    Recheck INR in ~ 2 week(s).   Patient seen in room # 3.      Patient to focus on consistency with green vegetables.     Patient verbalized understanding of dosing directions and information discussed. Dosing schedule given to patient. Progress note sent to referring office.     Kizzy Tuttle RP, CACP  Clinical Pharmacist Medication Management  2024  9:36 AM      For Pharmacy Admin Tracking Only    Intervention Detail: Adherence Monitorin and Dose Adjustment: 1, reason: Improve Adherence, Therapy De-escalation  Total # of Interventions Recommended: 2  Total # of Interventions Accepted: 2  Time Spent (min): 20

## 2024-01-28 DIAGNOSIS — I10 ESSENTIAL HYPERTENSION: ICD-10-CM

## 2024-01-28 RX ORDER — AMLODIPINE BESYLATE 5 MG/1
TABLET ORAL
Qty: 60 TABLET | Refills: 0 | Status: SHIPPED | OUTPATIENT
Start: 2024-01-28

## 2024-01-30 DIAGNOSIS — I10 ESSENTIAL HYPERTENSION: ICD-10-CM

## 2024-01-30 RX ORDER — AMLODIPINE BESYLATE 5 MG/1
TABLET ORAL
Qty: 60 TABLET | Refills: 0 | OUTPATIENT
Start: 2024-01-30

## 2024-02-05 ENCOUNTER — HOSPITAL ENCOUNTER (OUTPATIENT)
Dept: PHARMACY | Age: 64
Setting detail: THERAPIES SERIES
Discharge: HOME | End: 2024-02-05

## 2024-02-05 DIAGNOSIS — I48.91 ATRIAL FIBRILLATION WITH RVR (HCC): Primary | ICD-10-CM

## 2024-02-05 LAB
INR BLD: 2.4
PROTIME: 28.4 SECONDS

## 2024-02-05 PROCEDURE — 85610 PROTHROMBIN TIME: CPT

## 2024-02-05 PROCEDURE — 99211 OFF/OP EST MAY X REQ PHY/QHP: CPT

## 2024-02-05 NOTE — PROGRESS NOTES
Medication Management Service, Warfarin Management  [facility], [phone]  Visit Date: 2/5/2024   Subjective:   Lula Ayon is a 63 y.o. female who presents to clinic today for anticoagulation monitoring and adjustment.  Patient seen in clinic for warfarin management due to  Indication:   atrial fibrillation.   INR goal: of 2.0-3.0.  Duration of therapy: indefinite.    Assessment and PLAN   PT/INR done in office per protocol.   INR today is 2.4, therapeutic          Plan:  Will continue maintenance regimen established at last visit: warfarin 3mg on Tues, Thurs, Sat only; 1.5mg all other days of the week.  Using warfarin 3 mg tablets,     Patient still has plenty of 3mg tabs at home and opted to keep using these for now. May consider changing to 2mg tablets in future.     Recheck INR in ~ 4 week(s).   Patient seen in room # 2        Patient verbalized understanding of dosing directions and information discussed. Dosing schedule given to patient. Progress note sent to referring office.     Ashwin Hoover [facility],  Clinical Pharmacist Medication Management  2/5/2024  10:22 AM      For Pharmacy Admin Tracking Only    Intervention Detail: N/A  Total # of Interventions Recommended: 0  Total # of Interventions Accepted: 0  Time Spent (min): 20        
16

## 2024-02-10 DIAGNOSIS — I10 ESSENTIAL HYPERTENSION: ICD-10-CM

## 2024-02-11 RX ORDER — AMLODIPINE BESYLATE 5 MG/1
TABLET ORAL
Qty: 60 TABLET | Refills: 5 | Status: SHIPPED | OUTPATIENT
Start: 2024-02-11

## 2024-03-03 RX ORDER — TIZANIDINE 4 MG/1
TABLET ORAL
Qty: 90 TABLET | Refills: 0 | Status: SHIPPED | OUTPATIENT
Start: 2024-03-03

## 2024-03-04 ENCOUNTER — HOSPITAL ENCOUNTER (OUTPATIENT)
Dept: PHARMACY | Age: 64
Setting detail: THERAPIES SERIES
Discharge: HOME | End: 2024-03-04
Payer: COMMERCIAL

## 2024-03-04 DIAGNOSIS — I48.91 ATRIAL FIBRILLATION WITH RVR (HCC): Primary | ICD-10-CM

## 2024-03-04 LAB
INR BLD: 2.7
PROTIME: 32.6 SECONDS

## 2024-03-04 PROCEDURE — 85610 PROTHROMBIN TIME: CPT

## 2024-03-04 PROCEDURE — 99212 OFFICE O/P EST SF 10 MIN: CPT

## 2024-03-04 RX ORDER — WARFARIN SODIUM 2 MG/1
TABLET ORAL
Qty: 100 TABLET | Refills: 1 | Status: SHIPPED | OUTPATIENT
Start: 2024-03-04

## 2024-03-04 NOTE — PROGRESS NOTES
Medication Management Service, Warfarin Management  [facility], [phone]  Visit Date: 3/4/2024   Subjective:   Lula Ayon is a 63 y.o. female who presents to clinic today for anticoagulation monitoring and adjustment.  Patient seen in clinic for warfarin management due to  Indication:   atrial fibrillation.   INR goal: of 2.0-3.0.  Duration of therapy: indefinite.    Assessment and PLAN   PT/INR done in office per protocol.   INR today is 2.7, therapeutic.          Plan:  Will continue with warfarin 15mg/week but changing tablets to 2mg strength.  New regimen will be warfarin 3mg on Sundays only, 2mg all other days of the week.   Recheck INR in 8 week(s).   Patient seen in room # 3.      New prescription sent electronically to patient's preferred pharmacy.    Advised patient to dispose of 5mg tablets that she has in the home.   Reviewed change in tablet strength.     Patient verbalized understanding of dosing directions and information discussed. Dosing schedule given to patient. Progress note sent to referring office.     Kizzy Tuttle RP, CACP  Clinical Pharmacist Medication Management  3/4/2024  10:15 AM      For Pharmacy Admin Tracking Only    Intervention Detail: Adherence Monitorin and New Rx: 1, reason: Cost/Formulary Change  Total # of Interventions Recommended: 2  Total # of Interventions Accepted: 2  Time Spent (min): 30

## 2024-03-18 ENCOUNTER — OFFICE VISIT (OUTPATIENT)
Age: 64
End: 2024-03-18
Payer: COMMERCIAL

## 2024-03-18 VITALS
TEMPERATURE: 98.1 F | HEART RATE: 61 BPM | HEIGHT: 63 IN | WEIGHT: 199.2 LBS | BODY MASS INDEX: 35.3 KG/M2 | OXYGEN SATURATION: 96 % | SYSTOLIC BLOOD PRESSURE: 122 MMHG | DIASTOLIC BLOOD PRESSURE: 77 MMHG

## 2024-03-18 DIAGNOSIS — I48.19 PERSISTENT ATRIAL FIBRILLATION (HCC): Primary | ICD-10-CM

## 2024-03-18 PROCEDURE — 3074F SYST BP LT 130 MM HG: CPT | Performed by: SPECIALIST

## 2024-03-18 PROCEDURE — 3078F DIAST BP <80 MM HG: CPT | Performed by: SPECIALIST

## 2024-03-18 PROCEDURE — 99214 OFFICE O/P EST MOD 30 MIN: CPT | Performed by: SPECIALIST

## 2024-03-18 NOTE — PROGRESS NOTES
[facility] CARDIAC ELECTROPHYSIOLOGY  [address], [address]  [facility]  [address]    Date of Visit:  3/18/2024  Patient Name: Lula Ayon   Patient :  1960   Referring By:  No ref. provider found     CHIEF COMPLAINT/HPI:     Lula Ayon is a 63 y.o. female who presents today for an general visit to be evaluated for the following condition(s):  Chief Complaint   Patient presents with    Follow-up     6 month f/u Dr. Remy would like to discuss possible ablation with the Patient     Patient felt better when she was in sinus rhythm.  When I saw her last visit she was doing significantly better.  At 1 point after that she went back into atrial fibrillation despite amiodarone.  So far she has failed Tikosyn and amiodarone in the past.  This is a lady that has had prior pulmonary vein ablation done earlier 2 to 3 years ago with recurrence soon after.    Lengthy discussion was done with the patient.  The option of accepting atrial fibrillation as permanent rhythm is not accepted for her she is 63.  The option of catheter-based ablation versus surgical ablation of the atrial fibrillation was discussed with the patient.  I am concerned that she has failed prior ablation in the past.  Surgically they can get to the vein of Dao and to more epicardial connections more and better than we can do using catheters.  The possibility of redo catheter-based atrial fibrillation to follow the surgical ablation was also discussed with the patient.    She is open to the idea of surgical based ablation.  I will try to get hold of Dr. Lemon and refer her for opinion about that.  REVIEW OF SYSTEM      Review of Systems  Other than generalized fatigue and tiredness and nausea she is otherwise open have no new complaints  REVIEWED INFORMATION      Allergies   Allergen Reactions    Pcn [Penicillins] Hives    Amiodarone Rash     2021  PATIENT STATES TO MD POSSIBLE RASH BUT UNSURE AND WILL CONTINUE TO MONITOR

## 2024-04-03 ENCOUNTER — INITIAL CONSULT (OUTPATIENT)
Dept: CARDIOTHORACIC SURGERY | Age: 64
End: 2024-04-03
Payer: COMMERCIAL

## 2024-04-03 VITALS
OXYGEN SATURATION: 98 % | WEIGHT: 200.8 LBS | BODY MASS INDEX: 35.58 KG/M2 | SYSTOLIC BLOOD PRESSURE: 120 MMHG | TEMPERATURE: 97.4 F | HEIGHT: 63 IN | HEART RATE: 64 BPM | DIASTOLIC BLOOD PRESSURE: 80 MMHG

## 2024-04-03 DIAGNOSIS — Z98.890 S/P ABLATION OF ATRIAL FIBRILLATION: ICD-10-CM

## 2024-04-03 DIAGNOSIS — Z86.79 S/P ABLATION OF ATRIAL FIBRILLATION: ICD-10-CM

## 2024-04-03 DIAGNOSIS — I48.19 PERSISTENT ATRIAL FIBRILLATION (HCC): Primary | ICD-10-CM

## 2024-04-03 PROCEDURE — 3074F SYST BP LT 130 MM HG: CPT | Performed by: PHYSICIAN ASSISTANT

## 2024-04-03 PROCEDURE — 3079F DIAST BP 80-89 MM HG: CPT | Performed by: PHYSICIAN ASSISTANT

## 2024-04-03 PROCEDURE — 99244 OFF/OP CNSLTJ NEW/EST MOD 40: CPT | Performed by: PHYSICIAN ASSISTANT

## 2024-04-03 NOTE — PROGRESS NOTES
[facility]      Surgeon: Julia  Cardiologist: Janeth      Reason for consultation: atrial fibrillation    HISTORY OF PRESENT ILLNESS:  Lula Ayon is a 63 y.o. year old, ,  female with past medical history of atrial fibrillation status post 1 RFA and 1 cryoablation.  Patient also has a history of coronary artery disease anticoagulation, anemia, COPD, eczema, type 2 diabetes, and osteoarthritis.  Patient was recently seen Dr. Thompson and referred to us for surgical ablation.  She complains of severe shortness of breath and fatigue.  Denies chest pain.  All films and records available have been reviewed.     Review of systems:  General Denies any fever or chills  HEENT Denies any diplopia, tinnitus or vertigo  Resp positive for  dyspnea  Cardiac positive for  palpitations  GI Denies any melena, hematochezia, hematemesis or pyrosis   Denies any frequency, urgency, hesitancy or incontinence  Heme Denies bruising or bleeding easily  Neuro Denies any focal motor or sensory deficits    Past Medical History:        Diagnosis Date    Abnormal Pap smear 1989    Had colposcopy    Allergy     PCN    Anemia     Anticoagulated     ON COUMADIN    Anticoagulated on Coumadin     Atrial fibrillation ([facility]) 12/05/2014    Blood transfusion 12/20/2011    4 units    CAD (coronary artery disease)     COPD (chronic obstructive pulmonary disease) ([facility])     Eczema     Examination of participant in clinical trial 01/12/2011    end date 3/25/15    Headache 12/05/2014    Heart disease 01/13/2011    card stents x 2    History of atrioventricular jenni ablation     Hyperlipidemia     Hypertension     Menopausal symptoms     MI, old 2011    PAF (paroxysmal atrial fibrillation) ([facility])     s/p cardioversion Dec 2015     Primary osteoarthritis of both hands 07/31/2019    Smoker     STEMI (ST elevation myocardial infarction) ([facility]) 12/2016    INFERIOR / OCCLUDED RCA    Type 2 diabetes mellitus without

## 2024-04-04 ENCOUNTER — HOSPITAL ENCOUNTER (OUTPATIENT)
Dept: PHARMACY | Age: 64
Setting detail: THERAPIES SERIES
Discharge: HOME | End: 2024-04-04
Payer: COMMERCIAL

## 2024-04-04 DIAGNOSIS — I48.91 ATRIAL FIBRILLATION WITH RVR (HCC): Primary | ICD-10-CM

## 2024-04-04 LAB
INR BLD: 3.4
PROTIME: 41.2 SECONDS

## 2024-04-04 PROCEDURE — 85610 PROTHROMBIN TIME: CPT

## 2024-04-04 PROCEDURE — 99212 OFFICE O/P EST SF 10 MIN: CPT

## 2024-04-04 NOTE — PROGRESS NOTES
Medication Management Service, Warfarin Management  [facility], [phone]  Visit Date: 2023   Subjective:   Lula Ayon is a 63 y.o. female who presents to clinic today for anticoagulation monitoring and adjustment.  Patient seen in clinic for warfarin management due to  Indication:   atrial fibrillation.   INR goal: of 2.0-3.0.  Duration of therapy: indefinite.     Assessment and PLAN   PT/INR done in office per protocol.   INR today is 3.4,supratherapeutic. Increase in INR may be due to a decrease in  Vitamin K consumption.  Plan:   Will reduce regimen to warfarin 1 mg today only.  Then continue current regimen of warfarin 3 mg Sun; 2 mg all other days of the week.  Using warfarin 2 mg tablets.  Recheck INR in 2 week(s)  Patient seen in room #1.     ED OP =  Patient has a cardiac cath and a another heart surgery planned but dates are not set.  Cautioned patient on Pepto Bismul use and the increased risk of GI bleeding.    Of note: patient reports feeling unwell and having nausea and vomiting secondary to being in atrial fibrillation.     Patient verbalized understanding of dosing directions and information discussed. Dosing schedule given to patient. Progress note sent to referring office.     Vera CHAVIS. Ph., CACP, Clinical Pharmacist  Anticoagulation Services, [facility]  2024  10:59 AM      For Pharmacy Admin Tracking Only    Intervention Detail: Adherence Monitorin and Dose Adjustment: 1, reason: Therapy De-escalation  Total # of Interventions Recommended: 2  Total # of Interventions Accepted: 2  Time Spent (min): 20

## 2024-04-07 RX ORDER — GLIPIZIDE 5 MG/1
TABLET ORAL
Qty: 90 TABLET | Refills: 0 | Status: SHIPPED | OUTPATIENT
Start: 2024-04-07

## 2024-04-09 ENCOUNTER — HOSPITAL ENCOUNTER (OUTPATIENT)
Age: 64
Setting detail: SPECIMEN
Discharge: HOME | End: 2024-04-09

## 2024-04-09 DIAGNOSIS — I10 ESSENTIAL HYPERTENSION: ICD-10-CM

## 2024-04-09 DIAGNOSIS — I42.0 DILATED CARDIOMYOPATHY (HCC): ICD-10-CM

## 2024-04-09 DIAGNOSIS — I50.22 CHRONIC SYSTOLIC HEART FAILURE (HCC): ICD-10-CM

## 2024-04-09 DIAGNOSIS — Z01.810 PREPROCEDURAL CARDIOVASCULAR EXAMINATION: ICD-10-CM

## 2024-04-09 DIAGNOSIS — Z95.5 S/P CORONARY ARTERY STENT PLACEMENT: ICD-10-CM

## 2024-04-09 DIAGNOSIS — I48.19 PERSISTENT ATRIAL FIBRILLATION (HCC): ICD-10-CM

## 2024-04-09 DIAGNOSIS — I25.10 CORONARY ARTERY DISEASE, UNSPECIFIED VESSEL OR LESION TYPE, UNSPECIFIED WHETHER ANGINA PRESENT, UNSPECIFIED WHETHER NATIVE OR TRANSPLANTED HEART: ICD-10-CM

## 2024-04-09 LAB
ANION GAP SERPL CALCULATED.3IONS-SCNC: 10 MMOL/L (ref 9–16)
BASOPHILS # BLD: 0.06 K/UL (ref 0–0.2)
BASOPHILS NFR BLD: 1 % (ref 0–2)
BUN SERPL-MCNC: 24 MG/DL (ref 8–23)
CALCIUM SERPL-MCNC: 9.2 MG/DL (ref 8.6–10.4)
CHLORIDE SERPL-SCNC: 102 MMOL/L (ref 98–107)
CO2 SERPL-SCNC: 30 MMOL/L (ref 20–31)
CREAT SERPL-MCNC: 1.2 MG/DL (ref 0.5–0.9)
EOSINOPHIL # BLD: 0.19 K/UL (ref 0–0.44)
EOSINOPHILS RELATIVE PERCENT: 3 % (ref 1–4)
ERYTHROCYTE [DISTWIDTH] IN BLOOD BY AUTOMATED COUNT: 13 % (ref 11.8–14.4)
GFR SERPL CREATININE-BSD FRML MDRD: 50 ML/MIN/1.73M2
GLUCOSE SERPL-MCNC: 121 MG/DL (ref 74–99)
HCT VFR BLD AUTO: 42.7 % (ref 36.3–47.1)
HGB BLD-MCNC: 13.9 G/DL (ref 11.9–15.1)
IMM GRANULOCYTES # BLD AUTO: 0.04 K/UL (ref 0–0.3)
IMM GRANULOCYTES NFR BLD: 1 %
LYMPHOCYTES NFR BLD: 0.72 K/UL (ref 1.1–3.7)
LYMPHOCYTES RELATIVE PERCENT: 10 % (ref 24–43)
MCH RBC QN AUTO: 30.9 PG (ref 25.2–33.5)
MCHC RBC AUTO-ENTMCNC: 32.6 G/DL (ref 28.4–34.8)
MCV RBC AUTO: 94.9 FL (ref 82.6–102.9)
MONOCYTES NFR BLD: 0.68 K/UL (ref 0.1–1.2)
MONOCYTES NFR BLD: 9 % (ref 3–12)
NEUTROPHILS NFR BLD: 76 % (ref 36–65)
NEUTS SEG NFR BLD: 5.92 K/UL (ref 1.5–8.1)
NRBC BLD-RTO: 0 PER 100 WBC
PLATELET # BLD AUTO: 240 K/UL (ref 138–453)
PMV BLD AUTO: 11.4 FL (ref 8.1–13.5)
POTASSIUM SERPL-SCNC: 4.5 MMOL/L (ref 3.7–5.3)
RBC # BLD AUTO: 4.5 M/UL (ref 3.95–5.11)
SODIUM SERPL-SCNC: 142 MMOL/L (ref 136–145)
WBC OTHER # BLD: 7.6 K/UL (ref 3.5–11.3)

## 2024-04-12 ENCOUNTER — TELEPHONE (OUTPATIENT)
Dept: PHARMACY | Age: 64
End: 2024-04-12

## 2024-04-12 NOTE — TELEPHONE ENCOUNTER
Patient has a cardiac cath on 4/16 and take her last dose of warfarin this evening, 4/12. (She will holding for just 3 days) Her MD has sent in Lovenox and instructed her take it q12h on Sunday and Monday. Patient will be staying overnight for this procedure.     I advised that she would need Lovenox post procedure on 4/17. Increase warfarin to 3 mg on 4/17,4/18 . Recheck INR  on 4/19    I sent in a prescription for four additional syringes.    Vera CHAVIS. Ph., CACP, Clinical Pharmacist  Anticoagulation Services, [facility]  4/12/2024  11:17 AM    For Pharmacy Admin Tracking Only    Intervention Detail: Dose Adjustment: 1, reason: Therapy Optimization and New Rx: 1, reason: Needs Additional Therapy  Total # of Interventions Recommended: 2  Total # of Interventions Accepted: 2  Time Spent (min): 15

## 2024-04-15 ENCOUNTER — TELEPHONE (OUTPATIENT)
Dept: FAMILY MEDICINE CLINIC | Age: 64
End: 2024-04-15

## 2024-04-16 ENCOUNTER — HOSPITAL ENCOUNTER (OUTPATIENT)
Age: 64
Setting detail: OUTPATIENT SURGERY
Discharge: HOME | End: 2024-04-16
Attending: INTERNAL MEDICINE | Admitting: INTERNAL MEDICINE
Payer: COMMERCIAL

## 2024-04-16 VITALS
WEIGHT: 202 LBS | HEART RATE: 53 BPM | TEMPERATURE: 98.4 F | DIASTOLIC BLOOD PRESSURE: 60 MMHG | OXYGEN SATURATION: 97 % | HEIGHT: 63 IN | RESPIRATION RATE: 15 BRPM | BODY MASS INDEX: 35.79 KG/M2 | SYSTOLIC BLOOD PRESSURE: 114 MMHG

## 2024-04-16 DIAGNOSIS — I48.0 PAF (PAROXYSMAL ATRIAL FIBRILLATION) (HCC): ICD-10-CM

## 2024-04-16 DIAGNOSIS — I25.119 CORONARY ARTERY DISEASE WITH ANGINA PECTORIS, UNSPECIFIED VESSEL OR LESION TYPE, UNSPECIFIED WHETHER NATIVE OR TRANSPLANTED HEART (HCC): ICD-10-CM

## 2024-04-16 LAB
BUN BLD-MCNC: 19 MG/DL (ref 8–26)
CHLORIDE BLD-SCNC: 104 MMOL/L (ref 98–107)
ECHO BSA: 2.02 M2
EGFR, POC: 64 ML/MIN/1.73M2
GLUCOSE BLD-MCNC: 79 MG/DL (ref 74–100)
HCT VFR BLD AUTO: 46 % (ref 36–46)
POC CREATININE: 1 MG/DL (ref 0.51–1.19)
POC HEMOGLOBIN (CALC): 15.5 G/DL (ref 12–16)
POC INR: 1.6
POTASSIUM BLD-SCNC: 3.7 MMOL/L (ref 3.5–4.5)
PROTHROMBIN TIME, POC: 18.4 SEC (ref 10.4–14.2)
SODIUM BLD-SCNC: 144 MMOL/L (ref 138–146)

## 2024-04-16 PROCEDURE — 82435 ASSAY OF BLOOD CHLORIDE: CPT

## 2024-04-16 PROCEDURE — 84132 ASSAY OF SERUM POTASSIUM: CPT

## 2024-04-16 PROCEDURE — C9600 PERC DRUG-EL COR STENT SING: HCPCS | Performed by: INTERNAL MEDICINE

## 2024-04-16 PROCEDURE — 93458 L HRT ARTERY/VENTRICLE ANGIO: CPT | Performed by: INTERNAL MEDICINE

## 2024-04-16 PROCEDURE — 6370000000 HC RX 637 (ALT 250 FOR IP): Performed by: INTERNAL MEDICINE

## 2024-04-16 PROCEDURE — C1874 STENT, COATED/COV W/DEL SYS: HCPCS | Performed by: INTERNAL MEDICINE

## 2024-04-16 PROCEDURE — 99152 MOD SED SAME PHYS/QHP 5/>YRS: CPT | Performed by: INTERNAL MEDICINE

## 2024-04-16 PROCEDURE — 92920 PRQ TRLUML C ANGIOP 1ART&/BR: CPT | Performed by: INTERNAL MEDICINE

## 2024-04-16 PROCEDURE — 2709999900 HC NON-CHARGEABLE SUPPLY: Performed by: INTERNAL MEDICINE

## 2024-04-16 PROCEDURE — 92921 HC PRQ CARDIAC ANGIO ADDL ART: CPT | Performed by: INTERNAL MEDICINE

## 2024-04-16 PROCEDURE — C1725 CATH, TRANSLUMIN NON-LASER: HCPCS | Performed by: INTERNAL MEDICINE

## 2024-04-16 PROCEDURE — 2500000003 HC RX 250 WO HCPCS: Performed by: INTERNAL MEDICINE

## 2024-04-16 PROCEDURE — 84295 ASSAY OF SERUM SODIUM: CPT

## 2024-04-16 PROCEDURE — 85610 PROTHROMBIN TIME: CPT

## 2024-04-16 PROCEDURE — 82947 ASSAY GLUCOSE BLOOD QUANT: CPT

## 2024-04-16 PROCEDURE — C1769 GUIDE WIRE: HCPCS | Performed by: INTERNAL MEDICINE

## 2024-04-16 PROCEDURE — 82565 ASSAY OF CREATININE: CPT

## 2024-04-16 PROCEDURE — C1894 INTRO/SHEATH, NON-LASER: HCPCS | Performed by: INTERNAL MEDICINE

## 2024-04-16 PROCEDURE — 85014 HEMATOCRIT: CPT

## 2024-04-16 PROCEDURE — 92928 PRQ TCAT PLMT NTRAC ST 1 LES: CPT | Performed by: INTERNAL MEDICINE

## 2024-04-16 PROCEDURE — 84520 ASSAY OF UREA NITROGEN: CPT

## 2024-04-16 PROCEDURE — C1887 CATHETER, GUIDING: HCPCS | Performed by: INTERNAL MEDICINE

## 2024-04-16 PROCEDURE — 6360000002 HC RX W HCPCS: Performed by: INTERNAL MEDICINE

## 2024-04-16 PROCEDURE — 99153 MOD SED SAME PHYS/QHP EA: CPT | Performed by: INTERNAL MEDICINE

## 2024-04-16 PROCEDURE — 7100000011 HC PHASE II RECOVERY - ADDTL 15 MIN: Performed by: INTERNAL MEDICINE

## 2024-04-16 PROCEDURE — 2580000003 HC RX 258: Performed by: INTERNAL MEDICINE

## 2024-04-16 PROCEDURE — 99221 1ST HOSP IP/OBS SF/LOW 40: CPT | Performed by: INTERNAL MEDICINE

## 2024-04-16 PROCEDURE — 7100000010 HC PHASE II RECOVERY - FIRST 15 MIN: Performed by: INTERNAL MEDICINE

## 2024-04-16 DEVICE — STENT CORONARY ONYX FRONTIER RX 3X12 MM ZOTAROLIMUS ELUT: Type: IMPLANTABLE DEVICE | Status: FUNCTIONAL

## 2024-04-16 RX ORDER — FENTANYL CITRATE 50 UG/ML
INJECTION, SOLUTION INTRAMUSCULAR; INTRAVENOUS PRN
Status: DISCONTINUED | OUTPATIENT
Start: 2024-04-16 | End: 2024-04-16 | Stop reason: HOSPADM

## 2024-04-16 RX ORDER — SODIUM CHLORIDE 9 MG/ML
INJECTION, SOLUTION INTRAVENOUS CONTINUOUS
Status: DISCONTINUED | OUTPATIENT
Start: 2024-04-16 | End: 2024-04-16 | Stop reason: HOSPADM

## 2024-04-16 RX ORDER — ASPIRIN 325 MG
TABLET ORAL PRN
Status: DISCONTINUED | OUTPATIENT
Start: 2024-04-16 | End: 2024-04-16 | Stop reason: HOSPADM

## 2024-04-16 RX ORDER — ASPIRIN 81 MG/1
81 TABLET, CHEWABLE ORAL DAILY
Qty: 14 TABLET | Refills: 0 | Status: SHIPPED | OUTPATIENT
Start: 2024-04-16 | End: 2024-04-30

## 2024-04-16 RX ORDER — CLOPIDOGREL 300 MG/1
TABLET, FILM COATED ORAL PRN
Status: DISCONTINUED | OUTPATIENT
Start: 2024-04-16 | End: 2024-04-16 | Stop reason: HOSPADM

## 2024-04-16 RX ORDER — HEPARIN SODIUM 1000 [USP'U]/ML
INJECTION, SOLUTION INTRAVENOUS; SUBCUTANEOUS PRN
Status: DISCONTINUED | OUTPATIENT
Start: 2024-04-16 | End: 2024-04-16 | Stop reason: HOSPADM

## 2024-04-16 RX ORDER — NITROGLYCERIN 20 MG/100ML
INJECTION INTRAVENOUS PRN
Status: DISCONTINUED | OUTPATIENT
Start: 2024-04-16 | End: 2024-04-16 | Stop reason: HOSPADM

## 2024-04-16 RX ORDER — BIVALIRUDIN 250 MG/5ML
INJECTION, POWDER, LYOPHILIZED, FOR SOLUTION INTRAVENOUS PRN
Status: DISCONTINUED | OUTPATIENT
Start: 2024-04-16 | End: 2024-04-16 | Stop reason: HOSPADM

## 2024-04-16 RX ORDER — MULTIVITAMIN WITH IRON
250 TABLET ORAL SEE ADMIN INSTRUCTIONS
COMMUNITY

## 2024-04-16 RX ORDER — MIDAZOLAM HYDROCHLORIDE 1 MG/ML
INJECTION INTRAMUSCULAR; INTRAVENOUS PRN
Status: DISCONTINUED | OUTPATIENT
Start: 2024-04-16 | End: 2024-04-16 | Stop reason: HOSPADM

## 2024-04-16 RX ORDER — DIPHENHYDRAMINE HYDROCHLORIDE 50 MG/ML
INJECTION INTRAMUSCULAR; INTRAVENOUS PRN
Status: DISCONTINUED | OUTPATIENT
Start: 2024-04-16 | End: 2024-04-16 | Stop reason: HOSPADM

## 2024-04-16 RX ADMIN — SODIUM CHLORIDE: 9 INJECTION, SOLUTION INTRAVENOUS at 07:35

## 2024-04-16 ASSESSMENT — PAIN DESCRIPTION - LOCATION: LOCATION: WRIST

## 2024-04-16 ASSESSMENT — PAIN DESCRIPTION - DESCRIPTORS: DESCRIPTORS: ACHING

## 2024-04-16 ASSESSMENT — PAIN DESCRIPTION - ORIENTATION: ORIENTATION: RIGHT

## 2024-04-16 NOTE — PROGRESS NOTES
Patient admitted, consent signed and questions answered. Patient ready for procedure. Call light to reach with side rails up 2 of 2. Bilat groin hairs clipped with writer and Radha present.  No family at bedside with patient.  History and physical needed.

## 2024-04-16 NOTE — PROGRESS NOTES
Hand off at bedside with Justin RN with right wrist unchanged.  Patient complains of aching.  Transportation arrangements made with significant other for 1930 tentatively.  Right wrist site unchanged

## 2024-04-16 NOTE — PROGRESS NOTES
Patient returns post PCI to pcc room 5 with noted hematoma / swelling firmness above VASC BAND.  Patient complains of pain.  No bleeding noted.  Dr Rodriguez and Refugio from cath lab out to see patient / assess site and second long VASC BAND applied above current VASC BAND both with 14 ml air.  Restrictions reviewed with patient.  Side rails up with call light to reach.

## 2024-04-16 NOTE — PROGRESS NOTES
Air removed fromVasc band in  2 mL increments until all air removed. No bleeding or hematoma noted.  Pressure dressing  applied, radial pulse palpable.Air removed fromVasc band in  2 mL increments until all air removed. No bleeding or hematoma noted.  Pressure dressing  applied, radial pulse palpable.

## 2024-04-16 NOTE — H&P
dysuria, trouble voiding, or hematuria.  Neurological: No headache.        PHYSICAL EXAM:      /75   Pulse 61   Temp 98.4 °F (36.9 °C) (Oral)   Resp 12   Ht 1.6 m (5' 3\")   Wt 91.6 kg (202 lb)   LMP 07/03/2014 (Approximate)   SpO2 97%   BMI 35.78 kg/m²    No intake or output data in the 24 hours ending 04/16/24 1029        Constitutional and General Appearance:   alert, cooperative, no distress and appears stated age  HEENT:  PERRL, EOMI  Respiratory:  Normal excursion and expansion without use of accessory muscles  Resp Auscultation:  Good respiratory effort. No for increased work of breathing.On auscultation: clear to auscultation bilaterally  Cardiovascular:  Irregular rate and rhythm  S1/S2  No murmurs  The apical impulse is not displaced  Abdomen:  Soft  Bowel sounds present  Non-tender to palpation  Extremities:  No cyanosis or clubbing  Lower extremity edema: No  Skin:  Warm and dry  Neurological:  Alert and oriented.  Moves all extremities well    Labs:     CBC: No results for input(s): \"WBC\", \"HGB\", \"HCT\", \"PLT\" in the last 72 hours.  BMP:   Recent Labs     04/16/24  0720   CREATININE 1.0     Pro-BNP:  No results for input(s): \"PROBNP\" in the last 72 hours.  BNP: No results for input(s): \"BNP\" in the last 72 hours.  PT/INR:   Recent Labs     04/16/24  0729   PROTIME 18.4*   INR 1.6     APTT:No results for input(s): \"APTT\" in the last 72 hours.  CARDIAC ENZYMES:No results for input(s): \"CKTOTAL\", \"CKMB\", \"CKMBINDEX\", \"TROPONINI\", \"TROPONINT\" in the last 72 hours.  No results for input(s): \"TROPONINT\" in the last 72 hours.    FASTING LIPID PANEL:  Lab Results   Component Value Date/Time    HDL 40 11/06/2023 08:00 AM    LDLCALC 40 08/12/2015 12:00 AM    TRIG 233 11/06/2023 08:00 AM     LIVER PROFILE:No results for input(s): \"AST\", \"ALT\", \"LABALBU\" in the last 72 hours.      Data:  PROBLEM LIST:  1. CAD, status post cardiac cath and drug-eluting stents to the OM1 and RCA in 2011.  2. Preserved LV

## 2024-04-16 NOTE — PROGRESS NOTES
ANGIOMAX gtt completed.  Right wrist with VASC BAND x2 clean dry and secure.  Light meal and fluids offered.

## 2024-04-16 NOTE — DISCHARGE INSTRUCTIONS
DISCHARGE INSTRUCTIONS / ARM CARE POST CATHERIZATION    Home Care       Remove pressure dressing  after 24 hours.  Do not shower until dressing/bandaid is removed.  Do not apply band aids. KEEP SITE CLEAN DRY AND OPEN TO THE AIR.  No powder or lotion.  Do not soak in a pool or tub and do not swim for one week.   If there is any bleeding at the catheter site, apply firm pressure directly over site with your hand until the bleeding stops. If bleeding continues after 3 minutes call 911.   If there is any swelling or firm areas at your puncture site, this could be bleeding under the skin(hematoma), and if you have any concerns seek help immediately.   Drink plenty of fluids after the test. This will flush the x-ray dye from your system.   Return to your normal diet.         NO STRENUOUS LIFTING WITH AFFECTED ARM FOR 3 DAYS ANYTHING HEAVIER THAN 8 TO 10 POUNDS    WATCH FOR SIGNS OF INFECTION /  REDNESS / SWELLING / DRAINAGE / WARMTH / TEMPERATURE GREATER THAN 101    IF AREA BECOMES HARD AND SWOLLEN AND IF YOU ARE AT ALL CONCERNED SEEK HELP IMMEDIATELY    SEEK HELP IMMEDIATELY IF AFFECTED ARM BECOMES COLD / NUMB / SEVERE PAIN / NAILBEDS TURN BLUE     IF ON METFORMIN / GLUCOPHAGE DO NOT RESTART MEDICATION FOR 48 HOURS    CALL 911 if you have symptoms including:   Drooping facial muscles   Changes in vision or speech   Difficulty walking or using your limbs   Change in sensation to affected arm/hand including numbness, feeling cold, or change in color   Extreme sweating, nausea or vomiting   Dizziness or lightheadedness   Chest pain   Rapid, irregular heartbeat   Palpitations   Cough, shortness of breath, or difficulty breathing   Weakness or fainting   If you think you have an emergency, CALL 911 .    SEDATION / ANALGESIA INFORMATION / HOME GOING ADVICE  You have received the sedation/analgesia medication during your visit    Sedation/analgesia is used during short medical procedures under controlled supervision. The

## 2024-04-17 ENCOUNTER — TELEPHONE (OUTPATIENT)
Dept: PHARMACY | Age: 64
End: 2024-04-17

## 2024-04-17 NOTE — TELEPHONE ENCOUNTER
Audra called today to inquire about using enoxaparin post procedure; per AVS she should be using until INR within goal range.  I have boosted her dose for the next 2 days, (today and tomorrow) to 3mg.  INR check scheduled for Friday.     Patient stated she would  prescription from enoxaparin and start today.     Kizzy Tuttle, [facility], CACP  Clinical Pharmacist Medication Management  4/17/2024  8:35 AM

## 2024-04-18 ENCOUNTER — APPOINTMENT (OUTPATIENT)
Dept: PHARMACY | Age: 64
End: 2024-04-18
Payer: COMMERCIAL

## 2024-04-19 ENCOUNTER — HOSPITAL ENCOUNTER (OUTPATIENT)
Dept: PHARMACY | Age: 64
Setting detail: THERAPIES SERIES
Discharge: HOME | End: 2024-04-19
Payer: COMMERCIAL

## 2024-04-19 DIAGNOSIS — I48.91 ATRIAL FIBRILLATION WITH RVR (HCC): Primary | ICD-10-CM

## 2024-04-19 LAB
INR BLD: 1.8
PROTIME: 21.6 SECONDS

## 2024-04-19 PROCEDURE — 99211 OFF/OP EST MAY X REQ PHY/QHP: CPT

## 2024-04-19 PROCEDURE — 85610 PROTHROMBIN TIME: CPT

## 2024-04-19 NOTE — PROGRESS NOTES
Medication Management Service, Warfarin Management  [facility], [phone]  Visit Date: 2024   Subjective:   Lula Ayon is a 63 y.o. female who presents to clinic today for anticoagulation monitoring and adjustment.  Patient seen in clinic for warfarin management due to  Indication:   atrial fibrillation with RVR.  INR goal: of 2.0-3.0.  Duration of therapy: indefinite.    Assessment and PLAN   PT/INR done in office per protocol.   INR today is 1.8, subtherapeutic, due to held dose of warfarin  on SUN & MON for cardiac procedure.       Plan:  Patient is resistant to continuing Lovenox therapy due to cost,  pain and bruising.  She has another prescription at the pharmacy but did not pick it up.  Her last dose was yesterday evening. She  took \"boost doses of warfarin 3 mg daily the past 3 doses which we are not seeing the full impact of.  Will continue current regimen of warfarin 3 mg every SUN; 2 mg all other days of the week.    Using warfarin 2 mg tablets.  Recheck INR in 11 days.   Patient seen in room # 1.      Of Note: Patient is having upcoming cardiac procedure, date is not set yet.     Patient verbalized understanding of dosing directions and information discussed. Dosing schedule given to patient. Progress note sent to referring office.       Electronically signed by Blake Venegas on 24 at 9:36 AM EDT     For Pharmacy Admin Tracking Only    Intervention Detail: Adherence Monitorin  Total # of Interventions Recommended: 0  Total # of Interventions Accepted: 0  Time Spent (min): 15

## 2024-04-29 ENCOUNTER — OFFICE VISIT (OUTPATIENT)
Dept: FAMILY MEDICINE CLINIC | Age: 64
End: 2024-04-29
Payer: COMMERCIAL

## 2024-04-29 ENCOUNTER — HOSPITAL ENCOUNTER (OUTPATIENT)
Dept: PHARMACY | Age: 64
Setting detail: THERAPIES SERIES
Discharge: HOME | End: 2024-04-29
Payer: COMMERCIAL

## 2024-04-29 VITALS
WEIGHT: 195 LBS | HEART RATE: 46 BPM | BODY MASS INDEX: 34.55 KG/M2 | SYSTOLIC BLOOD PRESSURE: 110 MMHG | OXYGEN SATURATION: 96 % | TEMPERATURE: 97.3 F | DIASTOLIC BLOOD PRESSURE: 68 MMHG | HEIGHT: 63 IN

## 2024-04-29 DIAGNOSIS — I48.91 ATRIAL FIBRILLATION WITH RVR (HCC): Primary | ICD-10-CM

## 2024-04-29 DIAGNOSIS — Z12.11 SCREENING FOR COLON CANCER: ICD-10-CM

## 2024-04-29 DIAGNOSIS — E11.9 TYPE 2 DIABETES MELLITUS WITHOUT COMPLICATION, WITHOUT LONG-TERM CURRENT USE OF INSULIN (HCC): Primary | ICD-10-CM

## 2024-04-29 LAB
HBA1C MFR BLD: 6.6 %
INR BLD: 3.6
PROTIME: 43.3 SECONDS

## 2024-04-29 PROCEDURE — 3074F SYST BP LT 130 MM HG: CPT | Performed by: PHYSICIAN ASSISTANT

## 2024-04-29 PROCEDURE — 83036 HEMOGLOBIN GLYCOSYLATED A1C: CPT | Performed by: PHYSICIAN ASSISTANT

## 2024-04-29 PROCEDURE — 3078F DIAST BP <80 MM HG: CPT | Performed by: PHYSICIAN ASSISTANT

## 2024-04-29 PROCEDURE — 3044F HG A1C LEVEL LT 7.0%: CPT | Performed by: PHYSICIAN ASSISTANT

## 2024-04-29 PROCEDURE — 99212 OFFICE O/P EST SF 10 MIN: CPT

## 2024-04-29 PROCEDURE — 85610 PROTHROMBIN TIME: CPT

## 2024-04-29 PROCEDURE — 99214 OFFICE O/P EST MOD 30 MIN: CPT | Performed by: PHYSICIAN ASSISTANT

## 2024-04-29 RX ORDER — TIZANIDINE 4 MG/1
TABLET ORAL
Qty: 90 TABLET | Refills: 0 | Status: SHIPPED | OUTPATIENT
Start: 2024-04-29

## 2024-04-29 ASSESSMENT — ENCOUNTER SYMPTOMS
CONSTIPATION: 0
SHORTNESS OF BREATH: 0
COUGH: 0
NAUSEA: 0
COLOR CHANGE: 0
VOMITING: 0
WHEEZING: 0
ABDOMINAL PAIN: 0
DIARRHEA: 0

## 2024-04-29 ASSESSMENT — PATIENT HEALTH QUESTIONNAIRE - PHQ9
SUM OF ALL RESPONSES TO PHQ QUESTIONS 1-9: 0
SUM OF ALL RESPONSES TO PHQ QUESTIONS 1-9: 0
1. LITTLE INTEREST OR PLEASURE IN DOING THINGS: NOT AT ALL
SUM OF ALL RESPONSES TO PHQ QUESTIONS 1-9: 0
SUM OF ALL RESPONSES TO PHQ9 QUESTIONS 1 & 2: 0
SUM OF ALL RESPONSES TO PHQ QUESTIONS 1-9: 0
2. FEELING DOWN, DEPRESSED OR HOPELESS: NOT AT ALL

## 2024-04-29 NOTE — PROGRESS NOTES
03/15/2024    Diabetic foot exam  05/04/2024    Depression Screen  05/04/2024    Lipids  11/06/2024    GFR test (Diabetes, CKD 3-4, OR last GFR 15-59)  04/09/2025    A1C test (Diabetic or Prediabetic)  04/29/2025    Breast cancer screen  06/16/2025    DTaP/Tdap/Td vaccine (2 - Td or Tdap) 02/28/2027    Flu vaccine  Completed    Pneumococcal 0-64 years Vaccine  Completed    COVID-19 Vaccine  Completed    Hepatitis C screen  Completed    HIV screen  Completed    Hepatitis A vaccine  Aged Out    Hepatitis B vaccine  Aged Out    Hib vaccine  Aged Out    Polio vaccine  Aged Out    Meningococcal (ACWY) vaccine  Aged Out       Subjective:     Review of Systems   Constitutional:  Negative for activity change, appetite change, fatigue and unexpected weight change.        /68 (Site: Left Upper Arm, Position: Sitting, Cuff Size: Large Adult)   Pulse (!) 46   Temp 97.3 °F (36.3 °C) (Tympanic)   Ht 1.6 m (5' 3\")   Wt 88.5 kg (195 lb)   LMP 07/03/2014 (Approximate)   SpO2 96%   BMI 34.54 kg/m²    Respiratory:  Negative for cough, shortness of breath and wheezing.    Cardiovascular:  Negative for chest pain and leg swelling.   Gastrointestinal:  Negative for abdominal pain, constipation, diarrhea, nausea and vomiting.   Musculoskeletal:  Positive for arthralgias (hands).   Skin:  Negative for color change, pallor, rash and wound.   Hematological:  Negative for adenopathy.   Psychiatric/Behavioral:  The patient is not nervous/anxious.        Objective:     Physical Exam  Vitals and nursing note reviewed.   Constitutional:       General: She is not in acute distress.     Appearance: Normal appearance. She is well-developed. She is obese. She is not ill-appearing.   HENT:      Head: Normocephalic and atraumatic.   Cardiovascular:      Rate and Rhythm: Normal rate and regular rhythm.      Heart sounds: No murmur heard.  Pulmonary:      Effort: Pulmonary effort is normal. No respiratory distress.      Breath sounds: Normal

## 2024-04-29 NOTE — PROGRESS NOTES
Medication Management Service, Warfarin Management  [facility], [phone]  Visit Date: 2024   Subjective:   Lula Ayon is a 63 y.o. female who presents to clinic today for anticoagulation monitoring and adjustment.  Patient seen in clinic for warfarin management due to  Indication:   atrial fibrillation with RVR.  INR goal: of 2.0-3.0.  Duration of therapy: indefinite.    Assessment and PLAN   PT/INR done in office per protocol.   INR today is 3.6, supratherapeutic and likely due to reduction in green vegetables.  Patient has also had weight loss, so may need less warfarin overall.        Plan:  Will reduce dose for today to 1mg then continue current regimen of warfarin 3 mg every SUN; 2 mg all other days of the week.    Using warfarin 2 mg tablets.  Recheck INR in ~ 2 weeks.    Patient seen in room # 3.      Of Note: Patient is having upcoming cardiac procedure, date is not set yet.     Patient verbalized understanding of dosing directions and information discussed. Dosing schedule given to patient. Progress note sent to referring office.       Kizzy Tuttle RPh, CACP  Clinical Pharmacist Medication Management  2024  12:02 PM      For Pharmacy Admin Tracking Only    Intervention Detail: Adherence Monitorin and Dose Adjustment: 1, reason: Improve Adherence, Therapy De-escalation, Therapy Optimization  Total # of Interventions Recommended: 2  Total # of Interventions Accepted: 2  Time Spent (min): 20

## 2024-05-08 ENCOUNTER — OFFICE VISIT (OUTPATIENT)
Age: 64
End: 2024-05-08
Payer: COMMERCIAL

## 2024-05-08 VITALS
TEMPERATURE: 98.1 F | SYSTOLIC BLOOD PRESSURE: 128 MMHG | HEIGHT: 63 IN | BODY MASS INDEX: 34.91 KG/M2 | WEIGHT: 197 LBS | HEART RATE: 63 BPM | OXYGEN SATURATION: 98 % | DIASTOLIC BLOOD PRESSURE: 75 MMHG

## 2024-05-08 DIAGNOSIS — I48.0 PAF (PAROXYSMAL ATRIAL FIBRILLATION) (HCC): Primary | ICD-10-CM

## 2024-05-08 PROCEDURE — 99213 OFFICE O/P EST LOW 20 MIN: CPT | Performed by: SPECIALIST

## 2024-05-08 PROCEDURE — 3078F DIAST BP <80 MM HG: CPT | Performed by: SPECIALIST

## 2024-05-08 PROCEDURE — 3074F SYST BP LT 130 MM HG: CPT | Performed by: SPECIALIST

## 2024-05-08 RX ORDER — SODIUM CHLORIDE, SODIUM LACTATE, POTASSIUM CHLORIDE, CALCIUM CHLORIDE 600; 310; 30; 20 MG/100ML; MG/100ML; MG/100ML; MG/100ML
INJECTION, SOLUTION INTRAVENOUS CONTINUOUS
OUTPATIENT
Start: 2024-05-08

## 2024-05-08 RX ORDER — FUROSEMIDE 40 MG/1
40 TABLET ORAL 2 TIMES DAILY
Qty: 180 TABLET | Refills: 2 | Status: SHIPPED | OUTPATIENT
Start: 2024-05-08

## 2024-05-08 NOTE — DISCHARGE INSTRUCTIONS
PRE-OPERATIVE INSTRUCTIONS:    Stop eating solid foods at MIDNIGHT the night prior to surgery. (This includes gum, mints, smoking, or chewing tobacco.)    Stop drinking clear liquids at MIDNIGHT the night prior to surgery.    Arrive at the [facility] on 5/31/2024  (as directed by your surgeon's office).    If you arrive before 6:00 AM, go to the first floor (CAR1), not the main floor.  There is a large sign on the locked doors to push the button and someone will let you in.    If you arrive after 6:00 AM, go to the main floor registration.    Please check with your surgeon about medication(s) that must be stopped prior to surgery.  These may include: Aspirin, Coumadin, Plavix, Ibuprofen, Fish Oil, Herbal supplements  Coumadin & plavix - 5 days per surgeon    Please do NOT take diabetes medication(s) morning of surgery.     If applicable:   If you have been given a blood band, you must bring it with you the day of surgery.  Use and bring inhalers with you morning of surgery.   Bring C-Pap/Bi-pap with you morning of surgery.    PLEASE NOTE:  THE ABOVE (IF ANY) DISCONTINUED MEDS MAY ONLY BE FROM   \"CLEANING UP\" THE MED LIST AND WERE NOT ACTUALLY CANCELLED; SEE CHART FOR DETAILS AND ALWAYS CHECK WITH PRESCRIBING PROVIDER BEFORE DISCONTINUING ANY MEDICATIONS    Day of Surgery/Procedure    As a patient at [facility] you can expect quality medical and nursing care that is centered on your individual needs.  Our goal is to make your surgical experience as comfortable as possible  .  Directions to the [facility] ([facility])  [facility] is located at [address] [place] [address].  The [facility] [place] ([facility]) is across the street from the [facility] hospital. You may park at the [facility] [place] parking garage, or if handicapped there are closer spots in the surface lot directly in front of the [facility]

## 2024-05-08 NOTE — PROGRESS NOTES
diabetes mellitus without complication, without long-term current use of insulin ([facility])    S/P ablation of atrial fibrillation    Bowen's disease    Atrial fibrillation with RVR ([facility])    Chronic pain disorder    Chronic pain of multiple joints    Primary osteoarthritis of both hands    Tinea    Visit for monitoring Tikosyn therapy    Persistent atrial fibrillation ([facility])    PAF (paroxysmal atrial fibrillation) ([facility])       Past Medical History:   Diagnosis Date    Abnormal Pap smear 1989    Had colposcopy    Allergy     PCN    Anemia     Anticoagulated on Coumadin     Atrial fibrillation ([facility]) 12/05/2014    Blood transfusion 12/20/2011    4 units    CAD (coronary artery disease)     CHF (congestive heart failure) ([facility])     COPD (chronic obstructive pulmonary disease) ([facility])     Eczema     Examination of participant in clinical trial 01/12/2011    end date 3/25/15    Former smoker     QUIT 2018    Headache 12/05/2014    Heart disease 01/13/2011    card stents x 2    History of atrioventricular jenni ablation     Hyperlipidemia     Hypertension     Menopausal symptoms     MI, old 2011    Neuropathy     Osteoarthritis     HANDS    PAF (paroxysmal atrial fibrillation) ([facility])     s/p cardioversion Dec 2015     Primary osteoarthritis of both hands 07/31/2019    STEMI (ST elevation myocardial infarction) ([facility]) 12/2016    INFERIOR / OCCLUDED RCA    Type 2 diabetes mellitus without complication, without long-term current use of insulin ([facility]) 12/21/2017       Past Surgical History:   Procedure Laterality Date    ABLATION OF DYSRHYTHMIC FOCUS  09/2018    A FIB    CARDIAC CATHETERIZATION  03/18/2021    CARDIAC PROCEDURE N/A 4/16/2024    ali / Left heart cath / coronary angiography *arrive 0630 for hydration* performed by Carlos Mena MD at [facility] CARDIAC CATH LAB    CARDIAC PROCEDURE N/A 4/16/2024    Percutaneous coronary intervention performed by Carlos Mena MD at [facility] CARDIAC CATH LAB    CARDIOVERSION  02/08/2018

## 2024-05-14 ENCOUNTER — HOSPITAL ENCOUNTER (OUTPATIENT)
Dept: PREADMISSION TESTING | Age: 64
Discharge: HOME | End: 2024-05-18
Payer: COMMERCIAL

## 2024-05-14 ENCOUNTER — HOSPITAL ENCOUNTER (OUTPATIENT)
Dept: GENERAL RADIOLOGY | Age: 64
Discharge: HOME | End: 2024-05-16
Payer: COMMERCIAL

## 2024-05-14 VITALS
SYSTOLIC BLOOD PRESSURE: 116 MMHG | HEART RATE: 70 BPM | TEMPERATURE: 97.4 F | HEIGHT: 63 IN | RESPIRATION RATE: 14 BRPM | OXYGEN SATURATION: 97 % | BODY MASS INDEX: 34.55 KG/M2 | WEIGHT: 195 LBS | DIASTOLIC BLOOD PRESSURE: 73 MMHG

## 2024-05-14 LAB
ABO + RH BLD: NORMAL
ALLEN TEST: POSITIVE
ANION GAP SERPL CALCULATED.3IONS-SCNC: 15 MMOL/L (ref 9–16)
ARM BAND NUMBER: NORMAL
BACTERIA URNS QL MICRO: ABNORMAL
BASOPHILS # BLD: 0.06 K/UL (ref 0–0.2)
BASOPHILS NFR BLD: 1 % (ref 0–2)
BILIRUB UR QL STRIP: NEGATIVE
BLOOD BANK SAMPLE EXPIRATION: NORMAL
BLOOD GROUP ANTIBODIES SERPL: NEGATIVE
BUN SERPL-MCNC: 33 MG/DL (ref 8–23)
CALCIUM SERPL-MCNC: 9.8 MG/DL (ref 8.6–10.4)
CASTS #/AREA URNS LPF: ABNORMAL /LPF (ref 0–8)
CHLORIDE SERPL-SCNC: 98 MMOL/L (ref 98–107)
CLARITY UR: CLEAR
CLOSURE TME COLL+ADP BLD: 54 SEC (ref 67–112)
CO2 SERPL-SCNC: 27 MMOL/L (ref 20–31)
COLLAGEN EPINEPHRINE TIME: 116 SEC (ref 85–172)
COLOR UR: YELLOW
CREAT SERPL-MCNC: 1.7 MG/DL (ref 0.5–0.9)
EKG ATRIAL RATE: 65 BPM
EKG Q-T INTERVAL: 400 MS
EKG QRS DURATION: 90 MS
EKG QTC CALCULATION (BAZETT): 386 MS
EKG R AXIS: -13 DEGREES
EKG T AXIS: -177 DEGREES
EKG VENTRICULAR RATE: 56 BPM
EOSINOPHIL # BLD: 0.17 K/UL (ref 0–0.44)
EOSINOPHILS RELATIVE PERCENT: 2 % (ref 1–4)
EPI CELLS #/AREA URNS HPF: ABNORMAL /HPF (ref 0–5)
ERYTHROCYTE [DISTWIDTH] IN BLOOD BY AUTOMATED COUNT: 13.3 % (ref 11.8–14.4)
EST. AVERAGE GLUCOSE BLD GHB EST-MCNC: 123 MG/DL
FIO2: 21
GFR, ESTIMATED: 35 ML/MIN/1.73M2
GLUCOSE SERPL-MCNC: 80 MG/DL (ref 74–99)
GLUCOSE UR STRIP-MCNC: NEGATIVE MG/DL
HBA1C MFR BLD: 5.9 % (ref 4–6)
HCT VFR BLD AUTO: 46.4 % (ref 36.3–47.1)
HGB BLD-MCNC: 15 G/DL (ref 11.9–15.1)
HGB UR QL STRIP.AUTO: NEGATIVE
IMM GRANULOCYTES # BLD AUTO: 0.05 K/UL (ref 0–0.3)
IMM GRANULOCYTES NFR BLD: 1 %
INR PPP: 2.1
KETONES UR STRIP-MCNC: NEGATIVE MG/DL
LEUKOCYTE ESTERASE UR QL STRIP: ABNORMAL
LYMPHOCYTES NFR BLD: 1.15 K/UL (ref 1.1–3.7)
LYMPHOCYTES RELATIVE PERCENT: 13 % (ref 24–43)
MCH RBC QN AUTO: 29.6 PG (ref 25.2–33.5)
MCHC RBC AUTO-ENTMCNC: 32.3 G/DL (ref 28.4–34.8)
MCV RBC AUTO: 91.5 FL (ref 82.6–102.9)
MONOCYTES NFR BLD: 0.74 K/UL (ref 0.1–1.2)
MONOCYTES NFR BLD: 8 % (ref 3–12)
NEUTROPHILS NFR BLD: 75 % (ref 36–65)
NEUTS SEG NFR BLD: 6.77 K/UL (ref 1.5–8.1)
NITRITE UR QL STRIP: NEGATIVE
NRBC BLD-RTO: 0 PER 100 WBC
O2 DELIVERY DEVICE: ABNORMAL
PARTIAL THROMBOPLASTIN TIME: 35 SEC (ref 23–36.5)
PH UR STRIP: 6 [PH] (ref 5–8)
PLATELET # BLD AUTO: 303 K/UL (ref 138–453)
PLATELET FUNCTION INTERP: ABNORMAL
PMV BLD AUTO: 10.7 FL (ref 8.1–13.5)
POC HCO3: 27 MMOL/L (ref 21–28)
POC O2 SATURATION: 97.4 % (ref 94–98)
POC PCO2: 36.7 MM HG (ref 35–48)
POC PH: 7.47 (ref 7.35–7.45)
POC PO2: 87.8 MM HG (ref 83–108)
POSITIVE BASE EXCESS, ART: 3.5 MMOL/L (ref 0–3)
POTASSIUM SERPL-SCNC: 3.9 MMOL/L (ref 3.7–5.3)
PROT UR STRIP-MCNC: NEGATIVE MG/DL
PROTHROMBIN TIME: 23.4 SEC (ref 11.7–14.9)
RBC # BLD AUTO: 5.07 M/UL (ref 3.95–5.11)
RBC #/AREA URNS HPF: ABNORMAL /HPF (ref 0–4)
SAMPLE SITE: ABNORMAL
SODIUM SERPL-SCNC: 140 MMOL/L (ref 136–145)
SP GR UR STRIP: 1.01 (ref 1–1.03)
UROBILINOGEN UR STRIP-ACNC: NORMAL EU/DL (ref 0–1)
WBC #/AREA URNS HPF: ABNORMAL /HPF (ref 0–5)
WBC OTHER # BLD: 8.9 K/UL (ref 3.5–11.3)

## 2024-05-14 PROCEDURE — 86901 BLOOD TYPING SEROLOGIC RH(D): CPT

## 2024-05-14 PROCEDURE — 86900 BLOOD TYPING SEROLOGIC ABO: CPT

## 2024-05-14 PROCEDURE — 36600 WITHDRAWAL OF ARTERIAL BLOOD: CPT

## 2024-05-14 PROCEDURE — 82803 BLOOD GASES ANY COMBINATION: CPT

## 2024-05-14 PROCEDURE — 80048 BASIC METABOLIC PNL TOTAL CA: CPT

## 2024-05-14 PROCEDURE — 86850 RBC ANTIBODY SCREEN: CPT

## 2024-05-14 PROCEDURE — 87086 URINE CULTURE/COLONY COUNT: CPT

## 2024-05-14 PROCEDURE — 71046 X-RAY EXAM CHEST 2 VIEWS: CPT

## 2024-05-14 PROCEDURE — 85610 PROTHROMBIN TIME: CPT

## 2024-05-14 PROCEDURE — 85730 THROMBOPLASTIN TIME PARTIAL: CPT

## 2024-05-14 PROCEDURE — 93005 ELECTROCARDIOGRAM TRACING: CPT | Performed by: THORACIC SURGERY (CARDIOTHORACIC VASCULAR SURGERY)

## 2024-05-14 PROCEDURE — 36415 COLL VENOUS BLD VENIPUNCTURE: CPT

## 2024-05-14 PROCEDURE — 85025 COMPLETE CBC W/AUTO DIFF WBC: CPT

## 2024-05-14 PROCEDURE — 85576 BLOOD PLATELET AGGREGATION: CPT

## 2024-05-14 PROCEDURE — 81001 URINALYSIS AUTO W/SCOPE: CPT

## 2024-05-14 PROCEDURE — 87641 MR-STAPH DNA AMP PROBE: CPT

## 2024-05-14 PROCEDURE — 86920 COMPATIBILITY TEST SPIN: CPT

## 2024-05-14 PROCEDURE — 83036 HEMOGLOBIN GLYCOSYLATED A1C: CPT

## 2024-05-14 NOTE — PROGRESS NOTES
Dr espinosa to order lovenox bridging.notified pt instructions per charly at dr espinosa office.or 5/31/24

## 2024-05-14 NOTE — PROGRESS NOTES
Anesthesia Focused Assessment      STOP-BANG Sleep Apnea Questionnaire    SNORE loudly (heard through closed doors)?   No  TIRED, fatigued, sleepy during daytime?    No  OBSERVED stopping breathing during sleep?   No  High blood PRESSURE being treated?    Yes    BMI over 35?        No  AGE over 50?        Yes  NECK circumference over 16\"?     No  GENDER (male)?       No             Total 2  High risk 5-8  Intermediate risk 3-4  Low risk 0-2    Obstructive Sleep Apnea: denies  If YES, machine used: no     Type 1 DM:   no  T2DM:  yes    Coronary Artery Disease:  yes, stents, follows with Dr. Remy, atrial fibrillation.  Hypertension:  yes    Active smoker:  quit 2018, was 1/2 ppd for 34 years.  Drinks alcohol:  no  Recreational drugs: gummies PRN in PM    Dentition: upper full and lower partial denture    Defib / AICD / Pacemaker: no      Renal Failure/dialysis:  no    Patient was evaluated in PAT & anesthesia guidelines were applied.   NPO guidelines, medication instructions and scheduled arrival time were reviewed with patient.  I advised patient to please contact the surgeon's office, ahead of time if possible, if any new signs or symptoms of illness, infection, rash, etc    Hx of anesthesia complications:  PONV, prolonged emergence from general anesthesia in the past.  Family hx of anesthesia complications:  no    Patient says that she is to follow a lovenox bridge perioperatively.  She follows with coumadin clinic, also Dr. Remy.  Will contact cardiology regarding this.                                                                                                                       KEELEY WASHINGTON PA-C  5/14/24  12:38 PM

## 2024-05-15 LAB
EKG ATRIAL RATE: 65 BPM
EKG Q-T INTERVAL: 400 MS
EKG QRS DURATION: 90 MS
EKG QTC CALCULATION (BAZETT): 386 MS
EKG R AXIS: -13 DEGREES
EKG T AXIS: -177 DEGREES
EKG VENTRICULAR RATE: 56 BPM
MICROORGANISM SPEC CULT: NORMAL
MRSA, DNA, NASAL: NEGATIVE
SPECIMEN DESCRIPTION: NORMAL
SPECIMEN DESCRIPTION: NORMAL

## 2024-05-15 PROCEDURE — 93010 ELECTROCARDIOGRAM REPORT: CPT | Performed by: INTERNAL MEDICINE

## 2024-05-16 ENCOUNTER — HOSPITAL ENCOUNTER (OUTPATIENT)
Dept: PHARMACY | Age: 64
Setting detail: THERAPIES SERIES
Discharge: HOME | End: 2024-05-16
Payer: COMMERCIAL

## 2024-05-16 DIAGNOSIS — I48.91 ATRIAL FIBRILLATION WITH RVR (HCC): Primary | ICD-10-CM

## 2024-05-16 LAB
INR BLD: 3.2
PROTIME: 37.8 SECONDS

## 2024-05-16 PROCEDURE — 99213 OFFICE O/P EST LOW 20 MIN: CPT

## 2024-05-16 PROCEDURE — 85610 PROTHROMBIN TIME: CPT

## 2024-05-16 RX ORDER — ENOXAPARIN SODIUM 100 MG/ML
80 INJECTION SUBCUTANEOUS 2 TIMES DAILY
Qty: 6 EACH | Refills: 1 | Status: SHIPPED | OUTPATIENT
Start: 2024-05-16

## 2024-05-16 NOTE — PROGRESS NOTES
Medication Management Service, Warfarin Management  [facility], [phone]  Visit Date: 2024   Subjective:   Lula Ayon is a 63 y.o. female who presents to clinic today for anticoagulation monitoring and adjustment.  Patient seen in clinic for warfarin management due to  Indication:   atrial fibrillation with RVR.  INR goal: of 2.0-3.0.  Duration of therapy: indefinite.    Assessment and PLAN   PT/INR done in office per protocol.   INR today is 3.2, just above goal.   INR was 2.1 two days ago via lab draw. Increase in INR due to patient not eating much on Monday and Tuesday. Patient reports her cardio thoracic surgeon has instructed her to hold warfarin for 5 days before cardiac ablation and to bridge with Lovenox.     Plan: Will reduce warfarin to 1 mg for today only due to rapid increase in INR.  Then continue current regimen of warfarin 3 mg every SUN; 2 mg all other days of the week.   Last dose of warfarin on  prior to procedure. Lovenox calendar completed and reviewed during visit today.        Using warfarin 2 mg tablets.  Lovenox sent in today.  Recheck INR in 3 weeks.   Patient seen in room # 1.      Of Note:  Writer able to confirm holding warfarin x 5 days in EPIC note from surgeon's office but not the requirement Lovenox.  Patient recently bridged with Lovenox around another procedure. Called [facility] to confirm plan and left VM.     Patient verbalized understanding of dosing directions and information discussed. Dosing schedule given to patient. Progress note sent to referring office.     Vera CHAVIS. Cuco., CACP, Clinical Pharmacist  Anticoagulation Services, [facility]  2024  11:17 AM    For Pharmacy Admin Tracking Only    Intervention Detail: Adherence Monitorin, Dose Adjustment: 2, reason: Therapy De-escalation, Therapy Optimization, and New Rx: 1, reason: Needs Additional Therapy  Total # of Interventions Recommended: 4  Total # of

## 2024-05-20 RX ORDER — ENOXAPARIN SODIUM 100 MG/ML
80 INJECTION SUBCUTANEOUS 2 TIMES DAILY
Qty: 8 EACH | Refills: 1 | Status: SHIPPED | OUTPATIENT
Start: 2024-05-20

## 2024-05-21 ENCOUNTER — TELEPHONE (OUTPATIENT)
Dept: PHARMACY | Age: 64
End: 2024-05-21

## 2024-05-21 NOTE — TELEPHONE ENCOUNTER
Patient called to report that her MD has changed her last dose of warfarin to be 5/26. I called patient with an adjustment in the Lovenox dosing calendar.     Vera CHAVIS. Ph., CACP, Clinical Pharmacist  Anticoagulation Services, [facility]  5/21/2024  2:40 PM      For Pharmacy Admin Tracking Only    Intervention Detail: Dose Adjustment: 1, reason: Therapy Optimization  Total # of Interventions Recommended: 1  Total # of Interventions Accepted: 1  Time Spent (min): 10

## 2024-05-30 ENCOUNTER — ANESTHESIA EVENT (OUTPATIENT)
Dept: OPERATING ROOM | Age: 64
End: 2024-05-30
Payer: COMMERCIAL

## 2024-05-31 ENCOUNTER — APPOINTMENT (OUTPATIENT)
Age: 64
DRG: 215 | End: 2024-05-31
Attending: THORACIC SURGERY (CARDIOTHORACIC VASCULAR SURGERY)
Payer: COMMERCIAL

## 2024-05-31 ENCOUNTER — APPOINTMENT (OUTPATIENT)
Dept: GENERAL RADIOLOGY | Age: 64
DRG: 215 | End: 2024-05-31
Attending: THORACIC SURGERY (CARDIOTHORACIC VASCULAR SURGERY)
Payer: COMMERCIAL

## 2024-05-31 ENCOUNTER — HOSPITAL ENCOUNTER (INPATIENT)
Age: 64
LOS: 2 days | DRG: 215 | End: 2024-06-02
Attending: THORACIC SURGERY (CARDIOTHORACIC VASCULAR SURGERY) | Admitting: THORACIC SURGERY (CARDIOTHORACIC VASCULAR SURGERY)
Payer: COMMERCIAL

## 2024-05-31 ENCOUNTER — ANESTHESIA (OUTPATIENT)
Dept: OPERATING ROOM | Age: 64
End: 2024-05-31
Payer: COMMERCIAL

## 2024-05-31 DIAGNOSIS — Z98.890 S/P ABLATION OF ATRIAL FIBRILLATION: Primary | ICD-10-CM

## 2024-05-31 DIAGNOSIS — R57.0 CARDIOGENIC SHOCK (HCC): ICD-10-CM

## 2024-05-31 DIAGNOSIS — I21.3 STEMI (ST ELEVATION MYOCARDIAL INFARCTION) (HCC): ICD-10-CM

## 2024-05-31 DIAGNOSIS — Z86.79 S/P ABLATION OF ATRIAL FIBRILLATION: Primary | ICD-10-CM

## 2024-05-31 DIAGNOSIS — I48.20 CHRONIC ATRIAL FIBRILLATION (HCC): ICD-10-CM

## 2024-05-31 PROBLEM — I48.91 ATRIAL FIBRILLATION (HCC): Status: ACTIVE | Noted: 2024-05-31

## 2024-05-31 LAB
ACT BLD: 127 SEC (ref 79–149)
ACT BLD: 205 SEC (ref 79–149)
ALLEN TEST: ABNORMAL
ANION GAP SERPL CALCULATED.3IONS-SCNC: 14 MMOL/L (ref 9–16)
ANION GAP SERPL CALCULATED.3IONS-SCNC: 16 MMOL/L (ref 9–16)
ANION GAP SERPL CALCULATED.3IONS-SCNC: 22 MMOL/L (ref 9–16)
BASOPHILS # BLD: 0 K/UL (ref 0–0.2)
BASOPHILS # BLD: 0.04 K/UL (ref 0–0.2)
BASOPHILS NFR BLD: 0 % (ref 0–2)
BASOPHILS NFR BLD: 0 % (ref 0–2)
BUN BLD-MCNC: 18 MG/DL (ref 8–26)
BUN BLD-MCNC: 20 MG/DL (ref 8–26)
BUN BLD-MCNC: 20 MG/DL (ref 8–26)
BUN BLD-MCNC: 21 MG/DL (ref 8–26)
BUN BLD-MCNC: 21 MG/DL (ref 8–26)
BUN SERPL-MCNC: 18 MG/DL (ref 8–23)
BUN SERPL-MCNC: 20 MG/DL (ref 8–23)
BUN SERPL-MCNC: 21 MG/DL (ref 8–23)
CA-I BLD-SCNC: 0.56 MMOL/L (ref 1.15–1.33)
CA-I BLD-SCNC: 0.9 MMOL/L (ref 1.15–1.33)
CA-I BLD-SCNC: 1.01 MMOL/L (ref 1.13–1.33)
CA-I BLD-SCNC: 1.02 MMOL/L (ref 1.15–1.33)
CA-I BLD-SCNC: 1.02 MMOL/L (ref 1.15–1.33)
CA-I BLD-SCNC: 1.04 MMOL/L (ref 1.13–1.33)
CA-I BLD-SCNC: 1.07 MMOL/L (ref 1.15–1.33)
CA-I BLD-SCNC: 1.11 MMOL/L (ref 1.13–1.33)
CA-I BLD-SCNC: 1.14 MMOL/L (ref 1.15–1.33)
CA-I BLD-SCNC: 1.18 MMOL/L (ref 1.15–1.33)
CA-I BLD-SCNC: 1.19 MMOL/L (ref 1.15–1.33)
CALCIUM SERPL-MCNC: 7.4 MG/DL (ref 8.6–10.4)
CALCIUM SERPL-MCNC: 7.7 MG/DL (ref 8.6–10.4)
CALCIUM SERPL-MCNC: 7.8 MG/DL (ref 8.6–10.4)
CHLORIDE BLD-SCNC: 102 MMOL/L (ref 98–107)
CHLORIDE BLD-SCNC: 105 MMOL/L (ref 98–107)
CHLORIDE BLD-SCNC: 107 MMOL/L (ref 98–107)
CHLORIDE BLD-SCNC: 107 MMOL/L (ref 98–107)
CHLORIDE BLD-SCNC: 110 MMOL/L (ref 98–107)
CHLORIDE BLD-SCNC: 111 MMOL/L (ref 98–107)
CHLORIDE SERPL-SCNC: 105 MMOL/L (ref 98–107)
CHLORIDE SERPL-SCNC: 105 MMOL/L (ref 98–107)
CHLORIDE SERPL-SCNC: 110 MMOL/L (ref 98–107)
CLOT ANGLE.KAOLIN INDUCED BLD RES TEG: 73.3 DEG (ref 63–78)
CO2 BLD CALC-SCNC: 19 MMOL/L (ref 22–30)
CO2 BLD CALC-SCNC: 19 MMOL/L (ref 22–30)
CO2 BLD CALC-SCNC: 20 MMOL/L (ref 22–30)
CO2 BLD CALC-SCNC: 23 MMOL/L (ref 22–30)
CO2 BLD CALC-SCNC: 28 MMOL/L (ref 22–30)
CO2 BLD CALC-SCNC: 29 MMOL/L (ref 22–30)
CO2 BLD CALC-SCNC: 31 MMOL/L (ref 22–30)
CO2 SERPL-SCNC: 18 MMOL/L (ref 20–31)
CO2 SERPL-SCNC: 22 MMOL/L (ref 20–31)
CO2 SERPL-SCNC: 23 MMOL/L (ref 20–31)
CREAT SERPL-MCNC: 1 MG/DL (ref 0.5–0.9)
CREAT SERPL-MCNC: 1.4 MG/DL (ref 0.5–0.9)
CREAT SERPL-MCNC: 1.4 MG/DL (ref 0.5–0.9)
CRITICAL ACTION: NORMAL
CRITICAL NOTIFICATION DATE/TIME: NORMAL
CRITICAL NOTIFICATION: NORMAL
CRITICAL VALUE READ BACK: YES
ECHO BSA: 1.98 M2
ECHO BSA: 1.98 M2
ECHO LV EDV A2C: 50 ML
ECHO LV EDV A4C: 50 ML
ECHO LV EDV INDEX A4C: 26 ML/M2
ECHO LV EDV NDEX A2C: 26 ML/M2
ECHO LV EJECTION FRACTION A2C: 55 %
ECHO LV EJECTION FRACTION A4C: 55 %
ECHO LV EJECTION FRACTION BIPLANE: 55 % (ref 55–100)
ECHO LV ESV A2C: 23 ML
ECHO LV ESV A4C: 22 ML
ECHO LV ESV INDEX A2C: 12 ML/M2
ECHO LV ESV INDEX A4C: 12 ML/M2
EGFR, POC: 52 ML/MIN/1.73M2
EGFR, POC: 52 ML/MIN/1.73M2
EGFR, POC: 57 ML/MIN/1.73M2
EGFR, POC: 57 ML/MIN/1.73M2
EGFR, POC: 64 ML/MIN/1.73M2
EKG ATRIAL RATE: 241 BPM
EKG Q-T INTERVAL: 498 MS
EKG QRS DURATION: 102 MS
EKG QTC CALCULATION (BAZETT): 425 MS
EKG R AXIS: -25 DEGREES
EKG T AXIS: 164 DEGREES
EKG VENTRICULAR RATE: 44 BPM
EOSINOPHIL # BLD: 0 K/UL (ref 0–0.4)
EOSINOPHIL # BLD: <0.03 K/UL (ref 0–0.44)
EOSINOPHILS RELATIVE PERCENT: 0 % (ref 1–4)
EOSINOPHILS RELATIVE PERCENT: 0 % (ref 1–4)
ERYTHROCYTE [DISTWIDTH] IN BLOOD BY AUTOMATED COUNT: 13.4 % (ref 11.8–14.4)
ERYTHROCYTE [DISTWIDTH] IN BLOOD BY AUTOMATED COUNT: 13.5 % (ref 11.8–14.4)
ERYTHROCYTE [DISTWIDTH] IN BLOOD BY AUTOMATED COUNT: 15.6 % (ref 11.8–14.4)
FIBRINOGEN PPP-MCNC: 255 MG/DL (ref 203–521)
FIBRINOGEN, FUNCTIONAL TEG: 17.5 MM (ref 15–32)
FIO2: 100
GFR, ESTIMATED: 42 ML/MIN/1.73M2
GFR, ESTIMATED: 42 ML/MIN/1.73M2
GFR, ESTIMATED: 61 ML/MIN/1.73M2
GLUCOSE BLD-MCNC: 122 MG/DL (ref 74–100)
GLUCOSE BLD-MCNC: 122 MG/DL (ref 74–100)
GLUCOSE BLD-MCNC: 190 MG/DL (ref 74–100)
GLUCOSE BLD-MCNC: 212 MG/DL (ref 65–105)
GLUCOSE BLD-MCNC: 218 MG/DL (ref 65–105)
GLUCOSE BLD-MCNC: 225 MG/DL (ref 65–105)
GLUCOSE BLD-MCNC: 245 MG/DL (ref 65–105)
GLUCOSE BLD-MCNC: 262 MG/DL (ref 65–105)
GLUCOSE BLD-MCNC: 272 MG/DL (ref 74–100)
GLUCOSE BLD-MCNC: 282 MG/DL (ref 74–100)
GLUCOSE BLD-MCNC: 351 MG/DL (ref 74–100)
GLUCOSE BLD-MCNC: 361 MG/DL (ref 74–100)
GLUCOSE BLD-MCNC: 380 MG/DL (ref 74–100)
GLUCOSE SERPL-MCNC: 185 MG/DL (ref 74–99)
GLUCOSE SERPL-MCNC: 271 MG/DL (ref 74–99)
GLUCOSE SERPL-MCNC: 333 MG/DL (ref 74–99)
HCT VFR BLD AUTO: 29.6 % (ref 36.3–47.1)
HCT VFR BLD AUTO: 30 % (ref 36–46)
HCT VFR BLD AUTO: 31 % (ref 36–46)
HCT VFR BLD AUTO: 33.2 % (ref 36.3–47.1)
HCT VFR BLD AUTO: 34 % (ref 36–46)
HCT VFR BLD AUTO: 34 % (ref 36–46)
HCT VFR BLD AUTO: 35 % (ref 36–46)
HCT VFR BLD AUTO: 35 % (ref 36–46)
HCT VFR BLD AUTO: 35.9 % (ref 36.3–47.1)
HCT VFR BLD AUTO: 36 % (ref 36–46)
HCT VFR BLD AUTO: 36.6 % (ref 36.3–47.1)
HCT VFR BLD AUTO: 37.2 % (ref 36.3–47.1)
HCT VFR BLD AUTO: 38 % (ref 36–46)
HGB BLD-MCNC: 11 G/DL (ref 11.9–15.1)
HGB BLD-MCNC: 11.6 G/DL (ref 11.9–15.1)
HGB BLD-MCNC: 12 G/DL (ref 11.9–15.1)
HGB BLD-MCNC: 12.1 G/DL (ref 11.9–15.1)
HGB BLD-MCNC: 9.6 G/DL (ref 11.9–15.1)
IMM GRANULOCYTES # BLD AUTO: 0.12 K/UL (ref 0–0.3)
IMM GRANULOCYTES # BLD AUTO: 0.24 K/UL (ref 0–0.3)
IMM GRANULOCYTES NFR BLD: 1 %
IMM GRANULOCYTES NFR BLD: 1 %
INHIBITION AA TEG: 30.5 % (ref 0–11)
INHIBITION ADP TEG: 9.8 % (ref 0–17)
INR PPP: 1.3
INR PPP: 1.4
INR PPP: 1.5
INR PPP: 1.8
KINETICS TEG: 1.3 MIN (ref 0.8–2.1)
LACTIC ACID, WHOLE BLOOD: 16 MMOL/L (ref 0.7–2.1)
LACTIC ACID, WHOLE BLOOD: 5.6 MMOL/L (ref 0.7–2.1)
LYMPHOCYTES NFR BLD: 0.72 K/UL (ref 1.1–3.7)
LYMPHOCYTES NFR BLD: 1.7 K/UL (ref 1–4.8)
LYMPHOCYTES RELATIVE PERCENT: 4 % (ref 24–43)
LYMPHOCYTES RELATIVE PERCENT: 7 % (ref 24–44)
MA (MAX CLOT) TEG KAOLIN: 65.6 MM (ref 53–68)
MA (MAX CLOT) TEG: 62.6 MM (ref 52–69)
MA(AA) TEG: 49.4 MM (ref 51–71)
MA(ACTIVATED) TEG: 12.5 MM (ref 2–19)
MA(ADP) TEG: 60.4 MM (ref 45–69)
MA(MAX CLOT) RAPID TEG: 62.9 MM (ref 52–70)
MAGNESIUM SERPL-MCNC: 1.8 MG/DL (ref 1.6–2.4)
MAGNESIUM SERPL-MCNC: 2 MG/DL (ref 1.6–2.4)
MAGNESIUM SERPL-MCNC: 2.9 MG/DL (ref 1.6–2.4)
MCH RBC QN AUTO: 29.6 PG (ref 25.2–33.5)
MCH RBC QN AUTO: 30.4 PG (ref 25.2–33.5)
MCH RBC QN AUTO: 30.8 PG (ref 25.2–33.5)
MCHC RBC AUTO-ENTMCNC: 32.3 G/DL (ref 28.4–34.8)
MCHC RBC AUTO-ENTMCNC: 32.4 G/DL (ref 28.4–34.8)
MCHC RBC AUTO-ENTMCNC: 32.8 G/DL (ref 28.4–34.8)
MCV RBC AUTO: 90.4 FL (ref 82.6–102.9)
MCV RBC AUTO: 94 FL (ref 82.6–102.9)
MCV RBC AUTO: 94.9 FL (ref 82.6–102.9)
MODE: ABNORMAL
MONOCYTES NFR BLD: 0.24 K/UL (ref 0.1–0.8)
MONOCYTES NFR BLD: 0.87 K/UL (ref 0.1–1.2)
MONOCYTES NFR BLD: 1 % (ref 1–7)
MONOCYTES NFR BLD: 5 % (ref 3–12)
MORPHOLOGY: ABNORMAL
NEGATIVE BASE EXCESS, ART: 2.6 MMOL/L (ref 0–2)
NEGATIVE BASE EXCESS, ART: 4.6 MMOL/L (ref 0–2)
NEGATIVE BASE EXCESS, ART: 6.5 MMOL/L (ref 0–2)
NEGATIVE BASE EXCESS, ART: 8.2 MMOL/L (ref 0–2)
NEGATIVE BASE EXCESS, ART: 8.6 MMOL/L (ref 0–2)
NEGATIVE BASE EXCESS, ART: 8.9 MMOL/L (ref 0–2)
NEUTROPHILS NFR BLD: 91 % (ref 36–65)
NEUTROPHILS NFR BLD: 91 % (ref 36–66)
NEUTS SEG NFR BLD: 17.13 K/UL (ref 1.5–8.1)
NEUTS SEG NFR BLD: 22.12 K/UL (ref 1.8–7.7)
NRBC BLD-RTO: 0 PER 100 WBC
O2 DELIVERY DEVICE: ABNORMAL
PARTIAL THROMBOPLASTIN TIME: 27.8 SEC (ref 23–36.5)
PARTIAL THROMBOPLASTIN TIME: 32.4 SEC (ref 23–36.5)
PARTIAL THROMBOPLASTIN TIME: 98.1 SEC (ref 23–36.5)
PARTIAL THROMBOPLASTIN TIME: >180 SEC (ref 23–36.5)
PERFORMING LOCATION: ABNORMAL
PHOSPHATE SERPL-MCNC: 4 MG/DL (ref 2.5–4.5)
PLATELET # BLD AUTO: 190 K/UL (ref 138–453)
PLATELET # BLD AUTO: 251 K/UL (ref 138–453)
PLATELET # BLD AUTO: 299 K/UL (ref 138–453)
PLATELET # BLD AUTO: 301 K/UL (ref 138–453)
PMV BLD AUTO: 10.7 FL (ref 8.1–13.5)
PMV BLD AUTO: 11 FL (ref 8.1–13.5)
PMV BLD AUTO: 11.2 FL (ref 8.1–13.5)
POC ANION GAP: 10 MMOL/L (ref 7–16)
POC ANION GAP: 11 MMOL/L (ref 7–16)
POC ANION GAP: 11 MMOL/L (ref 7–16)
POC ANION GAP: 15 MMOL/L (ref 7–16)
POC ANION GAP: 20 MMOL/L (ref 7–16)
POC ANION GAP: 22 MMOL/L (ref 7–16)
POC ANION GAP: 23 MMOL/L (ref 7–16)
POC ANION GAP: 23 MMOL/L (ref 7–16)
POC CREATININE: 1 MG/DL (ref 0.51–1.19)
POC CREATININE: 1.1 MG/DL (ref 0.51–1.19)
POC CREATININE: 1.1 MG/DL (ref 0.51–1.19)
POC CREATININE: 1.2 MG/DL (ref 0.51–1.19)
POC CREATININE: 1.2 MG/DL (ref 0.51–1.19)
POC HCO3: 18.7 MMOL/L (ref 21–28)
POC HCO3: 19 MMOL/L (ref 21–28)
POC HCO3: 19.8 MMOL/L (ref 21–28)
POC HCO3: 22.3 MMOL/L (ref 21–28)
POC HCO3: 23.2 MMOL/L (ref 21–28)
POC HCO3: 24.9 MMOL/L (ref 21–28)
POC HCO3: 26 MMOL/L (ref 21–28)
POC HCO3: 27.3 MMOL/L (ref 21–28)
POC HCO3: 27.6 MMOL/L (ref 21–28)
POC HCO3: 28.1 MMOL/L (ref 21–28)
POC HCO3: 30.4 MMOL/L (ref 21–28)
POC HEMOGLOBIN (CALC): 10.3 G/DL (ref 12–16)
POC HEMOGLOBIN (CALC): 10.5 G/DL (ref 12–16)
POC HEMOGLOBIN (CALC): 11.7 G/DL (ref 12–16)
POC HEMOGLOBIN (CALC): 11.8 G/DL (ref 12–16)
POC HEMOGLOBIN (CALC): 12.1 G/DL (ref 12–16)
POC HEMOGLOBIN (CALC): 13.1 G/DL (ref 12–16)
POC LACTIC ACID: 0.8 MMOL/L (ref 0.56–1.39)
POC LACTIC ACID: 10.8 MMOL/L (ref 0.56–1.39)
POC LACTIC ACID: 11.1 MMOL/L (ref 0.56–1.39)
POC LACTIC ACID: 12.5 MMOL/L (ref 0.56–1.39)
POC LACTIC ACID: 5.6 MMOL/L (ref 0.56–1.39)
POC LACTIC ACID: 8 MMOL/L (ref 0.56–1.39)
POC O2 SATURATION: 18.2 % (ref 94–98)
POC O2 SATURATION: 89.8 % (ref 94–98)
POC O2 SATURATION: 90.6 % (ref 94–98)
POC O2 SATURATION: 91.8 % (ref 94–98)
POC O2 SATURATION: 92 % (ref 94–98)
POC O2 SATURATION: 93.3 % (ref 94–98)
POC O2 SATURATION: 94.4 % (ref 94–98)
POC O2 SATURATION: 94.5 % (ref 94–98)
POC O2 SATURATION: 95.3 % (ref 94–98)
POC O2 SATURATION: 96.3 % (ref 94–98)
POC O2 SATURATION: 98.2 % (ref 94–98)
POC PCO2: 43.3 MM HG (ref 35–48)
POC PCO2: 44.9 MM HG (ref 35–48)
POC PCO2: 46.1 MM HG (ref 35–48)
POC PCO2: 47.1 MM HG (ref 35–48)
POC PCO2: 48.1 MM HG (ref 35–48)
POC PCO2: 52.6 MM HG (ref 35–48)
POC PCO2: 53.1 MM HG (ref 35–48)
POC PCO2: 53.2 MM HG (ref 35–48)
POC PCO2: 54.8 MM HG (ref 35–48)
POC PCO2: 58.8 MM HG (ref 35–48)
POC PCO2: 84.1 MM HG (ref 35–48)
POC PH: 7.08 (ref 7.35–7.45)
POC PH: 7.18 (ref 7.35–7.45)
POC PH: 7.22 (ref 7.35–7.45)
POC PH: 7.23 (ref 7.35–7.45)
POC PH: 7.27 (ref 7.35–7.45)
POC PH: 7.32 (ref 7.35–7.45)
POC PH: 7.33 (ref 7.35–7.45)
POC PH: 7.34 (ref 7.35–7.45)
POC PH: 7.35 (ref 7.35–7.45)
POC PO2: 119.5 MM HG (ref 83–108)
POC PO2: 20.6 MM HG (ref 83–108)
POC PO2: 61.6 MM HG (ref 83–108)
POC PO2: 69.9 MM HG (ref 83–108)
POC PO2: 70 MM HG (ref 83–108)
POC PO2: 72.7 MM HG (ref 83–108)
POC PO2: 75.4 MM HG (ref 83–108)
POC PO2: 85.9 MM HG (ref 83–108)
POC PO2: 88 MM HG (ref 83–108)
POC PO2: 88.4 MM HG (ref 83–108)
POC PO2: 91.6 MM HG (ref 83–108)
POSITIVE BASE EXCESS, ART: 0 MMOL/L (ref 0–3)
POSITIVE BASE EXCESS, ART: 0.4 MMOL/L (ref 0–3)
POSITIVE BASE EXCESS, ART: 0.8 MMOL/L (ref 0–3)
POSITIVE BASE EXCESS, ART: 1.1 MMOL/L (ref 0–3)
POSITIVE BASE EXCESS, ART: 2.9 MMOL/L (ref 0–3)
POTASSIUM BLD-SCNC: 2.2 MMOL/L (ref 3.5–4.5)
POTASSIUM BLD-SCNC: 2.7 MMOL/L (ref 3.5–4.5)
POTASSIUM BLD-SCNC: 2.9 MMOL/L (ref 3.5–4.5)
POTASSIUM BLD-SCNC: 3.4 MMOL/L (ref 3.5–4.5)
POTASSIUM BLD-SCNC: 3.4 MMOL/L (ref 3.5–4.5)
POTASSIUM BLD-SCNC: 3.5 MMOL/L (ref 3.5–4.5)
POTASSIUM BLD-SCNC: 3.7 MMOL/L (ref 3.5–4.5)
POTASSIUM BLD-SCNC: 4 MMOL/L (ref 3.5–4.5)
POTASSIUM SERPL-SCNC: 2.4 MMOL/L (ref 3.7–5.3)
POTASSIUM SERPL-SCNC: 3.5 MMOL/L (ref 3.7–5.3)
POTASSIUM SERPL-SCNC: 4.1 MMOL/L (ref 3.7–5.3)
PROTHROMBIN TIME: 16.1 SEC (ref 11.7–14.9)
PROTHROMBIN TIME: 16.4 SEC (ref 11.7–14.9)
PROTHROMBIN TIME: 17.7 SEC (ref 11.7–14.9)
PROTHROMBIN TIME: 20.7 SEC (ref 11.7–14.9)
RBC # BLD AUTO: 3.12 M/UL (ref 3.95–5.11)
RBC # BLD AUTO: 3.82 M/UL (ref 3.95–5.11)
RBC # BLD AUTO: 4.05 M/UL (ref 3.95–5.11)
REACTION TIME TEG W HEPARIN: 5.7 MIN (ref 4.3–8.3)
REACTION TIME TEG: 5.7 MIN (ref 4.6–9.1)
SAMPLE SITE: ABNORMAL
SODIUM BLD-SCNC: 140 MMOL/L (ref 138–146)
SODIUM BLD-SCNC: 143 MMOL/L (ref 138–146)
SODIUM BLD-SCNC: 143 MMOL/L (ref 138–146)
SODIUM BLD-SCNC: 145 MMOL/L (ref 138–146)
SODIUM BLD-SCNC: 149 MMOL/L (ref 138–146)
SODIUM BLD-SCNC: 152 MMOL/L (ref 138–146)
SODIUM BLD-SCNC: 155 MMOL/L (ref 138–146)
SODIUM BLD-SCNC: 159 MMOL/L (ref 138–146)
SODIUM SERPL-SCNC: 139 MMOL/L (ref 136–145)
SODIUM SERPL-SCNC: 142 MMOL/L (ref 136–145)
SODIUM SERPL-SCNC: 154 MMOL/L (ref 136–145)
WBC OTHER # BLD: 18.9 K/UL (ref 3.5–11.3)
WBC OTHER # BLD: 24.3 K/UL (ref 3.5–11.3)
WBC OTHER # BLD: 6.2 K/UL (ref 3.5–11.3)

## 2024-05-31 PROCEDURE — 93306 TTE W/DOPPLER COMPLETE: CPT

## 2024-05-31 PROCEDURE — 6370000000 HC RX 637 (ALT 250 FOR IP)

## 2024-05-31 PROCEDURE — 92950 HEART/LUNG RESUSCITATION CPR: CPT | Performed by: INTERNAL MEDICINE

## 2024-05-31 PROCEDURE — C1760 CLOSURE DEV, VASC: HCPCS | Performed by: INTERNAL MEDICINE

## 2024-05-31 PROCEDURE — 93458 L HRT ARTERY/VENTRICLE ANGIO: CPT | Performed by: INTERNAL MEDICINE

## 2024-05-31 PROCEDURE — 85730 THROMBOPLASTIN TIME PARTIAL: CPT

## 2024-05-31 PROCEDURE — 82803 BLOOD GASES ANY COMBINATION: CPT

## 2024-05-31 PROCEDURE — B246ZZ4 ULTRASONOGRAPHY OF RIGHT AND LEFT HEART, TRANSESOPHAGEAL: ICD-10-PCS | Performed by: THORACIC SURGERY (CARDIOTHORACIC VASCULAR SURGERY)

## 2024-05-31 PROCEDURE — 94761 N-INVAS EAR/PLS OXIMETRY MLT: CPT

## 2024-05-31 PROCEDURE — 6360000002 HC RX W HCPCS: Performed by: THORACIC SURGERY (CARDIOTHORACIC VASCULAR SURGERY)

## 2024-05-31 PROCEDURE — 71045 X-RAY EXAM CHEST 1 VIEW: CPT

## 2024-05-31 PROCEDURE — 37799 UNLISTED PX VASCULAR SURGERY: CPT

## 2024-05-31 PROCEDURE — C1889 IMPLANT/INSERT DEVICE, NOC: HCPCS | Performed by: INTERNAL MEDICINE

## 2024-05-31 PROCEDURE — 0W9D30Z DRAINAGE OF PERICARDIAL CAVITY WITH DRAINAGE DEVICE, PERCUTANEOUS APPROACH: ICD-10-PCS | Performed by: THORACIC SURGERY (CARDIOTHORACIC VASCULAR SURGERY)

## 2024-05-31 PROCEDURE — 2720000010 HC SURG SUPPLY STERILE: Performed by: THORACIC SURGERY (CARDIOTHORACIC VASCULAR SURGERY)

## 2024-05-31 PROCEDURE — 6360000004 HC RX CONTRAST MEDICATION: Performed by: INTERNAL MEDICINE

## 2024-05-31 PROCEDURE — 84132 ASSAY OF SERUM POTASSIUM: CPT

## 2024-05-31 PROCEDURE — P9041 ALBUMIN (HUMAN),5%, 50ML: HCPCS | Performed by: PHYSICIAN ASSISTANT

## 2024-05-31 PROCEDURE — 83735 ASSAY OF MAGNESIUM: CPT

## 2024-05-31 PROCEDURE — 80048 BASIC METABOLIC PNL TOTAL CA: CPT

## 2024-05-31 PROCEDURE — 82435 ASSAY OF BLOOD CHLORIDE: CPT

## 2024-05-31 PROCEDURE — 2700000000 HC OXYGEN THERAPY PER DAY

## 2024-05-31 PROCEDURE — C1769 GUIDE WIRE: HCPCS | Performed by: INTERNAL MEDICINE

## 2024-05-31 PROCEDURE — C1751 CATH, INF, PER/CENT/MIDLINE: HCPCS | Performed by: INTERNAL MEDICINE

## 2024-05-31 PROCEDURE — 4A023N7 MEASUREMENT OF CARDIAC SAMPLING AND PRESSURE, LEFT HEART, PERCUTANEOUS APPROACH: ICD-10-PCS | Performed by: INTERNAL MEDICINE

## 2024-05-31 PROCEDURE — 2709999900 HC NON-CHARGEABLE SUPPLY: Performed by: INTERNAL MEDICINE

## 2024-05-31 PROCEDURE — C1894 INTRO/SHEATH, NON-LASER: HCPCS | Performed by: INTERNAL MEDICINE

## 2024-05-31 PROCEDURE — 85049 AUTOMATED PLATELET COUNT: CPT

## 2024-05-31 PROCEDURE — 2500000003 HC RX 250 WO HCPCS: Performed by: PHYSICIAN ASSISTANT

## 2024-05-31 PROCEDURE — 6370000000 HC RX 637 (ALT 250 FOR IP): Performed by: PHYSICIAN ASSISTANT

## 2024-05-31 PROCEDURE — 33999 UNLISTED PX CARDIAC SURGERY: CPT | Performed by: INTERNAL MEDICINE

## 2024-05-31 PROCEDURE — 2580000003 HC RX 258: Performed by: THORACIC SURGERY (CARDIOTHORACIC VASCULAR SURGERY)

## 2024-05-31 PROCEDURE — 94002 VENT MGMT INPAT INIT DAY: CPT

## 2024-05-31 PROCEDURE — 85610 PROTHROMBIN TIME: CPT

## 2024-05-31 PROCEDURE — 30233K1 TRANSFUSION OF NONAUTOLOGOUS FROZEN PLASMA INTO PERIPHERAL VEIN, PERCUTANEOUS APPROACH: ICD-10-PCS | Performed by: THORACIC SURGERY (CARDIOTHORACIC VASCULAR SURGERY)

## 2024-05-31 PROCEDURE — 6370000000 HC RX 637 (ALT 250 FOR IP): Performed by: NURSE PRACTITIONER

## 2024-05-31 PROCEDURE — 6360000002 HC RX W HCPCS: Performed by: NURSE ANESTHETIST, CERTIFIED REGISTERED

## 2024-05-31 PROCEDURE — 85014 HEMATOCRIT: CPT

## 2024-05-31 PROCEDURE — 84484 ASSAY OF TROPONIN QUANT: CPT

## 2024-05-31 PROCEDURE — 2720000010 HC SURG SUPPLY STERILE: Performed by: INTERNAL MEDICINE

## 2024-05-31 PROCEDURE — 2500000003 HC RX 250 WO HCPCS: Performed by: INTERNAL MEDICINE

## 2024-05-31 PROCEDURE — 84295 ASSAY OF SERUM SODIUM: CPT

## 2024-05-31 PROCEDURE — 2580000003 HC RX 258

## 2024-05-31 PROCEDURE — 93308 TTE F-UP OR LMTD: CPT | Performed by: INTERNAL MEDICINE

## 2024-05-31 PROCEDURE — 92950 HEART/LUNG RESUSCITATION CPR: CPT

## 2024-05-31 PROCEDURE — 82947 ASSAY GLUCOSE BLOOD QUANT: CPT

## 2024-05-31 PROCEDURE — 85025 COMPLETE CBC W/AUTO DIFF WBC: CPT

## 2024-05-31 PROCEDURE — 2100000001 HC CVICU R&B

## 2024-05-31 PROCEDURE — 86927 PLASMA FRESH FROZEN: CPT

## 2024-05-31 PROCEDURE — 30233R1 TRANSFUSION OF NONAUTOLOGOUS PLATELETS INTO PERIPHERAL VEIN, PERCUTANEOUS APPROACH: ICD-10-PCS | Performed by: THORACIC SURGERY (CARDIOTHORACIC VASCULAR SURGERY)

## 2024-05-31 PROCEDURE — 3700000001 HC ADD 15 MINUTES (ANESTHESIA): Performed by: THORACIC SURGERY (CARDIOTHORACIC VASCULAR SURGERY)

## 2024-05-31 PROCEDURE — 5A0221D ASSISTANCE WITH CARDIAC OUTPUT USING IMPELLER PUMP, CONTINUOUS: ICD-10-PCS | Performed by: INTERNAL MEDICINE

## 2024-05-31 PROCEDURE — C1729 CATH, DRAINAGE: HCPCS | Performed by: THORACIC SURGERY (CARDIOTHORACIC VASCULAR SURGERY)

## 2024-05-31 PROCEDURE — 3E033XZ INTRODUCTION OF VASOPRESSOR INTO PERIPHERAL VEIN, PERCUTANEOUS APPROACH: ICD-10-PCS | Performed by: THORACIC SURGERY (CARDIOTHORACIC VASCULAR SURGERY)

## 2024-05-31 PROCEDURE — 6360000002 HC RX W HCPCS: Performed by: PHYSICIAN ASSISTANT

## 2024-05-31 PROCEDURE — 02HA3RZ INSERTION OF SHORT-TERM EXTERNAL HEART ASSIST SYSTEM INTO HEART, PERCUTANEOUS APPROACH: ICD-10-PCS | Performed by: INTERNAL MEDICINE

## 2024-05-31 PROCEDURE — 7100000001 HC PACU RECOVERY - ADDTL 15 MIN

## 2024-05-31 PROCEDURE — 5A1935Z RESPIRATORY VENTILATION, LESS THAN 24 CONSECUTIVE HOURS: ICD-10-PCS | Performed by: THORACIC SURGERY (CARDIOTHORACIC VASCULAR SURGERY)

## 2024-05-31 PROCEDURE — 3600000004 HC SURGERY LEVEL 4 BASE: Performed by: THORACIC SURGERY (CARDIOTHORACIC VASCULAR SURGERY)

## 2024-05-31 PROCEDURE — 85576 BLOOD PLATELET AGGREGATION: CPT

## 2024-05-31 PROCEDURE — 93005 ELECTROCARDIOGRAM TRACING: CPT | Performed by: PHYSICIAN ASSISTANT

## 2024-05-31 PROCEDURE — 85018 HEMOGLOBIN: CPT

## 2024-05-31 PROCEDURE — 82330 ASSAY OF CALCIUM: CPT

## 2024-05-31 PROCEDURE — 36430 TRANSFUSION BLD/BLD COMPNT: CPT

## 2024-05-31 PROCEDURE — 6360000002 HC RX W HCPCS: Performed by: INTERNAL MEDICINE

## 2024-05-31 PROCEDURE — 85027 COMPLETE CBC AUTOMATED: CPT

## 2024-05-31 PROCEDURE — 85384 FIBRINOGEN ACTIVITY: CPT

## 2024-05-31 PROCEDURE — 02584ZZ DESTRUCTION OF CONDUCTION MECHANISM, PERCUTANEOUS ENDOSCOPIC APPROACH: ICD-10-PCS | Performed by: THORACIC SURGERY (CARDIOTHORACIC VASCULAR SURGERY)

## 2024-05-31 PROCEDURE — 82565 ASSAY OF CREATININE: CPT

## 2024-05-31 PROCEDURE — 84520 ASSAY OF UREA NITROGEN: CPT

## 2024-05-31 PROCEDURE — 93308 TTE F-UP OR LMTD: CPT

## 2024-05-31 PROCEDURE — 6360000002 HC RX W HCPCS

## 2024-05-31 PROCEDURE — 30233N1 TRANSFUSION OF NONAUTOLOGOUS RED BLOOD CELLS INTO PERIPHERAL VEIN, PERCUTANEOUS APPROACH: ICD-10-PCS | Performed by: THORACIC SURGERY (CARDIOTHORACIC VASCULAR SURGERY)

## 2024-05-31 PROCEDURE — 2580000003 HC RX 258: Performed by: PHYSICIAN ASSISTANT

## 2024-05-31 PROCEDURE — 83605 ASSAY OF LACTIC ACID: CPT

## 2024-05-31 PROCEDURE — 6360000002 HC RX W HCPCS: Performed by: ANESTHESIOLOGY

## 2024-05-31 PROCEDURE — P9017 PLASMA 1 DONOR FRZ W/IN 8 HR: HCPCS

## 2024-05-31 PROCEDURE — 2500000003 HC RX 250 WO HCPCS

## 2024-05-31 PROCEDURE — 2709999900 HC NON-CHARGEABLE SUPPLY: Performed by: THORACIC SURGERY (CARDIOTHORACIC VASCULAR SURGERY)

## 2024-05-31 PROCEDURE — 84100 ASSAY OF PHOSPHORUS: CPT

## 2024-05-31 PROCEDURE — 33990 INSJ PERQ VAD L HRT ARTERIAL: CPT | Performed by: INTERNAL MEDICINE

## 2024-05-31 PROCEDURE — B2111ZZ FLUOROSCOPY OF MULTIPLE CORONARY ARTERIES USING LOW OSMOLAR CONTRAST: ICD-10-PCS | Performed by: INTERNAL MEDICINE

## 2024-05-31 PROCEDURE — 99223 1ST HOSP IP/OBS HIGH 75: CPT | Performed by: INTERNAL MEDICINE

## 2024-05-31 PROCEDURE — P9016 RBC LEUKOCYTES REDUCED: HCPCS

## 2024-05-31 PROCEDURE — 3700000000 HC ANESTHESIA ATTENDED CARE: Performed by: THORACIC SURGERY (CARDIOTHORACIC VASCULAR SURGERY)

## 2024-05-31 PROCEDURE — P9073 PLATELETS PHERESIS PATH REDU: HCPCS

## 2024-05-31 PROCEDURE — 02583ZZ DESTRUCTION OF CONDUCTION MECHANISM, PERCUTANEOUS APPROACH: ICD-10-PCS | Performed by: THORACIC SURGERY (CARDIOTHORACIC VASCULAR SURGERY)

## 2024-05-31 PROCEDURE — 85347 COAGULATION TIME ACTIVATED: CPT

## 2024-05-31 PROCEDURE — 5A12012 PERFORMANCE OF CARDIAC OUTPUT, SINGLE, MANUAL: ICD-10-PCS | Performed by: INTERNAL MEDICINE

## 2024-05-31 PROCEDURE — 3600000014 HC SURGERY LEVEL 4 ADDTL 15MIN: Performed by: THORACIC SURGERY (CARDIOTHORACIC VASCULAR SURGERY)

## 2024-05-31 PROCEDURE — 80051 ELECTROLYTE PANEL: CPT

## 2024-05-31 PROCEDURE — A4216 STERILE WATER/SALINE, 10 ML: HCPCS | Performed by: PHYSICIAN ASSISTANT

## 2024-05-31 PROCEDURE — 7100000000 HC PACU RECOVERY - FIRST 15 MIN

## 2024-05-31 DEVICE — IMPLANTABLE DEVICE: Type: IMPLANTABLE DEVICE | Status: FUNCTIONAL

## 2024-05-31 DEVICE — KIT CATHETER PUMP INSERTION INTRACARDIAC IMPELLA CP: Type: IMPLANTABLE DEVICE | Status: FUNCTIONAL

## 2024-05-31 DEVICE — GUIDE WIRE 0.018" PTFE COATED
Type: IMPLANTABLE DEVICE | Status: FUNCTIONAL
Brand: IMPELLA

## 2024-05-31 RX ORDER — LIDOCAINE HYDROCHLORIDE 10 MG/ML
INJECTION, SOLUTION EPIDURAL; INFILTRATION; INTRACAUDAL; PERINEURAL PRN
Status: DISCONTINUED | OUTPATIENT
Start: 2024-05-31 | End: 2024-05-31 | Stop reason: SDUPTHER

## 2024-05-31 RX ORDER — SODIUM CHLORIDE 9 MG/ML
INJECTION, SOLUTION INTRAVENOUS PRN
Status: DISCONTINUED | OUTPATIENT
Start: 2024-05-31 | End: 2024-05-31 | Stop reason: HOSPADM

## 2024-05-31 RX ORDER — SODIUM CHLORIDE 9 MG/ML
INJECTION, SOLUTION INTRAVENOUS PRN
Status: DISCONTINUED | OUTPATIENT
Start: 2024-05-31 | End: 2024-06-02 | Stop reason: HOSPADM

## 2024-05-31 RX ORDER — HEPARIN SODIUM 10000 [USP'U]/100ML
5-30 INJECTION, SOLUTION INTRAVENOUS CONTINUOUS
Status: DISCONTINUED | OUTPATIENT
Start: 2024-06-01 | End: 2024-06-02 | Stop reason: HOSPADM

## 2024-05-31 RX ORDER — POLYETHYLENE GLYCOL 3350 17 G/17G
17 POWDER, FOR SOLUTION ORAL DAILY
Status: DISCONTINUED | OUTPATIENT
Start: 2024-05-31 | End: 2024-06-02 | Stop reason: HOSPADM

## 2024-05-31 RX ORDER — PROPOFOL 10 MG/ML
INJECTION, EMULSION INTRAVENOUS PRN
Status: DISCONTINUED | OUTPATIENT
Start: 2024-05-31 | End: 2024-05-31 | Stop reason: SDUPTHER

## 2024-05-31 RX ORDER — SODIUM CHLORIDE 0.9 % (FLUSH) 0.9 %
10 SYRINGE (ML) INJECTION PRN
Status: DISCONTINUED | OUTPATIENT
Start: 2024-05-31 | End: 2024-05-31 | Stop reason: HOSPADM

## 2024-05-31 RX ORDER — GLUCAGON 1 MG/ML
1 KIT INJECTION PRN
Status: DISCONTINUED | OUTPATIENT
Start: 2024-05-31 | End: 2024-06-02 | Stop reason: HOSPADM

## 2024-05-31 RX ORDER — SODIUM CHLORIDE 0.9 % (FLUSH) 0.9 %
5-40 SYRINGE (ML) INJECTION PRN
Status: DISCONTINUED | OUTPATIENT
Start: 2024-05-31 | End: 2024-06-02 | Stop reason: HOSPADM

## 2024-05-31 RX ORDER — IBUPROFEN 400 MG/1
400 TABLET ORAL
Status: DISCONTINUED | OUTPATIENT
Start: 2024-05-31 | End: 2024-06-02 | Stop reason: HOSPADM

## 2024-05-31 RX ORDER — AMIODARONE HYDROCHLORIDE 200 MG/1
200 TABLET ORAL 3 TIMES DAILY
Status: DISCONTINUED | OUTPATIENT
Start: 2024-05-31 | End: 2024-05-31 | Stop reason: HOSPADM

## 2024-05-31 RX ORDER — ENOXAPARIN SODIUM 100 MG/ML
80 INJECTION SUBCUTANEOUS 2 TIMES DAILY
Status: DISCONTINUED | OUTPATIENT
Start: 2024-05-31 | End: 2024-05-31

## 2024-05-31 RX ORDER — DOPAMINE HYDROCHLORIDE 160 MG/100ML
2 INJECTION, SOLUTION INTRAVENOUS CONTINUOUS
Status: DISCONTINUED | OUTPATIENT
Start: 2024-05-31 | End: 2024-06-01

## 2024-05-31 RX ORDER — ROCURONIUM BROMIDE 10 MG/ML
INJECTION, SOLUTION INTRAVENOUS PRN
Status: DISCONTINUED | OUTPATIENT
Start: 2024-05-31 | End: 2024-05-31 | Stop reason: SDUPTHER

## 2024-05-31 RX ORDER — HEPARIN SODIUM 10000 [USP'U]/100ML
5-30 INJECTION, SOLUTION INTRAVENOUS CONTINUOUS
Status: DISCONTINUED | OUTPATIENT
Start: 2024-06-01 | End: 2024-06-01

## 2024-05-31 RX ORDER — MAGNESIUM SULFATE IN WATER 40 MG/ML
2000 INJECTION, SOLUTION INTRAVENOUS PRN
Status: DISCONTINUED | OUTPATIENT
Start: 2024-05-31 | End: 2024-06-02 | Stop reason: HOSPADM

## 2024-05-31 RX ORDER — CLOPIDOGREL BISULFATE 75 MG/1
75 TABLET ORAL DAILY
Status: DISCONTINUED | OUTPATIENT
Start: 2024-06-01 | End: 2024-06-02 | Stop reason: HOSPADM

## 2024-05-31 RX ORDER — ATORVASTATIN CALCIUM 20 MG/1
20 TABLET, FILM COATED ORAL NIGHTLY
Status: DISCONTINUED | OUTPATIENT
Start: 2024-06-01 | End: 2024-06-02 | Stop reason: HOSPADM

## 2024-05-31 RX ORDER — METOPROLOL TARTRATE 1 MG/ML
2.5 INJECTION, SOLUTION INTRAVENOUS EVERY 10 MIN PRN
Status: DISCONTINUED | OUTPATIENT
Start: 2024-05-31 | End: 2024-06-02 | Stop reason: HOSPADM

## 2024-05-31 RX ORDER — ASPIRIN 81 MG/1
81 TABLET ORAL DAILY
Status: DISCONTINUED | OUTPATIENT
Start: 2024-05-31 | End: 2024-06-02 | Stop reason: HOSPADM

## 2024-05-31 RX ORDER — SODIUM CHLORIDE 0.9 % (FLUSH) 0.9 %
5-40 SYRINGE (ML) INJECTION EVERY 12 HOURS SCHEDULED
Status: DISCONTINUED | OUTPATIENT
Start: 2024-05-31 | End: 2024-06-02 | Stop reason: HOSPADM

## 2024-05-31 RX ORDER — PROPOFOL 10 MG/ML
INJECTION, EMULSION INTRAVENOUS
Status: COMPLETED
Start: 2024-05-31 | End: 2024-05-31

## 2024-05-31 RX ORDER — SODIUM CHLORIDE 9 MG/ML
INJECTION, SOLUTION INTRAVENOUS CONTINUOUS PRN
Status: COMPLETED | OUTPATIENT
Start: 2024-05-31 | End: 2024-05-31

## 2024-05-31 RX ORDER — SODIUM CHLORIDE 0.9 % (FLUSH) 0.9 %
5-40 SYRINGE (ML) INJECTION EVERY 12 HOURS SCHEDULED
Status: DISCONTINUED | OUTPATIENT
Start: 2024-05-31 | End: 2024-05-31 | Stop reason: HOSPADM

## 2024-05-31 RX ORDER — ONDANSETRON 2 MG/ML
4 INJECTION INTRAMUSCULAR; INTRAVENOUS EVERY 6 HOURS PRN
Status: DISCONTINUED | OUTPATIENT
Start: 2024-05-31 | End: 2024-06-02 | Stop reason: HOSPADM

## 2024-05-31 RX ORDER — FENTANYL CITRATE 50 UG/ML
INJECTION, SOLUTION INTRAMUSCULAR; INTRAVENOUS PRN
Status: DISCONTINUED | OUTPATIENT
Start: 2024-05-31 | End: 2024-05-31 | Stop reason: SDUPTHER

## 2024-05-31 RX ORDER — EPINEPHRINE 1 MG/ML
INJECTION, SOLUTION INTRAMUSCULAR; SUBCUTANEOUS PRN
Status: DISCONTINUED | OUTPATIENT
Start: 2024-05-31 | End: 2024-05-31 | Stop reason: HOSPADM

## 2024-05-31 RX ORDER — CEFAZOLIN SODIUM 1 G/3ML
INJECTION, POWDER, FOR SOLUTION INTRAMUSCULAR; INTRAVENOUS PRN
Status: DISCONTINUED | OUTPATIENT
Start: 2024-05-31 | End: 2024-05-31 | Stop reason: SDUPTHER

## 2024-05-31 RX ORDER — HEPARIN SODIUM 1000 [USP'U]/ML
INJECTION, SOLUTION INTRAVENOUS; SUBCUTANEOUS PRN
Status: DISCONTINUED | OUTPATIENT
Start: 2024-05-31 | End: 2024-05-31 | Stop reason: HOSPADM

## 2024-05-31 RX ORDER — BISACODYL 10 MG
10 SUPPOSITORY, RECTAL RECTAL DAILY PRN
Status: DISCONTINUED | OUTPATIENT
Start: 2024-05-31 | End: 2024-06-02 | Stop reason: HOSPADM

## 2024-05-31 RX ORDER — HEPARIN SODIUM 1000 [USP'U]/ML
60 INJECTION, SOLUTION INTRAVENOUS; SUBCUTANEOUS PRN
Status: DISCONTINUED | OUTPATIENT
Start: 2024-05-31 | End: 2024-06-02 | Stop reason: HOSPADM

## 2024-05-31 RX ORDER — BUPIVACAINE HYDROCHLORIDE 5 MG/ML
INJECTION, SOLUTION EPIDURAL; INTRACAUDAL
Status: DISCONTINUED
Start: 2024-05-31 | End: 2024-05-31

## 2024-05-31 RX ORDER — MIDAZOLAM HYDROCHLORIDE 1 MG/ML
INJECTION INTRAMUSCULAR; INTRAVENOUS PRN
Status: DISCONTINUED | OUTPATIENT
Start: 2024-05-31 | End: 2024-05-31 | Stop reason: SDUPTHER

## 2024-05-31 RX ORDER — DEXTROSE MONOHYDRATE 100 MG/ML
INJECTION, SOLUTION INTRAVENOUS CONTINUOUS PRN
Status: DISCONTINUED | OUTPATIENT
Start: 2024-05-31 | End: 2024-06-02 | Stop reason: HOSPADM

## 2024-05-31 RX ORDER — PROPOFOL 10 MG/ML
10 INJECTION, EMULSION INTRAVENOUS CONTINUOUS
Status: DISCONTINUED | OUTPATIENT
Start: 2024-05-31 | End: 2024-06-02 | Stop reason: HOSPADM

## 2024-05-31 RX ORDER — LIDOCAINE HYDROCHLORIDE ANHYDROUS AND DEXTROSE MONOHYDRATE 5; 400 G/100ML; MG/100ML
0.5 INJECTION, SOLUTION INTRAVENOUS CONTINUOUS
Status: DISCONTINUED | OUTPATIENT
Start: 2024-05-31 | End: 2024-06-02 | Stop reason: HOSPADM

## 2024-05-31 RX ORDER — ALBUTEROL SULFATE 90 UG/1
AEROSOL, METERED RESPIRATORY (INHALATION) PRN
Status: DISCONTINUED | OUTPATIENT
Start: 2024-05-31 | End: 2024-05-31 | Stop reason: SDUPTHER

## 2024-05-31 RX ORDER — HEPARIN SODIUM 10000 [USP'U]/100ML
INJECTION, SOLUTION INTRAVENOUS CONTINUOUS PRN
Status: COMPLETED | OUTPATIENT
Start: 2024-05-31 | End: 2024-05-31

## 2024-05-31 RX ORDER — PHENYLEPHRINE HCL IN 0.9% NACL 1 MG/10 ML
SYRINGE (ML) INTRAVENOUS PRN
Status: DISCONTINUED | OUTPATIENT
Start: 2024-05-31 | End: 2024-05-31 | Stop reason: SDUPTHER

## 2024-05-31 RX ORDER — EPHEDRINE SULFATE/0.9% NACL/PF 25 MG/5 ML
SYRINGE (ML) INTRAVENOUS PRN
Status: DISCONTINUED | OUTPATIENT
Start: 2024-05-31 | End: 2024-05-31 | Stop reason: SDUPTHER

## 2024-05-31 RX ORDER — BUPIVACAINE HYDROCHLORIDE 5 MG/ML
INJECTION, SOLUTION PERINEURAL PRN
Status: DISCONTINUED | OUTPATIENT
Start: 2024-05-31 | End: 2024-05-31 | Stop reason: ALTCHOICE

## 2024-05-31 RX ORDER — DEXMEDETOMIDINE HYDROCHLORIDE 4 UG/ML
INJECTION, SOLUTION INTRAVENOUS
Status: DISCONTINUED
Start: 2024-05-31 | End: 2024-05-31

## 2024-05-31 RX ORDER — FENTANYL CITRATE 50 UG/ML
50 INJECTION, SOLUTION INTRAMUSCULAR; INTRAVENOUS
Status: DISCONTINUED | OUTPATIENT
Start: 2024-05-31 | End: 2024-06-02 | Stop reason: HOSPADM

## 2024-05-31 RX ORDER — CALCIUM CHLORIDE 100 MG/ML
INJECTION INTRAVENOUS; INTRAVENTRICULAR PRN
Status: DISCONTINUED | OUTPATIENT
Start: 2024-05-31 | End: 2024-05-31 | Stop reason: HOSPADM

## 2024-05-31 RX ORDER — HEPARIN SODIUM 1000 [USP'U]/ML
INJECTION, SOLUTION INTRAVENOUS; SUBCUTANEOUS PRN
Status: DISCONTINUED | OUTPATIENT
Start: 2024-05-31 | End: 2024-05-31 | Stop reason: SDUPTHER

## 2024-05-31 RX ORDER — OXYCODONE HYDROCHLORIDE 5 MG/1
5 TABLET ORAL EVERY 4 HOURS PRN
Status: DISCONTINUED | OUTPATIENT
Start: 2024-05-31 | End: 2024-06-02 | Stop reason: HOSPADM

## 2024-05-31 RX ORDER — INSULIN LISPRO 100 [IU]/ML
0-12 INJECTION, SOLUTION INTRAVENOUS; SUBCUTANEOUS
Status: DISCONTINUED | OUTPATIENT
Start: 2024-06-01 | End: 2024-06-02 | Stop reason: HOSPADM

## 2024-05-31 RX ORDER — CHLORHEXIDINE GLUCONATE ORAL RINSE 1.2 MG/ML
15 SOLUTION DENTAL ONCE
Status: COMPLETED | OUTPATIENT
Start: 2024-05-31 | End: 2024-05-31

## 2024-05-31 RX ORDER — ALBUMIN (HUMAN) 12.5 G/50ML
25 SOLUTION INTRAVENOUS PRN
Status: DISCONTINUED | OUTPATIENT
Start: 2024-05-31 | End: 2024-06-02 | Stop reason: HOSPADM

## 2024-05-31 RX ORDER — POTASSIUM CHLORIDE 29.8 MG/ML
20 INJECTION INTRAVENOUS PRN
Status: DISCONTINUED | OUTPATIENT
Start: 2024-05-31 | End: 2024-06-02 | Stop reason: HOSPADM

## 2024-05-31 RX ORDER — SENNA AND DOCUSATE SODIUM 50; 8.6 MG/1; MG/1
1 TABLET, FILM COATED ORAL 2 TIMES DAILY
Status: DISCONTINUED | OUTPATIENT
Start: 2024-05-31 | End: 2024-06-02 | Stop reason: HOSPADM

## 2024-05-31 RX ORDER — PROPOFOL 10 MG/ML
INJECTION, EMULSION INTRAVENOUS CONTINUOUS PRN
Status: DISCONTINUED | OUTPATIENT
Start: 2024-05-31 | End: 2024-05-31 | Stop reason: SDUPTHER

## 2024-05-31 RX ORDER — SODIUM CHLORIDE, SODIUM LACTATE, POTASSIUM CHLORIDE, CALCIUM CHLORIDE 600; 310; 30; 20 MG/100ML; MG/100ML; MG/100ML; MG/100ML
INJECTION, SOLUTION INTRAVENOUS CONTINUOUS
Status: DISCONTINUED | OUTPATIENT
Start: 2024-05-31 | End: 2024-05-31 | Stop reason: HOSPADM

## 2024-05-31 RX ORDER — ACETAMINOPHEN 325 MG/1
650 TABLET ORAL EVERY 6 HOURS PRN
Status: DISCONTINUED | OUTPATIENT
Start: 2024-05-31 | End: 2024-06-02 | Stop reason: HOSPADM

## 2024-05-31 RX ORDER — AMIODARONE HYDROCHLORIDE 150 MG/3ML
INJECTION, SOLUTION INTRAVENOUS PRN
Status: DISCONTINUED | OUTPATIENT
Start: 2024-05-31 | End: 2024-05-31 | Stop reason: HOSPADM

## 2024-05-31 RX ORDER — COLCHICINE 0.6 MG/1
0.6 TABLET ORAL DAILY
Status: DISCONTINUED | OUTPATIENT
Start: 2024-05-31 | End: 2024-06-02 | Stop reason: HOSPADM

## 2024-05-31 RX ORDER — HYDRALAZINE HYDROCHLORIDE 20 MG/ML
5 INJECTION INTRAMUSCULAR; INTRAVENOUS EVERY 5 MIN PRN
Status: DISCONTINUED | OUTPATIENT
Start: 2024-05-31 | End: 2024-06-02 | Stop reason: HOSPADM

## 2024-05-31 RX ORDER — MAGNESIUM HYDROXIDE 1200 MG/15ML
LIQUID ORAL CONTINUOUS PRN
Status: COMPLETED | OUTPATIENT
Start: 2024-05-31 | End: 2024-05-31

## 2024-05-31 RX ORDER — LIDOCAINE HCL/PF 100 MG/5ML
SYRINGE (ML) INJECTION PRN
Status: DISCONTINUED | OUTPATIENT
Start: 2024-05-31 | End: 2024-05-31 | Stop reason: HOSPADM

## 2024-05-31 RX ORDER — ASPIRIN 81 MG/1
81 TABLET ORAL
Status: DISCONTINUED | OUTPATIENT
Start: 2024-05-31 | End: 2024-05-31 | Stop reason: HOSPADM

## 2024-05-31 RX ORDER — ALBUMIN, HUMAN INJ 5% 5 %
25 SOLUTION INTRAVENOUS PRN
Status: DISCONTINUED | OUTPATIENT
Start: 2024-05-31 | End: 2024-06-02 | Stop reason: HOSPADM

## 2024-05-31 RX ORDER — SODIUM CHLORIDE, SODIUM LACTATE, POTASSIUM CHLORIDE, CALCIUM CHLORIDE 600; 310; 30; 20 MG/100ML; MG/100ML; MG/100ML; MG/100ML
INJECTION, SOLUTION INTRAVENOUS CONTINUOUS PRN
Status: DISCONTINUED | OUTPATIENT
Start: 2024-05-31 | End: 2024-05-31 | Stop reason: SDUPTHER

## 2024-05-31 RX ORDER — ISOFLURANE 1 ML/ML
LIQUID RESPIRATORY (INHALATION)
Status: DISCONTINUED
Start: 2024-05-31 | End: 2024-05-31

## 2024-05-31 RX ORDER — NOREPINEPHRINE BITARTRATE 0.06 MG/ML
.01-.08 INJECTION, SOLUTION INTRAVENOUS CONTINUOUS PRN
Status: DISCONTINUED | OUTPATIENT
Start: 2024-05-31 | End: 2024-06-02 | Stop reason: HOSPADM

## 2024-05-31 RX ORDER — MIDAZOLAM HYDROCHLORIDE 1 MG/ML
1-10 INJECTION, SOLUTION INTRAVENOUS CONTINUOUS
Status: DISCONTINUED | OUTPATIENT
Start: 2024-05-31 | End: 2024-06-02 | Stop reason: HOSPADM

## 2024-05-31 RX ORDER — FAMOTIDINE 20 MG/1
20 TABLET, FILM COATED ORAL DAILY
Status: DISCONTINUED | OUTPATIENT
Start: 2024-05-31 | End: 2024-06-02 | Stop reason: HOSPADM

## 2024-05-31 RX ORDER — HEPARIN SODIUM 1000 [USP'U]/ML
60 INJECTION, SOLUTION INTRAVENOUS; SUBCUTANEOUS ONCE
Status: DISCONTINUED | OUTPATIENT
Start: 2024-05-31 | End: 2024-05-31 | Stop reason: SDUPTHER

## 2024-05-31 RX ORDER — CHLORHEXIDINE GLUCONATE 40 MG/ML
SOLUTION TOPICAL SEE ADMIN INSTRUCTIONS
Status: DISCONTINUED | OUTPATIENT
Start: 2024-05-31 | End: 2024-05-31 | Stop reason: HOSPADM

## 2024-05-31 RX ORDER — MEPERIDINE HYDROCHLORIDE 50 MG/ML
25 INJECTION INTRAMUSCULAR; INTRAVENOUS; SUBCUTANEOUS
Status: ACTIVE | OUTPATIENT
Start: 2024-05-31 | End: 2024-06-01

## 2024-05-31 RX ORDER — OXYCODONE HYDROCHLORIDE 5 MG/1
10 TABLET ORAL EVERY 4 HOURS PRN
Status: DISCONTINUED | OUTPATIENT
Start: 2024-05-31 | End: 2024-06-02 | Stop reason: HOSPADM

## 2024-05-31 RX ORDER — HEPARIN SODIUM 1000 [USP'U]/ML
30 INJECTION, SOLUTION INTRAVENOUS; SUBCUTANEOUS PRN
Status: DISCONTINUED | OUTPATIENT
Start: 2024-05-31 | End: 2024-06-02 | Stop reason: HOSPADM

## 2024-05-31 RX ORDER — PANTOPRAZOLE SODIUM 40 MG/1
40 TABLET, DELAYED RELEASE ORAL DAILY
Status: DISCONTINUED | OUTPATIENT
Start: 2024-05-31 | End: 2024-05-31

## 2024-05-31 RX ORDER — AMIODARONE HYDROCHLORIDE 200 MG/1
200 TABLET ORAL 3 TIMES DAILY
Status: DISCONTINUED | OUTPATIENT
Start: 2024-05-31 | End: 2024-06-02 | Stop reason: HOSPADM

## 2024-05-31 RX ORDER — ONDANSETRON 4 MG/1
4 TABLET, ORALLY DISINTEGRATING ORAL EVERY 8 HOURS PRN
Status: DISCONTINUED | OUTPATIENT
Start: 2024-05-31 | End: 2024-06-02 | Stop reason: HOSPADM

## 2024-05-31 RX ORDER — FENTANYL CITRATE 50 UG/ML
25 INJECTION, SOLUTION INTRAMUSCULAR; INTRAVENOUS
Status: DISCONTINUED | OUTPATIENT
Start: 2024-05-31 | End: 2024-06-02 | Stop reason: HOSPADM

## 2024-05-31 RX ORDER — DIPHENHYDRAMINE HCL 25 MG
25 TABLET ORAL NIGHTLY PRN
Status: DISCONTINUED | OUTPATIENT
Start: 2024-06-01 | End: 2024-06-02 | Stop reason: HOSPADM

## 2024-05-31 RX ADMIN — VASOPRESSIN 0.01 UNITS/MIN: 0.2 INJECTION INTRAVENOUS at 17:35

## 2024-05-31 RX ADMIN — ENOXAPARIN SODIUM 80 MG: 100 INJECTION SUBCUTANEOUS at 16:51

## 2024-05-31 RX ADMIN — PROPOFOL 150 MG: 10 INJECTION, EMULSION INTRAVENOUS at 08:39

## 2024-05-31 RX ADMIN — DOPAMINE HYDROCHLORIDE 2 MCG/KG/MIN: 160 INJECTION, SOLUTION INTRAVENOUS at 14:01

## 2024-05-31 RX ADMIN — FENTANYL CITRATE 100 MCG: 0.05 INJECTION, SOLUTION INTRAMUSCULAR; INTRAVENOUS at 10:14

## 2024-05-31 RX ADMIN — MIDAZOLAM 2 MG: 1 INJECTION INTRAMUSCULAR; INTRAVENOUS at 08:28

## 2024-05-31 RX ADMIN — Medication 6 PUFF: at 10:04

## 2024-05-31 RX ADMIN — ROCURONIUM BROMIDE 50 MG: 10 INJECTION, SOLUTION INTRAVENOUS at 08:39

## 2024-05-31 RX ADMIN — FENTANYL CITRATE 50 MCG: 50 INJECTION, SOLUTION INTRAMUSCULAR; INTRAVENOUS at 17:22

## 2024-05-31 RX ADMIN — COLCHICINE 0.6 MG: 0.6 TABLET, FILM COATED ORAL at 16:50

## 2024-05-31 RX ADMIN — FENTANYL CITRATE 50 MCG: 0.05 INJECTION, SOLUTION INTRAMUSCULAR; INTRAVENOUS at 10:52

## 2024-05-31 RX ADMIN — VASOPRESSIN 0.05 UNITS/MIN: 0.2 INJECTION INTRAVENOUS at 23:58

## 2024-05-31 RX ADMIN — ALBUMIN (HUMAN) 25 G: 12.5 INJECTION, SOLUTION INTRAVENOUS at 15:51

## 2024-05-31 RX ADMIN — Medication 100 MCG: at 10:31

## 2024-05-31 RX ADMIN — ALBUMIN (HUMAN) 25 G: 12.5 INJECTION, SOLUTION INTRAVENOUS at 23:49

## 2024-05-31 RX ADMIN — SODIUM BICARBONATE 50 MEQ: 84 INJECTION, SOLUTION INTRAVENOUS at 19:15

## 2024-05-31 RX ADMIN — SODIUM CHLORIDE, POTASSIUM CHLORIDE, SODIUM LACTATE AND CALCIUM CHLORIDE: 600; 310; 30; 20 INJECTION, SOLUTION INTRAVENOUS at 08:39

## 2024-05-31 RX ADMIN — EPHEDRINE SULFATE 5 MG: 5 INJECTION INTRAVENOUS at 09:21

## 2024-05-31 RX ADMIN — PROPOFOL 25 MCG/KG/MIN: 10 INJECTION, EMULSION INTRAVENOUS at 12:49

## 2024-05-31 RX ADMIN — SODIUM BICARBONATE 50 MEQ: 84 INJECTION, SOLUTION INTRAVENOUS at 19:25

## 2024-05-31 RX ADMIN — SODIUM BICARBONATE 50 MEQ: 84 INJECTION, SOLUTION INTRAVENOUS at 19:40

## 2024-05-31 RX ADMIN — SODIUM CHLORIDE, POTASSIUM CHLORIDE, SODIUM LACTATE AND CALCIUM CHLORIDE: 600; 310; 30; 20 INJECTION, SOLUTION INTRAVENOUS at 09:01

## 2024-05-31 RX ADMIN — SODIUM BICARBONATE 50 MEQ: 84 INJECTION, SOLUTION INTRAVENOUS at 19:30

## 2024-05-31 RX ADMIN — POTASSIUM CHLORIDE 20 MEQ: 29.8 INJECTION, SOLUTION INTRAVENOUS at 15:07

## 2024-05-31 RX ADMIN — 0.12% CHLORHEXIDINE GLUCONATE 15 ML: 1.2 RINSE ORAL at 07:48

## 2024-05-31 RX ADMIN — SODIUM CHLORIDE 1.7 UNITS/HR: 9 INJECTION, SOLUTION INTRAVENOUS at 16:25

## 2024-05-31 RX ADMIN — ROCURONIUM BROMIDE 50 MG: 10 INJECTION, SOLUTION INTRAVENOUS at 10:07

## 2024-05-31 RX ADMIN — SODIUM BICARBONATE 50 MEQ: 84 INJECTION, SOLUTION INTRAVENOUS at 19:35

## 2024-05-31 RX ADMIN — POTASSIUM CHLORIDE 20 MEQ: 29.8 INJECTION, SOLUTION INTRAVENOUS at 16:08

## 2024-05-31 RX ADMIN — MAGNESIUM SULFATE HEPTAHYDRATE 2000 MG: 40 INJECTION, SOLUTION INTRAVENOUS at 15:52

## 2024-05-31 RX ADMIN — IBUPROFEN 400 MG: 400 TABLET, FILM COATED ORAL at 15:24

## 2024-05-31 RX ADMIN — LIDOCAINE HYDROCHLORIDE 50 MG: 10 SOLUTION INTRAVENOUS at 08:39

## 2024-05-31 RX ADMIN — MUPIROCIN: 20 OINTMENT TOPICAL at 07:49

## 2024-05-31 RX ADMIN — FENTANYL CITRATE 100 MCG: 0.05 INJECTION, SOLUTION INTRAMUSCULAR; INTRAVENOUS at 10:09

## 2024-05-31 RX ADMIN — SODIUM BICARBONATE 50 MEQ: 84 INJECTION, SOLUTION INTRAVENOUS at 19:20

## 2024-05-31 RX ADMIN — NOREPINEPHRINE BITARTRATE 0.02 MCG/KG/MIN: 0.06 INJECTION, SOLUTION INTRAVENOUS at 16:04

## 2024-05-31 RX ADMIN — FENTANYL CITRATE 150 MCG: 0.05 INJECTION, SOLUTION INTRAMUSCULAR; INTRAVENOUS at 08:39

## 2024-05-31 RX ADMIN — ALBUMIN (HUMAN) 25 G: 12.5 INJECTION, SOLUTION INTRAVENOUS at 18:48

## 2024-05-31 RX ADMIN — FAMOTIDINE 20 MG: 10 INJECTION, SOLUTION INTRAVENOUS at 15:23

## 2024-05-31 RX ADMIN — SODIUM CHLORIDE 1500 MG: 9 INJECTION, SOLUTION INTRAVENOUS at 17:48

## 2024-05-31 RX ADMIN — AMIODARONE HYDROCHLORIDE 200 MG: 200 TABLET ORAL at 07:48

## 2024-05-31 RX ADMIN — HEPARIN SODIUM 7000 UNITS: 1000 INJECTION INTRAVENOUS; SUBCUTANEOUS at 10:33

## 2024-05-31 RX ADMIN — CEFAZOLIN 2 G: 1 INJECTION, POWDER, FOR SOLUTION INTRAMUSCULAR; INTRAVENOUS at 10:00

## 2024-05-31 RX ADMIN — EPHEDRINE SULFATE 5 MG: 5 INJECTION INTRAVENOUS at 10:25

## 2024-05-31 RX ADMIN — ROCURONIUM BROMIDE 50 MG: 10 INJECTION, SOLUTION INTRAVENOUS at 11:58

## 2024-05-31 RX ADMIN — Medication 2000 MG: at 18:19

## 2024-05-31 RX ADMIN — Medication 100 MCG: at 10:39

## 2024-05-31 RX ADMIN — POTASSIUM CHLORIDE 20 MEQ: 29.8 INJECTION, SOLUTION INTRAVENOUS at 22:17

## 2024-05-31 RX ADMIN — Medication 2 MG/HR: at 20:30

## 2024-05-31 RX ADMIN — POTASSIUM CHLORIDE 20 MEQ: 29.8 INJECTION, SOLUTION INTRAVENOUS at 23:20

## 2024-05-31 RX ADMIN — Medication 100 MCG: at 08:50

## 2024-05-31 RX ADMIN — Medication 1 MG/MIN: at 23:55

## 2024-05-31 RX ADMIN — ROCURONIUM BROMIDE 50 MG: 10 INJECTION, SOLUTION INTRAVENOUS at 10:48

## 2024-05-31 ASSESSMENT — ENCOUNTER SYMPTOMS: SHORTNESS OF BREATH: 1

## 2024-05-31 ASSESSMENT — PULMONARY FUNCTION TESTS
PIF_VALUE: 30
PIF_VALUE: 23
PIF_VALUE: 17

## 2024-05-31 ASSESSMENT — LIFESTYLE VARIABLES: HOW OFTEN DO YOU HAVE A DRINK CONTAINING ALCOHOL: NEVER

## 2024-05-31 ASSESSMENT — COPD QUESTIONNAIRES: CAT_SEVERITY: MODERATE

## 2024-05-31 NOTE — ANESTHESIA PRE PROCEDURE
Department of Anesthesiology  Preprocedure Note       Name:  Lula Ayon   Age:  63 y.o.  :  1960                                          MRN:  [id number]         Date:  2024      Surgeon: Surgeon(s):  Nando Lemon MD    Procedure: Procedure(s):  PERICARDIAL WINDOW CREATION, CONVERGE ABLATION, LEFT VATS, LEFT ATRIAL APPENDAGE CLIP    Medications prior to admission:   Prior to Admission medications    Medication Sig Start Date End Date Taking? Authorizing Provider   enoxaparin (LOVENOX) 80 MG/0.8ML Inject 0.8 mLs into the skin 2 times daily 24   Libby May MD   furosemide (LASIX) 40 MG tablet Take 1 tablet by mouth 2 times daily 24   Julian Remy DO   atorvastatin (LIPITOR) 40 MG tablet Take 1 tablet by mouth at bedtime 24   Libby May MD   tiZANidine (ZANAFLEX) 4 MG tablet 1 tablet every 8 hours prn spasm  Patient taking differently: at bedtime 1 tablet every 8 hours prn spasm 24   Ellen Manriquez PA-C   valsartan (DIOVAN) 80 MG tablet Take 1 tablet by mouth daily 24   Kristie Henley APRN - CNP   magnesium (MAGNESIUM-OXIDE) 250 MG TABS tablet Take 1 tablet by mouth See Admin Instructions  AND     Provider, MD Josefina   aspirin (ASPIRIN CHILDRENS) 81 MG chewable tablet Take 1 tablet by mouth daily for 14 days 24  Carlos Mena MD   Metoprolol Tartrate 75 MG TABS TAKE 1 TABLET BY MOUTH 2 TIMES A DAY 4/15/24   Libby May MD   glipiZIDE (GLUCOTROL) 5 MG tablet TAKE 1 TABLET BY MOUTH EVERY DAY  Patient taking differently: Take 1 tablet by mouth at bedtime 24   Ellen Manriquez PA-C   metFORMIN (GLUCOPHAGE) 1000 MG tablet TAKE 1 TABLET BY MOUTH 2 TIMES A DAY WITH FOOD  Patient taking differently: Take 1 tablet by mouth daily (with breakfast) 3/31/24   Juanita Gilmore APRN - CNP   warfarin (COUMADIN) 2 MG tablet Take 1 or 1 1/2 tablets (or as directed by Medication Management) by mouth once daily.

## 2024-05-31 NOTE — ANESTHESIA POSTPROCEDURE EVALUATION
Department of Anesthesiology  Postprocedure Note    Patient: Lula Ayon  MRN: [id number]  YOB: 1960  Date of evaluation: 5/31/2024    Procedure Summary       Date: 05/31/24 Room / Location: [address]    Anesthesia Start: 0827 Anesthesia Stop: 1310    Procedure: PERICARDIAL WINDOW CREATION, CONVERGE ABLATION, LEFT VATS (Chest) Diagnosis:       Atrial fibrillation, persistent (HCC)      (Atrial fibrillation, persistent (HCC) [I48.19])    Surgeons: Nando Lemon MD Responsible Provider: Brian Sarabia MD    Anesthesia Type: general ASA Status: 4            Anesthesia Type: No value filed.    Jolie Phase I:      Jolie Phase II:      Anesthesia Post Evaluation    Patient location during evaluation: ICU  Patient participation: complete - patient cannot participate  Level of consciousness: awake and alert and sedated and ventilated  Airway patency: patent  Nausea & Vomiting: no vomiting and no nausea  Cardiovascular status: hemodynamically stable  Respiratory status: intubated  Hydration status: euvolemic  Pain management: adequate    No notable events documented.

## 2024-05-31 NOTE — CARE COORDINATION
Case Management Assessment  Initial Evaluation    Date/Time of Evaluation: 5/31/2024 8:25 AM  Assessment Completed by: TEJAL OCAMPO RN    If patient is discharged prior to next notation, then this note serves as note for discharge by case management.    Patient Name: Lula Ayon                   YOB: 1960  Diagnosis: Atrial fibrillation, persistent (HCC) [I48.19]  Atrial fibrillation (HCC) [I48.91]                   Date / Time: 5/31/2024  6:21 AM    Patient Admission Status: Inpatient   Readmission Risk (Low < 19, Mod (19-27), High > 27): Readmission Risk Score: 7.6    Current PCP: Ellen Manriquez PA-C  PCP verified by CM? (P) Yes    Chart Reviewed: Yes      History Provided by: (P) Patient  Patient Orientation: (P) Alert and Oriented    Patient Cognition: (P) Alert    Hospitalization in the last 30 days (Readmission):  No    If yes, Readmission Assessment in CM Navigator will be completed.    Advance Directives:      Code Status: Prior   Patient's Primary Decision Maker is: (P) Legal Next of Kin      Discharge Planning:    Patient lives with: (P) Spouse/Significant Other (Boyfriend) Type of Home: (P) House  Primary Care Giver: (P) Self  Patient Support Systems include: (P) Spouse/Significant Other (Boyfriend)   Current Financial resources:    Current community resources:    Current services prior to admission: (P) None            Current DME:              Type of Home Care services:  (P) None    ADLS  Prior functional level: (P) Independent in ADLs/IADLs  Current functional level: (P) Independent in ADLs/IADLs    PT AM-PAC:   /24  OT AM-PAC:   /24    Family can provide assistance at DC: (P) Yes  Would you like Case Management to discuss the discharge plan with any other family members/significant others, and if so, who? (P) No  Plans to Return to Present Housing: (P) Unknown at present  Other Identified Issues/Barriers to RETURNING to current housing: Plan for VATS procedure

## 2024-05-31 NOTE — H&P
extremities to command  Psychiatric: Oriented to person place and time, no evidence of depression.    Data:  CBC:   Lab Results   Component Value Date/Time    WBC 8.9 05/14/2024 12:32 PM    RBC 5.07 05/14/2024 12:32 PM    RBC 5.11 02/21/2012 09:36 AM    HGB 15.0 05/14/2024 12:32 PM    HCT 46.4 05/14/2024 12:32 PM    MCV 91.5 05/14/2024 12:32 PM    MCH 29.6 05/14/2024 12:32 PM    MCHC 32.3 05/14/2024 12:32 PM    RDW 13.3 05/14/2024 12:32 PM     05/14/2024 12:32 PM     02/21/2012 09:36 AM    MPV 10.7 05/14/2024 12:32 PM       Cardiac Cath: Needs ordered before surgical ablation.    ECHO: Echo is ordered for tomorrow.      Problem List:  Patient Active Problem List   Diagnosis    S/P endometrial ablation    Essential hypertension    Mixed hyperlipidemia    CAD (coronary artery disease) with stents    SOB (shortness of breath)    Type 2 diabetes mellitus without complication, without long-term current use of insulin (HCC)    S/P ablation of atrial fibrillation    Bowen's disease    Atrial fibrillation with RVR (HCC)    Chronic pain disorder    Chronic pain of multiple joints    Primary osteoarthritis of both hands    Tinea    Visit for monitoring Tikosyn therapy    Persistent atrial fibrillation (HCC)    PAF (paroxysmal atrial fibrillation) (HCC)    Atrial fibrillation (HCC)       Plan:  Risk benefits and alternatives of surgical ablation for atrial fibrillation were discussed with the patient.  At this point time we do need to get with the operating room director to schedule with the convergent representatives and the rest of the OR team.  Once we received that information Dr. Lemon will be in touch with the patient to schedule for cardiac cath and clearance for the procedure with Dr. Remy.  Patient will need out patient PAT's ordered once surgical date has been given.  She also needs stop Coumadin 3 days before and bridged with Lovenox.    ABNER GONZALEZ, APRN - NP      NPO

## 2024-05-31 NOTE — PLAN OF CARE
Patient received from OR orally intubated with ETT # 7.5 secured at the 23 cm lawrence at the lip.

## 2024-05-31 NOTE — BRIEF OP NOTE
Brief Postoperative Note      Patient: Lula Ayon  YOB: 1960  MRN: [id number]    Date of Procedure: 5/31/2024    Pre-Op Diagnosis Codes:     * Atrial fibrillation, persistent (HCC) [I48.19]    Post-Op Diagnosis: Same       Procedure(s):  PERICARDIAL WINDOW CREATION, CONVERGE ABLATION, LEFT VATS    Surgeon(s):  Nando Bangura MD    Assistant:  Physician Assistant: David Carrasco PA    Anesthesia: General    Estimated Blood Loss (mL): N/A    Complications: None    Specimens:   * No specimens in log *    Implants:  * No implants in log *      Drains:   Chest Tube Left 2 (Active)       Closed/Suction Drain Anterior;Midline Mediastinal Bulb (Active)       Urinary Catheter 05/31/24 Chiu-Temperature (Active)       Findings:  Infection Present At Time Of Surgery (PATOS) (choose all levels that have infection present):  No infection present  Other Findings: Very nice complete vein to vein ablation, VATS revealed anatomy unsuitable for atriaCLip so will proceed with watchmen next week.    Electronically signed by Nando Bangura MD on 5/31/2024 at 1:20 PM

## 2024-06-01 ENCOUNTER — APPOINTMENT (OUTPATIENT)
Dept: GENERAL RADIOLOGY | Age: 64
DRG: 215 | End: 2024-06-01
Attending: THORACIC SURGERY (CARDIOTHORACIC VASCULAR SURGERY)
Payer: COMMERCIAL

## 2024-06-01 PROBLEM — I49.01 VENTRICULAR FIBRILLATION (HCC): Status: ACTIVE | Noted: 2024-06-01

## 2024-06-01 PROBLEM — I70.221 CRITICAL LIMB ISCHEMIA OF RIGHT LOWER EXTREMITY (HCC): Status: ACTIVE | Noted: 2024-06-01

## 2024-06-01 PROBLEM — J96.01 ACUTE RESPIRATORY FAILURE WITH HYPOXIA (HCC): Status: ACTIVE | Noted: 2024-06-01

## 2024-06-01 LAB
ACT BLD: 166 SEC (ref 79–149)
ACT BLD: 174 SEC (ref 79–149)
ACT BLD: 174 SEC (ref 79–149)
ACT BLD: 178 SEC (ref 79–149)
ACT BLD: 181 SEC (ref 79–149)
ACT BLD: 182 SEC (ref 79–149)
ACT BLD: 185 SEC (ref 79–149)
ACT BLD: 197 SEC (ref 79–149)
ACT BLD: 197 SEC (ref 79–149)
ACT BLD: 205 SEC (ref 79–149)
ACT BLD: 216 SEC (ref 79–149)
ACT BLD: 244 SEC (ref 79–149)
ACT BLD: 255 SEC (ref 79–149)
ACT BLD: 267 SEC (ref 79–149)
ACT BLD: 271 SEC (ref 79–149)
ALBUMIN SERPL-MCNC: 4 G/DL (ref 3.5–5.2)
ALBUMIN/GLOB SERPL: 4 {RATIO} (ref 1–2.5)
ALLEN TEST: ABNORMAL
ALP SERPL-CCNC: 55 U/L (ref 35–104)
ALT SERPL-CCNC: >7000 U/L (ref 10–35)
ANION GAP SERPL CALCULATED.3IONS-SCNC: 10 MMOL/L (ref 9–16)
ANION GAP SERPL CALCULATED.3IONS-SCNC: 13 MMOL/L (ref 9–16)
ANION GAP SERPL CALCULATED.3IONS-SCNC: 15 MMOL/L (ref 9–16)
ANION GAP SERPL CALCULATED.3IONS-SCNC: 17 MMOL/L (ref 9–16)
ANION GAP SERPL CALCULATED.3IONS-SCNC: 20 MMOL/L (ref 9–16)
ANION GAP SERPL CALCULATED.3IONS-SCNC: 21 MMOL/L (ref 9–16)
ANION GAP SERPL CALCULATED.3IONS-SCNC: 9 MMOL/L (ref 9–16)
ANTI-XA UNFRAC HEPARIN: 0.26 IU/L
ANTI-XA UNFRAC HEPARIN: 0.31 IU/L
ANTI-XA UNFRAC HEPARIN: 0.6 IU/L
AST SERPL-CCNC: 2850 U/L (ref 10–35)
BILIRUB DIRECT SERPL-MCNC: 1.3 MG/DL (ref 0–0.3)
BILIRUB INDIRECT SERPL-MCNC: 2.3 MG/DL (ref 0–1)
BILIRUB SERPL-MCNC: 3.6 MG/DL (ref 0–1.2)
BLOOD BANK BLOOD PRODUCT EXPIRATION DATE: NORMAL
BLOOD BANK DISPENSE STATUS: NORMAL
BLOOD BANK ISBT PRODUCT BLOOD TYPE: 6200
BLOOD BANK ISBT PRODUCT BLOOD TYPE: 7300
BLOOD BANK PRODUCT CODE: NORMAL
BLOOD BANK UNIT TYPE AND RH: NORMAL
BPU ID: NORMAL
BUN BLD-MCNC: 18 MG/DL (ref 8–26)
BUN BLD-MCNC: 18 MG/DL (ref 8–26)
BUN BLD-MCNC: 22 MG/DL (ref 8–26)
BUN SERPL-MCNC: 18 MG/DL (ref 8–23)
BUN SERPL-MCNC: 19 MG/DL (ref 8–23)
BUN SERPL-MCNC: 20 MG/DL (ref 8–23)
BUN SERPL-MCNC: 20 MG/DL (ref 8–23)
BUN SERPL-MCNC: 22 MG/DL (ref 8–23)
BUN SERPL-MCNC: 23 MG/DL (ref 8–23)
BUN SERPL-MCNC: 24 MG/DL (ref 8–23)
CA-I BLD-SCNC: 0.89 MMOL/L (ref 1.15–1.33)
CA-I BLD-SCNC: 1.02 MMOL/L (ref 1.13–1.33)
CA-I BLD-SCNC: 1.08 MMOL/L (ref 1.13–1.33)
CA-I BLD-SCNC: 1.11 MMOL/L (ref 1.13–1.33)
CA-I BLD-SCNC: 1.12 MMOL/L (ref 1.13–1.33)
CA-I BLD-SCNC: 1.13 MMOL/L (ref 1.15–1.33)
CA-I BLD-SCNC: 1.14 MMOL/L (ref 1.13–1.33)
CA-I BLD-SCNC: 1.14 MMOL/L (ref 1.13–1.33)
CA-I BLD-SCNC: 1.14 MMOL/L (ref 1.15–1.33)
CA-I BLD-SCNC: 1.15 MMOL/L (ref 1.13–1.33)
CALCIUM SERPL-MCNC: 7.8 MG/DL (ref 8.6–10.4)
CALCIUM SERPL-MCNC: 8 MG/DL (ref 8.6–10.4)
CALCIUM SERPL-MCNC: 8.2 MG/DL (ref 8.6–10.4)
CALCIUM SERPL-MCNC: 8.3 MG/DL (ref 8.6–10.4)
CALCIUM SERPL-MCNC: 8.4 MG/DL (ref 8.6–10.4)
CALCIUM SERPL-MCNC: 8.5 MG/DL (ref 8.6–10.4)
CALCIUM SERPL-MCNC: 8.7 MG/DL (ref 8.6–10.4)
CHLORIDE BLD-SCNC: 114 MMOL/L (ref 98–107)
CHLORIDE BLD-SCNC: 114 MMOL/L (ref 98–107)
CHLORIDE BLD-SCNC: 118 MMOL/L (ref 98–107)
CHLORIDE SERPL-SCNC: 111 MMOL/L (ref 98–107)
CHLORIDE SERPL-SCNC: 114 MMOL/L (ref 98–107)
CHLORIDE SERPL-SCNC: 114 MMOL/L (ref 98–107)
CHLORIDE SERPL-SCNC: 115 MMOL/L (ref 98–107)
CHLORIDE SERPL-SCNC: 115 MMOL/L (ref 98–107)
CHLORIDE SERPL-SCNC: 116 MMOL/L (ref 98–107)
CHLORIDE SERPL-SCNC: 116 MMOL/L (ref 98–107)
CO2 BLD CALC-SCNC: 15 MMOL/L (ref 22–30)
CO2 BLD CALC-SCNC: 22 MMOL/L (ref 22–30)
CO2 BLD CALC-SCNC: 26 MMOL/L (ref 22–30)
CO2 SERPL-SCNC: 18 MMOL/L (ref 20–31)
CO2 SERPL-SCNC: 20 MMOL/L (ref 20–31)
CO2 SERPL-SCNC: 22 MMOL/L (ref 20–31)
CO2 SERPL-SCNC: 23 MMOL/L (ref 20–31)
CO2 SERPL-SCNC: 25 MMOL/L (ref 20–31)
CO2 SERPL-SCNC: 27 MMOL/L (ref 20–31)
CO2 SERPL-SCNC: 27 MMOL/L (ref 20–31)
COMPONENT: NORMAL
CREAT SERPL-MCNC: 1.2 MG/DL (ref 0.5–0.9)
CREAT SERPL-MCNC: 1.3 MG/DL (ref 0.5–0.9)
CREAT SERPL-MCNC: 1.3 MG/DL (ref 0.5–0.9)
CREAT SERPL-MCNC: 1.4 MG/DL (ref 0.5–0.9)
CREAT SERPL-MCNC: 1.4 MG/DL (ref 0.5–0.9)
CREAT SERPL-MCNC: 1.9 MG/DL (ref 0.5–0.9)
CREAT SERPL-MCNC: 2.5 MG/DL (ref 0.5–0.9)
CRITICAL ACTION: NORMAL
CRITICAL NOTIFICATION DATE/TIME: NORMAL
CRITICAL NOTIFICATION: NORMAL
CRITICAL VALUE READ BACK: NO
EGFR, POC: 25 ML/MIN/1.73M2
EGFR, POC: 57 ML/MIN/1.73M2
EGFR, POC: 57 ML/MIN/1.73M2
EKG ATRIAL RATE: 60 BPM
EKG P AXIS: 68 DEGREES
EKG P-R INTERVAL: 228 MS
EKG Q-T INTERVAL: 550 MS
EKG QRS DURATION: 104 MS
EKG QTC CALCULATION (BAZETT): 550 MS
EKG R AXIS: 52 DEGREES
EKG T AXIS: -29 DEGREES
EKG VENTRICULAR RATE: 60 BPM
ERYTHROCYTE [DISTWIDTH] IN BLOOD BY AUTOMATED COUNT: 15.8 % (ref 11.8–14.4)
ERYTHROCYTE [DISTWIDTH] IN BLOOD BY AUTOMATED COUNT: 16.2 % (ref 11.8–14.4)
ERYTHROCYTE [DISTWIDTH] IN BLOOD BY AUTOMATED COUNT: 16.3 % (ref 11.8–14.4)
ERYTHROCYTE [DISTWIDTH] IN BLOOD BY AUTOMATED COUNT: 16.3 % (ref 11.8–14.4)
ERYTHROCYTE [DISTWIDTH] IN BLOOD BY AUTOMATED COUNT: 16.5 % (ref 11.8–14.4)
ERYTHROCYTE [DISTWIDTH] IN BLOOD BY AUTOMATED COUNT: 17.2 % (ref 11.8–14.4)
ERYTHROCYTE [DISTWIDTH] IN BLOOD BY AUTOMATED COUNT: 18 % (ref 11.8–14.4)
FIO2: 100
FIO2: 100
FIO2: 80
FIO2: 90
FIO2: 90
GFR, ESTIMATED: 22 ML/MIN/1.73M2
GFR, ESTIMATED: 30 ML/MIN/1.73M2
GFR, ESTIMATED: 42 ML/MIN/1.73M2
GFR, ESTIMATED: 43 ML/MIN/1.73M2
GFR, ESTIMATED: 47 ML/MIN/1.73M2
GFR, ESTIMATED: 48 ML/MIN/1.73M2
GFR, ESTIMATED: 50 ML/MIN/1.73M2
GLOBULIN SER CALC-MCNC: 1 G/DL
GLUCOSE BLD-MCNC: 106 MG/DL (ref 65–105)
GLUCOSE BLD-MCNC: 113 MG/DL (ref 65–105)
GLUCOSE BLD-MCNC: 116 MG/DL (ref 65–105)
GLUCOSE BLD-MCNC: 123 MG/DL (ref 65–105)
GLUCOSE BLD-MCNC: 130 MG/DL (ref 74–100)
GLUCOSE BLD-MCNC: 146 MG/DL (ref 65–105)
GLUCOSE BLD-MCNC: 161 MG/DL (ref 65–105)
GLUCOSE BLD-MCNC: 175 MG/DL (ref 65–105)
GLUCOSE BLD-MCNC: 184 MG/DL (ref 74–100)
GLUCOSE BLD-MCNC: 201 MG/DL (ref 65–105)
GLUCOSE BLD-MCNC: 206 MG/DL (ref 65–105)
GLUCOSE BLD-MCNC: 232 MG/DL (ref 65–105)
GLUCOSE BLD-MCNC: 52 MG/DL (ref 74–100)
GLUCOSE BLD-MCNC: 92 MG/DL (ref 65–105)
GLUCOSE BLD-MCNC: 95 MG/DL (ref 65–105)
GLUCOSE BLD-MCNC: 97 MG/DL (ref 65–105)
GLUCOSE SERPL-MCNC: 103 MG/DL (ref 74–99)
GLUCOSE SERPL-MCNC: 111 MG/DL (ref 74–99)
GLUCOSE SERPL-MCNC: 168 MG/DL (ref 74–99)
GLUCOSE SERPL-MCNC: 241 MG/DL (ref 74–99)
GLUCOSE SERPL-MCNC: 45 MG/DL (ref 74–99)
GLUCOSE SERPL-MCNC: 89 MG/DL (ref 74–99)
GLUCOSE SERPL-MCNC: 99 MG/DL (ref 74–99)
HCT VFR BLD AUTO: 19 % (ref 36–46)
HCT VFR BLD AUTO: 27.5 % (ref 36.3–47.1)
HCT VFR BLD AUTO: 28.4 % (ref 36.3–47.1)
HCT VFR BLD AUTO: 30.3 % (ref 36.3–47.1)
HCT VFR BLD AUTO: 30.7 % (ref 36.3–47.1)
HCT VFR BLD AUTO: 31.7 % (ref 36.3–47.1)
HCT VFR BLD AUTO: 32 % (ref 36–46)
HCT VFR BLD AUTO: 32.3 % (ref 36.3–47.1)
HCT VFR BLD AUTO: 34 % (ref 36–46)
HCT VFR BLD AUTO: 36.2 % (ref 36.3–47.1)
HGB BLD-MCNC: 10.2 G/DL (ref 11.9–15.1)
HGB BLD-MCNC: 10.3 G/DL (ref 11.9–15.1)
HGB BLD-MCNC: 10.5 G/DL (ref 11.9–15.1)
HGB BLD-MCNC: 10.8 G/DL (ref 11.9–15.1)
HGB BLD-MCNC: 11.9 G/DL (ref 11.9–15.1)
HGB BLD-MCNC: 9.1 G/DL (ref 11.9–15.1)
HGB BLD-MCNC: 9.4 G/DL (ref 11.9–15.1)
INR PPP: 1.7
INR PPP: 1.9
LACTIC ACID, WHOLE BLOOD: 4.2 MMOL/L (ref 0.7–2.1)
MAGNESIUM SERPL-MCNC: 2.2 MG/DL (ref 1.6–2.4)
MAGNESIUM SERPL-MCNC: 2.3 MG/DL (ref 1.6–2.4)
MAGNESIUM SERPL-MCNC: 2.4 MG/DL (ref 1.6–2.4)
MAGNESIUM SERPL-MCNC: 2.7 MG/DL (ref 1.6–2.4)
MAGNESIUM SERPL-MCNC: 2.7 MG/DL (ref 1.6–2.4)
MCH RBC QN AUTO: 28.8 PG (ref 25.2–33.5)
MCH RBC QN AUTO: 28.9 PG (ref 25.2–33.5)
MCH RBC QN AUTO: 29.1 PG (ref 25.2–33.5)
MCH RBC QN AUTO: 29.3 PG (ref 25.2–33.5)
MCH RBC QN AUTO: 29.7 PG (ref 25.2–33.5)
MCHC RBC AUTO-ENTMCNC: 32 G/DL (ref 28.4–34.8)
MCHC RBC AUTO-ENTMCNC: 32.9 G/DL (ref 28.4–34.8)
MCHC RBC AUTO-ENTMCNC: 33.1 G/DL (ref 28.4–34.8)
MCHC RBC AUTO-ENTMCNC: 33.4 G/DL (ref 28.4–34.8)
MCHC RBC AUTO-ENTMCNC: 33.6 G/DL (ref 28.4–34.8)
MCHC RBC AUTO-ENTMCNC: 33.7 G/DL (ref 28.4–34.8)
MCHC RBC AUTO-ENTMCNC: 34.2 G/DL (ref 28.4–34.8)
MCV RBC AUTO: 85.1 FL (ref 82.6–102.9)
MCV RBC AUTO: 85.8 FL (ref 82.6–102.9)
MCV RBC AUTO: 87.1 FL (ref 82.6–102.9)
MCV RBC AUTO: 87.5 FL (ref 82.6–102.9)
MCV RBC AUTO: 87.5 FL (ref 82.6–102.9)
MCV RBC AUTO: 89.2 FL (ref 82.6–102.9)
MCV RBC AUTO: 92.8 FL (ref 82.6–102.9)
METHEMOGLOBIN: 0.9 % (ref 0–1.5)
MODE: ABNORMAL
NEGATIVE BASE EXCESS, ART: 1 MMOL/L (ref 0–2)
NEGATIVE BASE EXCESS, ART: 11.1 MMOL/L (ref 0–2)
NEGATIVE BASE EXCESS, ART: 3.2 MMOL/L (ref 0–2)
NRBC BLD-RTO: 0 PER 100 WBC
NRBC BLD-RTO: 0.1 PER 100 WBC
NRBC BLD-RTO: 0.2 PER 100 WBC
NRBC BLD-RTO: 0.3 PER 100 WBC
NRBC BLD-RTO: 0.4 PER 100 WBC
NRBC BLD-RTO: 2.2 PER 100 WBC
NRBC BLD-RTO: 8.2 PER 100 WBC
O2 DELIVERY DEVICE: ABNORMAL
PATIENT TEMP: 38.5
PLATELET # BLD AUTO: 157 K/UL (ref 138–453)
PLATELET # BLD AUTO: 178 K/UL (ref 138–453)
PLATELET # BLD AUTO: 180 K/UL (ref 138–453)
PLATELET # BLD AUTO: 180 K/UL (ref 138–453)
PLATELET # BLD AUTO: 197 K/UL (ref 138–453)
PLATELET # BLD AUTO: 225 K/UL (ref 138–453)
PLATELET # BLD AUTO: 258 K/UL (ref 138–453)
PMV BLD AUTO: 10.6 FL (ref 8.1–13.5)
PMV BLD AUTO: 10.7 FL (ref 8.1–13.5)
PMV BLD AUTO: 10.8 FL (ref 8.1–13.5)
PMV BLD AUTO: 10.8 FL (ref 8.1–13.5)
PMV BLD AUTO: 10.9 FL (ref 8.1–13.5)
PMV BLD AUTO: 10.9 FL (ref 8.1–13.5)
PMV BLD AUTO: 11.3 FL (ref 8.1–13.5)
POC ANION GAP: 16 MMOL/L (ref 7–16)
POC ANION GAP: 18 MMOL/L (ref 7–16)
POC ANION GAP: 20 MMOL/L (ref 7–16)
POC CREATININE: 1.1 MG/DL (ref 0.51–1.19)
POC CREATININE: 1.1 MG/DL (ref 0.51–1.19)
POC CREATININE: 2.2 MG/DL (ref 0.51–1.19)
POC HCO3: 15 MMOL/L (ref 21–28)
POC HCO3: 22.4 MMOL/L (ref 21–28)
POC HCO3: 23.4 MMOL/L (ref 21–28)
POC HCO3: 25.3 MMOL/L (ref 21–28)
POC HCO3: 25.6 MMOL/L (ref 21–28)
POC HEMOGLOBIN (CALC): 11 G/DL (ref 12–16)
POC HEMOGLOBIN (CALC): 11.4 G/DL (ref 12–16)
POC HEMOGLOBIN (CALC): 6.6 G/DL (ref 12–16)
POC LACTIC ACID: 13.8 MMOL/L (ref 0.56–1.39)
POC LACTIC ACID: 5.2 MMOL/L (ref 0.56–1.39)
POC LACTIC ACID: 5.7 MMOL/L (ref 0.56–1.39)
POC LACTIC ACID: 7.9 MMOL/L (ref 0.56–1.39)
POC O2 SATURATION: 90.5 % (ref 94–98)
POC O2 SATURATION: 95.7 % (ref 94–98)
POC O2 SATURATION: 96.3 % (ref 94–98)
POC O2 SATURATION: 97.3 % (ref 94–98)
POC O2 SATURATION: 97.7 % (ref 94–98)
POC PCO2 TEMP: 36 MM HG
POC PCO2: 33 MM HG (ref 35–48)
POC PCO2: 33.7 MM HG (ref 35–48)
POC PCO2: 37 MM HG (ref 35–48)
POC PCO2: 38 MM HG (ref 35–48)
POC PCO2: 41.3 MM HG (ref 35–48)
POC PH TEMP: 7.24
POC PH: 7.26 (ref 7.35–7.45)
POC PH: 7.34 (ref 7.35–7.45)
POC PH: 7.41 (ref 7.35–7.45)
POC PH: 7.44 (ref 7.35–7.45)
POC PH: 7.49 (ref 7.35–7.45)
POC PO2 TEMP: 123.4 MM HG
POC PO2: 113.7 MM HG (ref 83–108)
POC PO2: 58.8 MM HG (ref 83–108)
POC PO2: 76.2 MM HG (ref 83–108)
POC PO2: 76.6 MM HG (ref 83–108)
POC PO2: 99.3 MM HG (ref 83–108)
POSITIVE BASE EXCESS, ART: 1.4 MMOL/L (ref 0–3)
POSITIVE BASE EXCESS, ART: 2.1 MMOL/L (ref 0–3)
POTASSIUM BLD-SCNC: 2.4 MMOL/L (ref 3.5–4.5)
POTASSIUM BLD-SCNC: 3.3 MMOL/L (ref 3.5–4.5)
POTASSIUM BLD-SCNC: 6.2 MMOL/L (ref 3.5–5.1)
POTASSIUM SERPL-SCNC: 2.6 MMOL/L (ref 3.7–5.3)
POTASSIUM SERPL-SCNC: 3.1 MMOL/L (ref 3.7–5.3)
POTASSIUM SERPL-SCNC: 4 MMOL/L (ref 3.7–5.3)
POTASSIUM SERPL-SCNC: 4.5 MMOL/L (ref 3.7–5.3)
POTASSIUM SERPL-SCNC: 5.2 MMOL/L (ref 3.7–5.3)
POTASSIUM SERPL-SCNC: 6.2 MMOL/L (ref 3.7–5.3)
POTASSIUM SERPL-SCNC: 6.3 MMOL/L (ref 3.7–5.3)
POTASSIUM SERPL-SCNC: 6.5 MMOL/L (ref 3.7–5.3)
PROT SERPL-MCNC: 5 G/DL (ref 6.6–8.7)
PROTHROMBIN TIME: 19.8 SEC (ref 11.7–14.9)
PROTHROMBIN TIME: 21.2 SEC (ref 11.7–14.9)
RBC # BLD AUTO: 3.06 M/UL (ref 3.95–5.11)
RBC # BLD AUTO: 3.23 M/UL (ref 3.95–5.11)
RBC # BLD AUTO: 3.51 M/UL (ref 3.95–5.11)
RBC # BLD AUTO: 3.53 M/UL (ref 3.95–5.11)
RBC # BLD AUTO: 3.64 M/UL (ref 3.95–5.11)
RBC # BLD AUTO: 3.69 M/UL (ref 3.95–5.11)
RBC # BLD AUTO: 4.06 M/UL (ref 3.95–5.11)
SAMPLE SITE: ABNORMAL
SODIUM BLD-SCNC: 150 MMOL/L (ref 138–146)
SODIUM BLD-SCNC: 155 MMOL/L (ref 138–146)
SODIUM BLD-SCNC: 155 MMOL/L (ref 138–146)
SODIUM SERPL-SCNC: 150 MMOL/L (ref 136–145)
SODIUM SERPL-SCNC: 152 MMOL/L (ref 136–145)
SODIUM SERPL-SCNC: 154 MMOL/L (ref 136–145)
TRANSFUSION STATUS: NORMAL
TROPONIN I SERPL HS-MCNC: 309 NG/L (ref 0–14)
TROPONIN I SERPL HS-MCNC: 449 NG/L (ref 0–14)
UNIT DIVISION: 0
UNIT ISSUE DATE/TIME: NORMAL
WBC OTHER # BLD: 16.6 K/UL (ref 3.5–11.3)
WBC OTHER # BLD: 17.6 K/UL (ref 3.5–11.3)
WBC OTHER # BLD: 18 K/UL (ref 3.5–11.3)
WBC OTHER # BLD: 18.8 K/UL (ref 3.5–11.3)
WBC OTHER # BLD: 19.2 K/UL (ref 3.5–11.3)
WBC OTHER # BLD: 21.9 K/UL (ref 3.5–11.3)
WBC OTHER # BLD: 25.6 K/UL (ref 3.5–11.3)

## 2024-06-01 PROCEDURE — 85610 PROTHROMBIN TIME: CPT

## 2024-06-01 PROCEDURE — 99291 CRITICAL CARE FIRST HOUR: CPT | Performed by: INTERNAL MEDICINE

## 2024-06-01 PROCEDURE — 93005 ELECTROCARDIOGRAM TRACING: CPT | Performed by: THORACIC SURGERY (CARDIOTHORACIC VASCULAR SURGERY)

## 2024-06-01 PROCEDURE — 80048 BASIC METABOLIC PNL TOTAL CA: CPT

## 2024-06-01 PROCEDURE — 2500000003 HC RX 250 WO HCPCS: Performed by: STUDENT IN AN ORGANIZED HEALTH CARE EDUCATION/TRAINING PROGRAM

## 2024-06-01 PROCEDURE — 82565 ASSAY OF CREATININE: CPT

## 2024-06-01 PROCEDURE — 99254 IP/OBS CNSLTJ NEW/EST MOD 60: CPT | Performed by: SURGERY

## 2024-06-01 PROCEDURE — 6360000002 HC RX W HCPCS: Performed by: STUDENT IN AN ORGANIZED HEALTH CARE EDUCATION/TRAINING PROGRAM

## 2024-06-01 PROCEDURE — 83605 ASSAY OF LACTIC ACID: CPT

## 2024-06-01 PROCEDURE — 2100000001 HC CVICU R&B

## 2024-06-01 PROCEDURE — 71045 X-RAY EXAM CHEST 1 VIEW: CPT

## 2024-06-01 PROCEDURE — 85014 HEMATOCRIT: CPT

## 2024-06-01 PROCEDURE — 80076 HEPATIC FUNCTION PANEL: CPT

## 2024-06-01 PROCEDURE — 6360000002 HC RX W HCPCS: Performed by: THORACIC SURGERY (CARDIOTHORACIC VASCULAR SURGERY)

## 2024-06-01 PROCEDURE — 2500000003 HC RX 250 WO HCPCS: Performed by: PHYSICIAN ASSISTANT

## 2024-06-01 PROCEDURE — 6370000000 HC RX 637 (ALT 250 FOR IP): Performed by: PHYSICIAN ASSISTANT

## 2024-06-01 PROCEDURE — P9041 ALBUMIN (HUMAN),5%, 50ML: HCPCS | Performed by: PHYSICIAN ASSISTANT

## 2024-06-01 PROCEDURE — 94761 N-INVAS EAR/PLS OXIMETRY MLT: CPT

## 2024-06-01 PROCEDURE — 82330 ASSAY OF CALCIUM: CPT

## 2024-06-01 PROCEDURE — 93005 ELECTROCARDIOGRAM TRACING: CPT | Performed by: STUDENT IN AN ORGANIZED HEALTH CARE EDUCATION/TRAINING PROGRAM

## 2024-06-01 PROCEDURE — 2500000003 HC RX 250 WO HCPCS: Performed by: INTERNAL MEDICINE

## 2024-06-01 PROCEDURE — 84132 ASSAY OF SERUM POTASSIUM: CPT

## 2024-06-01 PROCEDURE — 36415 COLL VENOUS BLD VENIPUNCTURE: CPT

## 2024-06-01 PROCEDURE — 2700000000 HC OXYGEN THERAPY PER DAY

## 2024-06-01 PROCEDURE — 83735 ASSAY OF MAGNESIUM: CPT

## 2024-06-01 PROCEDURE — 82947 ASSAY GLUCOSE BLOOD QUANT: CPT

## 2024-06-01 PROCEDURE — 82803 BLOOD GASES ANY COMBINATION: CPT

## 2024-06-01 PROCEDURE — 85520 HEPARIN ASSAY: CPT

## 2024-06-01 PROCEDURE — 88741 TRANSCUTANEOUS METHB: CPT

## 2024-06-01 PROCEDURE — 80051 ELECTROLYTE PANEL: CPT

## 2024-06-01 PROCEDURE — 83050 HGB METHEMOGLOBIN QUAN: CPT

## 2024-06-01 PROCEDURE — 84484 ASSAY OF TROPONIN QUANT: CPT

## 2024-06-01 PROCEDURE — 6360000002 HC RX W HCPCS: Performed by: ANESTHESIOLOGY

## 2024-06-01 PROCEDURE — 94003 VENT MGMT INPAT SUBQ DAY: CPT

## 2024-06-01 PROCEDURE — 2580000003 HC RX 258: Performed by: STUDENT IN AN ORGANIZED HEALTH CARE EDUCATION/TRAINING PROGRAM

## 2024-06-01 PROCEDURE — 84520 ASSAY OF UREA NITROGEN: CPT

## 2024-06-01 PROCEDURE — A4216 STERILE WATER/SALINE, 10 ML: HCPCS | Performed by: PHYSICIAN ASSISTANT

## 2024-06-01 PROCEDURE — 37799 UNLISTED PX VASCULAR SURGERY: CPT

## 2024-06-01 PROCEDURE — 6360000002 HC RX W HCPCS: Performed by: PHYSICIAN ASSISTANT

## 2024-06-01 PROCEDURE — 85347 COAGULATION TIME ACTIVATED: CPT

## 2024-06-01 PROCEDURE — 99223 1ST HOSP IP/OBS HIGH 75: CPT | Performed by: INTERNAL MEDICINE

## 2024-06-01 PROCEDURE — 85027 COMPLETE CBC AUTOMATED: CPT

## 2024-06-01 PROCEDURE — 2580000003 HC RX 258: Performed by: PHYSICIAN ASSISTANT

## 2024-06-01 RX ORDER — DEXTROSE MONOHYDRATE 25 G/50ML
25 INJECTION, SOLUTION INTRAVENOUS PRN
Status: DISCONTINUED | OUTPATIENT
Start: 2024-06-01 | End: 2024-06-02 | Stop reason: HOSPADM

## 2024-06-01 RX ORDER — CALCIUM GLUCONATE 20 MG/ML
2000 INJECTION, SOLUTION INTRAVENOUS PRN
Status: DISCONTINUED | OUTPATIENT
Start: 2024-06-01 | End: 2024-06-02 | Stop reason: HOSPADM

## 2024-06-01 RX ORDER — MIDAZOLAM HYDROCHLORIDE 2 MG/2ML
2 INJECTION, SOLUTION INTRAMUSCULAR; INTRAVENOUS ONCE
Status: COMPLETED | OUTPATIENT
Start: 2024-06-01 | End: 2024-06-01

## 2024-06-01 RX ORDER — ATROPINE SULFATE 0.1 MG/ML
INJECTION INTRAVENOUS
Status: DISCONTINUED
Start: 2024-06-01 | End: 2024-06-02 | Stop reason: HOSPADM

## 2024-06-01 RX ORDER — ALBUMIN, HUMAN INJ 5% 5 %
25 SOLUTION INTRAVENOUS ONCE
Status: COMPLETED | OUTPATIENT
Start: 2024-06-01 | End: 2024-06-01

## 2024-06-01 RX ORDER — PHENYLEPHRINE HCL IN 0.9% NACL 50MG/250ML
10-300 PLASTIC BAG, INJECTION (ML) INTRAVENOUS CONTINUOUS
Status: DISCONTINUED | OUTPATIENT
Start: 2024-06-01 | End: 2024-06-02 | Stop reason: HOSPADM

## 2024-06-01 RX ORDER — CALCIUM GLUCONATE 20 MG/ML
1000 INJECTION, SOLUTION INTRAVENOUS PRN
Status: DISCONTINUED | OUTPATIENT
Start: 2024-06-01 | End: 2024-06-02 | Stop reason: HOSPADM

## 2024-06-01 RX ORDER — GLYCOPYRROLATE 1 MG/5 ML
0.4 SYRINGE (ML) INTRAVENOUS ONCE
Status: DISCONTINUED | OUTPATIENT
Start: 2024-06-01 | End: 2024-06-01

## 2024-06-01 RX ORDER — CALCIUM CHLORIDE 100 MG/ML
INJECTION INTRAVENOUS; INTRAVENTRICULAR
Status: DISCONTINUED
Start: 2024-06-01 | End: 2024-06-02 | Stop reason: HOSPADM

## 2024-06-01 RX ORDER — CALCIUM CHLORIDE 100 MG/ML
1000 INJECTION INTRAVENOUS; INTRAVENTRICULAR ONCE
Status: COMPLETED | OUTPATIENT
Start: 2024-06-02 | End: 2024-06-01

## 2024-06-01 RX ORDER — GLYCOPYRROLATE 0.2 MG/ML
0.4 INJECTION INTRAMUSCULAR; INTRAVENOUS ONCE
Status: DISCONTINUED | OUTPATIENT
Start: 2024-06-02 | End: 2024-06-02 | Stop reason: HOSPADM

## 2024-06-01 RX ORDER — ATROPINE SULFATE 0.1 MG/ML
1 INJECTION INTRAVENOUS ONCE
Status: DISCONTINUED | OUTPATIENT
Start: 2024-06-02 | End: 2024-06-02 | Stop reason: HOSPADM

## 2024-06-01 RX ORDER — FUROSEMIDE 10 MG/ML
20 INJECTION INTRAMUSCULAR; INTRAVENOUS ONCE
Status: COMPLETED | OUTPATIENT
Start: 2024-06-01 | End: 2024-06-01

## 2024-06-01 RX ORDER — SODIUM POLYSTYRENE SULFONATE 15 G/60ML
15 SUSPENSION ORAL; RECTAL ONCE
Status: COMPLETED | OUTPATIENT
Start: 2024-06-01 | End: 2024-06-01

## 2024-06-01 RX ORDER — MIDAZOLAM HYDROCHLORIDE 2 MG/2ML
1 INJECTION, SOLUTION INTRAMUSCULAR; INTRAVENOUS ONCE
Status: COMPLETED | OUTPATIENT
Start: 2024-06-01 | End: 2024-06-01

## 2024-06-01 RX ORDER — SODIUM POLYSTYRENE SULFONATE 15 G/60ML
15 SUSPENSION ORAL; RECTAL ONCE
Status: DISCONTINUED | OUTPATIENT
Start: 2024-06-01 | End: 2024-06-02 | Stop reason: HOSPADM

## 2024-06-01 RX ADMIN — FUROSEMIDE 20 MG: 10 INJECTION, SOLUTION INTRAMUSCULAR; INTRAVENOUS at 19:35

## 2024-06-01 RX ADMIN — ALBUMIN (HUMAN) 25 G: 12.5 INJECTION, SOLUTION INTRAVENOUS at 00:50

## 2024-06-01 RX ADMIN — FENTANYL CITRATE 50 MCG: 50 INJECTION, SOLUTION INTRAMUSCULAR; INTRAVENOUS at 14:50

## 2024-06-01 RX ADMIN — LIDOCAINE HYDROCHLORIDE 0.5 MG/MIN: 4 INJECTION, SOLUTION INTRAVENOUS at 00:03

## 2024-06-01 RX ADMIN — SODIUM BICARBONATE 50 MEQ: 84 INJECTION, SOLUTION INTRAVENOUS at 22:34

## 2024-06-01 RX ADMIN — SODIUM BICARBONATE: 84 INJECTION, SOLUTION INTRAVENOUS at 00:58

## 2024-06-01 RX ADMIN — CALCIUM CHLORIDE 1000 MG: 100 INJECTION, SOLUTION INTRAVENOUS at 16:20

## 2024-06-01 RX ADMIN — DEXTROSE MONOHYDRATE: 100 INJECTION, SOLUTION INTRAVENOUS at 20:21

## 2024-06-01 RX ADMIN — ALBUMIN (HUMAN) 25 G: 12.5 INJECTION, SOLUTION INTRAVENOUS at 20:28

## 2024-06-01 RX ADMIN — SODIUM POLYSTYRENE SULFONATE 15 G: 15 SUSPENSION ORAL; RECTAL at 17:37

## 2024-06-01 RX ADMIN — POTASSIUM CHLORIDE 20 MEQ: 29.8 INJECTION, SOLUTION INTRAVENOUS at 01:36

## 2024-06-01 RX ADMIN — POLYETHYLENE GLYCOL 3350 17 G: 17 POWDER, FOR SOLUTION ORAL at 09:52

## 2024-06-01 RX ADMIN — INSULIN HUMAN 10 UNITS: 100 INJECTION, SOLUTION PARENTERAL at 17:58

## 2024-06-01 RX ADMIN — DEXTROSE MONOHYDRATE 25 G: 25 INJECTION, SOLUTION INTRAVENOUS at 17:52

## 2024-06-01 RX ADMIN — CALCIUM CHLORIDE 1000 MG: 100 INJECTION, SOLUTION INTRAVENOUS at 01:03

## 2024-06-01 RX ADMIN — MIDAZOLAM HYDROCHLORIDE 2 MG: 1 INJECTION, SOLUTION INTRAMUSCULAR; INTRAVENOUS at 13:20

## 2024-06-01 RX ADMIN — Medication 2000 MG: at 09:55

## 2024-06-01 RX ADMIN — FENTANYL CITRATE 50 MCG: 50 INJECTION, SOLUTION INTRAMUSCULAR; INTRAVENOUS at 13:15

## 2024-06-01 RX ADMIN — POTASSIUM CHLORIDE 20 MEQ: 29.8 INJECTION, SOLUTION INTRAVENOUS at 05:30

## 2024-06-01 RX ADMIN — POTASSIUM CHLORIDE 20 MEQ: 29.8 INJECTION, SOLUTION INTRAVENOUS at 04:19

## 2024-06-01 RX ADMIN — HEPARIN SODIUM AND DEXTROSE 12 UNITS/KG/HR: 10000; 5 INJECTION INTRAVENOUS at 00:19

## 2024-06-01 RX ADMIN — COLCHICINE 0.6 MG: 0.6 TABLET, FILM COATED ORAL at 09:51

## 2024-06-01 RX ADMIN — Medication 30 MCG/MIN: at 22:00

## 2024-06-01 RX ADMIN — Medication 2000 MG: at 16:20

## 2024-06-01 RX ADMIN — FAMOTIDINE 20 MG: 10 INJECTION, SOLUTION INTRAVENOUS at 09:50

## 2024-06-01 RX ADMIN — SENNOSIDES AND DOCUSATE SODIUM 1 TABLET: 50; 8.6 TABLET ORAL at 09:50

## 2024-06-01 RX ADMIN — IBUPROFEN 400 MG: 400 TABLET, FILM COATED ORAL at 14:00

## 2024-06-01 RX ADMIN — Medication 2000 MG: at 00:14

## 2024-06-01 RX ADMIN — DEXTROSE MONOHYDRATE 25 G: 25 INJECTION, SOLUTION INTRAVENOUS at 23:14

## 2024-06-01 RX ADMIN — MUPIROCIN: 20 OINTMENT TOPICAL at 09:51

## 2024-06-01 RX ADMIN — ALBUMIN (HUMAN) 25 G: 12.5 INJECTION, SOLUTION INTRAVENOUS at 21:48

## 2024-06-01 RX ADMIN — IBUPROFEN 400 MG: 400 TABLET, FILM COATED ORAL at 09:55

## 2024-06-01 RX ADMIN — POTASSIUM CHLORIDE 20 MEQ: 29.8 INJECTION, SOLUTION INTRAVENOUS at 02:42

## 2024-06-01 RX ADMIN — ALBUMIN (HUMAN) 25 G: 12.5 INJECTION, SOLUTION INTRAVENOUS at 17:43

## 2024-06-01 RX ADMIN — POTASSIUM CHLORIDE 20 MEQ: 29.8 INJECTION, SOLUTION INTRAVENOUS at 00:28

## 2024-06-01 RX ADMIN — Medication 0.5 MG/MIN: at 05:11

## 2024-06-01 RX ADMIN — SODIUM CHLORIDE 10 ML: 9 INJECTION INTRAMUSCULAR; INTRAVENOUS; SUBCUTANEOUS at 09:50

## 2024-06-01 RX ADMIN — MIDAZOLAM HYDROCHLORIDE 1 MG: 1 INJECTION, SOLUTION INTRAMUSCULAR; INTRAVENOUS at 13:10

## 2024-06-01 RX ADMIN — SODIUM CHLORIDE 1500 MG: 9 INJECTION, SOLUTION INTRAVENOUS at 10:01

## 2024-06-01 RX ADMIN — CALCIUM CHLORIDE INJECTION 1000 MG: 100 INJECTION, SOLUTION INTRAVENOUS at 23:51

## 2024-06-01 RX ADMIN — ACETAMINOPHEN 650 MG: 325 TABLET ORAL at 15:06

## 2024-06-01 RX ADMIN — Medication 7 MG/HR: at 11:06

## 2024-06-01 RX ADMIN — ALBUMIN (HUMAN) 25 G: 12.5 INJECTION, SOLUTION INTRAVENOUS at 21:03

## 2024-06-01 ASSESSMENT — PULMONARY FUNCTION TESTS
PIF_VALUE: 26
PIF_VALUE: 23
PIF_VALUE: 22
PIF_VALUE: 29

## 2024-06-01 NOTE — PLAN OF CARE
Problem: Respiratory - Adult  Goal: Achieves optimal ventilation and oxygenation  6/1/2024 0840 by Lynnette Scott RCP  Outcome: Progressing   MOBILIZE SECRETIONS    [x]   ASSESS BREATH SOUNDS  [x]   ASSESS SPUTUM PRODUCTION  [x]   COUGH AND DEEP BREATHING  []  IMPLEMENT SECRETION MANAGEMENT PROTOCOL  [x]   PATIENT EDUCATION AS NEEDED  PROVIDE ADEQUATE OXYGENATION WITH ACCEPTABLE SP02/ABG'S    [x]  IDENTIFY APPROPRIATE OXYGEN THERAPY  [x]   MONITOR SP02/ABG'S AS NEEDED   [x]   PATIENT EDUCATION AS NEEDED

## 2024-06-01 NOTE — PLAN OF CARE
Problem: Discharge Planning  Goal: Discharge to home or other facility with appropriate resources  Outcome: Progressing     Problem: Chronic Conditions and Co-morbidities  Goal: Patient's chronic conditions and co-morbidity symptoms are monitored and maintained or improved  Outcome: Progressing     Problem: Safety - Adult  Goal: Free from fall injury  Outcome: Progressing     Problem: ABCDS Injury Assessment  Goal: Absence of physical injury  Outcome: Progressing     Problem: Respiratory - Adult  Goal: Achieves optimal ventilation and oxygenation  Outcome: Progressing     Problem: Safety - Medical Restraint  Goal: Remains free of injury from restraints (Restraint for Interference with Medical Device)  Description: INTERVENTIONS:  1. Determine that other, less restrictive measures have been tried or would not be effective before applying the restraint  2. Evaluate the patient's condition at the time of restraint application  3. Inform patient/family regarding the reason for restraint  4. Q2H: Monitor safety, psychosocial status, comfort, nutrition and hydration  Outcome: Progressing  Flowsheets  Taken 6/1/2024 0200  Remains free of injury from restraints (restraint for interference with medical device): Every 2 hours: Monitor safety, psychosocial status, comfort, nutrition and hydration  Taken 6/1/2024 0032  Remains free of injury from restraints (restraint for interference with medical device):   Every 2 hours: Monitor safety, psychosocial status, comfort, nutrition and hydration   Inform patient/family regarding the reason for restraint   Evaluate the patient's condition at the time of restraint application   Determine that other, less restrictive measures have been tried or would not be effective before applying the restraint

## 2024-06-02 ENCOUNTER — APPOINTMENT (OUTPATIENT)
Dept: GENERAL RADIOLOGY | Age: 64
DRG: 215 | End: 2024-06-02
Attending: THORACIC SURGERY (CARDIOTHORACIC VASCULAR SURGERY)
Payer: COMMERCIAL

## 2024-06-02 VITALS
RESPIRATION RATE: 8 BRPM | OXYGEN SATURATION: 92 % | SYSTOLIC BLOOD PRESSURE: 96 MMHG | TEMPERATURE: 101.3 F | WEIGHT: 234.35 LBS | BODY MASS INDEX: 41.52 KG/M2 | HEIGHT: 63 IN | DIASTOLIC BLOOD PRESSURE: 80 MMHG

## 2024-06-02 LAB
ABO/RH: NORMAL
ANTIBODY SCREEN: NEGATIVE
ARM BAND NUMBER: NORMAL
BLOOD BANK BLOOD PRODUCT EXPIRATION DATE: NORMAL
BLOOD BANK DISPENSE STATUS: NORMAL
BLOOD BANK ISBT PRODUCT BLOOD TYPE: 5100
BLOOD BANK PRODUCT CODE: NORMAL
BLOOD BANK SAMPLE EXPIRATION: NORMAL
BLOOD BANK UNIT TYPE AND RH: NORMAL
BPU ID: NORMAL
COMPONENT: NORMAL
CROSSMATCH RESULT: NORMAL
GLUCOSE BLD-MCNC: 226 MG/DL (ref 65–105)
GLUCOSE BLD-MCNC: 30 MG/DL (ref 65–105)
GLUCOSE BLD-MCNC: 40 MG/DL (ref 65–105)
GLUCOSE BLD-MCNC: 53 MG/DL (ref 65–105)
GLUCOSE BLD-MCNC: 62 MG/DL (ref 65–105)
GLUCOSE BLD-MCNC: 78 MG/DL (ref 65–105)
TRANSFUSION STATUS: NORMAL
UNIT DIVISION: 0
UNIT ISSUE DATE/TIME: NORMAL

## 2024-06-02 PROCEDURE — 6360000002 HC RX W HCPCS: Performed by: PHYSICIAN ASSISTANT

## 2024-06-02 RX ORDER — MORPHINE SULFATE 4 MG/ML
4 INJECTION, SOLUTION INTRAMUSCULAR; INTRAVENOUS
Status: DISCONTINUED | OUTPATIENT
Start: 2024-06-02 | End: 2024-06-02 | Stop reason: HOSPADM

## 2024-06-02 RX ORDER — ATROPINE SULFATE 10 MG/ML
2 SOLUTION/ DROPS OPHTHALMIC EVERY 4 HOURS PRN
Status: DISCONTINUED | OUTPATIENT
Start: 2024-06-02 | End: 2024-06-02 | Stop reason: HOSPADM

## 2024-06-02 RX ORDER — LORAZEPAM 2 MG/ML
1 INJECTION INTRAMUSCULAR EVERY 4 HOURS
Status: DISCONTINUED | OUTPATIENT
Start: 2024-06-02 | End: 2024-06-02 | Stop reason: HOSPADM

## 2024-06-02 RX ORDER — MORPHINE SULFATE 20 MG/ML
5 SOLUTION ORAL
Status: DISCONTINUED | OUTPATIENT
Start: 2024-06-02 | End: 2024-06-02

## 2024-06-02 RX ADMIN — MORPHINE SULFATE 4 MG: 4 INJECTION INTRAVENOUS at 00:28

## 2024-06-02 RX ADMIN — Medication 1 MG: at 00:28

## 2024-06-02 NOTE — PLAN OF CARE
Problem: Safety - Medical Restraint  Goal: Remains free of injury from restraints (Restraint for Interference with Medical Device)  Outcome: Completed

## 2024-06-02 NOTE — PLAN OF CARE
Despite being off all pressor support since early this morning, this afternoon GONZALEZ Masters informed me that the patient was hypotensive (SBP in the 70's) and Epinephrine was started. At that time I was also informed that she no longer had a PT or DP pulse by Doppler and the leg was cool and looked mottled. Vascular surgery was called to assess the leg. Dr. Mena was also called for possible removal of the Impella in the right leg to the left leg.    Dr. Rivas was in contact with Dr. Estrada and Dr. Mena.     As per Dr. Estrada: Option of placing new impella catheter on left side and removing the right one followed by thrombectomy and fasciotomy may help, but after discussion with cardiology and issues with placing initial impella device with cardiac arrest I do not feel she will survive this endeavor. Because as of now she is on 4 pressors and on full support with Impella with SBP in 50s, and removing impella for a few minutes will likely lead to cardiac arrest. At this time after long discussion she would like her mother to be DNR and is going to discuss with family regarding comfort care.     As per Dr Mena: Dr. Estrada and myself met in person with daughter and other family members. We discussed extensively current status with her being on full Impella support at P9 and in addition requiring 4 pressors. Dr. Estrada addressed concern about right cold leg and surgical options. I discussed possibility of taking impella out and reinserting it via left femoral approach. I explained to the family that doing this is extremely risky and she will decompensate immediately. Also patient will likely not tolerate open thrombectomy and fasciotomy of right leg. Daughter agreed to change code status to DNR-CCA. Daughter wants to update other family members and she is considering palliation.     Spoke with the patients daughter around midnight who was requesting that the code status be changed to DNR-CC. Comfort care orders were

## 2024-06-02 NOTE — PLAN OF CARE
Dr. Estrada and myself met in person with daughter and other family members. We discussed extensively current status with her being on full Impella support at P9 and in addition requiring 4 pressors. Dr. Estrada addressed concern about right cold leg and surgical options. I discussed possibility of taking impella out and reinserting it via left femoral approach. I explained to the family that doing this is extremely risky and she will decompensate immediately. Also patient will likely not tolerate open thrombectomy and fasciotomy of right leg. Daughter agreed to change code status to DNR-CCA. Daughter wants to update other family members and she is considering palliation.    Carlos Mena MD

## 2024-06-02 NOTE — CONSULTS
VASCULAR SURGERY  CONSULT NOTE    PATIENT: Lula Ayon           : 1960  MRN: [id number]  ADMISSION DATE: 2024  6:21 AM   TODAY'S DATE: 24     ATTENDING PHYSICIAN: Nando Lemon MD  CONSULTING PHYSICIAN: Dr. Estrada    REASON FOR CONSULT:  \"Impella- lost signals on leg with impella\"    HISTORY OF PRESENT ILLNESS:  Patient is a 63-year-old female who is status post pericardial window, Converge cardiac ablation and Left vats on .  Patient became hemodynamically unstable and required multiple rounds of CPR.  She was taken to the Cath Lab and Impella device was placed in the right femoral artery.  Patient was brought back to CV ICU and bedside RN noticed loss of Doppler signals of the right lower extremity.  Vascular surgery was consulted.  On exam patient is on 3 pressors and currently on a heparin drip.  She is intubated and sedated so she is unable to participate in exam.  Patient has a chronic history of A-fib for which the convergent ablation procedure was done.  Other medical history includes CHF, COPD, cardiac ablation, multiple cardioversions, type 2 diabetes, hypertension.  Patient was taking Coumadin at home for A-fib.    On exam the patient's right foot is cooler than the left.  There is a present popliteal signal and 1+ popliteal pulse but absent DP and PT signals on the right.  There is a right femoral signal.  The left lower extremity has a femoral, popliteal, DP and PT signal.  Impella is in place in the right femoral artery.    PAST MEDICAL HISTORY:        Diagnosis Date    Abnormal Pap smear     Had colposcopy    Allergy     PCN    Anemia     Anticoagulated on Coumadin     Atrial fibrillation (HCC) 2014    Blood transfusion 2011    4 units    CAD (coronary artery disease)     CHF (congestive heart failure) (HCC)     COPD (chronic obstructive pulmonary disease) (HCC)     Pt denies    Eczema     Examination of participant in clinical trial 2011    end 
Critical Care Admit/Consult Note   [facility]      Patient - Lula Ayon   MRN -  [id number]   Acct # - [id number]   - 1960      Date of Admission -  2024  6:21 AM  Date of evaluation -  2024  1009/1009-01   Hospital Day - 1            ADMIT/CONSULT DETAILS     Reason for Admit/Consult   Hypoxic respiratory failure      Consulting Service/Physician   Consulting - Nando Lemon MD  Primary Care Physician - Ellen Manriquez PA-C HPI   The patient is a 63 y.o. female who is post surgical ablation , pericardial window . She went into cardiogenic shock and had emergent cath . She received multiple blood products post surgery .   Has impella in place . During cath had vfib with rosc after 20 min   She is on 90% fio2 and peep of 10 tv 470 ml   Plat 28            Past Medical History         Diagnosis Date    Abnormal Pap smear     Had colposcopy    Allergy     PCN    Anemia     Anticoagulated on Coumadin     Atrial fibrillation ([facility]) 2014    Blood transfusion 2011    4 units    CAD (coronary artery disease)     CHF (congestive heart failure) ([facility])     COPD (chronic obstructive pulmonary disease) ([facility])     Pt denies    Eczema     Examination of participant in clinical trial 2011    end date 3/25/15    Former smoker     QUIT     Headache 2014    Heart disease 2011    History of atrioventricular jenni ablation     History of blood transfusion     Hyperlipidemia     Hypertension     MI, old     Neuropathy     Osteoarthritis     HANDS    PAF (paroxysmal atrial fibrillation) ([facility])     s/p cardioversion Dec 2015     Primary osteoarthritis of both hands 2019    STEMI (ST elevation myocardial infarction) ([facility]) 2016    INFERIOR / OCCLUDED RCA    Type 2 diabetes mellitus without complication, without long-term current use of insulin ([facility]) 2017    Under care of team 2024    PCP: Ellen Manriquez PA-C, [place] 
Nephrology Consult Note    Reason for Consult:  Acute kidney injury, oliguria, hyperkalemia, metabolic acidosis  Requesting Physician:  CT Surgery team    Chief Complaint:  Unable to voice a chief complaint intubated and sedated on the ventilator  History Obtained From:  electronic medical record    History of Present Illness:              This is a 63 y.o. female who we are asked to see this evening, 6/1/2024, with shock, on multiple vasopressors with abnormal labs and low urine output. The patient was referred to the cardiothoracic surgery team for surgical ablation because of severe shortness of breath and fatigue in the setting of atrial fibrillation. The patient has a complicated medical history including primary hypertension, coronary artery disease, chronic anticoagulation, anemia of chronic disease, COPD, eczema, type 2 diabetes mellitus and osteoarthritis.  The patient also has a history of anemia with blood transfusion, previous tobacco use quitting in 2018, penicillin allergy, acute myocardial infarction, including a STEMI with inferior/occluded RCA,  and hyperlipidemia.     Medications are reviewed. Allergies to penicillin, lisinopril, amiodarone and Protonix.     Socially, the patient is  per the chart.  She smoked cigarettes from 1984 through 2018. Family medical history is documented below.      On 5/31/2024 the patient underwent pericardial window creation, convert ablation and left VATS.  Anatomy was suitable for atriaclip Melisa Edwards is planned. 1 the patient came out of the operating room she was noted to be in nature fibrillation with bradycardia with heart rate going down the low mid 30s with pauses of dopamine drip was started with heart rate in the high 60s to low 70s.  KOBE drainage was sent a significant amount of dark red blood and was brought to the ICU at about 1 PM.  Drainage was about 1900 mL by 7 PM and patient had been started on Levophed, vasopressin and epinephrine drips.  
  PROTIME 16.1* 17.7*   INR 1.3 1.5     APTT:  Recent Labs     05/31/24  1735 05/31/24  1926   APTT 27.8 32.4     CARDIAC ENZYMES:No results for input(s): \"CKTOTAL\", \"CKMB\", \"CKMBINDEX\", \"TROPONINI\" in the last 72 hours.    ASSESSMENT:  Cardiogenic shock   CAD s/p multiple stents as detailed above   S/p surgical afib ablation with pericardial window creation on 5/31/2024   Anemia   COPD  DM    RECOMMENDATIONS:  Will proceed with LHC and possible impalla placement.        MD Yolande.  Fellow, cardiovascular disease   [facility], [place], [place]        Attending Physician Statement:    I have discussed the care of  Lula Ayon , including pertinent history and exam findings, with the Cardiology fellow/resident.     I have seen and examined the patient and the key elements of all parts of the encounter have been performed by me. I agree with the assessment, plan and orders as documented by the fellow/resident, after I modified exam findings and plan of treatments, and the final version is my approved version of the assessment.     Additional Comments: Proceed with emergent cardiac cath with consideration of Impella placement due to cardiogenic shock.    Carlos Mena MD

## 2024-06-03 LAB
BUN BLD-MCNC: 26 MG/DL (ref 8–26)
CA-I BLD-SCNC: 1.02 MMOL/L (ref 1.15–1.33)
CA-I BLD-SCNC: 1.13 MMOL/L (ref 1.15–1.33)
CHLORIDE BLD-SCNC: 113 MMOL/L (ref 98–107)
CHLORIDE BLD-SCNC: 117 MMOL/L (ref 98–107)
CO2 BLD CALC-SCNC: 19 MMOL/L (ref 22–30)
CRITICAL ACTION: NORMAL
CRITICAL NOTIFICATION DATE/TIME: NORMAL
CRITICAL VALUE READ BACK: YES
EGFR, POC: 22 ML/MIN/1.73M2
GLUCOSE BLD-MCNC: 22 MG/DL (ref 74–100)
GLUCOSE BLD-MCNC: 311 MG/DL (ref 74–100)
HCT VFR BLD AUTO: 24 % (ref 36–46)
HCT VFR BLD AUTO: 33 % (ref 36–46)
NEGATIVE BASE EXCESS, ART: 4.5 MMOL/L (ref 0–2)
NEGATIVE BASE EXCESS, ART: 9.1 MMOL/L (ref 0–2)
NONINV COLON CA DNA+OCC BLD SCRN STL QL: POSITIVE
POC ANION GAP: 23 MMOL/L (ref 7–16)
POC ANION GAP: 5 MMOL/L (ref 7–16)
POC CREATININE: 2.4 MG/DL (ref 0.51–1.19)
POC HCO3: 18.5 MMOL/L (ref 21–28)
POC HCO3: 24.5 MMOL/L (ref 21–28)
POC HEMOGLOBIN (CALC): 11.1 G/DL (ref 12–16)
POC HEMOGLOBIN (CALC): 8.1 G/DL (ref 12–16)
POC LACTIC ACID: 10.5 MMOL/L (ref 0.56–1.39)
POC O2 SATURATION: 57.9 % (ref 94–98)
POC O2 SATURATION: 97.8 % (ref 94–98)
POC PCO2: 48.2 MM HG (ref 35–48)
POC PCO2: 65 MM HG (ref 35–48)
POC PH: 7.18 (ref 7.35–7.45)
POC PH: 7.19 (ref 7.35–7.45)
POC PO2: 127.7 MM HG (ref 83–108)
POC PO2: 37.3 MM HG (ref 83–108)
POTASSIUM BLD-SCNC: 2.5 MMOL/L (ref 3.5–4.5)
POTASSIUM BLD-SCNC: 6.3 MMOL/L (ref 3.5–5.1)
SODIUM BLD-SCNC: 140 MMOL/L (ref 138–146)
SODIUM BLD-SCNC: 160 MMOL/L (ref 138–146)

## 2024-06-03 NOTE — OP NOTE
[id number] [place] [address]                            OPERATIVE REPORT      PATIENT NAME: SNEHA BUTT             : 1960  MED REC NO: [id number]                         ROOM: 1009  ACCOUNT NO: [id number]                       ADMIT DATE: 2024  PROVIDER: Nando Lemon MD      DATE OF PROCEDURE:  2024    SURGEON:  Nando Lemon MD    OTHER ASSISTANTS:  Included David Carrasco PA-C.    PREOPERATIVE DIAGNOSIS:  Chronic long-standing atrial fibrillation, status post multiple cardioversions.    POSTOPERATIVE DIAGNOSIS:  Chronic long-standing atrial fibrillation, status post multiple cardioversions.    PROCEDURES:  Pericardial window creation with EPi-Sense ablation of the posterior left atrial wall (convergent ablation), aborted left VAT port placement.    COMPLICATIONS:  None.    CONDITION:  Stable.    DISPOSITION:  To CV-ICU.    ESTIMATED BLOOD LOSS:  Not applicable.    ANESTHESIA:  General endotracheal with Dr. Sarabia.    INDICATIONS FOR SURGERY:  The patient is a 63-year-old woman who has undergone 12 previous cardioversions for rapid RVR and atrial fibrillation and has suffered with a very high burden of atrial fibrillation for years.  She learned about the possibility of a convergent procedure for ablating the posterior left atrium as a possible opportunity to have better control of her atrial fibrillation and she want to explore this option.  We discussed this and offered this procedure to her as an option for potentially having better quality of life.  She was taken to surgery with the consent of she and her family on 2024.    FINDINGS OF SURGERY:  The pericardial window creation was routine.  The EPi-Sense AtriCure convergent catheter was successfully deployed over 25 lesions to the posterior left atrial wall, which was sufficiently ablated.  There was no bleeding at the conclusion of the

## 2024-06-04 LAB
EKG ATRIAL RATE: 267 BPM
EKG ATRIAL RATE: 39 BPM
EKG Q-T INTERVAL: 564 MS
EKG Q-T INTERVAL: 644 MS
EKG QRS DURATION: 130 MS
EKG QRS DURATION: 98 MS
EKG QTC CALCULATION (BAZETT): 476 MS
EKG QTC CALCULATION (BAZETT): 587 MS
EKG R AXIS: 127 DEGREES
EKG R AXIS: 94 DEGREES
EKG T AXIS: -53 DEGREES
EKG T AXIS: -8 DEGREES
EKG VENTRICULAR RATE: 43 BPM
EKG VENTRICULAR RATE: 50 BPM

## (undated) DEVICE — DRAIN,WOUND,ROUND,24FR,5/16",FULL-FLUTED: Brand: MEDLINE

## (undated) DEVICE — 3M™ IOBAN™ 2 ANTIMICROBIAL INCISE DRAPE 6650EZ: Brand: IOBAN™ 2

## (undated) DEVICE — DISSECTOR LAP DIA5MM BLNT TIP ENDOPATH

## (undated) DEVICE — SURGICAL PROCEDURE TRAY CRD CATH SVMMC

## (undated) DEVICE — ELECTRODE PT RET AD L9FT HI MOIST COND ADH HYDRGEL CORDED

## (undated) DEVICE — CATHETER ANGIO 6FR L100CM DIA0.056IN FR4 CRV VASC ACCS EXPO

## (undated) DEVICE — ANGIOGRAPHIC CATHETER: Brand: EXPO™

## (undated) DEVICE — DRESSING TRNSPAR W5XL4.5IN FLM SHT SEMIPERMEABLE WIND

## (undated) DEVICE — NEEDLE SPINAL 22GA L3.5IN SPINOCAN

## (undated) DEVICE — CATH BLLN ANGIO 2.50X12MM SC EUPHORA RX

## (undated) DEVICE — SOLUTION IV 250ML 0.9% SOD CHL PH 5 INJ USP VIAFLX PLAS

## (undated) DEVICE — DRAIN SURG SGL COLL PT TB FOR ATS BG OASIS

## (undated) DEVICE — INTRODUCER SHTH 5FR CANN L11CM GWIRE 0.038IN STD KINK

## (undated) DEVICE — GLOVE SURG SZ 65 L12IN FNGR THK79MIL GRN LTX FREE

## (undated) DEVICE — SCISSOR SURG METZ CRV TIP

## (undated) DEVICE — APPLICATOR MEDICATED 10.5 CC SOLUTION HI LT ORNG CHLORAPREP

## (undated) DEVICE — BLADE ES L6IN ELASTOMERIC COAT EXT DURABLE BEND UPTO 90DEG

## (undated) DEVICE — GUIDEWIRE 35/260/FC/PTFE/3J: Brand: GUIDEWIRE

## (undated) DEVICE — SUTURE VICRYL + SZ 2-0 L27IN ABSRB CLR CT-1 1/2 CIR TAPERCUT VCP259H

## (undated) DEVICE — 500ML,PRESSURE INFUSER W/STOPCOCK: Brand: MEDLINE

## (undated) DEVICE — PRESSURE TUBING: Brand: TRUWAVE

## (undated) DEVICE — SUTURE PERMAHAND SZ 0 L30IN NONABSORBABLE BLK L26MM SH 1/2 K834H

## (undated) DEVICE — PROVE COVER: Brand: UNBRANDED

## (undated) DEVICE — KIT VEN DRNGE VAC ACCSRY PERF VAVD STOCK W/ SPEC TRAP

## (undated) DEVICE — Device

## (undated) DEVICE — DEVICE COAG GUIDED EPI-SENSE ST W/ 30MM CANNULA

## (undated) DEVICE — 9F INTROFLEX MAX BARRIER: Brand: MEDLINE

## (undated) DEVICE — GLOVE SURG SZ 7 CRM LTX FREE POLYISOPRENE POLYMER BEAD ANTI

## (undated) DEVICE — POSITIONER,HEAD,MULTIRING,36CS: Brand: MEDLINE

## (undated) DEVICE — SOLUTION IRRIG 3000ML 0.9% SOD CHL USP UROMATIC PLAS CONT

## (undated) DEVICE — CONNECTOR TBNG WHT PLAS SUCT STR 5IN1 LTWT W/ M CONN

## (undated) DEVICE — SET CATH 20GA L1.5IN RAD ART POLYUR RADPQ W/ INTEGR 0.018IN

## (undated) DEVICE — TROCAR: Brand: KII FIOS FIRST ENTRY

## (undated) DEVICE — PERCLOSE PROGLIDE™ SUTURE-MEDIATED CLOSURE SYSTEM: Brand: PERCLOSE PROGLIDE™

## (undated) DEVICE — BAND COMPR L24CM REG CLR PLAS HEMSTAT EXT HK AND LOOP RETEN

## (undated) DEVICE — BLADE,CARBON-STEEL,15,STRL,DISPOSABLE: Brand: MEDLINE

## (undated) DEVICE — GOWN,AURORA,NONREINFORCED,LARGE: Brand: MEDLINE

## (undated) DEVICE — CATH BLLN ANGIO 3.25X8MM NC EUPHORIA RX

## (undated) DEVICE — SET EXTENSION 50IN W/4-WAY SC: Brand: MEDLINE INDUSTRIES, INC.

## (undated) DEVICE — CATHETER GUID 6FR L100CM GRN PTFE XBLAD3.5 TRUELUMEN HYBRID

## (undated) DEVICE — STRAP ARMBRD W1.5XL32IN FOAM STR YET SFT W/ HK AND LOOP

## (undated) DEVICE — PAD GEN USE BORDERED ADH 14IN 2IN AND 12IN 4IN GZ UNIV ST

## (undated) DEVICE — SOLUTION ANTIFOG VIS SYS CLEARIFY LAPSCP

## (undated) DEVICE — DRAIN SURG 19FR 100% SIL RADPQ RND CHN FULL FLUT

## (undated) DEVICE — Z DISCONTINUED USE 2220240 SUTURE VCRL SZ 2-0 L27IN ABSRB VLT L26MM UR-6 5/8 CIR J602H

## (undated) DEVICE — COPILOT BLEEDBACK CONTROL VALVE: Brand: COPILOT

## (undated) DEVICE — GLOVE SURG SZ 7.5 L11.73IN FNGR THK9.8MIL STRW LTX POLYMER

## (undated) DEVICE — ADULT (STERILE), RADIOTRANSLUCENT ELEMENT: Brand: DEFIBRILLATION ELECTRODES

## (undated) DEVICE — STERNUM BLADE, OFFSET (32.0 X 0.8 X 6.3MM)

## (undated) DEVICE — COVER,LIGHT HANDLE,FLX,2/PK: Brand: MEDLINE INDUSTRIES, INC.

## (undated) DEVICE — PROTECTOR ULN NRV PUR FOAM HK LOOP STRP ANATOMICALLY

## (undated) DEVICE — SPONGE LAP W18XL18IN WHT COT 4 PLY FLD STRUNG RADPQ DISP ST 2 PER PACK

## (undated) DEVICE — Device: Brand: PROWATERFLEX

## (undated) DEVICE — SUTURE PDS II SZ 0 L27IN ABSRB VLT L36MM CT-1 1/2 CIR Z340H

## (undated) DEVICE — PRESSURE DISPLAY SET 45IN COIL DIAPH STOPCOCK M LUERLOCK

## (undated) DEVICE — GOWN,SIRUS,NONRNF,SETINSLV,2XL,18/CS: Brand: MEDLINE

## (undated) DEVICE — PACK PROCEDURE SURG OPN HRT

## (undated) DEVICE — DRESSING TRNSPAR W2XL2.75IN FLM SHT SEMIPERMEABLE WIND

## (undated) DEVICE — CATHETER THRMDIL 7FR L110CM STD PULM ART 4 INFUS LUMN SWN

## (undated) DEVICE — SYRINGE MED 10ML LUERLOCK TIP W/O SFTY DISP

## (undated) DEVICE — PENCIL ES L3M BTTN SWCH HOLSTER W/ BLDE ELECTRD EDGE

## (undated) DEVICE — SPONGE DRN W4XL4IN RAYON/POLYESTER 6 PLY NONWOVEN PRECUT 2 PER PK

## (undated) DEVICE — SMOKE EVACUATION TUBING WITH 8 IN INTEGRAL WAND AND SPONGE GUARD: Brand: BUFFALO FILTER

## (undated) DEVICE — EVACUATOR SURG 100CC SIL BLB SUCT RESVR FOR CLS WND DRNGE

## (undated) DEVICE — PRESSURE MONITORING SET: Brand: TRUWAVE

## (undated) DEVICE — SEALER LAP L37CM MARYLAND JAW OPN NANO COAT MULTIFUNCTIONAL

## (undated) DEVICE — PRESSURE MONITORING LINES 4FT. (122CM) M/F: Brand: PRESSURE MONITORING LINES

## (undated) DEVICE — KIT,ANTI FOG,W/SPONGE & FLUID,SOFT PACK: Brand: MEDLINE

## (undated) DEVICE — DRAPE, SLUSH XL, 44X66, STERILE: Brand: MEDLINE

## (undated) DEVICE — 1LYRTR 16FR10ML100%SILTMPS SNP: Brand: MEDLINE INDUSTRIES, INC.

## (undated) DEVICE — 48" PROBE COVER W/GEL, ULTRASOUND, STERILE: Brand: SITE-RITE

## (undated) DEVICE — TROCAR: Brand: KII® SLEEVE

## (undated) DEVICE — CATHETER DIAG 6FR L110CM ID0.056IN TRILON VENT PGTL 145

## (undated) DEVICE — 3M™ IOBAN™ 2 ANTIMICROBIAL INCISE DRAPE 6651EZ: Brand: IOBAN™ 2

## (undated) DEVICE — LIQUIBAND RAPID ADHESIVE 36/CS 0.8ML: Brand: MEDLINE

## (undated) DEVICE — GLIDESHEATH SLENDER STAINLESS STEEL KIT: Brand: GLIDESHEATH SLENDER

## (undated) DEVICE — RUNTHROUGH NS EXTRA FLOPPY PTCA GUIDEWIRE: Brand: RUNTHROUGH